# Patient Record
Sex: MALE | Race: WHITE | Employment: OTHER | URBAN - METROPOLITAN AREA
[De-identification: names, ages, dates, MRNs, and addresses within clinical notes are randomized per-mention and may not be internally consistent; named-entity substitution may affect disease eponyms.]

---

## 2019-05-09 ENCOUNTER — HOSPITAL ENCOUNTER (EMERGENCY)
Age: 59
Discharge: HOME | End: 2019-05-09
Attending: EMERGENCY MEDICINE
Payer: MEDICAID

## 2019-05-09 ENCOUNTER — APPOINTMENT (OUTPATIENT)
Dept: GENERAL RADIOLOGY | Age: 59
End: 2019-05-09
Payer: MEDICAID

## 2019-05-09 VITALS
HEIGHT: 70 IN | RESPIRATION RATE: 18 BRPM | DIASTOLIC BLOOD PRESSURE: 95 MMHG | OXYGEN SATURATION: 94 % | BODY MASS INDEX: 30.64 KG/M2 | HEART RATE: 85 BPM | SYSTOLIC BLOOD PRESSURE: 144 MMHG | WEIGHT: 214 LBS | TEMPERATURE: 98.2 F

## 2019-05-09 DIAGNOSIS — Z72.0 TOBACCO ABUSE: ICD-10-CM

## 2019-05-09 DIAGNOSIS — J40 BRONCHITIS: Primary | ICD-10-CM

## 2019-05-09 DIAGNOSIS — R07.9 CHEST PAIN, UNSPECIFIED TYPE: ICD-10-CM

## 2019-05-09 DIAGNOSIS — J44.1 COPD EXACERBATION (HCC): ICD-10-CM

## 2019-05-09 LAB
A/G RATIO: 1.8 (ref 1.1–2.2)
ALBUMIN SERPL-MCNC: 4.4 G/DL (ref 3.4–5)
ALP BLD-CCNC: 93 U/L (ref 40–129)
ALT SERPL-CCNC: 22 U/L (ref 10–40)
ANION GAP SERPL CALCULATED.3IONS-SCNC: 13 MMOL/L (ref 3–16)
AST SERPL-CCNC: 18 U/L (ref 15–37)
BASOPHILS ABSOLUTE: 0.1 K/UL (ref 0–0.2)
BASOPHILS RELATIVE PERCENT: 1.1 %
BILIRUB SERPL-MCNC: <0.2 MG/DL (ref 0–1)
BUN BLDV-MCNC: 10 MG/DL (ref 7–20)
CALCIUM SERPL-MCNC: 9.5 MG/DL (ref 8.3–10.6)
CHLORIDE BLD-SCNC: 101 MMOL/L (ref 99–110)
CO2: 27 MMOL/L (ref 21–32)
CREAT SERPL-MCNC: 0.9 MG/DL (ref 0.9–1.3)
EKG ATRIAL RATE: 82 BPM
EKG DIAGNOSIS: NORMAL
EKG P AXIS: 48 DEGREES
EKG P-R INTERVAL: 140 MS
EKG Q-T INTERVAL: 372 MS
EKG QRS DURATION: 78 MS
EKG QTC CALCULATION (BAZETT): 434 MS
EKG R AXIS: -7 DEGREES
EKG T AXIS: 17 DEGREES
EKG VENTRICULAR RATE: 82 BPM
EOSINOPHILS ABSOLUTE: 0.1 K/UL (ref 0–0.6)
EOSINOPHILS RELATIVE PERCENT: 1.1 %
GFR AFRICAN AMERICAN: >60
GFR NON-AFRICAN AMERICAN: >60
GLOBULIN: 2.5 G/DL
GLUCOSE BLD-MCNC: 118 MG/DL (ref 70–99)
HCT VFR BLD CALC: 44.9 % (ref 40.5–52.5)
HEMOGLOBIN: 15.5 G/DL (ref 13.5–17.5)
LYMPHOCYTES ABSOLUTE: 3.8 K/UL (ref 1–5.1)
LYMPHOCYTES RELATIVE PERCENT: 31.3 %
MCH RBC QN AUTO: 33.1 PG (ref 26–34)
MCHC RBC AUTO-ENTMCNC: 34.5 G/DL (ref 31–36)
MCV RBC AUTO: 95.8 FL (ref 80–100)
MONOCYTES ABSOLUTE: 0.7 K/UL (ref 0–1.3)
MONOCYTES RELATIVE PERCENT: 5.8 %
NEUTROPHILS ABSOLUTE: 7.4 K/UL (ref 1.7–7.7)
NEUTROPHILS RELATIVE PERCENT: 60.7 %
PDW BLD-RTO: 13.3 % (ref 12.4–15.4)
PLATELET # BLD: 282 K/UL (ref 135–450)
PMV BLD AUTO: 9.2 FL (ref 5–10.5)
POTASSIUM SERPL-SCNC: 3.9 MMOL/L (ref 3.5–5.1)
RBC # BLD: 4.68 M/UL (ref 4.2–5.9)
SODIUM BLD-SCNC: 141 MMOL/L (ref 136–145)
TOTAL PROTEIN: 6.9 G/DL (ref 6.4–8.2)
TROPONIN: <0.01 NG/ML
WBC # BLD: 12.2 K/UL (ref 4–11)

## 2019-05-09 PROCEDURE — 94640 AIRWAY INHALATION TREATMENT: CPT

## 2019-05-09 PROCEDURE — 96374 THER/PROPH/DIAG INJ IV PUSH: CPT

## 2019-05-09 PROCEDURE — 6370000000 HC RX 637 (ALT 250 FOR IP): Performed by: PHYSICIAN ASSISTANT

## 2019-05-09 PROCEDURE — 6360000002 HC RX W HCPCS: Performed by: PHYSICIAN ASSISTANT

## 2019-05-09 PROCEDURE — 99285 EMERGENCY DEPT VISIT HI MDM: CPT

## 2019-05-09 PROCEDURE — 93005 ELECTROCARDIOGRAM TRACING: CPT | Performed by: EMERGENCY MEDICINE

## 2019-05-09 PROCEDURE — 85025 COMPLETE CBC W/AUTO DIFF WBC: CPT

## 2019-05-09 PROCEDURE — 71046 X-RAY EXAM CHEST 2 VIEWS: CPT

## 2019-05-09 PROCEDURE — 93010 ELECTROCARDIOGRAM REPORT: CPT | Performed by: INTERNAL MEDICINE

## 2019-05-09 PROCEDURE — 84484 ASSAY OF TROPONIN QUANT: CPT

## 2019-05-09 PROCEDURE — 80053 COMPREHEN METABOLIC PANEL: CPT

## 2019-05-09 RX ORDER — KETOROLAC TROMETHAMINE 30 MG/ML
15 INJECTION, SOLUTION INTRAMUSCULAR; INTRAVENOUS ONCE
Status: COMPLETED | OUTPATIENT
Start: 2019-05-09 | End: 2019-05-09

## 2019-05-09 RX ORDER — DOXYCYCLINE HYCLATE 100 MG
100 TABLET ORAL 2 TIMES DAILY
Qty: 14 TABLET | Refills: 0 | Status: SHIPPED | OUTPATIENT
Start: 2019-05-09 | End: 2019-05-16

## 2019-05-09 RX ORDER — IPRATROPIUM BROMIDE AND ALBUTEROL SULFATE 2.5; .5 MG/3ML; MG/3ML
1 SOLUTION RESPIRATORY (INHALATION) ONCE
Status: COMPLETED | OUTPATIENT
Start: 2019-05-09 | End: 2019-05-09

## 2019-05-09 RX ORDER — PREDNISONE 50 MG/1
50 TABLET ORAL DAILY
Qty: 4 TABLET | Refills: 0 | Status: SHIPPED | OUTPATIENT
Start: 2019-05-09 | End: 2019-05-13

## 2019-05-09 RX ORDER — ALBUTEROL SULFATE 90 UG/1
2 AEROSOL, METERED RESPIRATORY (INHALATION) EVERY 6 HOURS PRN
Qty: 1 INHALER | Refills: 1 | Status: SHIPPED | OUTPATIENT
Start: 2019-05-09

## 2019-05-09 RX ORDER — PREDNISONE 20 MG/1
60 TABLET ORAL ONCE
Status: COMPLETED | OUTPATIENT
Start: 2019-05-09 | End: 2019-05-09

## 2019-05-09 RX ADMIN — PREDNISONE 60 MG: 20 TABLET ORAL at 14:37

## 2019-05-09 RX ADMIN — KETOROLAC TROMETHAMINE 15 MG: 30 INJECTION, SOLUTION INTRAMUSCULAR at 14:37

## 2019-05-09 RX ADMIN — IPRATROPIUM BROMIDE AND ALBUTEROL SULFATE 1 AMPULE: .5; 3 SOLUTION RESPIRATORY (INHALATION) at 14:18

## 2019-05-09 ASSESSMENT — PAIN SCALES - GENERAL: PAINLEVEL_OUTOF10: 2

## 2019-05-09 ASSESSMENT — PAIN DESCRIPTION - ONSET: ONSET: ON-GOING

## 2019-05-09 ASSESSMENT — ENCOUNTER SYMPTOMS
WHEEZING: 1
NAUSEA: 0
VOMITING: 0
ABDOMINAL PAIN: 0
COUGH: 1
BACK PAIN: 1
SHORTNESS OF BREATH: 0
COLOR CHANGE: 0
EYES NEGATIVE: 1

## 2019-05-09 ASSESSMENT — PAIN DESCRIPTION - FREQUENCY: FREQUENCY: CONTINUOUS

## 2019-05-09 ASSESSMENT — PAIN DESCRIPTION - DESCRIPTORS: DESCRIPTORS: ACHING

## 2019-05-09 ASSESSMENT — PAIN DESCRIPTION - PAIN TYPE: TYPE: ACUTE PAIN

## 2019-05-09 ASSESSMENT — PAIN DESCRIPTION - PROGRESSION: CLINICAL_PROGRESSION: NOT CHANGED

## 2019-05-09 ASSESSMENT — PAIN DESCRIPTION - LOCATION: LOCATION: CHEST

## 2019-05-09 NOTE — DISCHARGE INSTR - COC
Continuity of Care Form    Patient Name: Shannan Nascimento   :  1960  MRN:  [id number]    Admit date:  2019  Discharge date:  ***    Code Status Order: Prior   Advance Directives:     Admitting Physician:  No admitting provider for patient encounter. PCP: Hetal Rosenberg MD    Discharging Nurse: [facility] Unit/Room#:   Discharging Unit Phone Number: ***    Emergency Contact:   Extended Emergency Contact Information  Primary Emergency Contact: [facility] Phone: [phone]  Relation: Parent    Past Surgical History:  History reviewed. No pertinent surgical history. Immunization History: There is no immunization history on file for this patient. Active Problems: There is no problem list on file for this patient.       Isolation/Infection:   Isolation          No Isolation            Nurse Assessment:  Last Vital Signs: BP (!) 143/94   Pulse 86   Temp 98.2 °F (36.8 °C) (Oral)   Resp 24   Ht 5' 10\" (1.778 m)   Wt 214 lb (97.1 kg)   SpO2 91%   BMI 30.71 kg/m²     Last documented pain score (0-10 scale): Pain Level: 2  Last Weight:   Wt Readings from Last 1 Encounters:   19 214 lb (97.1 kg)     Mental Status:  {IP PT MENTAL STATUS:19680}    IV Access:  { MEGAN IV ACCESS:087218504}    Nursing Mobility/ADLs:  Walking   {CHP DME RQVP:347067357}  Transfer  {CHP DME RTCS:933630464}  Bathing  {CHP DME LBFI:453542938}  Dressing  {CHP DME TWTW:478781659}  Toileting  {CHP DME RLWW:983275329}  Feeding  {CHP DME RBQM:473077029}  Med Admin  {P DME LQKT:144551031}  Med Delivery   { MEGAN MED Delivery:161535021}    Wound Care Documentation and Therapy:        Elimination:  Continence:   · Bowel: {YES / MN:12281}  · Bladder: {YES / AY:30058}  Urinary Catheter: {Urinary Catheter:887690514}   Colostomy/Ileostomy/Ileal Conduit: {YES / IV:98890}       Date of Last BM: ***  No intake or output data in the 24 hours ending 19 1521  No intake/output data recorded.     Safety Concerns:     [address] [facility] Safety Concerns:162209700}    Impairments/Disabilities:      [facility] Impairments/Disabilities:826649846}    Nutrition Therapy:  Current Nutrition Therapy:   [address] [facility] Diet List:901049321}    Routes of Feeding: {CHP DME Other Feedings:267509954}  Liquids: {Slp liquid thickness:11553}  Daily Fluid Restriction: {CHP DME Yes amt example:092523141}  Last Modified Barium Swallow with Video (Video Swallowing Test): {Done Not Done MSQQ:716128781}    Treatments at the Time of Hospital Discharge:   Respiratory Treatments: ***  Oxygen Therapy:  {Therapy; copd oxygen:11181}  Ventilator:    { CC Vent VEVC:256910776}    Rehab Therapies: {THERAPEUTIC INTERVENTION:0172234287}  Weight Bearing Status/Restrictions: [address]  Weight Bearin}  Other Medical Equipment (for information only, NOT a DME order):  {EQUIPMENT:606848661}  Other Treatments: ***    Patient's personal belongings (please select all that are sent with patient):  {P DME Belongings:912160149}    RN SIGNATURE:  {Esignature:175898607}    CASE MANAGEMENT/SOCIAL WORK SECTION    Inpatient Status Date: ***    Readmission Risk Assessment Score:  Readmission Risk              Risk of Unplanned Readmission:        0           Discharging to Facility/ Agency   · Name:   · Address:  · Phone:  · Fax:    Dialysis Facility (if applicable)   · Name:  · Address:  · Dialysis Schedule:  · Phone:  · Fax:    / signature: {Esignature:185288774}    PHYSICIAN SECTION    Prognosis: {Prognosis:6875107590}    Condition at Discharge: [address] Patient Condition:947840066}    Rehab Potential (if transferring to Rehab): {Prognosis:6242828766}    Recommended Labs or Other Treatments After Discharge: ***    Physician Certification: I certify the above information and transfer of Renetta Lowery  is necessary for the continuing treatment of the diagnosis listed and that he requires {Admit to Appropriate Level of Care:55763} for {GREATER/LESS:925337011} 30 days.      Update Admission H&P: {CHP DME Changes in ZQUCS:711616323}    PHYSICIAN SIGNATURE:  {Esignature:351520856}

## 2019-05-09 NOTE — ED PROVIDER NOTES
[facility]  ED  eMERGENCY dEPARTMENT eNCOUnter        Pt Name: Orquidea Rizzo  MRN: [id number]  Armsmichaelgflinda 1960  Date of evaluation: 5/9/2019  Provider: Harlan Blanco PA-C  PCP: Juana Pettit MD  ED Attending: Franci Carnes MD    History provided by the patient    CHIEF COMPLAINT:     Chief Complaint   Patient presents with    Chest Pain     having intermittent pain over a month, today pain radiating into left arm       HISTORY OF PRESENT ILLNESS:      Orquidea Rizzo is a 63 y.o. male who arrives to the ED by private vehicle. He was reportedly sent over by urgent care. The patient states he has been sick for the last several days with congestion and a productive cough. He additionally has been experiencing some pain to his left upper back, shoulder and left chest.  Upon describing his symptoms to urgent care he was sent here secondary to concerns for possible cardiac involvement. The patient has not had a fever or chills. He is a smoker--smoking 2 packs per day. He does not have a formal diagnosis of COPD but has had to use inhalers in the past when he has been acutely ill. The patient's left upper back pain, left shoulder pain and left chest pain is exacerbated with movement. He can reproduce the pain to palpate the areas as well. Nursing Notes were reviewed     REVIEW OF SYSTEMS:     Review of Systems   Constitutional: Negative for activity change, appetite change, chills and fever. HENT: Positive for congestion. Eyes: Negative. Respiratory: Positive for cough and wheezing. Negative for shortness of breath. Cardiovascular: Positive for chest pain. Negative for palpitations. Gastrointestinal: Negative for abdominal pain, nausea and vomiting. Genitourinary: Negative. Musculoskeletal: Positive for back pain. Negative for gait problem, joint swelling and neck pain. Skin: Negative for color change.    Neurological: Negative for dizziness, weakness and headaches. All other systems reviewed and are negative. Exceptas noted above in the ROS, all other systems were reviewed and negative. PAST MEDICAL HISTORY:     Past Medical History:   Diagnosis Date    Hypertension          SURGICAL HISTORY:    History reviewed. No pertinent surgical history. CURRENT MEDICATIONS:       Previous Medications    No medications on file         ALLERGIES:    Patient has no known allergies. FAMILY HISTORY:     History reviewed. No pertinent family history. SOCIAL HISTORY:       Social History     Socioeconomic History    Marital status: Single     Spouse name: None    Number of children: None    Years of education: None    Highest education level: None   Occupational History    None   Social Needs    Financial resource strain: None    Food insecurity:     Worry: None     Inability: None    Transportation needs:     Medical: None     Non-medical: None   Tobacco Use    Smoking status: Current Every Day Smoker     Packs/day: 2.00    Smokeless tobacco: Never Used   Substance and Sexual Activity    Alcohol use:  Yes     Alcohol/week: 25.2 oz     Types: 42 Cans of beer per week     Comment: hasn't drank in 3 days    Drug use: No    Sexual activity: None   Lifestyle    Physical activity:     Days per week: None     Minutes per session: None    Stress: None   Relationships    Social connections:     Talks on phone: None     Gets together: None     Attends Religion service: None     Active member of club or organization: None     Attends meetings of clubs or organizations: None     Relationship status: None    Intimate partner violence:     Fear of current or ex partner: None     Emotionally abused: None     Physically abused: None     Forced sexual activity: None   Other Topics Concern    None   Social History Narrative    None       SCREENINGS:    [place] Coma Scale  Eye Opening: Spontaneous  Best Verbal Response: Oriented  Best Motor Response: Obeys commands  Jessica Coma Scale Score: 15        PHYSICAL EXAM:       ED Triage Vitals [05/09/19 1350]   BP Temp Temp Source Pulse Resp SpO2 Height Weight   (!) 136/99 98.2 °F (36.8 °C) Oral 82 14 94 % 5' 10\" (1.778 m) 214 lb (97.1 kg)       Physical Exam    CONSTITUTIONAL: Awake and alert. Cooperative. Well-developed. Well-nourished. Non-toxic. No acute distress. HENT: Normocephalic. Atraumatic. External ears normal, without discharge. No nasal discharge. Oropharynx clear. Mucous membranes moist.  EYES: Conjunctiva non-injected. No scleral icterus. PERRL. EOM's grossly intact. NECK: Supple. Normal ROM. CARDIOVASCULAR: RRR. No Murmer. Intact distal pulses. PULMONARY/CHEST WALL: Effort normal. No tachypnea. Lungs with scattered wheezes and rhonchi throughout to ausculation. Reproducible pain to palpate the left upper chest wall. No soft tissue swelling or crepitus  ABDOMEN: Normal BS. Soft. Nondistended. No tenderness to palpate. No guarding. /ANORECTAL: Not assessed  MUSKULOSKELETAL: Neck: Mild pain to the left upper back and over the left shoulder. Pain is reproducible with palpation and range the left shoulder. No soft tissue swelling or crepitus. Normal ROM. No acute deformities. No edema. SKIN: Warm and dry. No rash. NEUROLOGICAL: Alert and oriented x 3. GCS 15. CN II-XII grossly intact. Strength is 5/5 in all extremities and sensation is intact. Normal gait.    PSYCHIATRIC: Normal affect        DIAGNOSTICRESULTS:     LABS:    Results for orders placed or performed during the hospital encounter of 05/09/19   CBC Auto Differential   Result Value Ref Range    WBC 12.2 (H) 4.0 - 11.0 K/uL    RBC 4.68 4.20 - 5.90 M/uL    Hemoglobin 15.5 13.5 - 17.5 g/dL    Hematocrit 44.9 40.5 - 52.5 %    MCV 95.8 80.0 - 100.0 fL    MCH 33.1 26.0 - 34.0 pg    MCHC 34.5 31.0 - 36.0 g/dL    RDW 13.3 12.4 - 15.4 %    Platelets 465 584 - 853 K/uL    MPV 9.2 5.0 - 10.5 fL    Neutrophils % 60.7 %    Lymphocytes % 31.3 % Monocytes % 5.8 %    Eosinophils % 1.1 %    Basophils % 1.1 %    Neutrophils # 7.4 1.7 - 7.7 K/uL    Lymphocytes # 3.8 1.0 - 5.1 K/uL    Monocytes # 0.7 0.0 - 1.3 K/uL    Eosinophils # 0.1 0.0 - 0.6 K/uL    Basophils # 0.1 0.0 - 0.2 K/uL   Comprehensive Metabolic Panel   Result Value Ref Range    Sodium 141 136 - 145 mmol/L    Potassium 3.9 3.5 - 5.1 mmol/L    Chloride 101 99 - 110 mmol/L    CO2 27 21 - 32 mmol/L    Anion Gap 13 3 - 16    Glucose 118 (H) 70 - 99 mg/dL    BUN 10 7 - 20 mg/dL    CREATININE 0.9 0.9 - 1.3 mg/dL    GFR Non-African American >60 >60    GFR African American >60 >60    Calcium 9.5 8.3 - 10.6 mg/dL    Total Protein 6.9 6.4 - 8.2 g/dL    Alb 4.4 3.4 - 5.0 g/dL    Albumin/Globulin Ratio 1.8 1.1 - 2.2    Total Bilirubin <0.2 0.0 - 1.0 mg/dL    Alkaline Phosphatase 93 40 - 129 U/L    ALT 22 10 - 40 U/L    AST 18 15 - 37 U/L    Globulin 2.5 g/dL   Troponin   Result Value Ref Range    Troponin <0.01 <0.01 ng/mL   EKG 12 Lead   Result Value Ref Range    Ventricular Rate 82 BPM    Atrial Rate 82 BPM    P-R Interval 140 ms    QRS Duration 78 ms    Q-T Interval 372 ms    QTc Calculation (Bazett) 434 ms    P Axis 48 degrees    R Axis -7 degrees    T Axis 17 degrees    Diagnosis       Normal sinus rhythmPossible Left atrial enlargementBorderline ECGNo previous ECGs available         RADIOLOGY:  All x-ray studies areviewed/reviewed by me. Formal interpretations per the radiologist are as follows:      Xr Chest Standard (2 Vw)    Result Date: 5/9/2019  EXAMINATION: TWO XRAY VIEWS OF THE CHEST 5/9/2019 2:08 pm COMPARISON: None. HISTORY: ORDERING SYSTEM PROVIDED HISTORY: pain TECHNOLOGIST PROVIDED HISTORY: Reason for exam:->pain Ordering Physician Provided Reason for Exam: pain Acuity: Acute Type of Exam: Initial FINDINGS: The lungs are without acute focal process. There is no effusion or pneumothorax. The cardiomediastinal silhouette is without acute process.  The osseous structures are without acute process. No acute process. EKG: The Ekg interpreted by me in the absence of a cardiologist shows. normal sinus rhythm with a rate of 82  Axis is   Normal  QTc is  within an acceptable range  Intervals and Durations areunremarkable. No specific ST-T wave changes appreciated. No evidence of acute ischemia. See also interpretation by Taz López MD.      PROCEDURES:   N/A    CRITICAL CARE TIME:       None      CONSULTS:  None      EMERGENCY DEPARTMENT COURSE and DIFFERENTIAL DIAGNOSIS/MDM:   Vitals:    Vitals:    05/09/19 1419 05/09/19 1442 05/09/19 1515 05/09/19 1523   BP:  (!) 143/94 (!) 144/95    Pulse:  86 85    Resp: 26 24 18    Temp:       TempSrc:       SpO2: 92% 91% 95% 94%   Weight:       Height:           Patient was given the following medications:  Medications   ipratropium-albuterol (DUONEB) nebulizer solution 1 ampule (1 ampule Inhalation Given 5/9/19 1418)   predniSONE (DELTASONE) tablet 60 mg (60 mg Oral Given 5/9/19 1437)   ketorolac (TORADOL) injection 15 mg (15 mg Intravenous Given 5/9/19 1437)         Patient was evaluated by both myself and Taz López MD.  This patient is here reporting upper and lower respiratory symptoms for several days. He describes a productive cough. On physical exam he has some wheezing and rhonchi. The patient was sent over secondary to chest pain component. I am able to reproduce this chest pain in palpating his chest wall, upper back and shoulder. A CBC shows a white count of 12.2 but is otherwise normal.  His CMP is unremarkable. Troponin is negative. EKG shows sinus rhythm without ischemic changes. Two-view chest x-ray is normal.  The patient is given Toradol IV for the musculoskeletal pain in his chest, shoulder and back. He is given a DuoNeb treatment and prednisone orally. Overall, the patient's clinical picture is consistent with a bronchitis. The pain he describes as musculoskeletal pain.   I don't see an indication for hospitalization. He has primary care follow-up. Because he is a smoker with a productive cough will cover him with doxycycline and will additionally prescribe him a albuterol inhaler and prednisone. I estimate there is LOW risk for PNEUMONIA, PNEUMOTHORAX, PULMONARY EMBOLISM, ACUTE CORONARY SYNDROME, OR THORACIC AORTIC DISSECTION, thus I consider the discharge disposition reasonable. Maylin Contreras and I have discussed the diagnosis and risks, and we agree with discharging home to follow-up with their primary doctor. We also discussed returning to the Emergency Department immediately if new or worsening symptoms occur. We have discussed the symptoms which are most concerning (e.g., bloody sputum, fever, worsening pain or shortness of breath, vomiting) that necessitate immediate return. FINAL IMPRESSION:      1. Bronchitis    2. COPD exacerbation (HCC)    3. Chest pain, unspecified type    4.  Tobacco abuse          DISPOSITION/PLAN:   DISPOSITION Decision To Discharge      PATIENT REFERRED TO:  Savannah Rabago, [address] [facility] [address]  [address] [phone]    Schedule an appointment as soon as possible for a visit       [facility]  ED  [address]  Go to   If symptoms worsen      DISCHARGE MEDICATIONS:  New Prescriptions    ALBUTEROL SULFATE HFA (VENTOLIN HFA) 108 (90 BASE) MCG/ACT INHALER    Inhale 2 puffs into the lungs every 6 hours as needed for Wheezing    DOXYCYCLINE HYCLATE (VIBRA-TABS) 100 MG TABLET    Take 1 tablet by mouth 2 times daily for 7 days    PREDNISONE (DELTASONE) 50 MG TABLET    Take 1 tablet by mouth daily for 4 days                  (Please note thatportions of this note were completed with a voice recognition program.  Efforts were made to edit the dictations, but occasionally words are mis-transcribed.)    Ang Link PA-C (electronicallysigned)             Modesta Esqueda, [place]  05/09/19 8407

## 2019-05-09 NOTE — ED NOTES
Findings and plan of care discussed at bedside by provider. Pt verbalized understanding of plan of care, pt discharged with all instructions reviewed and any prescriptions as applicable. All belongings in hand including discharge instructions, prescriptions x3, and personal belongings. Pt in no acute distress.      Kristina Ansari RN  05/09/19 3188

## 2019-05-09 NOTE — ED PROVIDER NOTES
I independently evaluated and obtained a history and physical on Eliseo Bailey. All diagnostic, treatment, and disposition assistants were made to myself in conjunction the advanced practice provider. For further details of this patient's emergency department encounter, please see the advanced practice provider's documentation. History: The patient is a 60-year-old male who presents with 3-4 days of congestion and productive cough. He isn't every day smoker. He denies any known coronary artery disease. He denies any known history of COPD but does report that he has been prescribed inhalers when he has had breast infections in the past.  He reports his left chest pain is aching in nature, worse with movement of his left arm or pressing on his left chest.  He denies any diaphoresis or syncope. Physician Exam: Pleasant middle-aged  male in no acute distress. Mildly prolonged expiratory phase of breathing and scant wheezes. Chest pain completely reproducible to palpation. Regular rate and rhythm. Intact distal pulses. MDM:    The Ekg interpreted by me shows  normal sinus rhythm with a rate of 82  Axis is   Normal  QTc is  434ms  Intervals and Durations are unremarkable. ST Segments: normal  No previous EKG available for comparison    I have considered ACS however the patient's symptoms are very atypical, have been ongoing for multiple days with no signs of ischemia on EKG and an undetectable troponin, and a completely reproducible to palpation. I suspect likely bronchitis and chest pain from upper respiratory infection and coughing. Do not suspect PE at this time. I feel the patient is appropriate for steroids, inhalers, antibiotics, and outpatient follow-up. Strict ER return precautions given for any new or worsening symptoms. The patient expresses understanding and agreement with this plan.     I estimate there is low risk for pericardial tamponade, pneumothorax, pulmonary embolism, acute coronary syndrome or thoracic aortic dissection, thus I consider the discharge disposition reasonable. We have discussed the diagnosis and risks, and we agree with discharging home to follow-up with their primary doctor. We also discussed returning to the Emergency Department immediately if new or worsening symptoms occur. We have discussed the symptoms which are most concerning (e.g., bloody sputum, fever, worsening pain or shortenss of breath, vomiting) that necessitate immediate return. FINAL IMPRESSION      1. Bronchitis    2. COPD exacerbation (HCC)    3. Chest pain, unspecified type    4.  Tobacco abuse             Lauren Herron MD  05/10/19 2353

## 2021-03-25 ENCOUNTER — APPOINTMENT (OUTPATIENT)
Dept: GENERAL RADIOLOGY | Age: 61
DRG: 130 | End: 2021-03-25
Payer: MEDICAID

## 2021-03-25 ENCOUNTER — HOSPITAL ENCOUNTER (INPATIENT)
Age: 61
LOS: 16 days | Discharge: HOME HEALTH | DRG: 130 | End: 2021-04-10
Attending: EMERGENCY MEDICINE | Admitting: INTERNAL MEDICINE
Payer: MEDICAID

## 2021-03-25 DIAGNOSIS — J96.01 ACUTE RESPIRATORY FAILURE WITH HYPOXIA AND HYPERCAPNIA (HCC): ICD-10-CM

## 2021-03-25 DIAGNOSIS — J44.1 COPD EXACERBATION (HCC): Primary | ICD-10-CM

## 2021-03-25 DIAGNOSIS — R05.9 COUGH: ICD-10-CM

## 2021-03-25 DIAGNOSIS — J96.02 ACUTE RESPIRATORY FAILURE WITH HYPOXIA AND HYPERCAPNIA (HCC): ICD-10-CM

## 2021-03-25 PROBLEM — F10.939 ALCOHOL WITHDRAWAL (HCC): Status: ACTIVE | Noted: 2021-03-25

## 2021-03-25 LAB
A/G RATIO: 1.3 (ref 1.1–2.2)
ALBUMIN SERPL-MCNC: 4.2 G/DL (ref 3.4–5)
ALP BLD-CCNC: 108 U/L (ref 40–129)
ALT SERPL-CCNC: 20 U/L (ref 10–40)
ANION GAP SERPL CALCULATED.3IONS-SCNC: 6 MMOL/L (ref 3–16)
AST SERPL-CCNC: 17 U/L (ref 15–37)
BASE EXCESS ARTERIAL: 1.4 MMOL/L (ref -3–3)
BASE EXCESS VENOUS: 4 MMOL/L (ref -3–3)
BASOPHILS ABSOLUTE: 0.1 K/UL (ref 0–0.2)
BASOPHILS RELATIVE PERCENT: 0.9 %
BILIRUB SERPL-MCNC: 0.3 MG/DL (ref 0–1)
BUN BLDV-MCNC: 13 MG/DL (ref 7–20)
CALCIUM IONIZED: 1.12 MMOL/L (ref 1.12–1.32)
CALCIUM SERPL-MCNC: 9.4 MG/DL (ref 8.3–10.6)
CARBOXYHEMOGLOBIN ARTERIAL: 2.8 % (ref 0–1.5)
CARBOXYHEMOGLOBIN: 9.3 % (ref 0–1.5)
CHLORIDE BLD-SCNC: 98 MMOL/L (ref 99–110)
CO2: 38 MMOL/L (ref 21–32)
CREAT SERPL-MCNC: 0.7 MG/DL (ref 0.9–1.3)
EKG ATRIAL RATE: 94 BPM
EKG DIAGNOSIS: NORMAL
EKG P AXIS: 69 DEGREES
EKG P-R INTERVAL: 130 MS
EKG Q-T INTERVAL: 344 MS
EKG QRS DURATION: 74 MS
EKG QTC CALCULATION (BAZETT): 430 MS
EKG R AXIS: 19 DEGREES
EKG T AXIS: 28 DEGREES
EKG VENTRICULAR RATE: 94 BPM
EOSINOPHILS ABSOLUTE: 0.1 K/UL (ref 0–0.6)
EOSINOPHILS RELATIVE PERCENT: 0.5 %
GFR AFRICAN AMERICAN: >60
GFR NON-AFRICAN AMERICAN: >60
GLOBULIN: 3.2 G/DL
GLUCOSE BLD-MCNC: 129 MG/DL (ref 70–99)
GLUCOSE BLD-MCNC: 137 MG/DL (ref 70–99)
GLUCOSE BLD-MCNC: 138 MG/DL (ref 70–99)
HCO3 ARTERIAL: 32.9 MMOL/L (ref 21–29)
HCO3 VENOUS: 35.3 MMOL/L (ref 23–29)
HCT VFR BLD CALC: 47 % (ref 40.5–52.5)
HEMOGLOBIN, ART, EXTENDED: 16.6 G/DL (ref 13.5–17.5)
HEMOGLOBIN: 15.2 G/DL (ref 13.5–17.5)
LYMPHOCYTES ABSOLUTE: 1.5 K/UL (ref 1–5.1)
LYMPHOCYTES RELATIVE PERCENT: 14 %
MCH RBC QN AUTO: 31.6 PG (ref 26–34)
MCHC RBC AUTO-ENTMCNC: 32.4 G/DL (ref 31–36)
MCV RBC AUTO: 97.6 FL (ref 80–100)
METHEMOGLOBIN ARTERIAL: 0.5 %
METHEMOGLOBIN VENOUS: 0.4 %
MONOCYTES ABSOLUTE: 0.7 K/UL (ref 0–1.3)
MONOCYTES RELATIVE PERCENT: 6.4 %
NEUTROPHILS ABSOLUTE: 8.2 K/UL (ref 1.7–7.7)
NEUTROPHILS RELATIVE PERCENT: 78.2 %
O2 CONTENT ARTERIAL: 22 ML/DL
O2 CONTENT, VEN: 20 VOL %
O2 SAT, ARTERIAL: 98.2 %
O2 SAT, VEN: 91 %
O2 THERAPY: ABNORMAL
O2 THERAPY: ABNORMAL
PCO2 ARTERIAL: 85.8 MMHG (ref 35–45)
PCO2, VEN: 85.9 MMHG (ref 40–50)
PDW BLD-RTO: 14.5 % (ref 12.4–15.4)
PERFORMED ON: ABNORMAL
PERFORMED ON: ABNORMAL
PH ARTERIAL: 7.2 (ref 7.35–7.45)
PH VENOUS: 7.23 (ref 7.35–7.45)
PH VENOUS: 7.25 (ref 7.35–7.45)
PLATELET # BLD: 264 K/UL (ref 135–450)
PMV BLD AUTO: 8 FL (ref 5–10.5)
PO2 ARTERIAL: 139.2 MMHG (ref 75–108)
PO2, VEN: 68.1 MMHG (ref 25–40)
POTASSIUM REFLEX MAGNESIUM: 3.9 MMOL/L (ref 3.5–5.1)
PRO-BNP: 635 PG/ML (ref 0–124)
PROCALCITONIN: 0.06 NG/ML (ref 0–0.15)
RBC # BLD: 4.81 M/UL (ref 4.2–5.9)
SARS-COV-2, NAAT: NOT DETECTED
SODIUM BLD-SCNC: 142 MMOL/L (ref 136–145)
TCO2 ARTERIAL: 35.5 MMOL/L
TCO2 CALC VENOUS: 38 MMOL/L
TOTAL PROTEIN: 7.4 G/DL (ref 6.4–8.2)
TROPONIN: <0.01 NG/ML
WBC # BLD: 10.5 K/UL (ref 4–11)

## 2021-03-25 PROCEDURE — 71045 X-RAY EXAM CHEST 1 VIEW: CPT

## 2021-03-25 PROCEDURE — 2580000003 HC RX 258: Performed by: INTERNAL MEDICINE

## 2021-03-25 PROCEDURE — 84484 ASSAY OF TROPONIN QUANT: CPT

## 2021-03-25 PROCEDURE — 94644 CONT INHLJ TX 1ST HOUR: CPT

## 2021-03-25 PROCEDURE — 94640 AIRWAY INHALATION TREATMENT: CPT

## 2021-03-25 PROCEDURE — 6370000000 HC RX 637 (ALT 250 FOR IP): Performed by: INTERNAL MEDICINE

## 2021-03-25 PROCEDURE — 83880 ASSAY OF NATRIURETIC PEPTIDE: CPT

## 2021-03-25 PROCEDURE — 94761 N-INVAS EAR/PLS OXIMETRY MLT: CPT

## 2021-03-25 PROCEDURE — 87635 SARS-COV-2 COVID-19 AMP PRB: CPT

## 2021-03-25 PROCEDURE — 80053 COMPREHEN METABOLIC PANEL: CPT

## 2021-03-25 PROCEDURE — 0BH17EZ INSERTION OF ENDOTRACHEAL AIRWAY INTO TRACHEA, VIA NATURAL OR ARTIFICIAL OPENING: ICD-10-PCS | Performed by: INTERNAL MEDICINE

## 2021-03-25 PROCEDURE — 94002 VENT MGMT INPAT INIT DAY: CPT

## 2021-03-25 PROCEDURE — 5A1955Z RESPIRATORY VENTILATION, GREATER THAN 96 CONSECUTIVE HOURS: ICD-10-PCS | Performed by: INTERNAL MEDICINE

## 2021-03-25 PROCEDURE — 36556 INSERT NON-TUNNEL CV CATH: CPT

## 2021-03-25 PROCEDURE — 82330 ASSAY OF CALCIUM: CPT

## 2021-03-25 PROCEDURE — 83735 ASSAY OF MAGNESIUM: CPT

## 2021-03-25 PROCEDURE — 36600 WITHDRAWAL OF ARTERIAL BLOOD: CPT

## 2021-03-25 PROCEDURE — 6370000000 HC RX 637 (ALT 250 FOR IP)

## 2021-03-25 PROCEDURE — 2700000000 HC OXYGEN THERAPY PER DAY

## 2021-03-25 PROCEDURE — 82803 BLOOD GASES ANY COMBINATION: CPT

## 2021-03-25 PROCEDURE — 6360000002 HC RX W HCPCS: Performed by: INTERNAL MEDICINE

## 2021-03-25 PROCEDURE — 2580000003 HC RX 258: Performed by: EMERGENCY MEDICINE

## 2021-03-25 PROCEDURE — 99284 EMERGENCY DEPT VISIT MOD MDM: CPT

## 2021-03-25 PROCEDURE — 84145 PROCALCITONIN (PCT): CPT

## 2021-03-25 PROCEDURE — 74018 RADEX ABDOMEN 1 VIEW: CPT

## 2021-03-25 PROCEDURE — 93010 ELECTROCARDIOGRAM REPORT: CPT | Performed by: INTERNAL MEDICINE

## 2021-03-25 PROCEDURE — 85025 COMPLETE CBC W/AUTO DIFF WBC: CPT

## 2021-03-25 PROCEDURE — 99255 IP/OBS CONSLTJ NEW/EST HI 80: CPT | Performed by: INTERNAL MEDICINE

## 2021-03-25 PROCEDURE — 6360000002 HC RX W HCPCS: Performed by: EMERGENCY MEDICINE

## 2021-03-25 PROCEDURE — 31500 INSERT EMERGENCY AIRWAY: CPT

## 2021-03-25 PROCEDURE — 36415 COLL VENOUS BLD VENIPUNCTURE: CPT

## 2021-03-25 PROCEDURE — 2000000000 HC ICU R&B

## 2021-03-25 PROCEDURE — 94660 CPAP INITIATION&MGMT: CPT

## 2021-03-25 PROCEDURE — 83605 ASSAY OF LACTIC ACID: CPT

## 2021-03-25 PROCEDURE — 2500000003 HC RX 250 WO HCPCS: Performed by: INTERNAL MEDICINE

## 2021-03-25 PROCEDURE — 93005 ELECTROCARDIOGRAM TRACING: CPT | Performed by: EMERGENCY MEDICINE

## 2021-03-25 RX ORDER — LORAZEPAM 2 MG/ML
1 INJECTION INTRAMUSCULAR
Status: DISCONTINUED | OUTPATIENT
Start: 2021-03-25 | End: 2021-03-25

## 2021-03-25 RX ORDER — METHYLPREDNISOLONE SODIUM SUCCINATE 40 MG/ML
40 INJECTION, POWDER, LYOPHILIZED, FOR SOLUTION INTRAMUSCULAR; INTRAVENOUS EVERY 12 HOURS
Status: DISCONTINUED | OUTPATIENT
Start: 2021-03-25 | End: 2021-03-29

## 2021-03-25 RX ORDER — ONDANSETRON 2 MG/ML
4 INJECTION INTRAMUSCULAR; INTRAVENOUS EVERY 6 HOURS PRN
Status: DISCONTINUED | OUTPATIENT
Start: 2021-03-25 | End: 2021-04-08

## 2021-03-25 RX ORDER — LORAZEPAM 2 MG/ML
3 INJECTION INTRAMUSCULAR
Status: DISCONTINUED | OUTPATIENT
Start: 2021-03-25 | End: 2021-03-25

## 2021-03-25 RX ORDER — PROMETHAZINE HYDROCHLORIDE 25 MG/1
12.5 TABLET ORAL EVERY 6 HOURS PRN
Status: DISCONTINUED | OUTPATIENT
Start: 2021-03-25 | End: 2021-04-08

## 2021-03-25 RX ORDER — SODIUM CHLORIDE 0.9 % (FLUSH) 0.9 %
10 SYRINGE (ML) INJECTION PRN
Status: DISCONTINUED | OUTPATIENT
Start: 2021-03-25 | End: 2021-03-26

## 2021-03-25 RX ORDER — SUCCINYLCHOLINE CHLORIDE 20 MG/ML
100 INJECTION INTRAMUSCULAR; INTRAVENOUS ONCE
Status: COMPLETED | OUTPATIENT
Start: 2021-03-25 | End: 2021-03-25

## 2021-03-25 RX ORDER — LORAZEPAM 1 MG/1
4 TABLET ORAL
Status: DISCONTINUED | OUTPATIENT
Start: 2021-03-25 | End: 2021-03-25

## 2021-03-25 RX ORDER — ETOMIDATE 2 MG/ML
30 INJECTION INTRAVENOUS ONCE
Status: COMPLETED | OUTPATIENT
Start: 2021-03-25 | End: 2021-03-25

## 2021-03-25 RX ORDER — IPRATROPIUM BROMIDE AND ALBUTEROL SULFATE 2.5; .5 MG/3ML; MG/3ML
SOLUTION RESPIRATORY (INHALATION)
Status: COMPLETED
Start: 2021-03-25 | End: 2021-03-25

## 2021-03-25 RX ORDER — ACETAMINOPHEN 325 MG/1
650 TABLET ORAL EVERY 6 HOURS PRN
Status: DISCONTINUED | OUTPATIENT
Start: 2021-03-25 | End: 2021-04-10 | Stop reason: HOSPADM

## 2021-03-25 RX ORDER — MIDAZOLAM HYDROCHLORIDE 1 MG/ML
2 INJECTION INTRAMUSCULAR; INTRAVENOUS EVERY 30 MIN PRN
Status: DISCONTINUED | OUTPATIENT
Start: 2021-03-25 | End: 2021-04-01

## 2021-03-25 RX ORDER — ALBUTEROL SULFATE 2.5 MG/3ML
2.5 SOLUTION RESPIRATORY (INHALATION)
Status: DISCONTINUED | OUTPATIENT
Start: 2021-03-25 | End: 2021-03-26

## 2021-03-25 RX ORDER — IPRATROPIUM BROMIDE AND ALBUTEROL SULFATE 2.5; .5 MG/3ML; MG/3ML
1 SOLUTION RESPIRATORY (INHALATION)
Status: DISCONTINUED | OUTPATIENT
Start: 2021-03-25 | End: 2021-03-27

## 2021-03-25 RX ORDER — ACETAMINOPHEN 650 MG/1
650 SUPPOSITORY RECTAL EVERY 6 HOURS PRN
Status: DISCONTINUED | OUTPATIENT
Start: 2021-03-25 | End: 2021-04-10 | Stop reason: HOSPADM

## 2021-03-25 RX ORDER — LORAZEPAM 2 MG/ML
4 INJECTION INTRAMUSCULAR
Status: DISCONTINUED | OUTPATIENT
Start: 2021-03-25 | End: 2021-03-25

## 2021-03-25 RX ORDER — LORAZEPAM 0.5 MG/1
0.5 TABLET ORAL EVERY 6 HOURS PRN
Status: DISCONTINUED | OUTPATIENT
Start: 2021-03-25 | End: 2021-03-25

## 2021-03-25 RX ORDER — LORAZEPAM 2 MG/ML
2 INJECTION INTRAMUSCULAR
Status: DISCONTINUED | OUTPATIENT
Start: 2021-03-25 | End: 2021-03-25

## 2021-03-25 RX ORDER — LORAZEPAM 1 MG/1
3 TABLET ORAL
Status: DISCONTINUED | OUTPATIENT
Start: 2021-03-25 | End: 2021-03-25

## 2021-03-25 RX ORDER — THIAMINE HYDROCHLORIDE 100 MG/ML
100 INJECTION, SOLUTION INTRAMUSCULAR; INTRAVENOUS DAILY
Status: DISCONTINUED | OUTPATIENT
Start: 2021-03-25 | End: 2021-03-25 | Stop reason: CLARIF

## 2021-03-25 RX ORDER — SODIUM CHLORIDE 0.9 % (FLUSH) 0.9 %
10 SYRINGE (ML) INJECTION PRN
Status: DISCONTINUED | OUTPATIENT
Start: 2021-03-25 | End: 2021-04-10 | Stop reason: HOSPADM

## 2021-03-25 RX ORDER — PROPOFOL 10 MG/ML
INJECTION, EMULSION INTRAVENOUS
Status: DISPENSED
Start: 2021-03-25 | End: 2021-03-26

## 2021-03-25 RX ORDER — PROPOFOL 10 MG/ML
5-50 INJECTION, EMULSION INTRAVENOUS
Status: DISCONTINUED | OUTPATIENT
Start: 2021-03-25 | End: 2021-03-30

## 2021-03-25 RX ORDER — LORAZEPAM 2 MG/ML
0.5 INJECTION INTRAMUSCULAR EVERY 6 HOURS PRN
Status: DISCONTINUED | OUTPATIENT
Start: 2021-03-25 | End: 2021-03-25

## 2021-03-25 RX ORDER — MINERAL OIL AND WHITE PETROLATUM 150; 830 MG/G; MG/G
OINTMENT OPHTHALMIC EVERY 4 HOURS
Status: DISCONTINUED | OUTPATIENT
Start: 2021-03-25 | End: 2021-03-26

## 2021-03-25 RX ORDER — FENTANYL CITRATE 50 UG/ML
25 INJECTION, SOLUTION INTRAMUSCULAR; INTRAVENOUS
Status: DISCONTINUED | OUTPATIENT
Start: 2021-03-25 | End: 2021-03-31

## 2021-03-25 RX ORDER — CHLORHEXIDINE GLUCONATE 0.12 MG/ML
15 RINSE ORAL 2 TIMES DAILY
Status: DISCONTINUED | OUTPATIENT
Start: 2021-03-25 | End: 2021-03-31

## 2021-03-25 RX ORDER — SODIUM CHLORIDE 0.9 % (FLUSH) 0.9 %
10 SYRINGE (ML) INJECTION EVERY 12 HOURS SCHEDULED
Status: DISCONTINUED | OUTPATIENT
Start: 2021-03-25 | End: 2021-03-26

## 2021-03-25 RX ORDER — ALBUTEROL SULFATE 90 UG/1
4 AEROSOL, METERED RESPIRATORY (INHALATION) EVERY 4 HOURS PRN
Status: DISCONTINUED | OUTPATIENT
Start: 2021-03-25 | End: 2021-03-31

## 2021-03-25 RX ORDER — OLANZAPINE 5 MG/1
10 TABLET, ORALLY DISINTEGRATING ORAL ONCE
Status: COMPLETED | OUTPATIENT
Start: 2021-03-25 | End: 2021-03-25

## 2021-03-25 RX ORDER — POLYETHYLENE GLYCOL 3350 17 G/17G
17 POWDER, FOR SOLUTION ORAL DAILY PRN
Status: DISCONTINUED | OUTPATIENT
Start: 2021-03-25 | End: 2021-03-31

## 2021-03-25 RX ORDER — SODIUM CHLORIDE 0.9 % (FLUSH) 0.9 %
10 SYRINGE (ML) INJECTION EVERY 12 HOURS SCHEDULED
Status: DISCONTINUED | OUTPATIENT
Start: 2021-03-25 | End: 2021-04-10 | Stop reason: HOSPADM

## 2021-03-25 RX ORDER — POLYVINYL ALCOHOL 14 MG/ML
1 SOLUTION/ DROPS OPHTHALMIC EVERY 4 HOURS
Status: DISCONTINUED | OUTPATIENT
Start: 2021-03-25 | End: 2021-03-26

## 2021-03-25 RX ORDER — LORAZEPAM 1 MG/1
1 TABLET ORAL
Status: DISCONTINUED | OUTPATIENT
Start: 2021-03-25 | End: 2021-03-25

## 2021-03-25 RX ORDER — LORAZEPAM 1 MG/1
2 TABLET ORAL
Status: DISCONTINUED | OUTPATIENT
Start: 2021-03-25 | End: 2021-03-25

## 2021-03-25 RX ADMIN — METHYLPREDNISOLONE SODIUM SUCCINATE 40 MG: 40 INJECTION, POWDER, FOR SOLUTION INTRAMUSCULAR; INTRAVENOUS at 15:16

## 2021-03-25 RX ADMIN — ENOXAPARIN SODIUM 40 MG: 40 INJECTION SUBCUTANEOUS at 15:16

## 2021-03-25 RX ADMIN — IPRATROPIUM BROMIDE AND ALBUTEROL SULFATE 3 ML: .5; 3 SOLUTION RESPIRATORY (INHALATION) at 09:16

## 2021-03-25 RX ADMIN — FENTANYL CITRATE 50 MCG/HR: 50 INJECTION, SOLUTION INTRAMUSCULAR; INTRAVENOUS at 21:30

## 2021-03-25 RX ADMIN — PROPOFOL 30 MCG/KG/MIN: 10 INJECTION, EMULSION INTRAVENOUS at 22:00

## 2021-03-25 RX ADMIN — OLANZAPINE 10 MG: 5 TABLET, ORALLY DISINTEGRATING ORAL at 17:06

## 2021-03-25 RX ADMIN — Medication 10 ML: at 22:30

## 2021-03-25 RX ADMIN — SUCCINYLCHOLINE CHLORIDE 100 MG: 20 INJECTION, SOLUTION INTRAMUSCULAR; INTRAVENOUS at 21:00

## 2021-03-25 RX ADMIN — SODIUM CHLORIDE, PRESERVATIVE FREE 10 ML: 5 INJECTION INTRAVENOUS at 21:00

## 2021-03-25 RX ADMIN — CEFTRIAXONE SODIUM 1000 MG: 1 INJECTION, POWDER, FOR SOLUTION INTRAMUSCULAR; INTRAVENOUS at 10:13

## 2021-03-25 RX ADMIN — LORAZEPAM 0.5 MG: 2 INJECTION, SOLUTION INTRAMUSCULAR; INTRAVENOUS at 15:16

## 2021-03-25 RX ADMIN — MIDAZOLAM HYDROCHLORIDE 5 MG/HR: 5 INJECTION, SOLUTION INTRAMUSCULAR; INTRAVENOUS at 22:30

## 2021-03-25 RX ADMIN — ETOMIDATE 30 MG: 40 INJECTION, SOLUTION INTRAVENOUS at 21:10

## 2021-03-25 RX ADMIN — IPRATROPIUM BROMIDE AND ALBUTEROL SULFATE 1 AMPULE: .5; 3 SOLUTION RESPIRATORY (INHALATION) at 19:32

## 2021-03-25 RX ADMIN — AZITHROMYCIN MONOHYDRATE 500 MG: 500 INJECTION, POWDER, LYOPHILIZED, FOR SOLUTION INTRAVENOUS at 11:20

## 2021-03-25 ASSESSMENT — PAIN SCALES - GENERAL: PAINLEVEL_OUTOF10: 0

## 2021-03-25 ASSESSMENT — ENCOUNTER SYMPTOMS
VOMITING: 0
ABDOMINAL PAIN: 1
SORE THROAT: 0
NAUSEA: 1
BACK PAIN: 0
SHORTNESS OF BREATH: 1
COUGH: 1

## 2021-03-25 ASSESSMENT — PULMONARY FUNCTION TESTS: PIF_VALUE: 25

## 2021-03-25 NOTE — PROGRESS NOTES
03/25/21 1452   NIV Type   Equipment Type v60   Mode Bilevel   Mask Type Full face mask   Mask Size Medium   Settings/Measurements   IPAP 12 cmH20   CPAP/EPAP 6 cmH2O   Rate Ordered 10   Resp (!) 32   FiO2  60 %   Vt Exhaled 336 mL   Mask Leak (lpm) 35 lpm  (large beard)   Comfort Level Good   Using Accessory Muscles No   SpO2 98

## 2021-03-25 NOTE — PROGRESS NOTES
03/25/21 1711   NIV Type   Equipment Type v60   Mode Bilevel   Mask Type Full face mask   Mask Size Medium   Settings/Measurements   IPAP 12 cmH20   CPAP/EPAP 6 cmH2O   Rate Ordered 10   Resp 30   FiO2  60 %   Vt Exhaled 400 mL   Mask Leak (lpm) 48 lpm   Comfort Level Poor   Using Accessory Muscles No   SpO2 98   Breath Sounds   Right Upper Lobe Expiratory Wheezes   Right Middle Lobe Expiratory Wheezes   Right Lower Lobe Expiratory Wheezes   Left Upper Lobe Expiratory Wheezes   Left Lower Lobe Expiratory Wheezes   Alarm Settings   Alarms On Y   Press Low Alarm 6 cmH2O   Apnea (secs) 20 secs

## 2021-03-25 NOTE — ED NOTES
[facility] @ 9004  Re: COPD exacerbation, on Bipap  [facility]  03/25/21 1037

## 2021-03-25 NOTE — PROGRESS NOTES
Pt is a 62 yr old admitted with COPD exacerbation. Pt is now maxed out on BiPAP and sating at 88%. Pt sounds rhonchus upon entering room. Pt is currently withdrawing and NPO. Can you please come lay eyes on the pt? Thanks.  MD Ishmael Gallego has been [employer]

## 2021-03-25 NOTE — PROGRESS NOTES
03/25/21 0911   NIV Type   $NIV $Daily Charge   Skin Protection for O2 Device Yes   Location Nose   NIV Started/Stopped On   Equipment Type v60   Mode Bilevel   Mask Type Full face mask   Mask Size Large   Settings/Measurements   IPAP 12 cmH20   CPAP/EPAP 6 cmH2O   Rate Ordered 10   Resp 20   FiO2  80 %   I Time/ I Time % 1 s   Vt Exhaled 433 mL   Minute Volume 11.2 Liters   Mask Leak (lpm) 24 lpm   Comfort Level Good   Using Accessory Muscles No   SpO2 96   Breath Sounds   Right Upper Lobe Expiratory Wheezes   Right Middle Lobe Expiratory Wheezes   Right Lower Lobe Expiratory Wheezes   Left Upper Lobe Expiratory Wheezes   Left Lower Lobe Expiratory Wheezes   Alarm Settings   Alarms On Y   Press Low Alarm 6 cmH2O   High Pressure Alarm 30 cmH2O   Delay Alarm 30 sec(s)   Apnea (secs) 6 secs   High Respiratory Rate 40 br/min

## 2021-03-25 NOTE — H&P
Hospital Medicine History & Physical      PCP: Inocente Peralta MD    Date of Admission: 3/25/2021    Date of Service: Pt seen/examined on 03/25/21  and Admitted to Inpatient with expected LOS greater than two midnights due to medical therapy of acute hypoxemic respiratory failure caused by COPD exacerbation. Chief Complaint: Shortness of breath      History Of Present Illness:      62 y.o. male who presented to [facility] with shortness of breath that started a couple days ago and got worse. Today, patient was extremely short of breath. 911 was called. Patient's pulse ox was in the 40s on room air. Patient was placed on a nonrebreather, which brought him up to 76s. At that point, patient was placed on CPAP and brought to the emergency department. On CPAP, patient's pulse ox was around 90s. In the emergency department, patient was found to be tachypneic with air hunger and switched on BiPAP. Tolerated BiPAP well. Pulse ox is around low 90s at this time. Has been able to speak after started on BiPAP. Yellow sputum with productive cough past couple days was noted. Has subjective fever and chills. Complains of chest pain in the center of the chest.  Cannot characterize whether it is radiating and cannot tell exactly what kind of pain it is. Apparently, it started in past 24 hours and is connected to shortness of breath. Has some nausea without vomiting  No other accompanying symptoms  Nothing onset makes the patient feel better or worse  Patient is a smoker. No recent COVID-19 test  No recent similar episodes. Currently on BiPAP and comfortable. Past Medical History:          Diagnosis Date    COPD (chronic obstructive pulmonary disease) ([facility] [address]     Hypertension        Past Surgical History:      History reviewed. No pertinent surgical history. Medications Prior to Admission:      Prior to Admission medications    Medication Sig Start Date End Date Taking?  Authorizing Provider albuterol sulfate HFA (VENTOLIN HFA) 108 (90 Base) MCG/ACT inhaler Inhale 2 puffs into the lungs every 6 hours as needed for Wheezing 5/9/19   GABRIELA Iglesias       Allergies:  Patient has no known allergies. Social History:      The patient currently lives at home    TOBACCO:   reports that he has been smoking. He has been smoking about 2.00 packs per day. He has never used smokeless tobacco.  ETOH:   reports current alcohol use of about 42.0 standard drinks of alcohol per week. E-Cigarettes/Vaping Use     Questions Responses    E-Cigarette/Vaping Use     Start Date     Passive Exposure     Quit Date     Counseling Given     Comments             Family History:      Reviewed in detail and negative for DM, CAD, Cancer, CVA. Positive as follows:    History reviewed. No pertinent family history. REVIEW OF SYSTEMS:   Pertinent positives as noted in the HPI. Shortness of breath, subjective fever and chills, anterior chest pain. All other systems reviewed and negative. PHYSICAL EXAM PERFORMED:    BP (!) 160/121   Pulse 93   Temp 97.8 °F (36.6 °C) (Oral)   Resp 24   Ht 5' 10\" (1.778 m)   Wt 214 lb (97.1 kg)   SpO2 100%   BMI 30.71 kg/m²     General appearance:  No apparent distress, appears stated age and cooperative. HEENT:  Normal cephalic, atraumatic without obvious deformity. Pupils equal, round, and reactive to light. Extra ocular muscles intact. Conjunctivae/corneas clear. Neck: Supple, with full range of motion. No jugular venous distention. Trachea midline. Respiratory: Tachypneic, with increased respiratory effort. Bilateral expiratory wheezes and biphasic rhonchi. Cardiovascular:  Regular rate and rhythm with normal S1/S2 without murmurs, rubs or gallops. Abdomen: Soft, non-tender, non-distended with normal bowel sounds. Musculoskeletal: Bilateral upper extremity clubbing, no cyanosis or edema bilaterally. Full range of motion without deformity.   Skin: Skin color, texture, turgor normal.  No rashes or lesions. Neurologic:  Neurovascularly intact without any focal sensory/motor deficits. Cranial nerves: II-XII intact, grossly non-focal.  Psychiatric:  Alert and oriented, thought content appropriate, normal insight  Capillary Refill: Brisk,< 3 seconds   Peripheral Pulses: +2 palpable, equal bilaterally       Labs:     Recent Labs     03/25/21 0905   WBC 10.5   HGB 15.2   HCT 47.0        Recent Labs     03/25/21 0905      K 3.9   CL 98*   CO2 38*   BUN 13   CREATININE 0.7*   CALCIUM 9.4     Recent Labs     03/25/21 0905   AST 17   ALT 20   BILITOT 0.3   ALKPHOS 108     No results for input(s): INR in the last 72 hours. Recent Labs     03/25/21 0905   TROPONINI <0.01       Urinalysis:    No results found for: Verdie Dg, BACTERIA, RBCUA, BLOODU, [address], [address]    Radiology:     CXR: I have reviewed the CXR with the following interpretation: Suspicious bibasilar infiltrates. Probably due to poor expansion. EKG:  I have reviewed the EKG with the following interpretation: 94 bpm.  Normal sinus rhythm. Normal EKG. XR CHEST PORTABLE   Final Result          ASSESSMENT:    Active Hospital Problems    Diagnosis Date Noted    [facility] [J44.1] 03/25/2021         PLAN:    Acute hypoxemic respiratory failure secondary to COPD exacerbation. Required BiPAP. Continue treatment of underlying cause  Continue BiPAP  Pulmonology consult requested  I am also asking for a COVID-19 PCR test    COPD exacerbation  Started on steroids  Continue bronchodilators  Needs to quit smoking  Pulmonology consulted  COVID-19 PCR ordered    Abnormal chest x-ray  Empiric antibiotic started in the emergency department. Procalcitonin is pending  Continue same antibiotics at this time until procalcitonin is back  Given productive cough with yellow sputum, patient is likely to need some sort of antibiotics.   If procalcitonin is low or normal, I will switch to an oral antibiotic, but

## 2021-03-25 NOTE — ED PROVIDER NOTES
[facility]  EMERGENCY DEPARTMENT ENCOUNTER      Pt Name: Bob Goodwin  MRN: [id number]  [place] 1960  Date of evaluation: 3/25/2021  Provider: Blayne Zvaala MD    CHIEF COMPLAINT       Chief Complaint   Patient presents with    Shortness of Breath     Hx of COPD. Low SPO2. Pt given 125 mg solumedrol. HISTORY OF PRESENT ILLNESS   (Location/Symptom, Timing/Onset, Context/Setting, Quality, Duration, Modifying Factors, Severity)  Note limiting factors. Bob Goodwin is a 62 y.o. male who presents to the emergency department     Patient is a 64-year-old male with a past medical history of hypertension and COPD who presents with shortness of breath. Patient reports worsening shortness of breath over the last couple of days. EMS was called today and on their arrival patient was satting in the 45s. They placed him on a nonrebreather which only brought him up to the 76s. They then placed him on CPAP and patient had improvement to the 90s. Patient is currently on BiPAP and states that he does feel better. He reports cough productive of yellow sputum. Reports subjective fevers and chills. Has chest pain in the center of his chest that he has a difficult time characterizing as well as pain in the epigastrium. Reports nausea but denies any vomiting. Denies any lower extremity swelling. The history is provided by the patient. Nursing Notes were reviewed. REVIEW OF SYSTEMS    (2-9 systems for level 4, 10 or more for level 5)     Review of Systems   Constitutional: Negative for chills and fever. HENT: Negative for congestion and sore throat. Eyes: Negative for visual disturbance. Respiratory: Positive for cough and shortness of breath. Cardiovascular: Positive for chest pain. Negative for leg swelling. Gastrointestinal: Positive for abdominal pain and nausea. Negative for vomiting. Genitourinary: Negative for dysuria and hematuria.    Musculoskeletal: Negative for back pain and neck pain. Skin: Negative for pallor and rash. Neurological: Negative for light-headedness and headaches. All other systems reviewed and are negative. Except as noted above the remainder of the review of systems was reviewed and negative. PAST MEDICAL HISTORY     Past Medical History:   Diagnosis Date    COPD (chronic obstructive pulmonary disease) ([facility] [address]     Hypertension          SURGICAL HISTORY     History reviewed. No pertinent surgical history. CURRENT MEDICATIONS       Current Discharge Medication List      CONTINUE these medications which have NOT CHANGED    Details   albuterol sulfate HFA (VENTOLIN HFA) 108 (90 Base) MCG/ACT inhaler Inhale 2 puffs into the lungs every 6 hours as needed for Wheezing  Qty: 1 Inhaler, Refills: 1             ALLERGIES     Patient has no known allergies. FAMILY HISTORY     History reviewed. No pertinent family history. SOCIAL HISTORY       Social History     Socioeconomic History    Marital status: Single     Spouse name: None    Number of children: None    Years of education: None    Highest education level: None   Occupational History    None   Social Needs    Financial resource strain: None    Food insecurity     Worry: None     Inability: None    Transportation needs     Medical: None     Non-medical: None   Tobacco Use    Smoking status: Current Every Day Smoker     Packs/day: 2.00    Smokeless tobacco: Never Used   Substance and Sexual Activity    Alcohol use:  Yes     Alcohol/week: 42.0 standard drinks     Types: 42 Cans of beer per week     Comment: hasn't drank in 3 days    Drug use: No    Sexual activity: None   Lifestyle    Physical activity     Days per week: None     Minutes per session: None    Stress: None   Relationships    Social connections     Talks on phone: None     Gets together: None     Attends Buddhist service: None     Active member of club or organization: None     Attends meetings of clubs or organizations: None     Relationship status: None    Intimate partner violence     Fear of current or ex partner: None     Emotionally abused: None     Physically abused: None     Forced sexual activity: None   Other Topics Concern    None   Social History Narrative    None       SCREENINGS    Jessica Coma Scale  Eye Opening: Spontaneous  Best Verbal Response: Confused  Best Motor Response: Obeys commands  Jessica Coma Scale Score: 14          PHYSICAL EXAM    (up to 7 for level 4, 8 or more for level 5)     ED Triage Vitals [03/25/21 0903]   BP Temp Temp Source Pulse Resp SpO2 Height Weight   (!) 165/110 97.8 °F (36.6 °C) Oral 99 28 98 % 5' 10\" (1.778 m) 214 lb (97.1 kg)       Physical Exam  Vitals signs and nursing note reviewed. Constitutional:       General: He is not in acute distress. Appearance: Normal appearance. HENT:      Head: Normocephalic and atraumatic. Nose: Nose normal. No congestion. Mouth/Throat:      Mouth: Mucous membranes are moist.   Eyes:      Conjunctiva/sclera: Conjunctivae normal.   Neck:      Musculoskeletal: Normal range of motion and neck supple. Cardiovascular:      Rate and Rhythm: Regular rhythm. Tachycardia present. Pulses: Normal pulses. Heart sounds: Normal heart sounds. Pulmonary:      Effort: Respiratory distress present. Breath sounds: Wheezing present. Abdominal:      General: There is no distension. Palpations: Abdomen is soft. Tenderness: There is no abdominal tenderness. Musculoskeletal: Normal range of motion. General: No swelling or deformity. Skin:     General: Skin is warm and dry. Neurological:      General: No focal deficit present. Mental Status: He is alert and oriented to person, place, and time.          DIAGNOSTIC RESULTS     EKG: All EKG's are interpreted by the Emergency Department Physician who either signs or Co-signs this chart in the absence of a cardiologist.    Normal sinus rhythm, rate 94, normal intervals, no STEMI, similar to previous EKG dated May 9, 2019    RADIOLOGY:     Interpretation per the Radiologist below, if available at the time of this note:    XR CHEST PORTABLE   Final Result            LABS:  Labs Reviewed   CBC WITH AUTO DIFFERENTIAL - Abnormal; Notable for the following components:       Result Value    Neutrophils Absolute 8.2 (*)     All other components within normal limits    Narrative:     Performed at:  [place] [address] [employer]   Phone [phone]   COMPREHENSIVE METABOLIC PANEL W/ REFLEX TO MG FOR LOW K - Abnormal; Notable for the following components:    Chloride 98 (*)     CO2 38 (*)     Glucose 137 (*)     CREATININE 0.7 (*)     All other components within normal limits    Narrative:     Performed at:  [place] [address] [employer]   Phone [phone] - Abnormal; Notable for the following components:    Pro- (*)     All other components within normal limits    Narrative:     Performed at:  [place] [address] [employer]   Phone [phone]   BLOOD GAS, VENOUS - Abnormal; Notable for the following components:    pH, Gilbert 7.232 (*)     pCO2, Gilbert 85.9 (*)     pO2, [place] 68.1 (*)     HCO3, Venous 35.3 (*)     Base Excess, Gilbert 4.0 (*)     Carboxyhemoglobin 9.3 (*)     All other components within normal limits    Narrative:     Performed at:  [place] [address] [employer]   Phone [phone], RAPID    Narrative:     Performed at:  [place] [address] [employer]   Phone [phone]   TROPONIN    Narrative:     Performed at:  [place] [address] [employer]   Phone [phone] treating other patients and teaching time    Critical care was necessary to treat or prevent imminent or life-threatening deterioration of the following conditions:  Respiratory failure    Critical care was time spent personally by me on the following activities:  Development of treatment plan with patient or surrogate, evaluation of patient's response to treatment, examination of patient, obtaining history from patient or surrogate, ordering and performing treatments and interventions, ordering and review of laboratory studies, ordering and review of radiographic studies, pulse oximetry, re-evaluation of patient's condition and review of old charts          FINAL IMPRESSION      1. COPD exacerbation ([address]    2. Acute respiratory failure with hypoxia and hypercapnia (HCC)    3. Cough          DISPOSITION/PLAN   DISPOSITION Admitted 03/25/2021 10:23:56 AM      PATIENT REFERRED TO:  No follow-up provider specified. DISCHARGE MEDICATIONS:  Current Discharge Medication List        Controlled Substances Monitoring:     No flowsheet data found.     (Please note that portions of this note were completed with a voice recognition program.  Efforts were made to edit the dictations but occasionally words are mis-transcribed.)    Shahab Tijerina MD (electronically signed)  Attending Emergency Physician           Marysol Mendez MD  03/25/21 3746

## 2021-03-25 NOTE — ED NOTES

## 2021-03-25 NOTE — ED NOTES
Bed: 03  Expected date:   Expected time:   Means of arrival: WIN  Comments:  WIN medic [address], [facility]  03/25/21 1735

## 2021-03-25 NOTE — PROGRESS NOTES
03/25/21 1935   NIV Type   Equipment Type v60   Mode Bilevel   Mask Type Full face mask   Mask Size Large   Settings/Measurements   IPAP 16 cmH20   CPAP/EPAP 6 cmH2O   Rate Ordered 10   Resp 24   FiO2  100 %   I Time/ I Time % 1.2 s   Vt Exhaled 392 mL   Mask Leak (lpm) 22 lpm   Comfort Level Fair   Using Accessory Muscles No   Breath Sounds   Right Upper Lobe Rhonchi   Right Middle Lobe Rhonchi   Right Lower Lobe Rhonchi   Left Upper Lobe Rhonchi   Left Lower Lobe Rhonchi   Alarm Settings   Alarms On Y

## 2021-03-25 NOTE — CONSULTS
Pulmonary & Critical Care Consultation Note   Latoya Patton MD    REASONFOR CONSULTATION/CC: pneumonia, acute resp failure    Consult at request of  Emani Brock MD  PCP: Patrick Michelle MD    HISTORYOF PRESENT ILLNESS:    62y.o. year old male active heavy smoker and etoh abuse history. He presented with a one week history of progressively worsening shortness of breath, confusion, and cough with yellow sputum production. Was recommended to go to the ED when they contacted his PCP office, EMS ended up being called and took him to the ED. He was hypoxic requiring supplemental oxygen. Additional workup identified a right sided pneumonia and he was started on empiric CABP coverage, admitted. Developed progressive hypoxia and confusion this afternoon prompting our involvement. He was too confused for obtaining history when I saw him but information gathered from nursing staff and his girlfriend. He does consume regular etoh but it was unknown when his last drink was. Past Medical History:   Diagnosis Date    COPD (chronic obstructive pulmonary disease) ([facility] [address]     Hypertension        History reviewed. No pertinent surgical history. family history is not on file. Social History     Tobacco Use    Smoking status: Current Every Day Smoker     Packs/day: 2.00    Smokeless tobacco: Never Used   Substance Use Topics    Alcohol use:  Yes     Alcohol/week: 42.0 standard drinks     Types: 42 Cans of beer per week     Comment: hasn't drank in 3 days        Scheduled Meds:   sodium chloride flush  10 mL Intravenous 2 times per day    enoxaparin  40 mg Subcutaneous Daily    ipratropium-albuterol  1 ampule Inhalation Q4H WA    [START ON 3/26/2021] azithromycin  500 mg Intravenous Q24H    [START ON 3/26/2021] cefTRIAXone (ROCEPHIN) IV  1,000 mg Intravenous Q24H    methylPREDNISolone  40 mg Intravenous Q12H    OLANZapine zydis  10 mg Oral Once       Continuous Infusions:      PRN Meds:   sodium chloride flush, promethazine **OR** ondansetron, polyethylene glycol, acetaminophen **OR** acetaminophen, albuterol, LORazepam   Consult to Pulmonology  Consult performed by: Lola Jo MD  Consult ordered by: Brunilda Torres MD        ALLERGIES:  Patient has No Known Allergies. REVIEW OF SYSTEMS:  Review of Systems   Unable to perform ROS: Mental status change       Objective:   PHYSICAL EXAM:  BP (!) 163/98   Pulse 96   Temp 97.5 °F (36.4 °C) (Axillary)   Resp (!) 32   Ht 5' 10\" (1.778 m)   Wt 214 lb (97.1 kg)   SpO2 97%   BMI 30.71 kg/m²    Physical Exam  Constitutional:       General: He is not in acute distress. Appearance: He is well-developed. He is not diaphoretic. HENT:      Head: Normocephalic and atraumatic. Mouth/Throat:      Pharynx: No oropharyngeal exudate. Eyes:      Pupils: Pupils are equal, round, and reactive to light. Neck:      Musculoskeletal: Neck supple. Vascular: No JVD. Cardiovascular:      Heart sounds: Normal heart sounds. No murmur. No friction rub. No gallop. Pulmonary:      Effort: Pulmonary effort is normal.      Breath sounds: Rhonchi present. No wheezing or rales. Abdominal:      General: Bowel sounds are normal. There is no distension. Palpations: Abdomen is soft. Tenderness: There is no abdominal tenderness. Musculoskeletal: Normal range of motion. Lymphadenopathy:      Cervical: No cervical adenopathy. Skin:     General: Skin is warm and dry. Findings: No rash. Neurological:      Mental Status: He is disoriented. Cranial Nerves: No cranial nerve deficit.       Comments: CN 2-12 grossly intact            Data Reviewed:   LABS:  CBC:   Recent Labs     03/25/21  0905   WBC 10.5   HGB 15.2   HCT 47.0   MCV 97.6        BMP:   Recent Labs     03/25/21  0905      K 3.9   CL 98*   CO2 38*   BUN 13   CREATININE 0.7*     LIVER PROFILE:   Recent Labs     03/25/21  0905   AST 17   ALT 20   BILITOT 0.3   ALKPHOS 108     PT/INR: No results for input(s): PROTIME, INR in the last 72 hours. APTT:No results for input(s): APTT in the last 72 hours. UA:No results for input(s): NITRITE, COLORU, PHUR, LABCAST, WBCUA, RBCUA, MUCUS, TRICHOMONAS, YEAST, BACTERIA, CLARITYU, SPECGRAV, LEUKOCYTESUR, UROBILINOGEN, BILIRUBINUR, BLOODU, GLUCOSEU, AMORPHOUS in the last 72 hours. Invalid input(s): KETONESU  No results for input(s): PHART, [id number], PO2ART in the last 72 hours. Vent Information  FiO2 : 60 %  SpO2: 97 %  SpO2/FiO2 ratio: 125  I Time/ I Time %: 1 s  Mask Type: Full face mask  Mask Size: Medium    CXR personally reviewed, right sided infiltrate           Assessment:     1. Acute resp failure, mixed  2. Right sided CABP  3. Acute encephalopathy, hypercarbia and suspected etoh withdrawal  4.  Tobacco dependence    Plan:      -Empiric atb  -BiPAP support, trend blood gasses  -Will give zyprexa x1 dose now  -CIWA protocol initiated including ativan and thiamine  -May require transfer to ICU for precedex, I notified ICU charge in case this is needed  -COVID rapid was negative   -Eventual smoking cessation counseling   -Will follow     Jules Stacy MD

## 2021-03-26 LAB
A/G RATIO: 1.5 (ref 1.1–2.2)
ALBUMIN SERPL-MCNC: 3.5 G/DL (ref 3.4–5)
ALP BLD-CCNC: 91 U/L (ref 40–129)
ALT SERPL-CCNC: 13 U/L (ref 10–40)
AMORPHOUS: ABNORMAL /HPF
ANION GAP SERPL CALCULATED.3IONS-SCNC: 8 MMOL/L (ref 3–16)
ANION GAP SERPL CALCULATED.3IONS-SCNC: 9 MMOL/L (ref 3–16)
AST SERPL-CCNC: 14 U/L (ref 15–37)
BACTERIA: ABNORMAL /HPF
BASE EXCESS ARTERIAL: 10 (ref -3–3)
BASE EXCESS ARTERIAL: 10 MMOL/L (ref -3–3)
BASE EXCESS ARTERIAL: 5.8 MMOL/L (ref -3–3)
BASE EXCESS ARTERIAL: 9 (ref -3–3)
BASOPHILS ABSOLUTE: 0 K/UL (ref 0–0.2)
BASOPHILS RELATIVE PERCENT: 0.2 %
BILIRUB SERPL-MCNC: 0.4 MG/DL (ref 0–1)
BILIRUBIN URINE: ABNORMAL
BLOOD, URINE: NEGATIVE
BUN BLDV-MCNC: 18 MG/DL (ref 7–20)
BUN BLDV-MCNC: 26 MG/DL (ref 7–20)
C-REACTIVE PROTEIN: 22.1 MG/L (ref 0–5.1)
CALCIUM SERPL-MCNC: 8.8 MG/DL (ref 8.3–10.6)
CALCIUM SERPL-MCNC: 9 MG/DL (ref 8.3–10.6)
CARBOXYHEMOGLOBIN ARTERIAL: 0.9 % (ref 0–1.5)
CARBOXYHEMOGLOBIN ARTERIAL: 1.8 % (ref 0–1.5)
CHLORIDE BLD-SCNC: 96 MMOL/L (ref 99–110)
CHLORIDE BLD-SCNC: 99 MMOL/L (ref 99–110)
CLARITY: ABNORMAL
CO2: 35 MMOL/L (ref 21–32)
CO2: 37 MMOL/L (ref 21–32)
COLOR: YELLOW
CREAT SERPL-MCNC: 1 MG/DL (ref 0.9–1.3)
CREAT SERPL-MCNC: 1.3 MG/DL (ref 0.9–1.3)
EOSINOPHILS ABSOLUTE: 0 K/UL (ref 0–0.6)
EOSINOPHILS RELATIVE PERCENT: 0 %
EPITHELIAL CELLS, UA: ABNORMAL /HPF (ref 0–5)
GFR AFRICAN AMERICAN: >60
GFR AFRICAN AMERICAN: >60
GFR NON-AFRICAN AMERICAN: 56
GFR NON-AFRICAN AMERICAN: >60
GLOBULIN: 2.3 G/DL
GLUCOSE BLD-MCNC: 120 MG/DL (ref 70–99)
GLUCOSE BLD-MCNC: 124 MG/DL (ref 70–99)
GLUCOSE BLD-MCNC: 153 MG/DL (ref 70–99)
GLUCOSE URINE: NEGATIVE MG/DL
HCO3 ARTERIAL: 33.9 MMOL/L (ref 21–29)
HCO3 ARTERIAL: 34.4 MMOL/L (ref 21–29)
HCO3 ARTERIAL: 34.5 MMOL/L (ref 21–29)
HCO3 ARTERIAL: 35.3 MMOL/L (ref 21–29)
HCT VFR BLD CALC: 40.9 % (ref 40.5–52.5)
HEMOGLOBIN, ART, EXTENDED: 14.4 G/DL (ref 13.5–17.5)
HEMOGLOBIN, ART, EXTENDED: 15.5 G/DL (ref 13.5–17.5)
HEMOGLOBIN: 13.4 G/DL (ref 13.5–17.5)
KETONES, URINE: NEGATIVE MG/DL
LACTATE: 1.61 MMOL/L (ref 0.4–2)
LACTIC ACID: 1.3 MMOL/L (ref 0.4–2)
LEUKOCYTE ESTERASE, URINE: NEGATIVE
LYMPHOCYTES ABSOLUTE: 0.9 K/UL (ref 1–5.1)
LYMPHOCYTES RELATIVE PERCENT: 7.8 %
MAGNESIUM: 2.2 MG/DL (ref 1.8–2.4)
MCH RBC QN AUTO: 31.6 PG (ref 26–34)
MCHC RBC AUTO-ENTMCNC: 32.8 G/DL (ref 31–36)
MCV RBC AUTO: 96.4 FL (ref 80–100)
METHEMOGLOBIN ARTERIAL: 0.3 %
METHEMOGLOBIN ARTERIAL: 0.3 %
MICROSCOPIC EXAMINATION: YES
MONOCYTES ABSOLUTE: 0.8 K/UL (ref 0–1.3)
MONOCYTES RELATIVE PERCENT: 6.8 %
MUCUS: ABNORMAL /LPF
NEUTROPHILS ABSOLUTE: 9.5 K/UL (ref 1.7–7.7)
NEUTROPHILS RELATIVE PERCENT: 85.2 %
NITRITE, URINE: NEGATIVE
O2 CONTENT ARTERIAL: 16 ML/DL
O2 CONTENT ARTERIAL: 20 ML/DL
O2 SAT, ARTERIAL: 75 % (ref 93–100)
O2 SAT, ARTERIAL: 82.3 %
O2 SAT, ARTERIAL: 92.3 %
O2 SAT, ARTERIAL: 93 % (ref 93–100)
O2 THERAPY: ABNORMAL
O2 THERAPY: ABNORMAL
PCO2 ARTERIAL: 49.5 MMHG (ref 35–45)
PCO2 ARTERIAL: 52.3 MM HG (ref 35–45)
PCO2 ARTERIAL: 52.7 MM HG (ref 35–45)
PCO2 ARTERIAL: 67.2 MMHG (ref 35–45)
PDW BLD-RTO: 14.2 % (ref 12.4–15.4)
PERFORMED ON: ABNORMAL
PERFORMED ON: ABNORMAL
PH ARTERIAL: 7.33 (ref 7.35–7.45)
PH ARTERIAL: 7.42 (ref 7.35–7.45)
PH ARTERIAL: 7.43 (ref 7.35–7.45)
PH ARTERIAL: 7.47 (ref 7.35–7.45)
PH UA: 5 (ref 5–8)
PHOSPHORUS: 2.2 MG/DL (ref 2.5–4.9)
PLATELET # BLD: 230 K/UL (ref 135–450)
PMV BLD AUTO: 8.3 FL (ref 5–10.5)
PO2 ARTERIAL: 40.1 MM HG (ref 75–108)
PO2 ARTERIAL: 41.8 MMHG (ref 75–108)
PO2 ARTERIAL: 64.1 MMHG (ref 75–108)
PO2 ARTERIAL: 68.1 MM HG (ref 75–108)
POC SAMPLE TYPE: ABNORMAL
POC SAMPLE TYPE: ABNORMAL
POTASSIUM REFLEX MAGNESIUM: 4.4 MMOL/L (ref 3.5–5.1)
POTASSIUM SERPL-SCNC: 4.9 MMOL/L (ref 3.5–5.1)
PROTEIN UA: NEGATIVE MG/DL
RBC # BLD: 4.24 M/UL (ref 4.2–5.9)
RBC UA: ABNORMAL /HPF (ref 0–4)
SARS-COV-2: NOT DETECTED
SODIUM BLD-SCNC: 141 MMOL/L (ref 136–145)
SODIUM BLD-SCNC: 143 MMOL/L (ref 136–145)
SPECIFIC GRAVITY UA: >=1.03 (ref 1–1.03)
TCO2 ARTERIAL: 36 MMOL/L
TCO2 ARTERIAL: 36 MMOL/L
TCO2 ARTERIAL: 36.5 MMOL/L
TCO2 ARTERIAL: 36.8 MMOL/L
TOTAL PROTEIN: 5.8 G/DL (ref 6.4–8.2)
TRIGL SERPL-MCNC: 136 MG/DL (ref 0–150)
URINE REFLEX TO CULTURE: ABNORMAL
URINE TYPE: ABNORMAL
UROBILINOGEN, URINE: 0.2 E.U./DL
WBC # BLD: 11.2 K/UL (ref 4–11)
WBC UA: ABNORMAL /HPF (ref 0–5)

## 2021-03-26 PROCEDURE — 94640 AIRWAY INHALATION TREATMENT: CPT

## 2021-03-26 PROCEDURE — 94003 VENT MGMT INPAT SUBQ DAY: CPT

## 2021-03-26 PROCEDURE — 2580000003 HC RX 258: Performed by: INTERNAL MEDICINE

## 2021-03-26 PROCEDURE — 2000000000 HC ICU R&B

## 2021-03-26 PROCEDURE — 6370000000 HC RX 637 (ALT 250 FOR IP): Performed by: NURSE PRACTITIONER

## 2021-03-26 PROCEDURE — 99291 CRITICAL CARE FIRST HOUR: CPT | Performed by: INTERNAL MEDICINE

## 2021-03-26 PROCEDURE — 81001 URINALYSIS AUTO W/SCOPE: CPT

## 2021-03-26 PROCEDURE — 6370000000 HC RX 637 (ALT 250 FOR IP): Performed by: INTERNAL MEDICINE

## 2021-03-26 PROCEDURE — U0005 INFEC AGEN DETEC AMPLI PROBE: HCPCS

## 2021-03-26 PROCEDURE — 84478 ASSAY OF TRIGLYCERIDES: CPT

## 2021-03-26 PROCEDURE — 83605 ASSAY OF LACTIC ACID: CPT

## 2021-03-26 PROCEDURE — 82947 ASSAY GLUCOSE BLOOD QUANT: CPT

## 2021-03-26 PROCEDURE — 6360000002 HC RX W HCPCS: Performed by: INTERNAL MEDICINE

## 2021-03-26 PROCEDURE — 80053 COMPREHEN METABOLIC PANEL: CPT

## 2021-03-26 PROCEDURE — 2700000000 HC OXYGEN THERAPY PER DAY

## 2021-03-26 PROCEDURE — 84100 ASSAY OF PHOSPHORUS: CPT

## 2021-03-26 PROCEDURE — 86140 C-REACTIVE PROTEIN: CPT

## 2021-03-26 PROCEDURE — 36592 COLLECT BLOOD FROM PICC: CPT

## 2021-03-26 PROCEDURE — 94761 N-INVAS EAR/PLS OXIMETRY MLT: CPT

## 2021-03-26 PROCEDURE — 2500000003 HC RX 250 WO HCPCS: Performed by: INTERNAL MEDICINE

## 2021-03-26 PROCEDURE — U0003 INFECTIOUS AGENT DETECTION BY NUCLEIC ACID (DNA OR RNA); SEVERE ACUTE RESPIRATORY SYNDROME CORONAVIRUS 2 (SARS-COV-2) (CORONAVIRUS DISEASE [COVID-19]), AMPLIFIED PROBE TECHNIQUE, MAKING USE OF HIGH THROUGHPUT TECHNOLOGIES AS DESCRIBED BY CMS-2020-01-R: HCPCS

## 2021-03-26 PROCEDURE — 85025 COMPLETE CBC W/AUTO DIFF WBC: CPT

## 2021-03-26 PROCEDURE — 82803 BLOOD GASES ANY COMBINATION: CPT

## 2021-03-26 PROCEDURE — 36556 INSERT NON-TUNNEL CV CATH: CPT

## 2021-03-26 RX ORDER — MIDAZOLAM HYDROCHLORIDE 1 MG/ML
5 INJECTION INTRAMUSCULAR; INTRAVENOUS ONCE
Status: DISCONTINUED | OUTPATIENT
Start: 2021-03-25 | End: 2021-03-30

## 2021-03-26 RX ORDER — LANOLIN ALCOHOL/MO/W.PET/CERES
100 CREAM (GRAM) TOPICAL DAILY
Status: DISCONTINUED | OUTPATIENT
Start: 2021-03-26 | End: 2021-04-01

## 2021-03-26 RX ORDER — FAMOTIDINE 20 MG/1
20 TABLET, FILM COATED ORAL 2 TIMES DAILY
Status: DISCONTINUED | OUTPATIENT
Start: 2021-03-26 | End: 2021-04-01

## 2021-03-26 RX ORDER — CARBOXYMETHYLCELLULOSE SODIUM 10 MG/ML
1 GEL OPHTHALMIC
Status: DISCONTINUED | OUTPATIENT
Start: 2021-03-26 | End: 2021-03-31

## 2021-03-26 RX ORDER — DEXTROSE MONOHYDRATE 50 MG/ML
INJECTION, SOLUTION INTRAVENOUS
Status: DISPENSED
Start: 2021-03-26 | End: 2021-03-26

## 2021-03-26 RX ORDER — SENNA PLUS 8.6 MG/1
1 TABLET ORAL 2 TIMES DAILY
Status: DISCONTINUED | OUTPATIENT
Start: 2021-03-26 | End: 2021-04-01

## 2021-03-26 RX ADMIN — SODIUM CHLORIDE, PRESERVATIVE FREE 10 ML: 5 INJECTION INTRAVENOUS at 20:30

## 2021-03-26 RX ADMIN — IPRATROPIUM BROMIDE AND ALBUTEROL SULFATE 1 AMPULE: .5; 3 SOLUTION RESPIRATORY (INHALATION) at 07:58

## 2021-03-26 RX ADMIN — CARBOXYMETHYLCELLULOSE SODIUM 1 DROP: 10 GEL OPHTHALMIC at 15:14

## 2021-03-26 RX ADMIN — ENOXAPARIN SODIUM 40 MG: 40 INJECTION SUBCUTANEOUS at 08:15

## 2021-03-26 RX ADMIN — MINERAL OIL, PETROLATUM: 425; 568 OINTMENT OPHTHALMIC at 04:30

## 2021-03-26 RX ADMIN — AZITHROMYCIN MONOHYDRATE 500 MG: 500 INJECTION, POWDER, LYOPHILIZED, FOR SOLUTION INTRAVENOUS at 08:51

## 2021-03-26 RX ADMIN — FENTANYL CITRATE 100 MCG/HR: 50 INJECTION, SOLUTION INTRAMUSCULAR; INTRAVENOUS at 17:30

## 2021-03-26 RX ADMIN — FAMOTIDINE 20 MG: 10 INJECTION, SOLUTION INTRAVENOUS at 08:16

## 2021-03-26 RX ADMIN — CARBOXYMETHYLCELLULOSE SODIUM 1 DROP: 10 GEL OPHTHALMIC at 12:25

## 2021-03-26 RX ADMIN — Medication 15 ML: at 20:30

## 2021-03-26 RX ADMIN — METHYLPREDNISOLONE SODIUM SUCCINATE 40 MG: 40 INJECTION, POWDER, FOR SOLUTION INTRAMUSCULAR; INTRAVENOUS at 04:30

## 2021-03-26 RX ADMIN — CEFTRIAXONE SODIUM 1000 MG: 1 INJECTION, POWDER, FOR SOLUTION INTRAMUSCULAR; INTRAVENOUS at 08:12

## 2021-03-26 RX ADMIN — SENNOSIDES 8.6 MG: 8.6 TABLET, FILM COATED ORAL at 23:00

## 2021-03-26 RX ADMIN — DOCUSATE SODIUM 100 MG: 50 LIQUID ORAL at 23:00

## 2021-03-26 RX ADMIN — MIDAZOLAM HYDROCHLORIDE 6 MG/HR: 5 INJECTION, SOLUTION INTRAMUSCULAR; INTRAVENOUS at 15:00

## 2021-03-26 RX ADMIN — IPRATROPIUM BROMIDE AND ALBUTEROL SULFATE 1 AMPULE: .5; 3 SOLUTION RESPIRATORY (INHALATION) at 15:58

## 2021-03-26 RX ADMIN — Medication 15 ML: at 08:56

## 2021-03-26 RX ADMIN — MIDAZOLAM HYDROCHLORIDE 6 MG/HR: 5 INJECTION, SOLUTION INTRAMUSCULAR; INTRAVENOUS at 12:24

## 2021-03-26 RX ADMIN — IPRATROPIUM BROMIDE AND ALBUTEROL SULFATE 1 AMPULE: .5; 3 SOLUTION RESPIRATORY (INHALATION) at 20:32

## 2021-03-26 RX ADMIN — Medication 100 MG: at 12:25

## 2021-03-26 RX ADMIN — FENTANYL CITRATE 100 MCG/HR: 50 INJECTION, SOLUTION INTRAMUSCULAR; INTRAVENOUS at 06:00

## 2021-03-26 RX ADMIN — MUPIROCIN: 20 OINTMENT TOPICAL at 20:30

## 2021-03-26 RX ADMIN — PROPOFOL 25 MCG/KG/MIN: 10 INJECTION, EMULSION INTRAVENOUS at 13:07

## 2021-03-26 RX ADMIN — DOCUSATE SODIUM 100 MG: 50 LIQUID ORAL at 12:25

## 2021-03-26 RX ADMIN — METHYLPREDNISOLONE SODIUM SUCCINATE 40 MG: 40 INJECTION, POWDER, FOR SOLUTION INTRAMUSCULAR; INTRAVENOUS at 15:14

## 2021-03-26 RX ADMIN — MIDAZOLAM 2 MG: 1 INJECTION INTRAMUSCULAR; INTRAVENOUS at 13:00

## 2021-03-26 RX ADMIN — MUPIROCIN: 20 OINTMENT TOPICAL at 08:16

## 2021-03-26 RX ADMIN — SODIUM CHLORIDE, PRESERVATIVE FREE 10 ML: 5 INJECTION INTRAVENOUS at 08:16

## 2021-03-26 RX ADMIN — IPRATROPIUM BROMIDE AND ALBUTEROL SULFATE 1 AMPULE: .5; 3 SOLUTION RESPIRATORY (INHALATION) at 11:37

## 2021-03-26 RX ADMIN — PROPOFOL 25 MCG/KG/MIN: 10 INJECTION, EMULSION INTRAVENOUS at 12:23

## 2021-03-26 RX ADMIN — POLYVINYL ALCOHOL 1 DROP: 14 SOLUTION/ DROPS OPHTHALMIC at 05:30

## 2021-03-26 RX ADMIN — FAMOTIDINE 20 MG: 20 TABLET, FILM COATED ORAL at 23:00

## 2021-03-26 RX ADMIN — MINERAL OIL, PETROLATUM: 425; 568 OINTMENT OPHTHALMIC at 08:15

## 2021-03-26 RX ADMIN — SENNOSIDES 8.6 MG: 8.6 TABLET, FILM COATED ORAL at 12:24

## 2021-03-26 RX ADMIN — CARBOXYMETHYLCELLULOSE SODIUM 1 DROP: 10 GEL OPHTHALMIC at 20:30

## 2021-03-26 RX ADMIN — PROPOFOL 30 MCG/KG/MIN: 10 INJECTION, EMULSION INTRAVENOUS at 01:18

## 2021-03-26 ASSESSMENT — PULMONARY FUNCTION TESTS
PIF_VALUE: 28
PIF_VALUE: 28
PIF_VALUE: 27

## 2021-03-26 ASSESSMENT — PAIN SCALES - GENERAL
PAINLEVEL_OUTOF10: 0

## 2021-03-26 NOTE — PROGRESS NOTES
RESPIRATORY THERAPY ASSESSMENT    Name:  Erik Steven  Medical Record Number:  [id number]  Age: 62 y.o. Gender: male  : 1960  Today's Date:  3/25/2021  Room:  [facility]0229-01    Assessment     Is the patient being admitted for a COPD or Asthma exacerbation? No   (If yes the patient will be seen every 4 hours for the first 24 hours and then reassessed)    Patient Admission Diagnosis      Allergies  No Known Allergies    Minimum Predicted Vital Capacity:     N/A          Actual Vital Capacity:      Pt intubated              Pulmonary History:COPD  Home Oxygen Therapy:  unknown  Home Respiratory Therapy:Albuterol   Current Respiratory Therapy:  Duoneb HHN Q4WA, Albuterol/Atrovent mdi PRN  Treatment Type: HHN  Medications: Albuterol/Ipratropium    Respiratory Severity Index(RSI)   Patients with orders for inhalation medications, oxygen, or any therapeutic treatment modality will be placed on Respiratory Protocol. They will be assessed with the first treatment and at least every 72 hours thereafter. The following severity scale will be used to determine frequency of treatment intervention. Smoking History: Pulmonary Disease or Smoking History, Greater than 15 pack year = 2    Social History  Social History     Tobacco Use    Smoking status: Current Every Day Smoker     Packs/day: 2.00    Smokeless tobacco: Never Used   Substance Use Topics    Alcohol use: Yes     Alcohol/week: 42.0 standard drinks     Types: 42 Cans of beer per week     Comment: hasn't drank in 3 days    Drug use: No       Recent Surgical History: None = 0  Past Surgical History  History reviewed. No pertinent surgical history.     Level of Consciousness: Comatose = 4    Level of Activity: Bedridden, unresponsive or quadriplegic = 4    Respiratory Pattern: Regular Pattern; RR 8-20 = 0    Breath Sounds: Diminshed bilaterally and/or crackles = 2    Sputum   ,  , Sputum How Obtained: None  Cough: Strong, spontaneous, non-productive = 0    Vital Signs   BP (!) 163/98   Pulse 107   Temp 97.5 °F (36.4 °C) (Axillary)   Resp 25   Ht 5' 10\" (1.778 m)   Wt 214 lb (97.1 kg)   SpO2 97%   BMI 30.71 kg/m²   SPO2 (COPD values may differ): Less than 86% on room air or greater than 92% on FiO2 greater than 50% = 4    Peak Flow (asthma only): not applicable = 0    RSI: 36-43 = Q4 (every four hours)        Plan       Goals: medication delivery, mobilize retained secretions, volume expansion and improve oxygenation    Patient/caregiver was educated on the proper method of use for Respiratory Care Devices:  Yes      Level of patient/caregiver understanding able to:   ? Verbalize understanding   ? Demonstrate understanding       ? Teach back        ? Needs reinforcement       ? No available caregiver               ? Other:     Response to education:  Good     Is patient being placed on Home Treatment Regimen? No     Does the patient have everything they need prior to discharge? NA     Comments: Evaluated pt and reviewed chart. Plan of Care: [facility] [id number], Albuterol/Atrovent mdi PRN    Electronically signed by Josse Gomez RCP on 3/25/2021 at 9:49 PM    Respiratory Protocol Guidelines     1. Assessment and treatment by Respiratory Therapy will be initiated for medication and therapeutic interventions upon initiation of aerosolized medication. 2. Physician will be contacted for respiratory rate (RR) greater than 35 breaths per minute. Therapy will be held for heart rate (HR) greater than 140 beats per minute, pending direction from physician. 3. Bronchodilators will be administered via Metered Dose Inhaler (MDI) with spacer when the following criteria are met:  a. Alert and cooperative     b. HR < 140 bpm  c. RR < 30 bpm                d. Can demonstrate a 2-3 second inspiratory hold  4. Bronchodilators will be administered via Hand Held Nebulizer [facility]) to patients when ANY of the following criteria are met  a.  Incognizant or uncooperative b. Patients treated with HHN at Home        c. Unable to demonstrate proper use of MDI with spacer     d. RR > 30 bpm   5. Bronchodilators will be delivered via Metered Dose Inhaler (MDI), HHN, Aerogen to intubated patients on mechanical ventilation. 6. Inhalation medication orders will be delivered and/or substituted as outlined below. Aerosolized Medications Ordering and Administration Guidelines:    1. All Medications will be ordered by a physician, and their frequency and/or modality will be adjusted as defined by the patients Respiratory Severity Index (RSI) score. 2. If the patient does not have documented COPD, consider discontinuing anticholinergics when RSI is less than 9.  3. If the bronchospasm worsens (increased RSI), then the bronchodilator frequency can be increased to a maximum of every 4 hours. If greater than every 4 hours is required, the physician will be contacted. 4. If the bronchospasm improves, the frequency of the bronchodilator can be decreased, based on the patient's RSI, but not less than home treatment regimen frequency. 5. Bronchodilator(s) will be discontinued if patient has a RSI less than 9 and has received no scheduled or as needed treatment for 72  Hrs. Patients Ordered on a Mucolytic Agent:    1. Must always be administered with a bronchodilator. 2. Discontinue if patient experiences worsened bronchospasm, or secretions have lessened to the point that the patient is able to clear them with a cough. Anti-inflammatory and Combination Medications:    1. If the patient lacks prior history of lung disease, is not using inhaled anti-inflammatory medication at home, and lacks wheezing by examination or by history for at least 24 hours, contact physician for possible discontinuation.

## 2021-03-26 NOTE — PROGRESS NOTES
Comprehensive Nutrition Assessment    Type and Reason for Visit:  Consult(Tube feed)    Nutrition Recommendations/Plan:   1. Recommend TF initiation. Order: \"Diet: Tube feed continuous/ NPO\". Initiate Vital High Protein (Low calorie, high protein formula) at 10 mL/hr and as tolerated, increase by 10 mL/hr q 4 hours until goal of 50 mL/hr is met via O/G   2. Recommend 30 mL H20 flush q 4 hours. Monitor IVF infusion, Na labs and need for adjustments in water flush  3. Recommend 2 1 Bottle Proteinex P2Go daily via syringe. Flush with 30 mL H20 before and after  4. Monitor TF tolerance (abd distention, bowel habits, N/V, cramping)  5. Check TG while on diprivan infusion (RD ordered TG lab)  6. Monitor nutrition adequacy, pertinent labs, bowel habits, wt changes, and clinical progress    Nutrition Assessment:  Pt 62 y.o male admitted with SOB and COPD. Pt maxed out on BiPAP and was sating at 88% on 3/25 which led to intubation that night, per chart review. Currently intubated (vent settings 100% FiO2 and PEEP of 10) and sedated on fentanyl (100 mcg) and propofol (20.4 ml/hr to provide 539 kcals from lipids)  in ICU. No hx of diabetes. Phos ordered for today and TG ordered for tomorrow. Plans to initiate TF, recommendations included. Malnutrition Assessment:  Malnutrition Status:  Insufficient data(Will continue to monitor when more information is available.)    Context:  Acute Illness       Estimated Daily Nutrient Needs:  Energy (kcal):  1041-5131 kcals/day; Weight Used for Energy Requirements:  Current(112.9 kg)     Protein (g):  113-151 g/day; Weight Used for Protein Requirements:  Ideal(1.5-2 g/kg IBW)        Fluid (ml/day):  1500 ml; Method Used for Fluid Requirements:  1 ml/kcal      Nutrition Related Findings:  BG WNL, phos and TG tests ordered. No BM recorded.       Wounds:  None       Current Nutrition Therapies:    Current Tube Feeding (TF) Orders:  · Feeding Route: Orogastric  · Formula: Low Calorie, High Protein  · Schedule: Continuous  · Goal TF & Flush Orders Provides: Vital High Protein (low calorie, high protein) x 20 hours at goal rate of 50 ml/hr to provide 1000 ml total volume, 1000 kcals, 88 g protein, 836 ml free water. + 2 1 bottle Proteinex daily to provide 208 kcals and 52 g protein. Free water flush of 30 ml q 4 hours, if no IV infusing and Na WNL. Rate may be adjusted based on propofol titrations. Anthropometric Measures:  · Height: 5' 10\" (177.8 cm)  · Current Body Weight: 248 lb 14.4 oz (112.9 kg)   · Ideal Body Weight: 166 lbs; % Ideal Body Weight 149.9 %  · BMI: 35.7  · BMI Categories: Obese Class 2 (BMI 35.0 -39.9)       Nutrition Diagnosis:   · Inadequate energy intake related to impaired respiratory function as evidenced by NPO or clear liquid status due to medical condition, intubation, nutrition support - enteral nutrition    Nutrition Interventions:   Food and/or Nutrient Delivery:  Start Tube Feeding  Nutrition Education/Counseling:  No recommendation at this time   Coordination of Nutrition Care:  Continue to monitor while inpatient, Interdisciplinary Rounds    Goals:  Initiate and tolerate tube feed at goal rate without BG disturbance or GI intolerance. Nutrition Monitoring and Evaluation:   Behavioral-Environmental Outcomes:  None Identified   Food/Nutrient Intake Outcomes:  Enteral Nutrition Intake/Tolerance  Physical Signs/Symptoms Outcomes:  Biochemical Data, GI Status, Hemodynamic Status     Discharge Planning:     Too soon to determine     Electronically signed by Roberta Garcia MS, RD, LD on 3/26/21 at 10:41 AM EDT    Contact: Office: [phone]; [facility]: [phone]

## 2021-03-26 NOTE — PROCEDURES
HOSPITAL MEDICINE  Intubation Note    Procedure: Orotracheal intubation    Description: Blade:  glidescope 3    View:  grade 1    Findings:  unremarkable    Tube size:  8mm    Tube depth:  23cm    Verification: Chest rise  bilateral    Qualitative capnography  (+)        Medications:  Etomidate 30mg  Succinylcholine 100mg        Procedure: Central Vein Catheter     - 7Fr x 20cm triple lumen    Site: Left subclavian vein   Central veins were surveyed by ultrasound to assess venous anatomy and patency. Site was chosen based on findings and patient-specific factors. Description:  Patient was placed in the trendelenberg position. The site was prepped and draped in sterile fashion. The skin was infiltrated with 1% lidocaine. The vein was cannulated using real-time ultrasound guidance. A wire was threaded through the needle into the vein. Correct wire placement was verified by ultrasound. The soft tissue tract was then dilated. The catheter was then passed over the wire. The wire was withdrawn intact. All catheter ports were drawn and flushed. The catheter was secured in place with sutures at a depth of 20 cm at the skin. The site was covered with a sterile dressing. The left anterior chest was then interrogated by ultrasound. Lung sliding was still present . A portable CXR was ordered to verify appropriate catheter tip placement. There was no immediately apparent complication.   Estimated Blood Loss: < 20mL

## 2021-03-26 NOTE — PROGRESS NOTES
03/25/21 2346   Vent Information   Vent Type 840   Vent Mode AC/VC   Rate Set 20 bmp   Peak Flow 60 L/min   FiO2  100 %   SpO2 92 %   SpO2/FiO2 ratio 92   Sensitivity 3   PEEP/CPAP 10   Vent Patient Data   Peak Inspiratory Pressure 25 cmH2O   Rate Measured 22 br/min   Vt Exhaled 506 mL   I:E Ratio 1:2.5   Cough/Sputum   Sputum How Obtained None   Spontaneous Breathing Trial (SBT) RT Doc   Pulse 68   Breath Sounds   Right Upper Lobe Diminished   Right Middle Lobe Diminished   Right Lower Lobe Diminished   Left Upper Lobe Diminished   Left Lower Lobe Diminished   Additional Respiratory  Assessments   Resp 16   Position Semi-Dowd's   Cuff Pressure (cm H2O) 30 cm H2O   Alarm Settings   High Pressure Alarm 40 cmH2O   Low Minute Volume Alarm 2 L/min   High Respiratory Rate 40 br/min   Low Exhaled Vt  200 mL   ETT (adult)   Placement Date/Time: 03/25/21 2147   Preoxygenation: Yes  Type: Cuffed  Tube Size: 8 mm  Laryngoscope: GlideScope  Blade Size: 3  Location: Oral  Insertion attempts: 1  Placement Verified By[de-identified] Auscultation;Capnometry  Secured at: 23 cm  Measured From:. ..    Secured at 23 cm   Measured From Lips   ET Placement Left   Secured By Commercial tube still   Site Condition Dry

## 2021-03-26 NOTE — PROGRESS NOTES
Hospitalist Progress Note      PCP: Kyung Bran MD    Date of Admission: 3/25/2021    Chief Complaint: Shortness of breath    Hospital Course: Presented with shortness of breath. Started on BiPAP that initially tolerated well, but later got agitated. Became refractory to sedation and had to be transferred to ICU after intubation. Subjective: Review of systems is not possible due to being sedated on mechanical ventilation.       Medications:  Reviewed    Infusion Medications    dextrose      fentaNYL (SUBLIMAZE) infusion 100 mcg/hr (03/26/21 0600)    midazolam 5 mg/hr (03/25/21 2230)    propofol 35 mcg/kg/min (03/26/21 0914)     Scheduled Medications    midazolam  5 mg Intravenous Once    mupirocin   Nasal BID    carboxymethylcellulose PF  1 drop Both Eyes 6 times per day    famotidine  20 mg Per NG tube BID    thiamine  100 mg Per NG tube Daily    senna  1 tablet Per NG tube BID    docusate  100 mg Per NG tube BID    sodium chloride flush  10 mL Intravenous 2 times per day    enoxaparin  40 mg Subcutaneous Daily    ipratropium-albuterol  1 ampule Inhalation Q4H WA    azithromycin  500 mg Intravenous Q24H    cefTRIAXone (ROCEPHIN) IV  1,000 mg Intravenous Q24H    methylPREDNISolone  40 mg Intravenous Q12H    chlorhexidine  15 mL Mouth/Throat BID     PRN Meds: sodium chloride flush, promethazine **OR** ondansetron, polyethylene glycol, acetaminophen **OR** acetaminophen, fentanNYL, albuterol sulfate HFA **AND** ipratropium **AND** MDI Treatment, midazolam      Intake/Output Summary (Last 24 hours) at 3/26/2021 1222  Last data filed at 3/26/2021 1153  Gross per 24 hour   Intake --   Output 445 ml   Net -445 ml       Physical Exam Performed:    /62   Pulse 65   Temp 98.3 °F (36.8 °C) (Bladder)   Resp 20   Ht 5' 10\" (1.778 m)   Wt 248 lb 14.4 oz (112.9 kg)   SpO2 95%   BMI 35.71 kg/m²     General appearance: Mechanical ventilation, sedated  HEENT: Pupils equal, round, and reactive to light. Conjunctivae/corneas clear. No jaundice. Endotracheal tube and NG tube in place  Neck: Supple, with full range of motion. No jugular venous distention. Trachea midline. Respiratory: Mechanical ventilation with trunk  Cardiovascular: Regular rate and rhythm with normal S1/S2 without murmurs, rubs or gallops. Abdomen: Soft, non-tender, non-distended with normal bowel sounds. Musculoskeletal: No clubbing, cyanosis or edema bilaterally. Full range of motion without deformity. Skin: Skin color, texture, turgor normal.  No rashes or lesions. Neurologic: Evaluation not possible due to patient being sedated. My impression is that patient does not show any focal findings. Psychiatric: Sedated on mechanical ventilation  Capillary Refill: Brisk,< 3 seconds   Peripheral Pulses: +2 palpable, equal bilaterally       Labs:   Recent Labs     03/25/21  0905 03/26/21  0600   WBC 10.5 11.2*   HGB 15.2 13.4*   HCT 47.0 40.9    230     Recent Labs     03/25/21  0905 03/25/21  2313 03/26/21  0600    141 143   K 3.9 4.9 4.4   CL 98* 96* 99   CO2 38* 37* 35*   BUN 13 18 26*   CREATININE 0.7* 1.0 1.3   CALCIUM 9.4 9.0 8.8   PHOS  --   --  2.2*     Recent Labs     03/25/21  0905 03/26/21  0600   AST 17 14*   ALT 20 13   BILITOT 0.3 0.4   ALKPHOS 108 91     No results for input(s): INR in the last 72 hours. Recent Labs     03/25/21  0905 03/25/21  1445 03/25/21  1913   TROPONINI <0.01 <0.01 <0.01       Urinalysis:    No results found for: [employer], BACTERIA, RBCUA, BLOODU, SPECGRAV, GLUCOSEU    Radiology:  XR ABDOMEN (KUB) (SINGLE AP VIEW)   Final Result   Satisfactory enteric tube placement. XR CHEST PORTABLE   Final Result   Support lines and tubes in satisfactory position. No pneumothorax. Worsening bibasilar predominant airspace disease. Small right and trace left   pleural effusions suspected.          XR CHEST PORTABLE   Final Result      XR CHEST PORTABLE    (Results Pending) Assessment/Plan:    Active Hospital Problems    Diagnosis    [facility] ([facility] [J44.1]    Alcohol withdrawal ([facility].) [F10.239]     PLAN:    Acute hypoxemic respiratory failure secondary to COPD exacerbation. Required BiPAP on arrival.  Due to worsening agitation refractory to Ativan and Zyprexa, patient was urgently intubated and transferred to ICU. Pulmonology was consulted yesterday. Being followed by pulmonology, on mechanical ventilation.     COPD exacerbation  Started on steroids. Pulmonology consulted  Rapid COVID-19 test was negative. There is high suspicion for COVID-19 at this time. PCR test also ordered and pending.     Abnormal chest x-ray  Empiric antibiotic started in the emergency department. Procalcitonin is low  Because of patient requiring mechanical ventilation, we will continue empiric antibiotics for now.     Chest pain  Probably mechanical due to shortness of breath and cough. Nothing further needed given troponin is negative. Alcohol addiction  Alcohol withdrawal may have played a role in patient's deterioration. Continue to monitor with sedation.       Discussed with critical care      DVT Prophylaxis: Lovenox  Diet: DIET TUBE FEED CONTINUOUS/CYCLIC NPO; Low Calorie High Protein; Orogastric; Continuous; 10; 50  Diet Tube Feed Modular: Protein Modular  Code Status: Full Code    PT/OT Eval Status: Not possible    Dispo -ICU stay, uncertain duration    Jose Draper MD

## 2021-03-26 NOTE — PLAN OF CARE
Nutrition Problem #1: Inadequate energy intake  Intervention: Food and/or Nutrient Delivery: Start Tube Feeding  Nutritional Goals: Initiate and tolerate tube feed at goal rate without BG disturbance or GI intolerance.

## 2021-03-26 NOTE — PROGRESS NOTES
03/25/21 2145   Vent Information   $Ventilation $Initial Day   Skin Assessment Clean, dry, & intact   Vent Type 840   Vent Mode AC/VC   Rate Set 20 bmp   Peak Flow 60 L/min   FiO2  100 %   SpO2 97 %   SpO2/FiO2 ratio 97   Sensitivity 3   PEEP/CPAP 10   Vent Patient Data   Peak Inspiratory Pressure 27 cmH2O   Rate Measured 27 br/min   Vt Exhaled 522 mL   I:E Ratio 1:2.2   Cough/Sputum   Sputum How Obtained None   Spontaneous Breathing Trial (SBT) RT Doc   Pulse 107   Breath Sounds   Right Upper Lobe Rhonchi   Right Middle Lobe Diminished   Right Lower Lobe Diminished   Left Upper Lobe Rhonchi   Left Lower Lobe Diminished   Additional Respiratory  Assessments   Resp 25   Position Semi-Dowd's   Cuff Pressure (cm H2O) 30 cm H2O   Alarm Settings   High Pressure Alarm 40 cmH2O   Low Minute Volume Alarm 2 L/min   High Respiratory Rate 40 br/min   Low Exhaled Vt  200 mL   ETT (adult)   Placement Date/Time: 03/25/21 2147   Preoxygenation: Yes  Type: Cuffed  Tube Size: 8 mm  Laryngoscope: GlideScope  Blade Size: 3  Location: Oral  Insertion attempts: 1  Placement Verified By[de-identified] Auscultation;Capnometry  Secured at: 23 cm  Measured From:. ..    Secured at 23 cm   Measured From [address] By Commercial tube still   Site Condition Dry

## 2021-03-26 NOTE — PROGRESS NOTES
03/26/21 1137   Vent Information   Skin Assessment Clean, dry, & intact   Vent Type 840   Vent Mode AC/VC   Vt Ordered 500 mL   Rate Set 20 bmp   FiO2  100 %   SpO2 95 %   SpO2/FiO2 ratio 95   Sensitivity 3   PEEP/CPAP 10   Humidification Source HME   Vent Patient Data   Peak Inspiratory Pressure 27 cmH2O   Mean Airway Pressure 11 cmH20   Rate Measured 20 br/min   Vt Exhaled 522 mL   Minute Volume 10.4 Liters   I:E Ratio 1:2.3   Spontaneous Breathing Trial (SBT) RT Doc   Pulse 65   Breath Sounds   Right Upper Lobe Diminished   Right Middle Lobe Diminished   Right Lower Lobe Diminished   Left Upper Lobe Diminished   Left Lower Lobe Diminished   Additional Respiratory  Assessments   Resp 20   Position Semi-Dowd's   Cuff Pressure (cm H2O) 30 cm H2O   Patient Observation   Observations ambu at bedside   ETT (adult)   Placement Date/Time: 03/25/21 2147   Preoxygenation: Yes  Type: Cuffed  Tube Size: 8 mm  Laryngoscope: GlideScope  Blade Size: 3  Location: Oral  Insertion attempts: 1  Placement Verified By[de-identified] Auscultation;Capnometry  Secured at: 23 cm  Measured From:. ..    Secured at 23 cm   Measured From Lips   ET Placement Right   Secured By Commercial tube still   Site Condition Dry

## 2021-03-26 NOTE — PROGRESS NOTES
4 Eyes Skin Assessment     The patient is being assess for   Admission    I agree that 2 RN's have performed a thorough Head to Toe Skin Assessment on the patient. ALL assessment sites listed below have been assessed. Areas assessed by both nurses:   [x]   Head, Face, and Ears   [x]   Shoulders, Back, and Chest, Abdomen  [x]   Arms, Elbows, and Hands   [x]   Coccyx, Sacrum, and Ischium  [x]   Legs, Feet, and Heels        ****    **SHARE this note so that the co-signing nurse is able to place an eSignature**    Co-signer eSignature: {Esignature:[id number]}    Does the Patient have Skin Breakdown?   No          Leo Prevention initiated:  Yes   Wound Care Orders initiated:  NA      [facility] nurse consulted for Pressure Injury (Stage 3,4, Unstageable, DTI, NWPT, Complex wounds)and New or Established Ostomies:  NA      Primary Nurse eSignature: Electronically signed by Mera Payton RN on 3/26/21 at 4:02 AM EDT

## 2021-03-26 NOTE — PROGRESS NOTES
Pt transferred to  via bed. Report given to [facility], [address]. S/O being notified.

## 2021-03-26 NOTE — PROGRESS NOTES
03/26/21 0758   Vent Information   Skin Assessment Clean, dry, & intact   Vent Type 840   Vent Mode AC/VC   Vt Ordered 500 mL   Rate Set 20 bmp   Peak Flow 60 L/min   FiO2  100 %   SpO2 94 %   SpO2/FiO2 ratio 94   Sensitivity 3   PEEP/CPAP 10   Humidification Source HME   Nitric Oxide/Epoprostenol In Use? No   Vent Patient Data   Peak Inspiratory Pressure 28 cmH2O   Mean Airway Pressure 16 cmH20   Rate Measured 20 br/min   Vt Exhaled 570 mL   Minute Volume 11.4 Liters   I:E Ratio 1:2.3   Cough/Sputum   Sputum How Obtained Endotracheal   Sputum Amount Small   Sputum Color Clear   Tenacity Thin   Spontaneous Breathing Trial (SBT) RT Doc   Pulse 72   Breath Sounds   Right Upper Lobe Diminished   Right Middle Lobe Diminished   Right Lower Lobe Diminished   Left Upper Lobe Diminished   Left Lower Lobe Diminished   Additional Respiratory  Assessments   Resp 20   Position Semi-Dowd's   Cuff Pressure (cm H2O) 30 cm H2O   Patient Observation   Observations ambu @ bedside   ETT (adult)   Placement Date/Time: 03/25/21 6084   Preoxygenation: Yes  Type: Cuffed  Tube Size: 8 mm  Laryngoscope: GlideScope  Blade Size: 3  Location: Oral  Insertion attempts: 1  Placement Verified By[de-identified] Auscultation;Capnometry  Secured at: 23 cm  Measured From:. ..    Secured at 23 cm   Measured From Lips   ET Placement Left   Secured By Commercial tube still   Site Condition Dry

## 2021-03-26 NOTE — PROGRESS NOTES
[facility] [place] MEDICINE  Nocturnist / Cross-Cover Note  Dr. Robin Barajas MD    SITUATION:  Failing NIV and poor mental status    PHYSICAL EXAM:  Patient Vitals for the past 24 hrs:   BP Temp Temp src Pulse Resp SpO2 Height Weight   03/25/21 1935 -- -- -- -- 24 90 % -- --   03/25/21 1711 -- -- -- -- 30 -- -- --   03/25/21 1541 -- -- -- 95 -- -- -- --   03/25/21 1452 -- -- -- -- (!) 32 -- -- --   03/25/21 1430 (!) 163/98 97.5 °F (36.4 °C) Axillary 96 24 97 % -- --   03/25/21 1411 (!) 148/93 -- -- 94 23 98 % -- --   03/25/21 1353 -- -- -- -- -- 97 % -- --   03/25/21 1348 (!) 156/94 -- -- 93 24 92 % -- --   03/25/21 1258 139/82 -- -- 90 22 100 % -- --   03/25/21 1229 -- -- -- -- 24 -- -- --   03/25/21 1207 (!) 149/88 -- -- 98 20 100 % -- --   03/25/21 1117 (!) 147/95 -- -- 86 24 94 % -- --   03/25/21 1010 (!) 160/121 -- -- 93 24 100 % -- --   03/25/21 0911 -- -- -- -- 20 99 % -- --   03/25/21 0903 (!) 165/110 97.8 °F (36.6 °C) Oral 99 28 98 % 5' 10\" (1.778 m) 214 lb (97.1 kg)     BiPAP 18 / 8 100% at a rate of 10/min resulted in tidal volumes of ~ 330mL w/ MV ~ 4L/min. Patient is not triggering breaths much above the set rate. Adjusted to. ... BiPAP 20/10 100% w/ set rate = 15/min. Tidal volumes increased to 500-550mL and MV up to ~ 8L/min. GEN:  Somnolent. Will open eyes to voice and tactile stim, but does not follow commands or answer questions. NECK:  CV:   PULM:  Globally diminished breath sounds throughout. Crackles at the left base. Wheezes throughout. AB:  Obese abdomen. EXTR:  Cool skin. Pertinent labs, imaging, etc. reviewed. Assessment:  Acute on chronic respiratory failure  Alcohol withdrawal    Plan:  Moving to ICU. BiPAP settings adjusted to temporize. Likely to intubate. ABG and other labs pending.

## 2021-03-26 NOTE — PROGRESS NOTES
Pulmonary & Critical Care Inpatient Progress Note   Jennifer Lyn MD     REASON FOR TODAY'S VISIT:  Acute resp failure, encephalopathy    SUBJECTIVE:   Worsening agitation overnight, was transferred to the ICU and intubated after failing zyprexa and CIWA protocol ativan yesterday afternoon  Hypoxia has worsened now on 100% FIO2 and 10 of PEEP  High sedation needs, now on versed, fentanyl, and propofol infusions at high doses    Scheduled Meds:   midazolam  5 mg Intravenous Once    mupirocin   Nasal BID    carboxymethylcellulose PF  1 drop Both Eyes 6 times per day    famotidine  20 mg Per NG tube BID    thiamine  100 mg Per NG tube Daily    senna  1 tablet Per NG tube BID    docusate  100 mg Per NG tube BID    sodium chloride flush  10 mL Intravenous 2 times per day    enoxaparin  40 mg Subcutaneous Daily    ipratropium-albuterol  1 ampule Inhalation Q4H WA    azithromycin  500 mg Intravenous Q24H    cefTRIAXone (ROCEPHIN) IV  1,000 mg Intravenous Q24H    methylPREDNISolone  40 mg Intravenous Q12H    chlorhexidine  15 mL Mouth/Throat BID       Continuous Infusions:   dextrose      fentaNYL (SUBLIMAZE) infusion 100 mcg/hr (03/26/21 0600)    midazolam 5 mg/hr (03/25/21 2230)    propofol 35 mcg/kg/min (03/26/21 0914)       PRN Meds:  sodium chloride flush, promethazine **OR** ondansetron, polyethylene glycol, acetaminophen **OR** acetaminophen, fentanNYL, albuterol sulfate HFA **AND** ipratropium **AND** MDI Treatment, midazolam    ALLERGIES:  Patient has No Known Allergies. Objective:   PHYSICAL EXAM:  /62   Pulse 65   Temp 98.3 °F (36.8 °C) (Bladder)   Resp 20   Ht 5' 10\" (1.778 m)   Wt 248 lb 14.4 oz (112.9 kg)   SpO2 95%   BMI 35.71 kg/m²    Physical Exam  Constitutional:       General: He is not in acute distress. Appearance: He is well-developed. He is not diaphoretic. HENT:      Head: Normocephalic and atraumatic. Mouth/Throat:      Pharynx: No oropharyngeal exudate. Eyes:      Pupils: Pupils are equal, round, and reactive to light. Neck:      Musculoskeletal: Neck supple. Vascular: No JVD. Cardiovascular:      Heart sounds: Normal heart sounds. No murmur. No friction rub. No gallop. Pulmonary:      Breath sounds: Rhonchi and rales present. No wheezing. Abdominal:      General: Bowel sounds are normal. There is no distension. Palpations: Abdomen is soft. Tenderness: There is no abdominal tenderness. Lymphadenopathy:      Cervical: No cervical adenopathy. Skin:     General: Skin is warm and dry. Findings: No rash. Neurological:      Mental Status: He is disoriented. Cranial Nerves: Cranial nerve deficit present. Comments: intubated            Data Reviewed:   LABS:  CBC:  Recent Labs     03/25/21  0905 03/26/21  0600   WBC 10.5 11.2*   HGB 15.2 13.4*   HCT 47.0 40.9   MCV 97.6 96.4    230     BMP:  Recent Labs     03/25/21  0905 03/25/21  2313 03/26/21  0600    141 143   K 3.9 4.9 4.4   CL 98* 96* 99   CO2 38* 37* 35*   PHOS  --   --  2.2*   BUN 13 18 26*   CREATININE 0.7* 1.0 1.3     LIVER PROFILE:   Recent Labs     03/25/21  0905 03/26/21  0600   AST 17 14*   ALT 20 13   BILITOT 0.3 0.4   ALKPHOS 108 91     PT/INR:No results for input(s): PROTIME, INR in the last 72 hours. APTT: No results for input(s): APTT in the last 72 hours. UA:No results for input(s): NITRITE, COLORU, PHUR, LABCAST, WBCUA, RBCUA, MUCUS, TRICHOMONAS, YEAST, BACTERIA, CLARITYU, SPECGRAV, LEUKOCYTESUR, UROBILINOGEN, BILIRUBINUR, BLOODU, GLUCOSEU, AMORPHOUS in the last 72 hours. Invalid input(s): Opalmelissa Elin  Recent Labs     03/26/21  0830 03/26/21  0918   PHART 7.427 7. 417   RLA2CSG 52.3* 52.7*   PO2ART 40.1* 68.1*       Vent Information  $Ventilation: $Subsequent Day  Skin Assessment: Clean, dry, & intact  Vent Type: 840  Vent Mode: AC/VC  Vt Ordered: 500 mL  Rate Set: 20 bmp  Peak Flow: 60 L/min  FiO2 : 100 %  SpO2: 95 %  SpO2/FiO2 ratio: 95  Sensitivity: 3  PEEP/CPAP: 10  I Time/ I Time %: 1.2 s  Humidification Source: HME  Nitric Oxide/Epoprostenol In Use?: No  Mask Type: Full face mask  Mask Size: Large    CXR personally reviewed, worsening bilateral infiltrates and possible superimposed pleural effusions          Assessment:     1. Acute resp failure, mixed   -multifocal pneumonia, possible COVID  2. Acute encephalopathy, acute etoh withdrawal  3. KAROL  4. COPD with acute exac  5. Tobacco dependence    Plan:      -COVID testing pending, if confirmed will need therapuetics added.   -Empiric atb coverage  -Vent support, serial ABGs  -Titrate sedation as needed  -Monitor renal function with serial labs, if Cr continues to rise will ask nephro to see  -Steroids  -Start tube feeds  -CXR in AM to follow effusions and infiltrates  -Anticipate prolonged course on account of underlying etoh withdrawal. Discussed with girlfriend    Due to life threatening resp failure with vent dependence this patient is critically ill.  Total critical care time involved in his care was over 30 mins  [id number], [id number]    Jean Guerra MD

## 2021-03-27 ENCOUNTER — APPOINTMENT (OUTPATIENT)
Dept: GENERAL RADIOLOGY | Age: 61
DRG: 130 | End: 2021-03-27
Payer: MEDICAID

## 2021-03-27 LAB
ANION GAP SERPL CALCULATED.3IONS-SCNC: 9 MMOL/L (ref 3–16)
BUN BLDV-MCNC: 51 MG/DL (ref 7–20)
CALCIUM SERPL-MCNC: 9.1 MG/DL (ref 8.3–10.6)
CHLORIDE BLD-SCNC: 98 MMOL/L (ref 99–110)
CO2: 33 MMOL/L (ref 21–32)
CREAT SERPL-MCNC: 1.3 MG/DL (ref 0.9–1.3)
GFR AFRICAN AMERICAN: >60
GFR NON-AFRICAN AMERICAN: 56
GLUCOSE BLD-MCNC: 155 MG/DL (ref 70–99)
PHOSPHORUS: 4.9 MG/DL (ref 2.5–4.9)
POTASSIUM REFLEX MAGNESIUM: 4 MMOL/L (ref 3.5–5.1)
SODIUM BLD-SCNC: 140 MMOL/L (ref 136–145)

## 2021-03-27 PROCEDURE — 94640 AIRWAY INHALATION TREATMENT: CPT

## 2021-03-27 PROCEDURE — 87205 SMEAR GRAM STAIN: CPT

## 2021-03-27 PROCEDURE — 31720 CLEARANCE OF AIRWAYS: CPT

## 2021-03-27 PROCEDURE — 6360000002 HC RX W HCPCS: Performed by: INTERNAL MEDICINE

## 2021-03-27 PROCEDURE — 2500000003 HC RX 250 WO HCPCS: Performed by: INTERNAL MEDICINE

## 2021-03-27 PROCEDURE — 71045 X-RAY EXAM CHEST 1 VIEW: CPT

## 2021-03-27 PROCEDURE — 2580000003 HC RX 258: Performed by: INTERNAL MEDICINE

## 2021-03-27 PROCEDURE — 31624 DX BRONCHOSCOPE/LAVAGE: CPT | Performed by: INTERNAL MEDICINE

## 2021-03-27 PROCEDURE — 6370000000 HC RX 637 (ALT 250 FOR IP): Performed by: INTERNAL MEDICINE

## 2021-03-27 PROCEDURE — 6370000000 HC RX 637 (ALT 250 FOR IP): Performed by: NURSE PRACTITIONER

## 2021-03-27 PROCEDURE — 2700000000 HC OXYGEN THERAPY PER DAY

## 2021-03-27 PROCEDURE — 31622 DX BRONCHOSCOPE/WASH: CPT

## 2021-03-27 PROCEDURE — 2000000000 HC ICU R&B

## 2021-03-27 PROCEDURE — 99291 CRITICAL CARE FIRST HOUR: CPT | Performed by: INTERNAL MEDICINE

## 2021-03-27 PROCEDURE — 31645 BRNCHSC W/THER ASPIR 1ST: CPT | Performed by: INTERNAL MEDICINE

## 2021-03-27 PROCEDURE — 94003 VENT MGMT INPAT SUBQ DAY: CPT

## 2021-03-27 PROCEDURE — 87070 CULTURE OTHR SPECIMN AEROBIC: CPT

## 2021-03-27 PROCEDURE — 84100 ASSAY OF PHOSPHORUS: CPT

## 2021-03-27 PROCEDURE — 80048 BASIC METABOLIC PNL TOTAL CA: CPT

## 2021-03-27 PROCEDURE — 94761 N-INVAS EAR/PLS OXIMETRY MLT: CPT

## 2021-03-27 RX ORDER — IPRATROPIUM BROMIDE AND ALBUTEROL SULFATE 2.5; .5 MG/3ML; MG/3ML
1 SOLUTION RESPIRATORY (INHALATION) EVERY 4 HOURS
Status: DISCONTINUED | OUTPATIENT
Start: 2021-03-28 | End: 2021-04-01

## 2021-03-27 RX ORDER — PHENOBARBITAL SODIUM 65 MG/ML
240 INJECTION INTRAMUSCULAR EVERY 8 HOURS SCHEDULED
Status: DISCONTINUED | OUTPATIENT
Start: 2021-03-27 | End: 2021-03-29

## 2021-03-27 RX ADMIN — Medication 15 ML: at 11:18

## 2021-03-27 RX ADMIN — PROPOFOL 20 MCG/KG/MIN: 10 INJECTION, EMULSION INTRAVENOUS at 08:12

## 2021-03-27 RX ADMIN — CARBOXYMETHYLCELLULOSE SODIUM 1 DROP: 10 GEL OPHTHALMIC at 14:39

## 2021-03-27 RX ADMIN — DOCUSATE SODIUM 100 MG: 50 LIQUID ORAL at 23:00

## 2021-03-27 RX ADMIN — CARBOXYMETHYLCELLULOSE SODIUM 1 DROP: 10 GEL OPHTHALMIC at 11:18

## 2021-03-27 RX ADMIN — SENNOSIDES 8.6 MG: 8.6 TABLET, FILM COATED ORAL at 23:00

## 2021-03-27 RX ADMIN — SODIUM CHLORIDE, PRESERVATIVE FREE 10 ML: 5 INJECTION INTRAVENOUS at 23:00

## 2021-03-27 RX ADMIN — FENTANYL CITRATE 175 MCG/HR: 50 INJECTION, SOLUTION INTRAMUSCULAR; INTRAVENOUS at 07:00

## 2021-03-27 RX ADMIN — FENTANYL CITRATE 175 MCG/HR: 50 INJECTION, SOLUTION INTRAMUSCULAR; INTRAVENOUS at 12:52

## 2021-03-27 RX ADMIN — IPRATROPIUM BROMIDE AND ALBUTEROL SULFATE 1 AMPULE: .5; 3 SOLUTION RESPIRATORY (INHALATION) at 08:10

## 2021-03-27 RX ADMIN — METHYLPREDNISOLONE SODIUM SUCCINATE 40 MG: 40 INJECTION, POWDER, FOR SOLUTION INTRAMUSCULAR; INTRAVENOUS at 02:12

## 2021-03-27 RX ADMIN — MIDAZOLAM HYDROCHLORIDE 6 MG/HR: 5 INJECTION, SOLUTION INTRAMUSCULAR; INTRAVENOUS at 05:29

## 2021-03-27 RX ADMIN — AZITHROMYCIN MONOHYDRATE 500 MG: 500 INJECTION, POWDER, LYOPHILIZED, FOR SOLUTION INTRAVENOUS at 11:18

## 2021-03-27 RX ADMIN — MUPIROCIN: 20 OINTMENT TOPICAL at 11:19

## 2021-03-27 RX ADMIN — MIDAZOLAM HYDROCHLORIDE 6 MG/HR: 5 INJECTION, SOLUTION INTRAMUSCULAR; INTRAVENOUS at 21:30

## 2021-03-27 RX ADMIN — CARBOXYMETHYLCELLULOSE SODIUM 1 DROP: 10 GEL OPHTHALMIC at 23:00

## 2021-03-27 RX ADMIN — DOCUSATE SODIUM 100 MG: 50 LIQUID ORAL at 11:17

## 2021-03-27 RX ADMIN — ENOXAPARIN SODIUM 40 MG: 40 INJECTION SUBCUTANEOUS at 11:16

## 2021-03-27 RX ADMIN — FAMOTIDINE 20 MG: 20 TABLET, FILM COATED ORAL at 11:17

## 2021-03-27 RX ADMIN — Medication 100 MG: at 11:17

## 2021-03-27 RX ADMIN — IPRATROPIUM BROMIDE AND ALBUTEROL SULFATE 1 AMPULE: .5; 3 SOLUTION RESPIRATORY (INHALATION) at 20:25

## 2021-03-27 RX ADMIN — IPRATROPIUM BROMIDE AND ALBUTEROL SULFATE 1 AMPULE: .5; 3 SOLUTION RESPIRATORY (INHALATION) at 11:50

## 2021-03-27 RX ADMIN — MUPIROCIN: 20 OINTMENT TOPICAL at 23:00

## 2021-03-27 RX ADMIN — FAMOTIDINE 20 MG: 20 TABLET, FILM COATED ORAL at 23:00

## 2021-03-27 RX ADMIN — CARBOXYMETHYLCELLULOSE SODIUM 1 DROP: 10 GEL OPHTHALMIC at 04:25

## 2021-03-27 RX ADMIN — SENNOSIDES 8.6 MG: 8.6 TABLET, FILM COATED ORAL at 11:18

## 2021-03-27 RX ADMIN — SODIUM CHLORIDE, PRESERVATIVE FREE 10 ML: 5 INJECTION INTRAVENOUS at 10:40

## 2021-03-27 RX ADMIN — PROPOFOL 30 MCG/KG/MIN: 10 INJECTION, EMULSION INTRAVENOUS at 05:40

## 2021-03-27 RX ADMIN — PROPOFOL 20 MCG/KG/MIN: 10 INJECTION, EMULSION INTRAVENOUS at 19:26

## 2021-03-27 RX ADMIN — IPRATROPIUM BROMIDE AND ALBUTEROL SULFATE 1 AMPULE: .5; 3 SOLUTION RESPIRATORY (INHALATION) at 16:20

## 2021-03-27 RX ADMIN — PROPOFOL 30 MCG/KG/MIN: 10 INJECTION, EMULSION INTRAVENOUS at 23:26

## 2021-03-27 RX ADMIN — IPRATROPIUM BROMIDE AND ALBUTEROL SULFATE 1 AMPULE: .5; 3 SOLUTION RESPIRATORY (INHALATION) at 23:37

## 2021-03-27 RX ADMIN — CARBOXYMETHYLCELLULOSE SODIUM 1 DROP: 10 GEL OPHTHALMIC at 00:30

## 2021-03-27 RX ADMIN — Medication 15 ML: at 23:00

## 2021-03-27 RX ADMIN — CARBOXYMETHYLCELLULOSE SODIUM 1 DROP: 10 GEL OPHTHALMIC at 08:10

## 2021-03-27 RX ADMIN — FENTANYL CITRATE 200 MCG/HR: 50 INJECTION, SOLUTION INTRAMUSCULAR; INTRAVENOUS at 18:49

## 2021-03-27 RX ADMIN — PROPOFOL 51.49 MCG/KG/MIN: 10 INJECTION, EMULSION INTRAVENOUS at 08:09

## 2021-03-27 RX ADMIN — FENTANYL CITRATE 175 MCG/HR: 50 INJECTION, SOLUTION INTRAMUSCULAR; INTRAVENOUS at 00:30

## 2021-03-27 RX ADMIN — CEFTRIAXONE SODIUM 1000 MG: 1 INJECTION, POWDER, FOR SOLUTION INTRAMUSCULAR; INTRAVENOUS at 11:00

## 2021-03-27 RX ADMIN — METHYLPREDNISOLONE SODIUM SUCCINATE 40 MG: 40 INJECTION, POWDER, FOR SOLUTION INTRAMUSCULAR; INTRAVENOUS at 14:38

## 2021-03-27 RX ADMIN — PHENOBARBITAL SODIUM 240 MG: 65 INJECTION INTRAMUSCULAR at 22:13

## 2021-03-27 RX ADMIN — CISATRACURIUM BESYLATE 0.5 MCG/KG/MIN: 10 INJECTION, SOLUTION INTRAVENOUS at 23:34

## 2021-03-27 ASSESSMENT — PULMONARY FUNCTION TESTS
PIF_VALUE: 27
PIF_VALUE: 26
PIF_VALUE: 25
PIF_VALUE: 27
PIF_VALUE: 40
PIF_VALUE: 28
PIF_VALUE: 27
PIF_VALUE: 26
PIF_VALUE: 27
PIF_VALUE: 28
PIF_VALUE: 28
PIF_VALUE: 26
PIF_VALUE: 28

## 2021-03-27 ASSESSMENT — PAIN SCALES - GENERAL: PAINLEVEL_OUTOF10: 0

## 2021-03-27 NOTE — PROGRESS NOTES
Pupils: Pupils are equal, round, and reactive to light. Neck:      Musculoskeletal: Neck supple. Vascular: No JVD. Cardiovascular:      Heart sounds: Normal heart sounds. No murmur. No friction rub. No gallop. Pulmonary:      Breath sounds: Rhonchi and rales present. No wheezing. Abdominal:      General: Bowel sounds are normal. There is no distension. Palpations: Abdomen is soft. Tenderness: There is no abdominal tenderness. Lymphadenopathy:      Cervical: No cervical adenopathy. Skin:     General: Skin is warm and dry. Findings: No rash. Neurological:      Mental Status: He is disoriented. Cranial Nerves: Cranial nerve deficit present. Comments: intubated            Data Reviewed:   LABS:  CBC:  Recent Labs     03/25/21  0905 03/26/21  0600   WBC 10.5 11.2*   HGB 15.2 13.4*   HCT 47.0 40.9   MCV 97.6 96.4    230     BMP:  Recent Labs     03/25/21  2313 03/26/21  0600 03/27/21  1106    143 140   K 4.9 4.4 4.0   CL 96* 99 98*   CO2 37* 35* 33*   PHOS  --  2.2* 4.9   BUN 18 26* 51*   CREATININE 1.0 1.3 1.3     LIVER PROFILE:   Recent Labs     03/25/21  0905 03/26/21  0600   AST 17 14*   ALT 20 13   BILITOT 0.3 0.4   ALKPHOS 108 91     PT/INR:No results for input(s): PROTIME, INR in the last 72 hours. APTT: No results for input(s): APTT in the last 72 hours. UA:  Recent Labs     03/26/21  1310   COLORU Yellow   PHUR 5.0   WBCUA 0-2   RBCUA 0-2   MUCUS Rare*   BACTERIA 2+*   CLARITYU CLOUDY*   SPECGRAV >=1.030   LEUKOCYTESUR Negative   UROBILINOGEN 0.2   BILIRUBINUR SMALL*   BLOODU Negative   GLUCOSEU Negative   AMORPHOUS 2+     Recent Labs     03/26/21  0830 03/26/21  0918   PHART 7.427 7. 417   [id number] 52.3* 52.7*   PO2ART 40.1* 68.1*       Vent Information  $Ventilation: $Subsequent Day  Skin Assessment: Clean, dry, & intact  Equipment Changed: HME  Vent Type: 840  Vent Mode: AC/VC  Vt Ordered: 500 mL  Rate Set: 20 bmp  Peak Flow: 60 L/min  Pressure Support: 0 cmH20  FiO2 : 100 %  SpO2: (!) 88 %  SpO2/FiO2 ratio: 88  Sensitivity: 3  PEEP/CPAP: 10  I Time/ I Time %: 1.2 s  Humidification Source: HME  Nitric Oxide/Epoprostenol In Use?: No  Mask Type: Full face mask  Mask Size: Large    CXR personally reviewed, worsening bilateral infiltrates and possible superimposed pleural effusions          Assessment:     1. Acute resp failure, mixed   -multifocal pneumonia, possible COVID  2. Acute encephalopathy, acute etoh withdrawal  3. KAROL  4. COPD with acute exac  5. Tobacco dependence    Plan:      -Plan for bronch at the bedside  -Empiric atb coverage  -Vent support, serial ABGs  -Titrate sedation as needed  -Monitor renal function with serial labs, if Cr continues to rise will ask nephro to see. Will also start MIVF   -Steroids  -Continue tube feeds  -Anticipate prolonged course on account of underlying etoh withdrawal. Discussed with girlfriend    Due to life threatening resp failure with vent dependence this patient is critically ill.  Total critical care time involved in his care was over 30 mins excluding other procedures   [id number]    Garth Nagel MD

## 2021-03-27 NOTE — PLAN OF CARE
Problem: Falls - Risk of:  Goal: Will remain free from falls  Description: Will remain free from falls  Outcome: Ongoing  Goal: Absence of physical injury  Description: Absence of physical injury  Outcome: Ongoing     Problem: Skin Integrity:  Goal: Will show no infection signs and symptoms  Description: Will show no infection signs and symptoms  Outcome: Ongoing  Goal: Absence of new skin breakdown  Description: Absence of new skin breakdown  Outcome: Ongoing     Problem: Non-Violent Restraints  Goal: Removal from restraints as soon as assessed to be safe  Outcome: Ongoing  Goal: No harm/injury to patient while restraints in use  Outcome: Ongoing  Goal: Patient's dignity will be maintained  Outcome: Ongoing     Problem: Nutrition  Goal: Optimal nutrition therapy  Outcome: Ongoing

## 2021-03-27 NOTE — PROGRESS NOTES
03/27/21 1152   Vent Information   Vent Type 840   Vent Mode AC/VC   Vt Ordered 500 mL   Rate Set 20 bmp   Peak Flow 60 L/min   Pressure Support 0 cmH20   FiO2  100 %   Sensitivity 3   PEEP/CPAP 10   Humidification Source HME   Vent Patient Data   Peak Inspiratory Pressure 26 cmH2O   Mean Airway Pressure 16 cmH20   Rate Measured 20 br/min   Vt Exhaled 580 mL   Minute Volume 11.1 Liters   I:E Ratio 1:2.30   Spontaneous Breathing Trial (SBT) RT Doc   Pulse 67   Additional Respiratory  Assessments   Resp 20   Position Semi-Dowd's   Cuff Pressure (cm H2O) 30 cm H2O   Alarm Settings   High Pressure Alarm 40 cmH2O   Low Minute Volume Alarm 2 L/min   High Respiratory Rate 40 br/min   Patient Observation   Observations setting up for A scope   ETT (adult)   Placement Date/Time: 03/25/21 2147   Preoxygenation: Yes  Type: Cuffed  Tube Size: 8 mm  Laryngoscope: GlideScope  Blade Size: 3  Location: Oral  Insertion attempts: 1  Placement Verified By[de-identified] Auscultation;Capnometry  Secured at: 23 cm  Measured From:. ..    Secured at 25 cm   Measured From Lips   ET Placement Right   Secured By Commercial tube still   Site Condition Dry

## 2021-03-27 NOTE — PROGRESS NOTES
03/26/21 2038   Vent Information   Vent Type 840   Vent Mode AC/VC   Vt Ordered 500 mL   Rate Set 20 bmp   Peak Flow 60 L/min   Pressure Support 0 cmH20   FiO2  100 %   SpO2 93 %   SpO2/FiO2 ratio 93   Sensitivity 3   PEEP/CPAP 10   Humidification Source HME   Vent Patient Data   Peak Inspiratory Pressure 27 cmH2O   Mean Airway Pressure 16 cmH20   Rate Measured 20 br/min   Vt Exhaled 566 mL   Minute Volume 11.3 Liters   I:E Ratio 1:2.30   Cough/Sputum   Sputum How Obtained Endotracheal   Cough Productive   Sputum Amount Small   Sputum Color White   Tenacity Thin   Spontaneous Breathing Trial (SBT) RT Doc   Pulse 66   Breath Sounds   Right Upper Lobe Rhonchi   Right Middle Lobe Diminished   Right Lower Lobe Diminished   Left Upper Lobe Rhonchi   Left Lower Lobe Diminished   Additional Respiratory  Assessments   Resp 20   Alarm Settings   High Pressure Alarm 40 cmH2O   Low Minute Volume Alarm 2 L/min   High Respiratory Rate 40 br/min   Low Exhaled Vt  200 mL   ETT (adult)   Placement Date/Time: 03/25/21 2147   Preoxygenation: Yes  Type: Cuffed  Tube Size: 8 mm  Laryngoscope: GlideScope  Blade Size: 3  Location: Oral  Insertion attempts: 1  Placement Verified By[de-identified] Auscultation;Capnometry  Secured at: 23 cm  Measured From:. ..    Secured at 25 cm   Measured From [address],[facility] [address] By Commercial tube still   Site Condition Dry   Cuff Pressure 32 cm H2O

## 2021-03-27 NOTE — PROGRESS NOTES
Hospitalist Progress Note      PCP: Chiquita Salazar MD    Date of Admission: 3/25/2021    Chief Complaint: Shortness of breath    Hospital Course: Admitted with shortness of breath and started on BiPAP initially tolerated but later became agitated became refractory to sedation and has to be transferred to ICU after intubation. Subjective: Patient is intubated sedated appears comfortable. Medications:  Reviewed    Infusion Medications    fentaNYL (SUBLIMAZE) infusion 175 mcg/hr (03/27/21 0700)    midazolam 6 mg/hr (03/27/21 0529)    propofol 20 mcg/kg/min (03/27/21 6651)     Scheduled Medications    midazolam  5 mg Intravenous Once    mupirocin   Nasal BID    carboxymethylcellulose PF  1 drop Both Eyes 6 times per day    famotidine  20 mg Per NG tube BID    thiamine  100 mg Per NG tube Daily    senna  1 tablet Per NG tube BID    docusate  100 mg Per NG tube BID    sodium chloride flush  10 mL Intravenous 2 times per day    enoxaparin  40 mg Subcutaneous Daily    ipratropium-albuterol  1 ampule Inhalation Q4H WA    azithromycin  500 mg Intravenous Q24H    cefTRIAXone (ROCEPHIN) IV  1,000 mg Intravenous Q24H    methylPREDNISolone  40 mg Intravenous Q12H    chlorhexidine  15 mL Mouth/Throat BID     PRN Meds: sodium chloride flush, promethazine **OR** ondansetron, polyethylene glycol, acetaminophen **OR** acetaminophen, fentanNYL, albuterol sulfate HFA **AND** ipratropium **AND** MDI Treatment, midazolam      Intake/Output Summary (Last 24 hours) at 3/27/2021 0944  Last data filed at 3/27/2021 0000  Gross per 24 hour   Intake 1440 ml   Output 310 ml   Net 1130 ml       Physical Exam Performed:    /69   Pulse 64   Temp 97.3 °F (36.3 °C) (Bladder)   Resp 20   Ht 5' 10\" (1.778 m)   Wt 248 lb 14.4 oz (112.9 kg)   SpO2 (!) 88%   BMI 35.71 kg/m²     General appearance: No apparent distress, appears stated age and on ventilator  HEENT: Pupils equal, round, and reactive to light. satisfactory position. No pneumothorax. Worsening bibasilar predominant airspace disease. Small right and trace left   pleural effusions suspected. XR CHEST PORTABLE   Final Result              Assessment/Plan:    Active Hospital Problems    Diagnosis    [facility] ([facility] [J44.1]    Alcohol withdrawal ([facility] [F10.239]     1. Acute hypoxic respiratory failure secondary COPD exacerbation. Required BiPAP on arrival.  Due to worsening agitation refractory to recommended Zyprexa patient was urgently intubated and transferred to ICU. Pulmonary critical care consult appreciated vent management per pulmonary. 2.  COPD exacerbation   On IV Solu-Medrol rapid COVID-19 negative, PCR negative. Continue with inhalers plan per pulmonary. 3.  Abnormal chest x-ray on admission on antibiotic, procalcitonin level negative.   4.  History of alcohol addiction      DVT Prophylaxis: Lovenox subcu  Diet: DIET TUBE FEED CONTINUOUS/CYCLIC NPO; Low Calorie High Protein; Orogastric; Continuous; 10; 50  Diet Tube Feed Modular: Protein Modular  Code Status: Full Code    PT/OT Eval Status:     Cece Leonard MD

## 2021-03-27 NOTE — PROGRESS NOTES
03/27/21 0002   Vent Information   Vent Type 840   Vent Mode AC/VC   Vt Ordered 500 mL   Rate Set 20 bmp   Peak Flow 60 L/min   Pressure Support 0 cmH20   FiO2  100 %   Sensitivity 3   PEEP/CPAP 10   Humidification Source HME   Vent Patient Data   Peak Inspiratory Pressure 26 cmH2O   Mean Airway Pressure 16 cmH20   Rate Measured 22 br/min   Vt Exhaled 547 mL   Minute Volume 11.4 Liters   I:E Ratio 1:2.30   Cough/Sputum   Sputum How Obtained Endotracheal   Cough Non-productive   Spontaneous Breathing Trial (SBT) RT Doc   Pulse 68   Breath Sounds   Right Upper Lobe Diminished   Right Middle Lobe Diminished   Right Lower Lobe Diminished   Left Upper Lobe Diminished   Left Lower Lobe Diminished   Additional Respiratory  Assessments   Resp 19   Alarm Settings   High Pressure Alarm 40 cmH2O   Low Minute Volume Alarm 2 L/min   High Respiratory Rate 40 br/min   Low Exhaled Vt  200 mL   ETT (adult)   Placement Date/Time: 03/25/21 2147   Preoxygenation: Yes  Type: Cuffed  Tube Size: 8 mm  Laryngoscope: GlideScope  Blade Size: 3  Location: Oral  Insertion attempts: 1  Placement Verified By[de-identified] Auscultation;Capnometry  Secured at: 23 cm  Measured From:. ..    Secured at 25 cm   Measured From Lips   ET Placement Right   Secured By Commercial tube still   Site Condition Dry   Cuff Pressure 30 cm H2O

## 2021-03-27 NOTE — PROGRESS NOTES
03/27/21 0812   Vent Information   Vent Type 840   Vent Mode AC/VC   Vt Ordered 500 mL   Rate Set 20 bmp   Peak Flow 60 L/min   Pressure Support 0 cmH20   FiO2  100 %   SpO2 (!) 88 %   SpO2/FiO2 ratio 88   Sensitivity 3   PEEP/CPAP 10   Vent Patient Data   Peak Inspiratory Pressure 27 cmH2O   Mean Airway Pressure 16 cmH20   Rate Measured 20 br/min   Vt Exhaled 553 mL   Minute Volume 11 Liters   I:E Ratio 1:2.30   Cough/Sputum   Sputum How Obtained None   Spontaneous Breathing Trial (SBT) RT Doc   Pulse 64   Breath Sounds   Right Upper Lobe Clear   Right Middle Lobe Clear   Right Lower Lobe Diminished   Left Upper Lobe Clear   Left Lower Lobe Diminished   Additional Respiratory  Assessments   Resp 20   Position Semi-Dowd's   Cuff Pressure (cm H2O) 30 cm H2O   Alarm Settings   High Pressure Alarm 40 cmH2O   Low Minute Volume Alarm 2 L/min   High Respiratory Rate 40 br/min   Patient Observation   Observations [email or web address]   ETT (adult)   Placement Date/Time: 03/25/21 2147   Preoxygenation: Yes  Type: Cuffed  Tube Size: 8 mm  Laryngoscope: GlideScope  Blade Size: 3  Location: Oral  Insertion attempts: 1  Placement Verified By[de-identified] Auscultation;Capnometry  Secured at: 23 cm  Measured From:. ..    Secured at 25 cm   Measured From [address] still   Site Condition Dry   Cuff Pressure 30 cm H2O

## 2021-03-28 ENCOUNTER — APPOINTMENT (OUTPATIENT)
Dept: GENERAL RADIOLOGY | Age: 61
DRG: 130 | End: 2021-03-28
Payer: MEDICAID

## 2021-03-28 LAB
ANION GAP SERPL CALCULATED.3IONS-SCNC: 8 MMOL/L (ref 3–16)
ANION GAP SERPL CALCULATED.3IONS-SCNC: 8 MMOL/L (ref 3–16)
BACTERIA: ABNORMAL /HPF
BASE EXCESS ARTERIAL: 4.2 MMOL/L (ref -3–3)
BASE EXCESS ARTERIAL: 6.6 MMOL/L (ref -3–3)
BILIRUBIN URINE: NEGATIVE
BLOOD, URINE: NEGATIVE
BUN BLDV-MCNC: 60 MG/DL (ref 7–20)
BUN BLDV-MCNC: 61 MG/DL (ref 7–20)
CALCIUM SERPL-MCNC: 8.8 MG/DL (ref 8.3–10.6)
CALCIUM SERPL-MCNC: 8.8 MG/DL (ref 8.3–10.6)
CARBOXYHEMOGLOBIN ARTERIAL: 0.1 % (ref 0–1.5)
CARBOXYHEMOGLOBIN ARTERIAL: 0.1 % (ref 0–1.5)
CHLORIDE BLD-SCNC: 97 MMOL/L (ref 99–110)
CHLORIDE BLD-SCNC: 99 MMOL/L (ref 99–110)
CHLORIDE URINE RANDOM: <20 MMOL/L
CLARITY: CLEAR
CO2: 32 MMOL/L (ref 21–32)
CO2: 34 MMOL/L (ref 21–32)
COLOR: YELLOW
CREAT SERPL-MCNC: 1.3 MG/DL (ref 0.9–1.3)
CREAT SERPL-MCNC: 1.5 MG/DL (ref 0.9–1.3)
CREATININE URINE: 99 MG/DL (ref 39–259)
CRYSTALS, UA: ABNORMAL /HPF
GFR AFRICAN AMERICAN: 58
GFR AFRICAN AMERICAN: >60
GFR NON-AFRICAN AMERICAN: 48
GFR NON-AFRICAN AMERICAN: 56
GLUCOSE BLD-MCNC: 143 MG/DL (ref 70–99)
GLUCOSE BLD-MCNC: 179 MG/DL (ref 70–99)
GLUCOSE URINE: NEGATIVE MG/DL
HCO3 ARTERIAL: 28.4 MMOL/L (ref 21–29)
HCO3 ARTERIAL: 34 MMOL/L (ref 21–29)
HEMOGLOBIN, ART, EXTENDED: 14.3 G/DL (ref 13.5–17.5)
HEMOGLOBIN, ART, EXTENDED: 14.5 G/DL (ref 13.5–17.5)
KETONES, URINE: NEGATIVE MG/DL
LEUKOCYTE ESTERASE, URINE: NEGATIVE
METHEMOGLOBIN ARTERIAL: 0.4 %
METHEMOGLOBIN ARTERIAL: 0.4 %
MICROSCOPIC EXAMINATION: ABNORMAL
MUCUS: ABNORMAL /LPF
NITRITE, URINE: NEGATIVE
O2 CONTENT ARTERIAL: 19 ML/DL
O2 CONTENT ARTERIAL: 19 ML/DL
O2 SAT, ARTERIAL: 93.4 %
O2 SAT, ARTERIAL: 94.4 %
O2 THERAPY: ABNORMAL
O2 THERAPY: ABNORMAL
PCO2 ARTERIAL: 41 MMHG (ref 35–45)
PCO2 ARTERIAL: 60.7 MMHG (ref 35–45)
PH ARTERIAL: 7.37 (ref 7.35–7.45)
PH ARTERIAL: 7.46 (ref 7.35–7.45)
PH UA: 5 (ref 5–8)
PHOSPHORUS: 3.3 MG/DL (ref 2.5–4.9)
PHOSPHORUS: 5.3 MG/DL (ref 2.5–4.9)
PO2 ARTERIAL: 71.1 MMHG (ref 75–108)
PO2 ARTERIAL: 74.2 MMHG (ref 75–108)
POTASSIUM REFLEX MAGNESIUM: 4.1 MMOL/L (ref 3.5–5.1)
POTASSIUM REFLEX MAGNESIUM: 4.3 MMOL/L (ref 3.5–5.1)
POTASSIUM, UR: 30.8 MMOL/L
PROTEIN UA: NEGATIVE MG/DL
RBC UA: ABNORMAL /HPF (ref 0–4)
SODIUM BLD-SCNC: 139 MMOL/L (ref 136–145)
SODIUM BLD-SCNC: 139 MMOL/L (ref 136–145)
SODIUM URINE: <20 MMOL/L
SPECIFIC GRAVITY UA: 1.02 (ref 1–1.03)
TCO2 ARTERIAL: 29.7 MMOL/L
TCO2 ARTERIAL: 35.9 MMOL/L
URINE TYPE: ABNORMAL
UROBILINOGEN, URINE: 0.2 E.U./DL
WBC UA: ABNORMAL /HPF (ref 0–5)

## 2021-03-28 PROCEDURE — 94003 VENT MGMT INPAT SUBQ DAY: CPT

## 2021-03-28 PROCEDURE — 0B9D8ZX DRAINAGE OF RIGHT MIDDLE LUNG LOBE, VIA NATURAL OR ARTIFICIAL OPENING ENDOSCOPIC, DIAGNOSTIC: ICD-10-PCS | Performed by: INTERNAL MEDICINE

## 2021-03-28 PROCEDURE — 2580000003 HC RX 258: Performed by: INTERNAL MEDICINE

## 2021-03-28 PROCEDURE — 94761 N-INVAS EAR/PLS OXIMETRY MLT: CPT

## 2021-03-28 PROCEDURE — 82436 ASSAY OF URINE CHLORIDE: CPT

## 2021-03-28 PROCEDURE — 2700000000 HC OXYGEN THERAPY PER DAY

## 2021-03-28 PROCEDURE — 84100 ASSAY OF PHOSPHORUS: CPT

## 2021-03-28 PROCEDURE — 6370000000 HC RX 637 (ALT 250 FOR IP): Performed by: NURSE PRACTITIONER

## 2021-03-28 PROCEDURE — 84300 ASSAY OF URINE SODIUM: CPT

## 2021-03-28 PROCEDURE — 99233 SBSQ HOSP IP/OBS HIGH 50: CPT | Performed by: INTERNAL MEDICINE

## 2021-03-28 PROCEDURE — 84133 ASSAY OF URINE POTASSIUM: CPT

## 2021-03-28 PROCEDURE — 36592 COLLECT BLOOD FROM PICC: CPT

## 2021-03-28 PROCEDURE — 82570 ASSAY OF URINE CREATININE: CPT

## 2021-03-28 PROCEDURE — 2000000000 HC ICU R&B

## 2021-03-28 PROCEDURE — 37799 UNLISTED PX VASCULAR SURGERY: CPT

## 2021-03-28 PROCEDURE — 81001 URINALYSIS AUTO W/SCOPE: CPT

## 2021-03-28 PROCEDURE — 6360000002 HC RX W HCPCS: Performed by: INTERNAL MEDICINE

## 2021-03-28 PROCEDURE — 71045 X-RAY EXAM CHEST 1 VIEW: CPT

## 2021-03-28 PROCEDURE — 36620 INSERTION CATHETER ARTERY: CPT

## 2021-03-28 PROCEDURE — 94640 AIRWAY INHALATION TREATMENT: CPT

## 2021-03-28 PROCEDURE — 82803 BLOOD GASES ANY COMBINATION: CPT

## 2021-03-28 PROCEDURE — 6370000000 HC RX 637 (ALT 250 FOR IP): Performed by: INTERNAL MEDICINE

## 2021-03-28 PROCEDURE — 80048 BASIC METABOLIC PNL TOTAL CA: CPT

## 2021-03-28 RX ADMIN — CARBOXYMETHYLCELLULOSE SODIUM 1 DROP: 10 GEL OPHTHALMIC at 20:01

## 2021-03-28 RX ADMIN — PROPOFOL 40 MCG/KG/MIN: 10 INJECTION, EMULSION INTRAVENOUS at 15:28

## 2021-03-28 RX ADMIN — CARBOXYMETHYLCELLULOSE SODIUM 1 DROP: 10 GEL OPHTHALMIC at 03:41

## 2021-03-28 RX ADMIN — PHENOBARBITAL SODIUM 240 MG: 65 INJECTION INTRAMUSCULAR at 05:23

## 2021-03-28 RX ADMIN — PHENOBARBITAL SODIUM 240 MG: 65 INJECTION INTRAMUSCULAR at 15:46

## 2021-03-28 RX ADMIN — CARBOXYMETHYLCELLULOSE SODIUM 1 DROP: 10 GEL OPHTHALMIC at 07:52

## 2021-03-28 RX ADMIN — IPRATROPIUM BROMIDE AND ALBUTEROL SULFATE 1 AMPULE: .5; 3 SOLUTION RESPIRATORY (INHALATION) at 11:32

## 2021-03-28 RX ADMIN — SODIUM CHLORIDE, PRESERVATIVE FREE 10 ML: 5 INJECTION INTRAVENOUS at 20:01

## 2021-03-28 RX ADMIN — FENTANYL CITRATE 150 MCG/HR: 50 INJECTION, SOLUTION INTRAMUSCULAR; INTRAVENOUS at 16:36

## 2021-03-28 RX ADMIN — CARBOXYMETHYLCELLULOSE SODIUM 1 DROP: 10 GEL OPHTHALMIC at 13:37

## 2021-03-28 RX ADMIN — FAMOTIDINE 20 MG: 20 TABLET, FILM COATED ORAL at 20:01

## 2021-03-28 RX ADMIN — SENNOSIDES 8.6 MG: 8.6 TABLET, FILM COATED ORAL at 07:53

## 2021-03-28 RX ADMIN — CARBOXYMETHYLCELLULOSE SODIUM 1 DROP: 10 GEL OPHTHALMIC at 15:52

## 2021-03-28 RX ADMIN — AZITHROMYCIN MONOHYDRATE 500 MG: 500 INJECTION, POWDER, LYOPHILIZED, FOR SOLUTION INTRAVENOUS at 07:50

## 2021-03-28 RX ADMIN — METHYLPREDNISOLONE SODIUM SUCCINATE 40 MG: 40 INJECTION, POWDER, FOR SOLUTION INTRAMUSCULAR; INTRAVENOUS at 15:46

## 2021-03-28 RX ADMIN — MIDAZOLAM HYDROCHLORIDE 6 MG/HR: 5 INJECTION, SOLUTION INTRAMUSCULAR; INTRAVENOUS at 18:23

## 2021-03-28 RX ADMIN — ENOXAPARIN SODIUM 40 MG: 40 INJECTION SUBCUTANEOUS at 07:53

## 2021-03-28 RX ADMIN — PROPOFOL 40 MCG/KG/MIN: 10 INJECTION, EMULSION INTRAVENOUS at 07:26

## 2021-03-28 RX ADMIN — Medication 100 MG: at 07:53

## 2021-03-28 RX ADMIN — PROPOFOL 40 MCG/KG/MIN: 10 INJECTION, EMULSION INTRAVENOUS at 03:32

## 2021-03-28 RX ADMIN — SODIUM CHLORIDE, PRESERVATIVE FREE 10 ML: 5 INJECTION INTRAVENOUS at 08:55

## 2021-03-28 RX ADMIN — DOCUSATE SODIUM 100 MG: 50 LIQUID ORAL at 07:52

## 2021-03-28 RX ADMIN — FENTANYL CITRATE 200 MCG/HR: 50 INJECTION, SOLUTION INTRAMUSCULAR; INTRAVENOUS at 06:12

## 2021-03-28 RX ADMIN — FAMOTIDINE 20 MG: 20 TABLET, FILM COATED ORAL at 07:53

## 2021-03-28 RX ADMIN — PHENOBARBITAL SODIUM 240 MG: 65 INJECTION INTRAMUSCULAR at 22:26

## 2021-03-28 RX ADMIN — MUPIROCIN: 20 OINTMENT TOPICAL at 07:52

## 2021-03-28 RX ADMIN — FENTANYL CITRATE 200 MCG/HR: 50 INJECTION, SOLUTION INTRAMUSCULAR; INTRAVENOUS at 00:48

## 2021-03-28 RX ADMIN — SENNOSIDES 8.6 MG: 8.6 TABLET, FILM COATED ORAL at 20:01

## 2021-03-28 RX ADMIN — IPRATROPIUM BROMIDE AND ALBUTEROL SULFATE 1 AMPULE: .5; 3 SOLUTION RESPIRATORY (INHALATION) at 23:54

## 2021-03-28 RX ADMIN — METHYLPREDNISOLONE SODIUM SUCCINATE 40 MG: 40 INJECTION, POWDER, FOR SOLUTION INTRAMUSCULAR; INTRAVENOUS at 03:40

## 2021-03-28 RX ADMIN — IPRATROPIUM BROMIDE AND ALBUTEROL SULFATE 1 AMPULE: .5; 3 SOLUTION RESPIRATORY (INHALATION) at 15:13

## 2021-03-28 RX ADMIN — IPRATROPIUM BROMIDE AND ALBUTEROL SULFATE 1 AMPULE: .5; 3 SOLUTION RESPIRATORY (INHALATION) at 08:30

## 2021-03-28 RX ADMIN — IPRATROPIUM BROMIDE AND ALBUTEROL SULFATE 1 AMPULE: .5; 3 SOLUTION RESPIRATORY (INHALATION) at 03:39

## 2021-03-28 RX ADMIN — PROPOFOL 20 MCG/KG/MIN: 10 INJECTION, EMULSION INTRAVENOUS at 20:47

## 2021-03-28 RX ADMIN — Medication 15 ML: at 08:56

## 2021-03-28 RX ADMIN — MIDAZOLAM HYDROCHLORIDE 10 MG/HR: 5 INJECTION, SOLUTION INTRAMUSCULAR; INTRAVENOUS at 07:05

## 2021-03-28 RX ADMIN — IPRATROPIUM BROMIDE AND ALBUTEROL SULFATE 1 AMPULE: .5; 3 SOLUTION RESPIRATORY (INHALATION) at 20:21

## 2021-03-28 RX ADMIN — CEFTRIAXONE SODIUM 1000 MG: 1 INJECTION, POWDER, FOR SOLUTION INTRAMUSCULAR; INTRAVENOUS at 08:55

## 2021-03-28 RX ADMIN — PROPOFOL 40 MCG/KG/MIN: 10 INJECTION, EMULSION INTRAVENOUS at 11:16

## 2021-03-28 RX ADMIN — Medication 15 ML: at 20:01

## 2021-03-28 RX ADMIN — DOCUSATE SODIUM 100 MG: 50 LIQUID ORAL at 20:01

## 2021-03-28 RX ADMIN — MUPIROCIN: 20 OINTMENT TOPICAL at 20:01

## 2021-03-28 RX ADMIN — FENTANYL CITRATE 200 MCG/HR: 50 INJECTION, SOLUTION INTRAMUSCULAR; INTRAVENOUS at 11:31

## 2021-03-28 ASSESSMENT — PULMONARY FUNCTION TESTS
PIF_VALUE: 27
PIF_VALUE: 32
PIF_VALUE: 29
PIF_VALUE: 32
PIF_VALUE: 32
PIF_VALUE: 28
PIF_VALUE: 31
PIF_VALUE: 32
PIF_VALUE: 28
PIF_VALUE: 32
PIF_VALUE: 32

## 2021-03-28 ASSESSMENT — PAIN SCALES - GENERAL
PAINLEVEL_OUTOF10: 0

## 2021-03-28 NOTE — OP NOTE
Preoperative Diagnosis:  1. Acute resp failure  2. Multifocal pneumonia     Postoperative Diagnosis:  1. Acute resp failure  2. Multifocal pneumonia     Procedure Performed:  1. Flexible bronchoscopy  2. Therapeutic aspiration of endobronchial secretions  3. Bronchoalveolar lavage of right middle lobe    Findings:  1. Improved airway patency after aspiration of secretions from the tracheobronchial tree    Recommendations:  1. Await results of collected specimen    Indications: Worsening pneumonia and resp failure. Bronchoscopy indicated for further evaluation of findings and to assist with pulmonary toileting. ASA 4, Mallampati 3    Procedure Details: The patient was correctly identified as  Eliseo Brandon, a safety timeout was performed. An adult therapeutic bronchoscope was inserted through the endotracheal tube and into the airways. Tenacious white secretions were aspirated from the tracheobronchial tree with subsequent improved airway patency. An airway exam was then performed, all subsegments were well visualized and did not appear to show any acute abnormality. The bronchoscope was wedged into the right middle lobe and a bronchoalveolar lavage was performed. Bronchoscope was withdrawn and the procedure was terminated. There was no immediate complications, the patient tolerated the procedure well. Estimated blood loss was less than 1 cc.      Specimens:

## 2021-03-28 NOTE — PROGRESS NOTES
03/28/21 0343   Vent Information   $Ventilation $Subsequent Day   Skin Assessment Clean, dry, & intact   Equipment Changed HME   Vent Type 840   Vent Mode AC/VC   Vt Ordered 500 mL   Rate Set 20 bmp   Peak Flow 60 L/min   Pressure Support 0 cmH20   FiO2  100 %   Sensitivity 3   PEEP/CPAP 10   Humidification Source HME   Vent Patient Data   Peak Inspiratory Pressure 26 cmH2O   Mean Airway Pressure 15 cmH20   Rate Measured 20 br/min   Vt Exhaled 514 mL   Minute Volume 9.89 Liters   I:E Ratio 1:2.30   Spontaneous Breathing Trial (SBT) RT Doc   Pulse 74   Additional Respiratory  Assessments   Resp 20   Position Semi-Dowd's   Cuff Pressure (cm H2O) 30 cm H2O   Alarm Settings   High Pressure Alarm 40 cmH2O   Low Minute Volume Alarm 2 L/min   High Respiratory Rate 40 br/min   Low Exhaled Vt  200 mL   Patient Observation   Observations Ambu @ bedside. ETT moved to center   ETT (adult)   Placement Date/Time: 03/25/21 2147   Preoxygenation: Yes  Type: Cuffed  Tube Size: 8 mm  Laryngoscope: GlideScope  Blade Size: 3  Location: Oral  Insertion attempts: 1  Placement Verified By[de-identified] Auscultation;Capnometry  Secured at: 23 cm  Measured From:. ..    Secured at 25 cm   Measured From [address] By [address] tube still   Site Condition Dry

## 2021-03-28 NOTE — PROGRESS NOTES
03/28/21 0842   Vent Information   Vent Mode AC/PC   Rate Set 20 bmp   Pressure Support 0 cmH20   FiO2  100 %   Sensitivity 3   PEEP/CPAP 12   I Time/ I Time % 0 s   Vent Patient Data   High Peep/I Pressure 20   Peak Inspiratory Pressure 32 cmH2O   Mean Airway Pressure 17 cmH20   Rate Measured 20 br/min   Vt Exhaled 554 mL   Minute Volume 12.2 Liters   I:E Ratio 1:2.30   Spontaneous Breathing Trial (SBT) RT Doc   Pulse 73   Additional Respiratory  Assessments   Resp 20   Alarm Settings   High Pressure Alarm 40 cmH2O   Low Minute Volume Alarm 2 L/min   High Respiratory Rate 40 br/min

## 2021-03-28 NOTE — PROGRESS NOTES
Pulmonary & Critical Care Inpatient Progress Note   Marycruz Pop MD     REASON FOR TODAY'S VISIT:  Acute resp failure, encephalopathy    SUBJECTIVE:   Persistent agitation requiring versed and fentanyl infusions as well as propofol  Was paralyzed yesterday, taken off today  BIS reading below 40 despite goal listed of 40-70, discussed with nursing on weaning sedation  Tube feeds running  Low urine output     Scheduled Meds:   PHENobarbital  240 mg Intravenous 3 times per day    ipratropium-albuterol  1 ampule Inhalation Q4H    midazolam  5 mg Intravenous Once    mupirocin   Nasal BID    carboxymethylcellulose PF  1 drop Both Eyes 6 times per day    famotidine  20 mg Per NG tube BID    thiamine  100 mg Per NG tube Daily    senna  1 tablet Per NG tube BID    docusate  100 mg Per NG tube BID    sodium chloride flush  10 mL Intravenous 2 times per day    enoxaparin  40 mg Subcutaneous Daily    azithromycin  500 mg Intravenous Q24H    cefTRIAXone (ROCEPHIN) IV  1,000 mg Intravenous Q24H    methylPREDNISolone  40 mg Intravenous Q12H    chlorhexidine  15 mL Mouth/Throat BID       Continuous Infusions:   cisatracurium (NIMBEX) infusion Stopped (03/28/21 1526)    norepinephrine Stopped (03/27/21 2200)    fentaNYL (SUBLIMAZE) infusion 150 mcg/hr (03/28/21 1636)    midazolam 6 mg/hr (03/28/21 1823)    propofol 20 mcg/kg/min (03/28/21 1805)       PRN Meds:  sodium chloride flush, promethazine **OR** ondansetron, polyethylene glycol, acetaminophen **OR** acetaminophen, fentanNYL, albuterol sulfate HFA **AND** ipratropium **AND** MDI Treatment, midazolam    ALLERGIES:  Patient has No Known Allergies. Objective:   PHYSICAL EXAM:  /69   Pulse 62   Temp 97.2 °F (36.2 °C) (Bladder)   Resp 18   Ht 5' 10\" (1.778 m)   Wt 251 lb 5.2 oz (114 kg)   SpO2 94%   BMI 36.06 kg/m²    Physical Exam  Constitutional:       General: He is not in acute distress. Appearance: He is well-developed.  He is not diaphoretic. HENT:      Head: Normocephalic and atraumatic. Mouth/Throat:      Pharynx: No oropharyngeal exudate. Eyes:      Pupils: Pupils are equal, round, and reactive to light. Neck:      Musculoskeletal: Neck supple. Vascular: No JVD. Cardiovascular:      Heart sounds: Normal heart sounds. No murmur. No friction rub. No gallop. Pulmonary:      Breath sounds: Rhonchi and rales present. No wheezing. Abdominal:      General: Bowel sounds are normal. There is no distension. Palpations: Abdomen is soft. Tenderness: There is no abdominal tenderness. Lymphadenopathy:      Cervical: No cervical adenopathy. Skin:     General: Skin is warm and dry. Findings: No rash. Neurological:      Mental Status: He is disoriented. Cranial Nerves: Cranial nerve deficit present. Comments: intubated            Data Reviewed:   LABS:  CBC:  Recent Labs     03/26/21  0600   WBC 11.2*   HGB 13.4*   HCT 40.9   MCV 96.4        BMP:  Recent Labs     03/27/21  1106 03/28/21  0525 03/28/21  1340    139 139   K 4.0 4.1 4.3   CL 98* 97* 99   CO2 33* 34* 32   PHOS 4.9 5.3* 3.3   BUN 51* 60* 61*   CREATININE 1.3 1.5* 1.3     LIVER PROFILE:   Recent Labs     03/26/21  0600   AST 14*   ALT 13   BILITOT 0.4   ALKPHOS 91     PT/INR:No results for input(s): PROTIME, INR in the last 72 hours. APTT: No results for input(s): APTT in the last 72 hours.   UA:  Recent Labs     03/26/21  1310 03/28/21  1345   COLORU Yellow Yellow   PHUR 5.0 5.0   WBCUA 0-2 None seen   RBCUA 0-2 None seen   MUCUS Rare* Rare*   BACTERIA 2+* Rare*   CLARITYU CLOUDY* Clear   SPECGRAV >=1.030 1.020   LEUKOCYTESUR Negative Negative   UROBILINOGEN 0.2 0.2   BILIRUBINUR SMALL* Negative   BLOODU Negative Negative   GLUCOSEU Negative Negative   AMORPHOUS 2+  --      Recent Labs     03/28/21  0525 03/28/21  1340   PHART 7.366 7.459*   YYF8VDX 60.7* 41.0   PO2ART 74.2* 71.1*       Vent Information  $Ventilation: $Subsequent Day  Skin Assessment: Clean, dry, & intact  Equipment Changed: HME  Vent Type: 840  Vent Mode: AC/PC  Vt Ordered: 500 mL  Rate Set: 18 bmp  Peak Flow: 60 L/min  Pressure Support: 0 cmH20  FiO2 : 100 %  SpO2: 94 %  SpO2/FiO2 ratio: 94  Sensitivity: 3  PEEP/CPAP: 12  I Time/ I Time %: 0 s  Humidification Source: HME  Nitric Oxide/Epoprostenol In Use?: No  Mask Type: Full face mask  Mask Size: Large    CXR personally reviewed, worsening bilateral infiltrates and possible superimposed pleural effusions          Assessment:     1. Acute resp failure, mixed   -multifocal pneumonia, possible COVID  2. Acute encephalopathy, acute etoh withdrawal  3. KAROL  4. COPD with acute exac  5. Tobacco dependence    Plan:      -Empiric atb coverage, follow up on bronch culture   -Vent support, serial ABGs.  I changed to PC mode today.   -Titrate sedation as needed  -Monitor renal function with serial labs, nephro involved for renal failure   -Steroids  -Continue tube feeds  -Anticipate prolonged course on account of underlying etoh withdrawal. Discussed with girlfriend at the bedside     Matt Pretty MD

## 2021-03-28 NOTE — PROGRESS NOTES
This note also relates to the following rows which could not be included:  FiO2  - Cannot attach notes to unvalidated device data       03/27/21 2340   Vent Information   Skin Assessment Clean, dry, & intact   Vent Type 840   Vent Mode AC/VC   Vt Ordered 500 mL   Rate Set 20 bmp   Peak Flow 60 L/min   Pressure Support 0 cmH20   Sensitivity 3   PEEP/CPAP 10   Humidification Source HME   Vent Patient Data   Peak Inspiratory Pressure 27 cmH2O   Mean Airway Pressure 17 cmH20   Rate Measured 23 br/min   Vt Exhaled 514 mL   Minute Volume 8.09 Liters   I:E Ratio 1:2.30   Cough/Sputum   Sputum How Obtained Endotracheal   Cough Productive   Sputum Amount Small   Sputum Color Cloudy   Tenacity Thin   Spontaneous Breathing Trial (SBT) RT Doc   Pulse 86   Breath Sounds   Right Upper Lobe Diminished   Right Middle Lobe Diminished   Right Lower Lobe Diminished   Left Upper Lobe Diminished   Left Lower Lobe Diminished   Additional Respiratory  Assessments   Resp 20   Position Semi-Dowd's   Alarm Settings   High Pressure Alarm 40 cmH2O   Low Minute Volume Alarm 2 L/min   High Respiratory Rate 40 br/min   Low Exhaled Vt  200 mL   Patient Observation   Observations Ambu @ bedside. ETT moved to right side   ETT (adult)   Placement Date/Time: 03/25/21 2147   Preoxygenation: Yes  Type: Cuffed  Tube Size: 8 mm  Laryngoscope: GlideScope  Blade Size: 3  Location: Oral  Insertion attempts: 1  Placement Verified By[de-identified] Auscultation;Capnometry  Secured at: 23 cm  Measured From:. ..    Secured at 25 cm   Measured From Lips   ET Placement Right   Secured By Commercial tube still   Site Condition Dry

## 2021-03-28 NOTE — PROGRESS NOTES
This note also relates to the following rows which could not be included:  Resp - Cannot attach notes to unvalidated device data       03/27/21 2026   Vent Information   Skin Assessment Clean, dry, & intact   Vent Type 840   Vent Mode AC/VC   Vt Ordered 500 mL   Rate Set 20 bmp   Peak Flow 60 L/min   Pressure Support 0 cmH20   FiO2  100 %   Sensitivity 3   PEEP/CPAP 10   Humidification Source HME   Vent Patient Data   Peak Inspiratory Pressure 27 cmH2O   Mean Airway Pressure 16 cmH20   Rate Measured 20 br/min   Vt Exhaled 538 mL   Minute Volume 10.8 Liters   I:E Ratio 1:2.30   Spontaneous Breathing Trial (SBT) RT Doc   Pulse 70   Breath Sounds   Right Upper Lobe Diminished   Right Middle Lobe Diminished   Right Lower Lobe Diminished   Left Upper Lobe Diminished   Left Lower Lobe Diminished   Additional Respiratory  Assessments   Position Semi-Dowd's   Cuff Pressure (cm H2O) 30 cm H2O   Alarm Settings   High Pressure Alarm 40 cmH2O   Low Minute Volume Alarm 2 L/min   High Respiratory Rate 40 br/min   Low Exhaled Vt  200 mL   Patient Observation   Observations Ambu @ bedside. ETT moved to left side   ETT (adult)   Placement Date/Time: 03/25/21 2147   Preoxygenation: Yes  Type: Cuffed  Tube Size: 8 mm  Laryngoscope: GlideScope  Blade Size: 3  Location: Oral  Insertion attempts: 1  Placement Verified By[de-identified] Auscultation;Capnometry  Secured at: 23 cm  Measured From:. ..    Secured at 25 cm   Measured From Lips   ET Placement Left   Secured By Commercial tube still   Site Condition Dry   Cuff Pressure 30 cm H2O

## 2021-03-28 NOTE — PROGRESS NOTES
Updated girlfriend bedside. Updated mom via telephone. Updated sister via telephone. Girlfriend updated son. Stopped Nimbex today due to no helping respiratory functioning. Decreasing sedation currently with goal of BIS >45. Good urine output now that ott was changed this morning. There was a sediment clot in old ott. A-line placed today. Will monitor.

## 2021-03-28 NOTE — PROGRESS NOTES
03/28/21 0831   Vent Information   Vent Type 840   Vent Mode AC/VC   Vt Ordered 500 mL   Rate Set 20 bmp   Peak Flow 60 L/min   Pressure Support 0 cmH20   FiO2  100 %   Sensitivity 3   PEEP/CPAP 10   Humidification Source HME   Vent Patient Data   Peak Inspiratory Pressure 28 cmH2O   Mean Airway Pressure 16 cmH20   Rate Measured 20 br/min   Vt Exhaled 517 mL   Minute Volume 10.3 Liters   I:E Ratio 1:2.30   Spontaneous Breathing Trial (SBT) RT Doc   Pulse 74   Breath Sounds   Right Upper Lobe Diminished   Right Middle Lobe Diminished   Right Lower Lobe Diminished   Left Upper Lobe Diminished   Left Lower Lobe Diminished   Additional Respiratory  Assessments   Resp 20   Cuff Pressure (cm H2O) 30 cm H2O   Alarm Settings   High Pressure Alarm 40 cmH2O   Low Minute Volume Alarm 2 L/min   High Respiratory Rate 40 br/min   ETT (adult)   Placement Date/Time: 03/25/21 2147   Preoxygenation: Yes  Type: Cuffed  Tube Size: 8 mm  Laryngoscope: GlideScope  Blade Size: 3  Location: Oral  Insertion attempts: 1  Placement Verified By[de-identified] Auscultation;Capnometry  Secured at: 23 cm  Measured From:. ..    Secured at 25 cm   Measured From Lips   ET Placement Left   Secured By Commercial tube still   Site Condition Dry   Cuff Pressure 30 cm H2O

## 2021-03-28 NOTE — PROGRESS NOTES
Hospitalist Progress Note      PCP: Gordo Rojas MD    Date of Admission: 3/25/2021    Chief Complaint: Shortness of breath    Hospital Course: Admitted with shortness of breath and started on BiPAP initially tolerated but later became agitated became refractory to sedation and has to be transferred to ICU after intubation.     Subjective: Patient is on ventilator intubated, sedated      Medications:  Reviewed    Infusion Medications    cisatracurium (NIMBEX) infusion 1 mcg/kg/min (03/28/21 0332)    norepinephrine Stopped (03/27/21 2200)    fentaNYL (SUBLIMAZE) infusion 200 mcg/hr (03/28/21 0612)    midazolam 10 mg/hr (03/28/21 0705)    propofol 40 mcg/kg/min (03/28/21 0726)     Scheduled Medications    PHENobarbital  240 mg Intravenous 3 times per day    ipratropium-albuterol  1 ampule Inhalation Q4H    midazolam  5 mg Intravenous Once    mupirocin   Nasal BID    carboxymethylcellulose PF  1 drop Both Eyes 6 times per day    famotidine  20 mg Per NG tube BID    thiamine  100 mg Per NG tube Daily    senna  1 tablet Per NG tube BID    docusate  100 mg Per NG tube BID    sodium chloride flush  10 mL Intravenous 2 times per day    enoxaparin  40 mg Subcutaneous Daily    azithromycin  500 mg Intravenous Q24H    cefTRIAXone (ROCEPHIN) IV  1,000 mg Intravenous Q24H    methylPREDNISolone  40 mg Intravenous Q12H    chlorhexidine  15 mL Mouth/Throat BID     PRN Meds: sodium chloride flush, promethazine **OR** ondansetron, polyethylene glycol, acetaminophen **OR** acetaminophen, fentanNYL, albuterol sulfate HFA **AND** ipratropium **AND** MDI Treatment, midazolam      Intake/Output Summary (Last 24 hours) at 3/28/2021 0913  Last data filed at 3/28/2021 0855  Gross per 24 hour   Intake 2434.09 ml   Output 1845 ml   Net 589.09 ml       Physical Exam Performed:    /64   Pulse 73   Temp 96.4 °F (35.8 °C) (Bladder)   Resp 20   Ht 5' 10\" (1.778 m)   Wt 251 lb 5.2 oz (114 kg)   SpO2 92% airspace disease and possible layered effusions. Pulmonary edema or pneumonia are possible. XR ABDOMEN (KUB) (SINGLE AP VIEW)   Final Result   Satisfactory enteric tube placement. XR CHEST PORTABLE   Final Result   Support lines and tubes in satisfactory position. No pneumothorax. Worsening bibasilar predominant airspace disease. Small right and trace left   pleural effusions suspected. XR CHEST PORTABLE   Final Result              Assessment/Plan:    Active Hospital Problems    Diagnosis    COPD exacerbation ([facility] [address] [J44.1]    Alcohol withdrawal ([facility] [address] [F10.239]     1. Acute hypoxic respiratory failure secondary COPD exacerbation. Required BiPAP on arrival.  Due to worsening agitation refractory to recommended Zyprexa patient was urgently intubated and transferred to ICU. Pulmonary critical care consult appreciated vent management per pulmonary. 2.  COPD exacerbation   On IV Solu-Medrol rapid COVID-19 negative, PCR negative. Continue with inhalers plan per pulmonary. 3.  Abnormal chest x-ray on admission on antibiotic, procalcitonin level negative. 4.  History of alcohol addiction, agitated requiring Versed and fentanyl infusion as well as propofol. 5.  Acute kidney injury monitor closely,? IV fluid boluses. 6.  Nutrition on tube feeding.     DVT Prophylaxis: Lovenox subcu  Diet: DIET TUBE FEED CONTINUOUS/CYCLIC NPO; Low Calorie High Protein; Orogastric; Continuous; 10; 50  Diet Tube Feed Modular: Protein Modular  Code Status: Full Code    PT/OT Eval Status:     Dispo -ICU    Alisson Dominguez MD

## 2021-03-28 NOTE — CONSULTS
[facility]    Consult Note           Reason for Consult: KAROL  Requesting Physician:  Dr. Joés Zaragoza    Chief Complaint:    Chief Complaint   Patient presents with    Shortness of Breath     Hx of COPD. Low SPO2. Pt given 125 mg solumedrol. History of Present Illness on 3/28/21:    [address] y.o. yo male with PMH of Tobacco and etoh abuse who is admitted for resp failure  Pt presented to [facility] w 1 week h/o cough, sob n was admitted on 3/25; was intubated on 3/25 night. He has hypotensive episodes of 90s few times on 3/26 n 3/28. Overnight he didn't have any uop as the ott was obstructed with sediments. This has been remedied by RN  Cr was 1 on admission and has risen to 1.5 and nephrology has been consulted     Past Medical History:        Diagnosis Date    COPD (chronic obstructive pulmonary disease) ([facility] [address]     Hypertension        Past Surgical History:    History reviewed. No pertinent surgical history. Home Medications:    No current facility-administered medications on file prior to encounter. Current Outpatient Medications on File Prior to Encounter   Medication Sig Dispense Refill    albuterol sulfate HFA (VENTOLIN HFA) 108 (90 Base) MCG/ACT inhaler Inhale 2 puffs into the lungs every 6 hours as needed for Wheezing 1 Inhaler 1       Allergies:  Patient has no known allergies.     Social History:    Social History     Socioeconomic History    Marital status: Single     Spouse name: Not on file    Number of children: Not on file    Years of education: Not on file    Highest education level: Not on file   Occupational History    Not on file   Social Needs    Financial resource strain: Not on file    Food insecurity     Worry: Not on file     Inability: Not on file    Transportation needs     Medical: Not on file     Non-medical: Not on file   Tobacco Use    Smoking status: Current Every Day Smoker     Packs/day: 2.00    Smokeless tobacco: Never Used   Substance and Sexual Activity    Alcohol use: Yes     Alcohol/week: 42.0 standard drinks     Types: 42 Cans of beer per week     Comment: hasn't drank in 3 days    Drug use: No    Sexual activity: Not on file   Lifestyle    Physical activity     Days per week: Not on file     Minutes per session: Not on file    Stress: Not on file   Relationships    Social connections     Talks on phone: Not on file     Gets together: Not on file     Attends Hindu service: Not on file     Active member of club or organization: Not on file     Attends meetings of clubs or organizations: Not on file     Relationship status: Not on file    Intimate partner violence     Fear of current or ex partner: Not on file     Emotionally abused: Not on file     Physically abused: Not on file     Forced sexual activity: Not on file   Other Topics Concern    Not on file   Social History Narrative    Not on file       Family History:   History reviewed. No pertinent family history.     Review of Systems:   UTO    Physical exam:   Constitutional:  VITALS:  /69   Pulse 64   Temp 97.2 °F (36.2 °C) (Bladder)   Resp 20   Ht 5' 10\" (1.778 m)   Wt 251 lb 5.2 oz (114 kg)   SpO2 94%   BMI 36.06 kg/m²   Gen: intubated  Heent: ETT in situ  Cardiovascular:  S1, S2 without m/r/g; no lower extremity edema  Respiratory: rhonchi+ occasional wheezing  Abdomen:  +bs, soft, nt, nd, no hepatosplenomegaly  Neuro/Psy: sedated     Data/  Recent Labs     03/26/21  0600   WBC 11.2*   HGB 13.4*   HCT 40.9   MCV 96.4        Recent Labs     03/25/21  2313 03/25/21  2313 03/27/21  1106 03/28/21  0525 03/28/21  1340      < > 140 139 139   K 4.9   < > 4.0 4.1 4.3   CL 96*   < > 98* 97* 99   CO2 37*   < > 33* 34* 32   GLUCOSE 153*   < > 155* 143* 179*   PHOS  --    < > 4.9 5.3* 3.3   MG 2.20  --   --   --   --    BUN 18   < > 51* 60* 61*   CREATININE 1.0   < > 1.3 1.5* 1.3   LABGLOM >60   < > 56* 48* 56*   GFRAA >60   < > >60 58* >60    < > = values in this interval not displayed. UA w micro bland on 3/25    Assessment  -KAROL in the setting of obstructed ott for several hours, also hemodynamic instability (occasional SBP dropped to 90s between 3/26-28)    -Acute resp failure s/p intubation w high PEEP and FIo2    -Sepsis from pneumonia   COVID rapid test was negative     -acute COPD exacerbation    -Mixed resp acidosis and metabolic alkalosis    Plan  -Maintain hemodynamics: Keep MAP>65 CVP 8-12     -hold off IVF for now  -Urine analysis, urine na, cr  -Serial renal panel  -Daily wts and strict i/o  -Renal dose meds and avoid nephrotoxins      Thank you for the consultation. Please do not hesitate to call with questions.     Leticia Robles  [facility]  Office: [phone]  Fax:    [phone]

## 2021-03-29 ENCOUNTER — APPOINTMENT (OUTPATIENT)
Dept: GENERAL RADIOLOGY | Age: 61
DRG: 130 | End: 2021-03-29
Payer: MEDICAID

## 2021-03-29 LAB
ANION GAP SERPL CALCULATED.3IONS-SCNC: 6 MMOL/L (ref 3–16)
BASE EXCESS ARTERIAL: 7.7 MMOL/L (ref -3–3)
BUN BLDV-MCNC: 65 MG/DL (ref 7–20)
CALCIUM SERPL-MCNC: 9 MG/DL (ref 8.3–10.6)
CARBOXYHEMOGLOBIN ARTERIAL: 0.1 % (ref 0–1.5)
CHLORIDE BLD-SCNC: 99 MMOL/L (ref 99–110)
CO2: 33 MMOL/L (ref 21–32)
CREAT SERPL-MCNC: 1.1 MG/DL (ref 0.9–1.3)
CULTURE, RESPIRATORY: NORMAL
GFR AFRICAN AMERICAN: >60
GFR NON-AFRICAN AMERICAN: >60
GLUCOSE BLD-MCNC: 147 MG/DL (ref 70–99)
GRAM STAIN RESULT: NORMAL
HCO3 ARTERIAL: 33.7 MMOL/L (ref 21–29)
HEMOGLOBIN, ART, EXTENDED: 14.1 G/DL (ref 13.5–17.5)
METHEMOGLOBIN ARTERIAL: 0.5 %
O2 CONTENT ARTERIAL: 19 ML/DL
O2 SAT, ARTERIAL: 97.3 %
O2 THERAPY: ABNORMAL
PCO2 ARTERIAL: 52.4 MMHG (ref 35–45)
PH ARTERIAL: 7.43 (ref 7.35–7.45)
PO2 ARTERIAL: 101 MMHG (ref 75–108)
POTASSIUM REFLEX MAGNESIUM: 4.6 MMOL/L (ref 3.5–5.1)
SODIUM BLD-SCNC: 138 MMOL/L (ref 136–145)
TCO2 ARTERIAL: 35.3 MMOL/L

## 2021-03-29 PROCEDURE — 94003 VENT MGMT INPAT SUBQ DAY: CPT

## 2021-03-29 PROCEDURE — 6360000002 HC RX W HCPCS: Performed by: INTERNAL MEDICINE

## 2021-03-29 PROCEDURE — 80048 BASIC METABOLIC PNL TOTAL CA: CPT

## 2021-03-29 PROCEDURE — 6370000000 HC RX 637 (ALT 250 FOR IP): Performed by: INTERNAL MEDICINE

## 2021-03-29 PROCEDURE — 2580000003 HC RX 258: Performed by: INTERNAL MEDICINE

## 2021-03-29 PROCEDURE — 99291 CRITICAL CARE FIRST HOUR: CPT | Performed by: INTERNAL MEDICINE

## 2021-03-29 PROCEDURE — 94640 AIRWAY INHALATION TREATMENT: CPT

## 2021-03-29 PROCEDURE — 82803 BLOOD GASES ANY COMBINATION: CPT

## 2021-03-29 PROCEDURE — 94761 N-INVAS EAR/PLS OXIMETRY MLT: CPT

## 2021-03-29 PROCEDURE — 37799 UNLISTED PX VASCULAR SURGERY: CPT

## 2021-03-29 PROCEDURE — 2000000000 HC ICU R&B

## 2021-03-29 PROCEDURE — 2700000000 HC OXYGEN THERAPY PER DAY

## 2021-03-29 PROCEDURE — 71045 X-RAY EXAM CHEST 1 VIEW: CPT

## 2021-03-29 PROCEDURE — 6370000000 HC RX 637 (ALT 250 FOR IP): Performed by: NURSE PRACTITIONER

## 2021-03-29 RX ORDER — PREDNISONE 20 MG/1
40 TABLET ORAL DAILY
Status: DISCONTINUED | OUTPATIENT
Start: 2021-03-29 | End: 2021-04-02

## 2021-03-29 RX ORDER — FUROSEMIDE 10 MG/ML
20 INJECTION INTRAMUSCULAR; INTRAVENOUS ONCE
Status: COMPLETED | OUTPATIENT
Start: 2021-03-29 | End: 2021-03-29

## 2021-03-29 RX ORDER — PHENOBARBITAL SODIUM 65 MG/ML
130 INJECTION INTRAMUSCULAR EVERY 8 HOURS SCHEDULED
Status: DISCONTINUED | OUTPATIENT
Start: 2021-03-29 | End: 2021-03-31

## 2021-03-29 RX ADMIN — SENNOSIDES 8.6 MG: 8.6 TABLET, FILM COATED ORAL at 20:53

## 2021-03-29 RX ADMIN — PHENOBARBITAL SODIUM 130 MG: 65 INJECTION INTRAMUSCULAR at 23:00

## 2021-03-29 RX ADMIN — AZITHROMYCIN MONOHYDRATE 500 MG: 500 INJECTION, POWDER, LYOPHILIZED, FOR SOLUTION INTRAVENOUS at 09:59

## 2021-03-29 RX ADMIN — IPRATROPIUM BROMIDE AND ALBUTEROL SULFATE 1 AMPULE: .5; 3 SOLUTION RESPIRATORY (INHALATION) at 15:53

## 2021-03-29 RX ADMIN — FUROSEMIDE 20 MG: 10 INJECTION, SOLUTION INTRAMUSCULAR; INTRAVENOUS at 10:00

## 2021-03-29 RX ADMIN — CARBOXYMETHYLCELLULOSE SODIUM 1 DROP: 10 GEL OPHTHALMIC at 13:11

## 2021-03-29 RX ADMIN — FENTANYL CITRATE 150 MCG/HR: 50 INJECTION, SOLUTION INTRAMUSCULAR; INTRAVENOUS at 14:12

## 2021-03-29 RX ADMIN — PROPOFOL 20 MCG/KG/MIN: 10 INJECTION, EMULSION INTRAVENOUS at 18:48

## 2021-03-29 RX ADMIN — Medication 15 ML: at 20:54

## 2021-03-29 RX ADMIN — FENTANYL CITRATE 150 MCG/HR: 50 INJECTION, SOLUTION INTRAMUSCULAR; INTRAVENOUS at 09:59

## 2021-03-29 RX ADMIN — MUPIROCIN: 20 OINTMENT TOPICAL at 20:54

## 2021-03-29 RX ADMIN — FENTANYL CITRATE 150 MCG/HR: 50 INJECTION, SOLUTION INTRAMUSCULAR; INTRAVENOUS at 00:45

## 2021-03-29 RX ADMIN — IPRATROPIUM BROMIDE AND ALBUTEROL SULFATE 1 AMPULE: .5; 3 SOLUTION RESPIRATORY (INHALATION) at 12:04

## 2021-03-29 RX ADMIN — IPRATROPIUM BROMIDE AND ALBUTEROL SULFATE 1 AMPULE: .5; 3 SOLUTION RESPIRATORY (INHALATION) at 03:44

## 2021-03-29 RX ADMIN — PHENOBARBITAL SODIUM 130 MG: 65 INJECTION INTRAMUSCULAR at 13:10

## 2021-03-29 RX ADMIN — IPRATROPIUM BROMIDE AND ALBUTEROL SULFATE 1 AMPULE: .5; 3 SOLUTION RESPIRATORY (INHALATION) at 19:52

## 2021-03-29 RX ADMIN — CARBOXYMETHYLCELLULOSE SODIUM 1 DROP: 10 GEL OPHTHALMIC at 10:02

## 2021-03-29 RX ADMIN — CEFTRIAXONE SODIUM 1000 MG: 1 INJECTION, POWDER, FOR SOLUTION INTRAMUSCULAR; INTRAVENOUS at 10:01

## 2021-03-29 RX ADMIN — DOCUSATE SODIUM 100 MG: 50 LIQUID ORAL at 10:00

## 2021-03-29 RX ADMIN — PROPOFOL 20 MCG/KG/MIN: 10 INJECTION, EMULSION INTRAVENOUS at 05:15

## 2021-03-29 RX ADMIN — CARBOXYMETHYLCELLULOSE SODIUM 1 DROP: 10 GEL OPHTHALMIC at 04:27

## 2021-03-29 RX ADMIN — MUPIROCIN: 20 OINTMENT TOPICAL at 10:03

## 2021-03-29 RX ADMIN — Medication 100 MG: at 10:00

## 2021-03-29 RX ADMIN — SODIUM CHLORIDE, PRESERVATIVE FREE 10 ML: 5 INJECTION INTRAVENOUS at 20:54

## 2021-03-29 RX ADMIN — SODIUM CHLORIDE, PRESERVATIVE FREE 10 ML: 5 INJECTION INTRAVENOUS at 10:02

## 2021-03-29 RX ADMIN — CARBOXYMETHYLCELLULOSE SODIUM 1 DROP: 10 GEL OPHTHALMIC at 23:41

## 2021-03-29 RX ADMIN — PREDNISONE 40 MG: 20 TABLET ORAL at 13:10

## 2021-03-29 RX ADMIN — PHENOBARBITAL SODIUM 240 MG: 65 INJECTION INTRAMUSCULAR at 05:16

## 2021-03-29 RX ADMIN — METHYLPREDNISOLONE SODIUM SUCCINATE 40 MG: 40 INJECTION, POWDER, FOR SOLUTION INTRAMUSCULAR; INTRAVENOUS at 04:30

## 2021-03-29 RX ADMIN — ENOXAPARIN SODIUM 40 MG: 40 INJECTION SUBCUTANEOUS at 10:01

## 2021-03-29 RX ADMIN — IPRATROPIUM BROMIDE AND ALBUTEROL SULFATE 1 AMPULE: .5; 3 SOLUTION RESPIRATORY (INHALATION) at 08:37

## 2021-03-29 RX ADMIN — Medication 15 ML: at 10:03

## 2021-03-29 RX ADMIN — FENTANYL CITRATE 150 MCG/HR: 50 INJECTION, SOLUTION INTRAMUSCULAR; INTRAVENOUS at 22:36

## 2021-03-29 RX ADMIN — FAMOTIDINE 20 MG: 20 TABLET, FILM COATED ORAL at 10:00

## 2021-03-29 RX ADMIN — CARBOXYMETHYLCELLULOSE SODIUM 1 DROP: 10 GEL OPHTHALMIC at 16:48

## 2021-03-29 RX ADMIN — DOCUSATE SODIUM 100 MG: 50 LIQUID ORAL at 20:53

## 2021-03-29 RX ADMIN — SENNOSIDES 8.6 MG: 8.6 TABLET, FILM COATED ORAL at 10:02

## 2021-03-29 RX ADMIN — MIDAZOLAM HYDROCHLORIDE 6 MG/HR: 5 INJECTION, SOLUTION INTRAMUSCULAR; INTRAVENOUS at 14:12

## 2021-03-29 RX ADMIN — CARBOXYMETHYLCELLULOSE SODIUM 1 DROP: 10 GEL OPHTHALMIC at 00:10

## 2021-03-29 RX ADMIN — FAMOTIDINE 20 MG: 20 TABLET, FILM COATED ORAL at 20:54

## 2021-03-29 ASSESSMENT — PAIN SCALES - GENERAL
PAINLEVEL_OUTOF10: 0

## 2021-03-29 ASSESSMENT — PULMONARY FUNCTION TESTS
PIF_VALUE: 28
PIF_VALUE: 32
PIF_VALUE: 28
PIF_VALUE: 25
PIF_VALUE: 28
PIF_VALUE: 32
PIF_VALUE: 28

## 2021-03-29 NOTE — PROGRESS NOTES
Hospitalist Progress Note      PCP: Stuart Reilly MD    Date of Admission: 3/25/2021    Chief Complaint: SOB    Hospital Course:   62 y.o. male who presented to [facility] with shortness of breath that started a couple days ago and got worse. Today, patient was extremely short of breath. 911 was called. Patient's pulse ox was in the 40s on room air. Patient was placed on a nonrebreather, which brought him up to 76s. At that point, patient was placed on CPAP and brought to the emergency department. On CPAP, patient's pulse ox was around 90s. In the emergency department, patient was found to be tachypneic with air hunger and switched on BiPAP. Tolerated BiPAP well. Pulse ox is around low 90s at this time. Has been able to speak after started on BiPAP. Yellow sputum with productive cough past couple days was noted. Has subjective fever and chills. Complains of chest pain in the center of the chest.  Cannot characterize whether it is radiating and cannot tell exactly what kind of pain it is. Apparently, it started in past 24 hours and is connected to shortness of breath. Has some nausea without vomiting  No other accompanying symptoms  Nothing onset makes the patient feel better or worse  Patient is a smoker. No recent COVID-19 test  No recent similar episodes. Subjective: remains intubated and heavily sedated.        Medications:  Reviewed    Infusion Medications    cisatracurium (NIMBEX) infusion Stopped (03/28/21 1526)    norepinephrine Stopped (03/27/21 2200)    fentaNYL (SUBLIMAZE) infusion 150 mcg/hr (03/29/21 0045)    midazolam 6 mg/hr (03/28/21 6833)    propofol 20 mcg/kg/min (03/29/21 2115)     Scheduled Medications    PHENobarbital  240 mg Intravenous 3 times per day    ipratropium-albuterol  1 ampule Inhalation Q4H    midazolam  5 mg Intravenous Once    mupirocin   Nasal BID    carboxymethylcellulose PF  1 drop Both Eyes 6 times per day    famotidine  20 mg Per NG tube BID    thiamine  100 mg Per NG tube Daily    senna  1 tablet Per NG tube BID    docusate  100 mg Per NG tube BID    sodium chloride flush  10 mL Intravenous 2 times per day    enoxaparin  40 mg Subcutaneous Daily    azithromycin  500 mg Intravenous Q24H    cefTRIAXone (ROCEPHIN) IV  1,000 mg Intravenous Q24H    methylPREDNISolone  40 mg Intravenous Q12H    chlorhexidine  15 mL Mouth/Throat BID     PRN Meds: sodium chloride flush, promethazine **OR** ondansetron, polyethylene glycol, acetaminophen **OR** acetaminophen, fentanNYL, albuterol sulfate HFA **AND** ipratropium **AND** MDI Treatment, midazolam      Intake/Output Summary (Last 24 hours) at 3/29/2021 0829  Last data filed at 3/29/2021 0600  Gross per 24 hour   Intake 3067 ml   Output 1855 ml   Net 1212 ml       Physical Exam Performed:    /69   Pulse 69   Temp 97.8 °F (36.6 °C) (Bladder)   Resp 18   Ht 5' 10\" (1.778 m)   Wt 249 lb 1.9 oz (113 kg)   SpO2 98%   BMI 35.74 kg/m²     General appearance: intubated, sedated  HEENT: Pupils equal, round, and reactive to light. Conjunctivae/corneas clear. Neck: Supple, with full range of motion. No jugular venous distention. Trachea midline. Respiratory:  Normal respiratory effort. Clear to auscultation, bilaterally without Rales/Wheezes/Rhonchi. Cardiovascular: Regular rate and rhythm with normal S1/S2 without murmurs, rubs or gallops. Abdomen: Soft, non-tender, non-distended with normal bowel sounds. Musculoskeletal: No clubbing, cyanosis or edema bilaterally. Full range of motion without deformity. Skin: Skin color, texture, turgor normal.  No rashes or lesions. Neurologic: On ventilator  Psychiatric: On ventilator sedated  Capillary Refill: Brisk,< 3 seconds   Peripheral Pulses: +2 palpable, equal bilaterally       Labs:   No results for input(s): WBC, HGB, HCT, PLT in the last 72 hours.   Recent Labs     03/27/21  1106 03/28/21  0525 03/28/21  1340 03/29/21  0410    139 139 138   K 4.0 4.1 4.3 4.6   CL 98* 97* 99 99   CO2 33* 34* 32 33*   BUN 51* 60* 61* 65*   CREATININE 1.3 1.5* 1.3 1.1   CALCIUM 9.1 8.8 8.8 9.0   PHOS 4.9 5.3* 3.3  --      No results for input(s): AST, ALT, BILIDIR, BILITOT, ALKPHOS in the last 72 hours. No results for input(s): INR in the last 72 hours. No results for input(s): [place] in the last 72 hours. Urinalysis:      Lab Results   Component Value Date    NITRU Negative 03/28/2021    WBCUA None seen 03/28/2021    BACTERIA Rare 03/28/2021    RBCUA None seen 03/28/2021    BLOODU Negative 03/28/2021    SPECGRAV 1.020 03/28/2021    GLUCOSEU Negative 03/28/2021       Radiology:  XR CHEST PORTABLE   Preliminary Result   Persistent bibasilar airspace disease and pleural effusions. Mild cardiac   silhouette enlargement. No change in life support. XR CHEST PORTABLE   Final Result   Supportive tubing projects in stable positions. Continued bibasilar airspace disease, with possible layered pleural effusions. XR CHEST PORTABLE   Final Result   Supportive tubing is in stable position. Stable pattern of bilateral airspace disease and possible layered effusions. Pulmonary edema or pneumonia are possible. XR ABDOMEN (KUB) (SINGLE AP VIEW)   Final Result   Satisfactory enteric tube placement. XR CHEST PORTABLE   Final Result   Support lines and tubes in satisfactory position. No pneumothorax. Worsening bibasilar predominant airspace disease. Small right and trace left   pleural effusions suspected. XR CHEST PORTABLE   Final Result              Assessment/Plan:    Active Hospital Problems    Diagnosis    COPD exacerbation ([facility] [address] [J44.1]    Alcohol withdrawal ([facility] [F10.239]     Acute hypoxic and hypercarbic respiratory failure 2/2 COPD exacerbation  - remains intubated  - vent management per pulm  - continue nebs, steroids  - COVID PCR negative  - s/p bronch.  BAL NGTD  - remains on very high settings. S/p paralytics  - continue ceftriaxone, azithromycin for now. If final BAL results negative, will likely stop    Alcohol dependence with withdrawal  - continue versed gtt  - will start CIWA if applicable.  Will likely need prolonged sedatives  - counseling once able    KAROL  - 2/2 ott obstruction  - nephrology consulted  - improved following replacement    DVT Prophylaxis: lovenox  Diet: DIET TUBE FEED CONTINUOUS/CYCLIC NPO; Low Calorie High Protein; Orogastric; Continuous; 10; 50  Diet Tube Feed Modular: Protein Modular  Code Status: Full Code    PT/OT Eval Status: pending improvement    Dispo - ICU    Yany Galan MD

## 2021-03-29 NOTE — PROGRESS NOTES
03/29/21 1557   Vent Information   Vent Type 840   Vent Mode AC/PC   Pressure Ordered 18   Rate Set 18 bmp   Pressure Support 0 cmH20   FiO2  70 %   SpO2 95 %   SpO2/FiO2 ratio 135.71   Sensitivity 3   PEEP/CPAP 10   I Time/ I Time % 0 s   Vent Patient Data   High Peep/I Pressure 18   Peak Inspiratory Pressure 28 cmH2O   Mean Airway Pressure 15 cmH20   Rate Measured 18 br/min   Vt Exhaled 537 mL   Minute Volume 9.72 Liters   I:E Ratio 1:2.70   Cough/Sputum   Sputum How Obtained Endotracheal;Suctioned   Cough None   Sputum Amount Small   Sputum Color Clear   Tenacity Thin   Spontaneous Breathing Trial (SBT) RT Doc   Pulse 81   Breath Sounds   Right Upper Lobe Diminished   Right Middle Lobe Diminished   Right Lower Lobe Diminished   Left Upper Lobe Diminished   Left Lower Lobe Diminished   Additional Respiratory  Assessments   Resp 18   Alarm Settings   High Pressure Alarm 40 cmH2O   Low Minute Volume Alarm 2 L/min   High Respiratory Rate 40 br/min   Patient Observation   Observations 8 ETT, ambu bag @ bedside   ETT (adult)   Placement Date/Time: 03/25/21 2144   Preoxygenation: Yes  Type: Cuffed  Tube Size: 8 mm  Laryngoscope: GlideScope  Blade Size: 3  Location: Oral  Insertion attempts: 1  Placement Verified By[de-identified] Auscultation;Capnometry  Secured at: 23 cm  Measured From:. ..    Secured at 25 cm   Measured From [address] By [employer] tube still   Site Condition Dry   Cuff Pressure 30 cm H2O

## 2021-03-29 NOTE — PROGRESS NOTES
Received bedside report from off going RN. Pt currently sedated and on vent. Pts skin was assessed. Turned and repositioned. Laws in place. Orders verified. Pt able to follow commands. OG in place and clamped, placement verified. Call light in reach, bed in lowest position, will continue to monitor.

## 2021-03-29 NOTE — PROGRESS NOTES
03/29/21 1206   Vent Information   Vent Type 840   Vent Mode AC/PC   Pressure Ordered 18   Rate Set 18 bmp   Pressure Support 0 cmH20   FiO2  80 %   Sensitivity 3   PEEP/CPAP 10   I Time/ I Time % 0.9 s   Humidification Source HME   Vent Patient Data   High Peep/I Pressure 18   Peak Inspiratory Pressure 28 cmH2O   Mean Airway Pressure 15 cmH20   Rate Measured 18 br/min   Vt Exhaled 574 mL   Minute Volume 10.3 Liters   I:E Ratio 1:2.70   Cough/Sputum   Sputum How Obtained Endotracheal;Suctioned   Cough Non-productive   Sputum Amount None   Spontaneous Breathing Trial (SBT) RT Doc   Pulse 79   Breath Sounds   Right Upper Lobe Diminished   Right Middle Lobe Diminished   Right Lower Lobe Diminished   Left Upper Lobe Diminished   Left Lower Lobe Diminished   Additional Respiratory  Assessments   Resp 18   Position Semi-Dowd's   Alarm Settings   High Pressure Alarm 40 cmH2O   Low Minute Volume Alarm 2 L/min   High Respiratory Rate 40 br/min   Low Exhaled Vt  200 mL   Patient Observation   Observations 8 ETT, ambu bag @ bedside   ETT (adult)   Placement Date/Time: 03/25/21 2147   Preoxygenation: Yes  Type: Cuffed  Tube Size: 8 mm  Laryngoscope: GlideScope  Blade Size: 3  Location: Oral  Insertion attempts: 1  Placement Verified By[de-identified] Auscultation;Capnometry  Secured at: 23 cm  Measured From:. ..    Secured at 25 cm   Measured From Lips   ET Placement Right   Secured By Commercial tube still

## 2021-03-29 NOTE — PROGRESS NOTES
03/29/21 0345   Vent Information   $Ventilation $Subsequent Day   Skin Assessment Clean, dry, & intact   Equipment Changed HME   Vent Type 840   Vent Mode AC/PC   Rate Set 18 bmp   Pressure Support 0 cmH20   FiO2  100 %   Sensitivity 3   PEEP/CPAP 12   I Time/ I Time % 0.9 s   Humidification Source HME   Vent Patient Data   High Peep/I Pressure 20   Peak Inspiratory Pressure 25 cmH2O   Mean Airway Pressure 17 cmH20   Rate Measured 25 br/min   Vt Exhaled 693 mL   Minute Volume 10.7 Liters   I:E Ratio 4.40:1   Cough/Sputum   Sputum How Obtained Endotracheal   Cough Productive   Sputum Amount Small   Sputum Color Clear   Tenacity Thin   Spontaneous Breathing Trial (SBT) RT Doc   Pulse 71   Breath Sounds   Right Upper Lobe Diminished   Right Middle Lobe Diminished   Right Lower Lobe Diminished   Left Upper Lobe Diminished   Left Lower Lobe Diminished   Additional Respiratory  Assessments   Resp 18   Position Semi-Dowd's   Cuff Pressure (cm H2O) 30 cm H2O   Alarm Settings   High Pressure Alarm 40 cmH2O   Low Minute Volume Alarm 2 L/min   High Respiratory Rate 40 br/min   Low Exhaled Vt  200 mL   Patient Observation   Observations Ambu @ bedside. ETT moved to left side   ETT (adult)   Placement Date/Time: 03/25/21 2147   Preoxygenation: Yes  Type: Cuffed  Tube Size: 8 mm  Laryngoscope: GlideScope  Blade Size: 3  Location: Oral  Insertion attempts: 1  Placement Verified By[de-identified] Auscultation;Capnometry  Secured at: 23 cm  Measured From:. ..    Secured at 25 cm   Measured From Lips   ET Placement Left   Secured By Commercial tube still   Site Condition Dry   Cuff Pressure 30 cm H2O stated

## 2021-03-29 NOTE — PROGRESS NOTES
Nephrology Progress Note   [email or web address]      This patient is a 62year old male whom we are following for KAROL. Subjective: The patient was seen and examined. Good amount of urine, received IV Lasix this morning. BP stable. Family History: Family at bedside and updated  ROS: Unable to obtain      Vitals:  /69   Pulse 83   Temp 97.1 °F (36.2 °C) (Bladder)   Resp 18   Ht 5' 10\" (1.778 m)   Wt 249 lb 1.9 oz (113 kg)   SpO2 95%   BMI 35.74 kg/m²   I/O last 3 completed shifts: In: 3067 [I.V.:1496; NG/GT:1571]  Out: 1577 [Urine:2805]  I/O this shift:  In: -   Out: 840 [Urine:840]    Physical Exam:  Physical Exam  Vitals signs reviewed. Constitutional:       Interventions: He is sedated and intubated. HENT:      Head: Normocephalic and atraumatic. Eyes:      General: No scleral icterus. Conjunctiva/sclera: Conjunctivae normal.   Cardiovascular:      Rate and Rhythm: Normal rate and regular rhythm. Heart sounds: No friction rub. Pulmonary:      Effort: He is intubated. Comments: Equal chest expansion, decreased breath sounds bibasal  Abdominal:      General: Bowel sounds are normal. There is no distension. Tenderness: There is no abdominal tenderness. Musculoskeletal:      Right lower leg: Edema present. Left lower leg: Edema present.            Medications:   predniSONE  40 mg Oral Daily    PHENobarbital  130 mg Intravenous 3 times per day    ipratropium-albuterol  1 ampule Inhalation Q4H    midazolam  5 mg Intravenous Once    mupirocin   Nasal BID    carboxymethylcellulose PF  1 drop Both Eyes 6 times per day    famotidine  20 mg Per NG tube BID    thiamine  100 mg Per NG tube Daily    senna  1 tablet Per NG tube BID    docusate  100 mg Per NG tube BID    sodium chloride flush  10 mL Intravenous 2 times per day    enoxaparin  40 mg Subcutaneous Daily    chlorhexidine  15 mL Mouth/Throat BID         Labs:  No results for input(s): WBC, HGB, HCT, MCV, PLT in the last 72 hours. Recent Labs     03/27/21  1106 03/28/21  0525 03/28/21  1340 03/29/21  0410    139 139 138   K 4.0 4.1 4.3 4.6   CL 98* 97* 99 99   CO2 33* 34* 32 33*   GLUCOSE 155* 143* 179* 147*   PHOS 4.9 5.3* 3.3  --    BUN 51* 60* 61* 65*   CREATININE 1.3 1.5* 1.3 1.1   LABGLOM 56* 48* 56* >60   GFRAA >60 58* >60 >60           Assessment/Plan:    Acute Kidney Injury. - In the setting of obstructed ott for several hours, also hemodynamic instability (occasional SBP dropped to 90s between 3/26-28). - Urinalysis with no hematuria or proteinuria. Urine studies consistent with pre-renal cause of KAROL.  - Kidney function improving.  - Ok to give PRN Lasix for volume control.  - Avoid hypotension.     Acute Respiratory Failure. - From multifocal pneumonia and COPD. - Per pulmonary.     Sepsis from pneumonia.  - COVID rapid test was negative      Acid-Base Disorder.  - Mixed resp acidosis and metabolic alkalosis. Discussed with Sabrina JUARES. Please do not hesitate to contact me at [phone] if with questions. Thank you!     Jordan Yousif MD  3/29/2021  [facility]

## 2021-03-29 NOTE — PROGRESS NOTES
pain.    Medications:  Scheduled Meds:   predniSONE  40 mg Oral Daily    PHENobarbital  130 mg Intravenous 3 times per day    ipratropium-albuterol  1 ampule Inhalation Q4H    midazolam  5 mg Intravenous Once    mupirocin   Nasal BID    carboxymethylcellulose PF  1 drop Both Eyes 6 times per day    famotidine  20 mg Per NG tube BID    thiamine  100 mg Per NG tube Daily    senna  1 tablet Per NG tube BID    docusate  100 mg Per NG tube BID    sodium chloride flush  10 mL Intravenous 2 times per day    enoxaparin  40 mg Subcutaneous Daily    azithromycin  500 mg Intravenous Q24H    chlorhexidine  15 mL Mouth/Throat BID       PRN Meds:  sodium chloride flush, promethazine **OR** ondansetron, polyethylene glycol, acetaminophen **OR** acetaminophen, fentanNYL, albuterol sulfate HFA **AND** ipratropium **AND** MDI Treatment, midazolam    Results:  CBC: No results for input(s): WBC, HGB, HCT, MCV, PLT in the last 72 hours. BMP:   Recent Labs     03/27/21  1106 03/28/21  0525 03/28/21  1340 03/29/21  0410    139 139 138   K 4.0 4.1 4.3 4.6   CL 98* 97* 99 99   CO2 33* 34* 32 33*   PHOS 4.9 5.3* 3.3  --    BUN 51* 60* 61* 65*   CREATININE 1.3 1.5* 1.3 1.1     LIVER PROFILE: No results for input(s): AST, ALT, LIPASE, BILIDIR, BILITOT, ALKPHOS in the last 72 hours. Invalid input(s): AMYLASE,  ALB  PT/INR: No results for input(s): PROTIME, INR in the last 72 hours. APTT: No results for input(s): APTT in the last 72 hours.   UA:  Recent Labs     03/26/21  1310 03/28/21  1345   COLORU Yellow Yellow   PHUR 5.0 5.0   WBCUA 0-2 None seen   RBCUA 0-2 None seen   MUCUS Rare* Rare*   BACTERIA 2+* Rare*   CLARITYU CLOUDY* Clear   SPECGRAV >=1.030 1.020   LEUKOCYTESUR Negative Negative   UROBILINOGEN 0.2 0.2   BILIRUBINUR SMALL* Negative   BLOODU Negative Negative   GLUCOSEU Negative Negative   AMORPHOUS 2+  --        Cultures:  Respiratory culture has no growth to date    Films:  CXR from 03/29 reviewed by me and it showed persistent bibasilar airspace disease and pleural effusions. Assessment and Plan:    Acute hypoxic respiratory failure secondary to COPD exacerbation  -Pt remains intubated  -Continue nebs and steroids  -COVID PCR neg  -Serial ABGs  -s/p bronch. Cultures no growth to date  -Continue ceftriaxone, azithromycin pending final BAL result. -Titrate sedation as needed    Alcohol Dependence with withdrawal  -Continue versed, fentanyl, propofol, and phenobarbital.  -[facility] protocol  -Counseling    KAROL  -Secondary to ott obstruction  -Nephro consulted  -Improved following replacement  -Per Nephro, holding IVF    Critical care time spent reviewing labs/films, examining patient, collaborating with other physicians but excluding procedures for life threatening organ failure is 30 minutes.     Erica Patel DO, PGY-1    Electronically signed by:  Erica Patel DO    3/29/2021    10:46 AM.

## 2021-03-29 NOTE — PROGRESS NOTES
03/29/21 0838   Vent Information   Vent Type 840   Vent Mode AC/PC   Pressure Ordered 18   Rate Set 18 bmp   Pressure Support 0 cmH20   FiO2  90 %   SpO2 96 %   SpO2/FiO2 ratio 106.67   Sensitivity 3   PEEP/CPAP 10   I Time/ I Time % 0.9 s   Humidification Source HME  (chamged)   Vent Patient Data   High Peep/I Pressure 18   Peak Inspiratory Pressure 28 cmH2O   Mean Airway Pressure 15 cmH20   Rate Measured 18 br/min   Vt Exhaled 601 mL   Minute Volume 10.8 Liters   I:E Ratio 1:2.70   Cough/Sputum   Sputum How Obtained Endotracheal;Suctioned   Cough Non-productive   Sputum Amount None   Spontaneous Breathing Trial (SBT) RT Doc   Pulse 71   Breath Sounds   Right Upper Lobe Diminished   Right Middle Lobe Diminished   Right Lower Lobe Diminished   Left Upper Lobe Diminished   Left Lower Lobe Diminished   Additional Respiratory  Assessments   Resp 18   Position Semi-Dowd's   Cuff Pressure (cm H2O) 30 cm H2O   Alarm Settings   High Pressure Alarm 40 cmH2O   Low Minute Volume Alarm 2 L/min   High Respiratory Rate 40 br/min   Low Exhaled Vt  200 mL   Patient Observation   Observations 8 ETT, ambu bag @ bedside   ETT (adult)   Placement Date/Time: 03/25/21 2147   Preoxygenation: Yes  Type: Cuffed  Tube Size: 8 mm  Laryngoscope: GlideScope  Blade Size: 3  Location: Oral  Insertion attempts: 1  Placement Verified By[de-identified] Auscultation;Capnometry  Secured at: 23 cm  Measured From:. ..    Secured at 25 cm   Measured From [address] By [employer] tube still

## 2021-03-29 NOTE — PROGRESS NOTES
03/29/21 1943   Vent Information   Vent Type 840   Vent Mode AC/PC   Pressure Ordered 18   Rate Set 18 bmp   Pressure Support 0 cmH20   FiO2  70 %   Sensitivity 3   PEEP/CPAP 10   I Time/ I Time % 0 s   Vent Patient Data   High Peep/I Pressure 18   Peak Inspiratory Pressure 28 cmH2O   Mean Airway Pressure 15 cmH20   Rate Measured 18 br/min   Vt Exhaled 545 mL   Minute Volume 9.85 Liters   I:E Ratio 1:2.70   Cough/Sputum   Sputum How Obtained Endotracheal;Suctioned   Spontaneous Breathing Trial (SBT) RT Doc   Pulse 78   Breath Sounds   Right Upper Lobe Diminished   Right Middle Lobe Diminished   Right Lower Lobe Diminished   Left Upper Lobe Diminished   Left Lower Lobe Diminished   Additional Respiratory  Assessments   Resp 18   Alarm Settings   High Pressure Alarm 40 cmH2O   Low Minute Volume Alarm 2 L/min   High Respiratory Rate 40 br/min   Patient Observation   Observations 8 ETT, ambu bag @ bedside   ETT (adult)   Placement Date/Time: 03/25/21 2144   Preoxygenation: Yes  Type: Cuffed  Tube Size: 8 mm  Laryngoscope: GlideScope  Blade Size: 3  Location: Oral  Insertion attempts: 1  Placement Verified By[de-identified] Auscultation;Capnometry  Secured at: 23 cm  Measured From:. ..    Secured at 25 cm   Measured From Lips   ET Placement Right   Secured By Commercial tube still   Site Condition Dry   Cuff Pressure 30 cm H2O

## 2021-03-29 NOTE — CARE COORDINATION
CM update PD#4. COPD and ETOH withdrawal- remains sedated on Vent. Will follow for needs.     Jhon Lefort, RN

## 2021-03-29 NOTE — PROGRESS NOTES
Pulmonary & Critical Care Medicine ICU Progress Note      Events of Last 24 hours: The patient remains intubated. He is heavily sedated due to alcohol withdrawal. He was initially intubated for COPD exacerbation, was negative for Covid. Related to left alcohol withdrawal. He remains on high FiO2 and PEEP, sedated with Versed 6, fentanyl 150, propofol 20 and also received phenobarbital. He is afebrile. He was initially paralyzed, no longer on cisatracurium. He is on PCV 18, FiO2 reduced from 100% to 80%, PEEP 10. He underwent fiberoptic bronchoscopy on 3/27/2021. Invasive Lines:      CVC left subclavian 3/25/2021    Art Line left radial        MV: 3/25/2021    Recent Labs     03/28/21  1340 03/29/21  0410   PHART 7.459* 7.426   GBQ4KBT 41.0 52.4*   PO2ART 71.1* 101.0       MV Settings:  Vent Mode: AC/PC Rate Set: 18 bmp/Vt Ordered: 500 mL/ [email or web address])    IV:   norepinephrine Stopped (03/27/21 2200)    fentaNYL (SUBLIMAZE) infusion 150 mcg/hr (03/29/21 0045)    midazolam 6 mg/hr (03/28/21 1823)    propofol 20 mcg/kg/min (03/29/21 0515)       Vitals:  /69   Pulse 71   Temp 97.8 °F (36.6 °C) (Bladder)   Resp 18   Ht 5' 10\" (1.778 m)   Wt 249 lb 1.9 oz (113 kg)   SpO2 96%   BMI 35.74 kg/m²         Intake/Output Summary (Last 24 hours) at 3/29/2021 0912  Last data filed at 3/29/2021 0600  Gross per 24 hour   Intake 3047 ml   Output 1780 ml   Net 1267 ml       EXAM:  General: No distress. Overweight, deeply sedated and intubated  Eyes: PERRL. No sclera icterus. No conjunctival injection. ENT: #8 endotracheal tube at 23 cm, OG. Koza dry. Neck: Large neck, no JVD trachea midline. Normal thyroid. Resp: No accessory muscle use. No crackles. No wheezing. No rhonchi. CV: Regular rate. Regular rhythm. No mumur or rub. No edema. GI: Non-tender, protuberant and fatty abdomen. Non-distended. No masses. No organmegaly. Normal bowel sounds. No hernia. Skin: Warm and dry.  No nodule on exposed extremities. No rash on exposed extremities. Lymph: No cervical LAD. No supraclavicular LAD. M/S: No cyanosis. No joint deformity. No clubbing. Neuro: Sedated and intubated, not much spontaneous movement. Not breathing over the ventilator. Psych: Could not be assessed. Medications:  Scheduled Meds:   predniSONE  40 mg Oral Daily    furosemide  20 mg Intravenous Once    PHENobarbital  130 mg Intravenous 3 times per day    ipratropium-albuterol  1 ampule Inhalation Q4H    midazolam  5 mg Intravenous Once    mupirocin   Nasal BID    carboxymethylcellulose PF  1 drop Both Eyes 6 times per day    famotidine  20 mg Per NG tube BID    thiamine  100 mg Per NG tube Daily    senna  1 tablet Per NG tube BID    docusate  100 mg Per NG tube BID    sodium chloride flush  10 mL Intravenous 2 times per day    enoxaparin  40 mg Subcutaneous Daily    azithromycin  500 mg Intravenous Q24H    cefTRIAXone (ROCEPHIN) IV  1,000 mg Intravenous Q24H    chlorhexidine  15 mL Mouth/Throat BID       PRN Meds:  sodium chloride flush, promethazine **OR** ondansetron, polyethylene glycol, acetaminophen **OR** acetaminophen, fentanNYL, albuterol sulfate HFA **AND** ipratropium **AND** MDI Treatment, midazolam    Results:  CBC:   No results for input(s): WBC, HGB, HCT, MCV, PLT in the last 72 hours.   BMP:   Recent Labs     03/27/21  1106 03/28/21  0525 03/28/21  1340 03/29/21  0410    139 139 138   K 4.0 4.1 4.3 4.6   CL 98* 97* 99 99   CO2 33* 34* 32 33*   PHOS 4.9 5.3* 3.3  --    BUN 51* 60* 61* 65*   CREATININE 1.3 1.5* 1.3 1.1     UA:  Recent Labs     03/26/21  1310 03/28/21  1345   COLORU Yellow Yellow   PHUR 5.0 5.0   WBCUA 0-2 None seen   RBCUA 0-2 None seen   MUCUS Rare* Rare*   BACTERIA 2+* Rare*   CLARITYU CLOUDY* Clear   SPECGRAV >=1.030 1.020   LEUKOCYTESUR Negative Negative   UROBILINOGEN 0.2 0.2   BILIRUBINUR SMALL* Negative   BLOODU Negative Negative   GLUCOSEU Negative Negative   AMORPHOUS 2+  --

## 2021-03-30 ENCOUNTER — APPOINTMENT (OUTPATIENT)
Dept: GENERAL RADIOLOGY | Age: 61
DRG: 130 | End: 2021-03-30
Payer: MEDICAID

## 2021-03-30 LAB
ANION GAP SERPL CALCULATED.3IONS-SCNC: 5 MMOL/L (ref 3–16)
BASE EXCESS ARTERIAL: 15 (ref -3–3)
BASE EXCESS ARTERIAL: 7.8 MMOL/L (ref -3–3)
BASE EXCESS ARTERIAL: 9.5 MMOL/L (ref -3–3)
BUN BLDV-MCNC: 61 MG/DL (ref 7–20)
CALCIUM SERPL-MCNC: 9.4 MG/DL (ref 8.3–10.6)
CARBOXYHEMOGLOBIN ARTERIAL: 0.3 % (ref 0–1.5)
CARBOXYHEMOGLOBIN ARTERIAL: 0.3 % (ref 0–1.5)
CHLORIDE BLD-SCNC: 101 MMOL/L (ref 99–110)
CO2: 38 MMOL/L (ref 21–32)
CREAT SERPL-MCNC: 1 MG/DL (ref 0.9–1.3)
GFR AFRICAN AMERICAN: >60
GFR NON-AFRICAN AMERICAN: >60
GLUCOSE BLD-MCNC: 115 MG/DL (ref 70–99)
GLUCOSE BLD-MCNC: 155 MG/DL (ref 70–99)
HCO3 ARTERIAL: 39.4 MMOL/L (ref 21–29)
HCO3 ARTERIAL: 39.8 MMOL/L (ref 21–29)
HCO3 ARTERIAL: 40.2 MMOL/L (ref 21–29)
HEMOGLOBIN, ART, EXTENDED: 14.6 G/DL (ref 13.5–17.5)
HEMOGLOBIN, ART, EXTENDED: 14.6 G/DL (ref 13.5–17.5)
METHEMOGLOBIN ARTERIAL: 0.5 %
METHEMOGLOBIN ARTERIAL: 0.6 %
O2 CONTENT ARTERIAL: 19 ML/DL
O2 CONTENT ARTERIAL: 19 ML/DL
O2 SAT, ARTERIAL: 92 % (ref 93–100)
O2 SAT, ARTERIAL: 93.6 %
O2 SAT, ARTERIAL: 93.7 %
O2 THERAPY: ABNORMAL
O2 THERAPY: ABNORMAL
PCO2 ARTERIAL: 59.2 MM HG (ref 35–45)
PCO2 ARTERIAL: 88 MMHG (ref 35–45)
PCO2 ARTERIAL: 99.8 MMHG (ref 35–45)
PERFORMED ON: ABNORMAL
PH ARTERIAL: 7.22 (ref 7.35–7.45)
PH ARTERIAL: 7.28 (ref 7.35–7.45)
PH ARTERIAL: 7.43 (ref 7.35–7.45)
PO2 ARTERIAL: 63.1 MM HG (ref 75–108)
PO2 ARTERIAL: 77.1 MMHG (ref 75–108)
PO2 ARTERIAL: 80.3 MMHG (ref 75–108)
POC SAMPLE TYPE: ABNORMAL
POTASSIUM REFLEX MAGNESIUM: 4.8 MMOL/L (ref 3.5–5.1)
SODIUM BLD-SCNC: 144 MMOL/L (ref 136–145)
TCO2 ARTERIAL: 41 MMOL/L
TCO2 ARTERIAL: 42.9 MMOL/L
TCO2 ARTERIAL: 42.9 MMOL/L

## 2021-03-30 PROCEDURE — 71045 X-RAY EXAM CHEST 1 VIEW: CPT

## 2021-03-30 PROCEDURE — 2700000000 HC OXYGEN THERAPY PER DAY

## 2021-03-30 PROCEDURE — 80048 BASIC METABOLIC PNL TOTAL CA: CPT

## 2021-03-30 PROCEDURE — 94761 N-INVAS EAR/PLS OXIMETRY MLT: CPT

## 2021-03-30 PROCEDURE — 2000000000 HC ICU R&B

## 2021-03-30 PROCEDURE — 6360000002 HC RX W HCPCS: Performed by: INTERNAL MEDICINE

## 2021-03-30 PROCEDURE — 6370000000 HC RX 637 (ALT 250 FOR IP): Performed by: INTERNAL MEDICINE

## 2021-03-30 PROCEDURE — 82803 BLOOD GASES ANY COMBINATION: CPT

## 2021-03-30 PROCEDURE — 6370000000 HC RX 637 (ALT 250 FOR IP): Performed by: NURSE PRACTITIONER

## 2021-03-30 PROCEDURE — 99291 CRITICAL CARE FIRST HOUR: CPT | Performed by: INTERNAL MEDICINE

## 2021-03-30 PROCEDURE — 94640 AIRWAY INHALATION TREATMENT: CPT

## 2021-03-30 PROCEDURE — 2500000003 HC RX 250 WO HCPCS: Performed by: INTERNAL MEDICINE

## 2021-03-30 PROCEDURE — 2580000003 HC RX 258: Performed by: INTERNAL MEDICINE

## 2021-03-30 PROCEDURE — 82947 ASSAY GLUCOSE BLOOD QUANT: CPT

## 2021-03-30 PROCEDURE — 94003 VENT MGMT INPAT SUBQ DAY: CPT

## 2021-03-30 PROCEDURE — 37799 UNLISTED PX VASCULAR SURGERY: CPT

## 2021-03-30 RX ORDER — FUROSEMIDE 10 MG/ML
40 INJECTION INTRAMUSCULAR; INTRAVENOUS 2 TIMES DAILY
Status: COMPLETED | OUTPATIENT
Start: 2021-03-30 | End: 2021-03-30

## 2021-03-30 RX ORDER — DEXMEDETOMIDINE HYDROCHLORIDE 4 UG/ML
.2-1.4 INJECTION, SOLUTION INTRAVENOUS
Status: DISCONTINUED | OUTPATIENT
Start: 2021-03-30 | End: 2021-04-08

## 2021-03-30 RX ORDER — FUROSEMIDE 10 MG/ML
20 INJECTION INTRAMUSCULAR; INTRAVENOUS ONCE
Status: DISCONTINUED | OUTPATIENT
Start: 2021-03-30 | End: 2021-03-30

## 2021-03-30 RX ORDER — BISACODYL 10 MG
10 SUPPOSITORY, RECTAL RECTAL DAILY PRN
Status: DISCONTINUED | OUTPATIENT
Start: 2021-03-30 | End: 2021-03-31

## 2021-03-30 RX ADMIN — PHENOBARBITAL SODIUM 130 MG: 65 INJECTION INTRAMUSCULAR at 21:47

## 2021-03-30 RX ADMIN — Medication 15 ML: at 20:06

## 2021-03-30 RX ADMIN — IPRATROPIUM BROMIDE AND ALBUTEROL SULFATE 1 AMPULE: .5; 3 SOLUTION RESPIRATORY (INHALATION) at 19:31

## 2021-03-30 RX ADMIN — SODIUM CHLORIDE, PRESERVATIVE FREE 10 ML: 5 INJECTION INTRAVENOUS at 20:13

## 2021-03-30 RX ADMIN — DOCUSATE SODIUM 100 MG: 50 LIQUID ORAL at 20:12

## 2021-03-30 RX ADMIN — FENTANYL CITRATE 150 MCG/HR: 50 INJECTION, SOLUTION INTRAMUSCULAR; INTRAVENOUS at 13:56

## 2021-03-30 RX ADMIN — FENTANYL CITRATE 150 MCG/HR: 50 INJECTION, SOLUTION INTRAMUSCULAR; INTRAVENOUS at 06:30

## 2021-03-30 RX ADMIN — IPRATROPIUM BROMIDE AND ALBUTEROL SULFATE 1 AMPULE: .5; 3 SOLUTION RESPIRATORY (INHALATION) at 15:33

## 2021-03-30 RX ADMIN — CARBOXYMETHYLCELLULOSE SODIUM 1 DROP: 10 GEL OPHTHALMIC at 15:55

## 2021-03-30 RX ADMIN — MIDAZOLAM HYDROCHLORIDE 6 MG/HR: 5 INJECTION, SOLUTION INTRAMUSCULAR; INTRAVENOUS at 07:50

## 2021-03-30 RX ADMIN — SENNOSIDES 8.6 MG: 8.6 TABLET, FILM COATED ORAL at 20:12

## 2021-03-30 RX ADMIN — MUPIROCIN: 20 OINTMENT TOPICAL at 08:03

## 2021-03-30 RX ADMIN — ENOXAPARIN SODIUM 40 MG: 40 INJECTION SUBCUTANEOUS at 08:04

## 2021-03-30 RX ADMIN — FENTANYL CITRATE 150 MCG/HR: 50 INJECTION, SOLUTION INTRAMUSCULAR; INTRAVENOUS at 19:19

## 2021-03-30 RX ADMIN — PREDNISONE 40 MG: 20 TABLET ORAL at 08:07

## 2021-03-30 RX ADMIN — MIDAZOLAM HYDROCHLORIDE 5 MG/HR: 5 INJECTION, SOLUTION INTRAMUSCULAR; INTRAVENOUS at 09:38

## 2021-03-30 RX ADMIN — Medication 0.8 MCG/KG/HR: at 21:46

## 2021-03-30 RX ADMIN — Medication 0.7 MCG/KG/HR: at 16:11

## 2021-03-30 RX ADMIN — FUROSEMIDE 40 MG: 10 INJECTION, SOLUTION INTRAMUSCULAR; INTRAVENOUS at 17:43

## 2021-03-30 RX ADMIN — SENNOSIDES 8.6 MG: 8.6 TABLET, FILM COATED ORAL at 08:05

## 2021-03-30 RX ADMIN — MUPIROCIN: 20 OINTMENT TOPICAL at 20:19

## 2021-03-30 RX ADMIN — DOCUSATE SODIUM 100 MG: 50 LIQUID ORAL at 08:04

## 2021-03-30 RX ADMIN — Medication 15 ML: at 08:03

## 2021-03-30 RX ADMIN — Medication 100 MG: at 08:07

## 2021-03-30 RX ADMIN — IPRATROPIUM BROMIDE AND ALBUTEROL SULFATE 1 AMPULE: .5; 3 SOLUTION RESPIRATORY (INHALATION) at 04:13

## 2021-03-30 RX ADMIN — CARBOXYMETHYLCELLULOSE SODIUM 1 DROP: 10 GEL OPHTHALMIC at 12:21

## 2021-03-30 RX ADMIN — FAMOTIDINE 20 MG: 20 TABLET, FILM COATED ORAL at 20:12

## 2021-03-30 RX ADMIN — BISACODYL 10 MG: 10 SUPPOSITORY RECTAL at 13:03

## 2021-03-30 RX ADMIN — FUROSEMIDE 40 MG: 10 INJECTION, SOLUTION INTRAMUSCULAR; INTRAVENOUS at 09:38

## 2021-03-30 RX ADMIN — FAMOTIDINE 20 MG: 20 TABLET, FILM COATED ORAL at 08:05

## 2021-03-30 RX ADMIN — Medication 0.6 MCG/KG/HR: at 13:13

## 2021-03-30 RX ADMIN — IPRATROPIUM BROMIDE AND ALBUTEROL SULFATE 1 AMPULE: .5; 3 SOLUTION RESPIRATORY (INHALATION) at 11:57

## 2021-03-30 RX ADMIN — CARBOXYMETHYLCELLULOSE SODIUM 1 DROP: 10 GEL OPHTHALMIC at 08:03

## 2021-03-30 RX ADMIN — IPRATROPIUM BROMIDE AND ALBUTEROL SULFATE 1 AMPULE: .5; 3 SOLUTION RESPIRATORY (INHALATION) at 00:10

## 2021-03-30 RX ADMIN — CARBOXYMETHYLCELLULOSE SODIUM 1 DROP: 10 GEL OPHTHALMIC at 04:00

## 2021-03-30 RX ADMIN — Medication 0.7 MCG/KG/HR: at 16:43

## 2021-03-30 RX ADMIN — IPRATROPIUM BROMIDE AND ALBUTEROL SULFATE 1 AMPULE: .5; 3 SOLUTION RESPIRATORY (INHALATION) at 07:52

## 2021-03-30 RX ADMIN — CARBOXYMETHYLCELLULOSE SODIUM 1 DROP: 10 GEL OPHTHALMIC at 20:07

## 2021-03-30 RX ADMIN — PROPOFOL 20 MCG/KG/MIN: 10 INJECTION, EMULSION INTRAVENOUS at 00:30

## 2021-03-30 RX ADMIN — PHENOBARBITAL SODIUM 130 MG: 65 INJECTION INTRAMUSCULAR at 14:49

## 2021-03-30 RX ADMIN — Medication 0.4 MCG/KG/HR: at 11:08

## 2021-03-30 RX ADMIN — SODIUM CHLORIDE, PRESERVATIVE FREE 10 ML: 5 INJECTION INTRAVENOUS at 08:03

## 2021-03-30 RX ADMIN — PHENOBARBITAL SODIUM 130 MG: 65 INJECTION INTRAMUSCULAR at 04:35

## 2021-03-30 ASSESSMENT — PULMONARY FUNCTION TESTS
PIF_VALUE: 28
PIF_VALUE: 29
PIF_VALUE: 28
PIF_VALUE: 26
PIF_VALUE: 29
PIF_VALUE: 24
PIF_VALUE: 28
PIF_VALUE: 28
PIF_VALUE: 30
PIF_VALUE: 32
PIF_VALUE: 28
PIF_VALUE: 28
PIF_VALUE: 29
PIF_VALUE: 28
PIF_VALUE: 26

## 2021-03-30 ASSESSMENT — PAIN SCALES - GENERAL: PAINLEVEL_OUTOF10: 0

## 2021-03-30 NOTE — PROGRESS NOTES
4 Eyes Skin Assessment     The patient is being assess for   Shift Handoff    I agree that 2 RN's have performed a thorough Head to Toe Skin Assessment on the patient. ALL assessment sites listed below have been assessed. Areas assessed by both nurses:   [x]   Head, Face, and Ears   [x]   Shoulders, Back, and Chest, Abdomen  [x]   Arms, Elbows, and Hands   [x]   Coccyx, Sacrum, and Ischium  [x]   Legs, Feet, and Heels            **SHARE this note so that the co-signing nurse is able to place an eSignature**    Co-signer eSignature: Electronically signed by Brown Edmond RN on 3/30/21 at 5:02 PM EDT    Does the Patient have Skin Breakdown?   No          Leo Prevention initiated:  No   Wound Care Orders initiated:  No      [facility] nurse consulted for Pressure Injury (Stage 3,4, Unstageable, DTI, NWPT, Complex wounds)and New or Established Ostomies:  No      Primary Nurse eSignature: Electronically signed by Elke Warren RN on 3/30/21 at 4:51 PM EDT

## 2021-03-30 NOTE — PROGRESS NOTES
Comprehensive Nutrition Assessment    Type and Reason for Visit:  Reassess    Nutrition Recommendations/Plan:   1. Vital high protein continue at 50 ml/hr  2.  Water flush 150 ml every 4 hours  3. Proteinex Protein supplement twice per day, flush 30 ml before and after administration  4. Will monitor TF intake and tolerance, blood sugar trends, and fluid and electrolyte balances    Nutrition Assessment:  Follow up:  Currently on Vital high protein at 50 ml/hr with water flush 30 ml every 4 hours + 2 Proteinex Protein supplements per day. No BM in several days, suppository to be provided today. Will increase water flush due to rising Na levels, constipation and elevated BUN. Malnutrition Assessment:  Malnutrition Status:  Insufficient data(Will continue to monitor when more information is available.)    Context:  Acute Illness       Estimated Daily Nutrient Needs:  Energy (kcal):  7273-6392 kcals/day; Weight Used for Energy Requirements:  Current(112.9 kg)     Protein (g):  113-151 g/day; Weight Used for Protein Requirements:  Ideal(1.5-2 g/kg IBW)        Fluid (ml/day):  1500 ml; Method Used for Fluid Requirements:  1 ml/kcal      Nutrition Related Findings:  NO BM in several days-getting suppository today per rounds      Wounds:  None       Current Nutrition Therapies:    DIET TUBE FEED CONTINUOUS/CYCLIC NPO; Low Calorie High Protein; Orogastric; Continuous; 10; 50  Current Tube Feeding (TF) Orders:  · Feeding Route: Orogastric  · Formula: Low Calorie, High Protein  · Schedule: Continuous  · Goal TF & Flush Orders Provides: Vital High Protein (low calorie, high protein) x 20 hours at goal rate of 50 ml/hr to provide 1000 ml total volume, 1000 kcals, 88 g protein, 836 ml free water. + 2 1 bottle Proteinex daily to provide 208 kcals and 52 g protein. Free water flush of 30 ml q 4 hours, if no IV infusing and Na WNL. Rate may be adjusted based on propofol titrations.     Anthropometric Measures:  · Height:

## 2021-03-30 NOTE — PROGRESS NOTES
Hospitalist Progress Note      PCP: Adrien Brown MD    Date of Admission: 3/25/2021    Chief Complaint: SOB    Hospital Course:   62 y.o. male who presented to [facility] with shortness of breath that started a couple days ago and got worse. Today, patient was extremely short of breath. 911 was called. Patient's pulse ox was in the 40s on room air. Patient was placed on a nonrebreather, which brought him up to 76s. At that point, patient was placed on CPAP and brought to the emergency department. On CPAP, patient's pulse ox was around 90s. In the emergency department, patient was found to be tachypneic with air hunger and switched on BiPAP. Tolerated BiPAP well. Pulse ox is around low 90s at this time. Has been able to speak after started on BiPAP. Yellow sputum with productive cough past couple days was noted. Has subjective fever and chills. Complains of chest pain in the center of the chest.  Cannot characterize whether it is radiating and cannot tell exactly what kind of pain it is. Apparently, it started in past 24 hours and is connected to shortness of breath. Has some nausea without vomiting  No other accompanying symptoms  Nothing onset makes the patient feel better or worse  Patient is a smoker. No recent COVID-19 test  No recent similar episodes. Subjective: remains intubated and heavily sedated.        Medications:  Reviewed    Infusion Medications    fentaNYL (SUBLIMAZE) infusion 150 mcg/hr (03/30/21 0630)    midazolam 6 mg/hr (03/30/21 0750)     Scheduled Medications    furosemide  20 mg Intravenous Once    predniSONE  40 mg Oral Daily    PHENobarbital  130 mg Intravenous 3 times per day    ipratropium-albuterol  1 ampule Inhalation Q4H    midazolam  5 mg Intravenous Once    mupirocin   Nasal BID    carboxymethylcellulose PF  1 drop Both Eyes 6 times per day    famotidine  20 mg Per NG tube BID    thiamine  100 mg Per NG tube Daily    senna  1 tablet Per NG tube BID    docusate  100 mg Per NG tube BID    sodium chloride flush  10 mL Intravenous 2 times per day    enoxaparin  40 mg Subcutaneous Daily    chlorhexidine  15 mL Mouth/Throat BID     PRN Meds: bisacodyl, sodium chloride flush, promethazine **OR** ondansetron, polyethylene glycol, acetaminophen **OR** acetaminophen, fentanNYL, albuterol sulfate HFA **AND** ipratropium **AND** MDI Treatment, midazolam      Intake/Output Summary (Last 24 hours) at 3/30/2021 0829  Last data filed at 3/30/2021 0600  Gross per 24 hour   Intake 3074 ml   Output 5120 ml   Net -2046 ml       Physical Exam Performed:    /69   Pulse 88   Temp 97.4 °F (36.3 °C) (Bladder)   Resp 21   Ht 5' 10\" (1.778 m)   Wt 249 lb 1.9 oz (113 kg)   SpO2 93%   BMI 35.74 kg/m²     General appearance: intubated, sedated  HEENT: Pupils equal, round, and reactive to light. Conjunctivae/corneas clear. Neck: Supple, with full range of motion. No jugular venous distention. Trachea midline. Respiratory:  Normal respiratory effort. Clear to auscultation, bilaterally without Rales/Wheezes/Rhonchi. Cardiovascular: Regular rate and rhythm with normal S1/S2 without murmurs, rubs or gallops. Abdomen: Soft, non-tender, non-distended with normal bowel sounds. Musculoskeletal: No clubbing, cyanosis or edema bilaterally. Full range of motion without deformity. Skin: Skin color, texture, turgor normal.  No rashes or lesions. Neurologic: On ventilator  Psychiatric: On ventilator sedated  Capillary Refill: Brisk,< 3 seconds   Peripheral Pulses: +2 palpable, equal bilaterally       Labs:   No results for input(s): WBC, HGB, HCT, PLT in the last 72 hours.   Recent Labs     03/27/21  1106 03/28/21  0525 03/28/21  1340 03/29/21  0410 03/30/21  0400    139 139 138 144   K 4.0 4.1 4.3 4.6 4.8   CL 98* 97* 99 99 101   CO2 33* 34* 32 33* 38*   BUN 51* 60* 61* 65* 61*   CREATININE 1.3 1.5* 1.3 1.1 1.0   CALCIUM 9.1 8.8 8.8 9.0 9.4   PHOS 4.9 5.3* 3.3  -- --      No results for input(s): AST, ALT, BILIDIR, BILITOT, ALKPHOS in the last 72 hours. No results for input(s): INR in the last 72 hours. No results for input(s): Cori Lemme in the last 72 hours. Urinalysis:      Lab Results   Component Value Date    NITRU Negative 03/28/2021    WBCUA None seen 03/28/2021    BACTERIA Rare 03/28/2021    RBCUA None seen 03/28/2021    BLOODU Negative 03/28/2021    SPECGRAV 1.020 03/28/2021    GLUCOSEU Negative 03/28/2021       Radiology:  XR CHEST PORTABLE   Final Result   Persistent bibasilar airspace disease and pleural effusions. Mild cardiac   silhouette enlargement. No change in life support. XR CHEST PORTABLE   Final Result   Supportive tubing projects in stable positions. Continued bibasilar airspace disease, with possible layered pleural effusions. XR CHEST PORTABLE   Final Result   Supportive tubing is in stable position. Stable pattern of bilateral airspace disease and possible layered effusions. Pulmonary edema or pneumonia are possible. XR ABDOMEN (KUB) (SINGLE AP VIEW)   Final Result   Satisfactory enteric tube placement. XR CHEST PORTABLE   Final Result   Support lines and tubes in satisfactory position. No pneumothorax. Worsening bibasilar predominant airspace disease. Small right and trace left   pleural effusions suspected. XR CHEST PORTABLE   Final Result      XR CHEST PORTABLE    (Results Pending)           Assessment/Plan:    Active Hospital Problems    Diagnosis    COPD exacerbation ([address] [J44.1]    [facility] [F10.239]     Acute hypoxic and hypercarbic respiratory failure 2/2 COPD exacerbation  - remains intubated  - vent management per pulm  - continue nebs, steroids  - COVID PCR negative  - s/p bronch.  BAL NGTD  - s/p paralytics  - completed course of abx without clear evidence of pneumonia  - hypercarbia worse today but hypoxia slowly improving    Alcohol dependence with withdrawal  - continue versed gtt  - will start CIWA if applicable.  Will likely need prolonged sedatives  - counseling once able    KAROL  - 2/2 ott obstruction  - nephrology consulted  - improved following replacement    DVT Prophylaxis: lovenox  Diet: DIET TUBE FEED CONTINUOUS/CYCLIC NPO; Low Calorie High Protein; Orogastric; Continuous; 10; 50  Diet Tube Feed Modular: Protein Modular  Code Status: Full Code    PT/OT Eval Status: pending improvement    Dispo - ICU    Keren Guaman MD

## 2021-03-30 NOTE — PROGRESS NOTES
This note also relates to the following rows which could not be included:  Vt Ordered - Cannot attach notes to unvalidated device data  Rate Set - Cannot attach notes to unvalidated device data  Peak Flow - Cannot attach notes to unvalidated device data  Pressure Support - Cannot attach notes to unvalidated device data  FiO2  - Cannot attach notes to unvalidated device data  Sensitivity - Cannot attach notes to unvalidated device data  PEEP/CPAP - Cannot attach notes to unvalidated device data  Peak Inspiratory Pressure - Cannot attach notes to unvalidated device data  Mean Airway Pressure - Cannot attach notes to unvalidated device data  Rate Measured - Cannot attach notes to unvalidated device data  Vt Exhaled - Cannot attach notes to unvalidated device data  Minute Volume - Cannot attach notes to unvalidated device data  I:E Ratio - Cannot attach notes to unvalidated device data  Pulse - Cannot attach notes to unvalidated device data  Resp - Cannot attach notes to unvalidated device data  High Pressure Alarm - Cannot attach notes to unvalidated device data  Low Minute Volume Alarm - Cannot attach notes to unvalidated device data  High Respiratory Rate - Cannot attach notes to unvalidated device data       03/30/21 1938   Vent Information   Vent Mode AC/VC

## 2021-03-30 NOTE — PROGRESS NOTES
Pulmonary & Critical Care Medicine ICU Progress Note      Events of Last 24 hours: The patient remains intubated. Propofol turned off. He remains on high FiO2 and PEEP, FiO2 decreased but elevated PCO2. He is afebrile. He is on PCV 18, FiO2 reduced from 100% to 60 %, PEEP 10. He underwent fiberoptic bronchoscopy on 3/27/2021. Tolerating tube feeds, completed 5 days of antibiotics      Invasive Lines:      CVC left subclavian 3/25/2021    Art Line left radial        MV: 3/25/2021    Recent Labs     03/30/21  0540 03/30/21  1736   PHART 7.278* 7.431   HMR2NFG 88.0* 59.2*   PO2ART 77.1 63.1*       MV Settings:  Vent Mode: AC/PC Rate Set: 20 bmp/Vt Ordered: 550 mL/ Jodie@[employer])    IV:   dexmedetomidine 0.8 mcg/kg/hr (03/30/21 1829)    fentaNYL (SUBLIMAZE) infusion 150 mcg/hr (03/30/21 1356)    midazolam 3 mg/hr (03/30/21 1553)       Vitals:  /69   Pulse 68   Temp 98.5 °F (36.9 °C) (Bladder)   Resp 20   Ht 5' 10\" (1.778 m)   Wt 249 lb 1.9 oz (113 kg)   SpO2 91%   BMI 35.74 kg/m²         Intake/Output Summary (Last 24 hours) at 3/30/2021 1855  Last data filed at 3/30/2021 1833  Gross per 24 hour   Intake 1904 ml   Output 5400 ml   Net -3496 ml       EXAM:  General: No distress. Overweight, deeply sedated and intubated  Eyes: PERRL. No sclera icterus. No conjunctival injection. ENT: #8 endotracheal tube at 23 cm, OG. Mucosa dry. Neck: Large neck, no JVD   Resp: No accessory muscle use. No crackles. No wheezing. No rhonchi. CV: Regular rate. Regular rhythm. No mumur or rub. No edema. GI: Non-tender, protuberant and fatty abdomen. Non-distended. No masses. No organmegaly. Skin: Warm and dry. No nodule on exposed extremities. No rash on exposed extremities. Lymph: Deferred. M/S: No cyanosis. No joint deformity. No clubbing. Neuro: Sedated and intubated, not much spontaneous movement. Now breathing over the ventilator. Psych: Could not be assessed.      Medications:  Scheduled Meds:   predniSONE  40 mg Oral Daily    PHENobarbital  130 mg Intravenous 3 times per day    ipratropium-albuterol  1 ampule Inhalation Q4H    mupirocin   Nasal BID    carboxymethylcellulose PF  1 drop Both Eyes 6 times per day    famotidine  20 mg Per NG tube BID    thiamine  100 mg Per NG tube Daily    senna  1 tablet Per NG tube BID    docusate  100 mg Per NG tube BID    sodium chloride flush  10 mL Intravenous 2 times per day    enoxaparin  40 mg Subcutaneous Daily    chlorhexidine  15 mL Mouth/Throat BID       PRN Meds:  bisacodyl, sodium chloride flush, promethazine **OR** ondansetron, polyethylene glycol, acetaminophen **OR** acetaminophen, fentanNYL, albuterol sulfate HFA **AND** ipratropium **AND** MDI Treatment, midazolam    Results:  CBC:   No results for input(s): WBC, HGB, HCT, MCV, PLT in the last 72 hours. BMP:   Recent Labs     03/28/21  0525 03/28/21  1340 03/29/21  0410 03/30/21  0400    139 138 144   K 4.1 4.3 4.6 4.8   CL 97* 99 99 101   CO2 34* 32 33* 38*   PHOS 5.3* 3.3  --   --    BUN 60* 61* 65* 61*   CREATININE 1.5* 1.3 1.1 1.0     UA:  Recent Labs     03/28/21  1345   COLORU Yellow   PHUR 5.0   WBCUA None seen   RBCUA None seen   MUCUS Rare*   BACTERIA Rare*   CLARITYU Clear   SPECGRAV 1.020   LEUKOCYTESUR Negative   UROBILINOGEN 0.2   BILIRUBINUR Negative   BLOODU Negative   GLUCOSEU Negative       Cultures:  Bronchoscopy cultures negative    Films:  CXR reviewed by me    Assessment and plan:  Acute respiratory failure, hypoxemic and hypercarbic. Attempt to wean FiO2, will attempt change to volume control ventilation  COPD exacerbation. On bronchodilator and steroid. Completed 5 days of antibiotics  Bilateral large pleural effusions. Diuresis, hopefully extubate by tomorrow  Alcohol withdrawal, encephalopathy. Attempt to wean decrease sedation further, possible SBT. KAROL. Renal function improving, nephrology following. Diuretics held. Alcoholism.  Address once extubated, receiving thiamine  DVT prophylaxis. Lovenox. PUD prophylaxis. Pepcid. Critical care time spent reviewing labs/films, examining patient, collaborating with other physicians but excluding procedures for life threatening organ failure is 33 minutes.         Electronically signed by:  Julian Edmonds MD    3/30/2021    6:55 PM.

## 2021-03-30 NOTE — PROGRESS NOTES
03/30/21 0755   Vent Information   Vent Type 840   Vent Mode AC/PC   Pressure Ordered 18   Rate Set 18 bmp   Pressure Support 0 cmH20   FiO2  65 %   Sensitivity 3   PEEP/CPAP 10   I Time/ I Time % 0 s   Humidification Source HME   Vent Patient Data   High Peep/I Pressure 18   Peak Inspiratory Pressure 29 cmH2O   Mean Airway Pressure 15 cmH20   Rate Measured 18 br/min   Vt Exhaled 514 mL   Minute Volume 9.23 Liters   I:E Ratio 1:2.70   Cough/Sputum   Sputum How Obtained Suctioned;Endotracheal   Cough Productive   Sputum Amount Moderate   Sputum Color Red;Yellow   Spontaneous Breathing Trial (SBT) RT Doc   Pulse 88   Additional Respiratory  Assessments   Resp 21   Alarm Settings   High Pressure Alarm 40 cmH2O   Low Minute Volume Alarm 2 L/min   High Respiratory Rate 40 br/min   Low Exhaled Vt  200 mL   ETT (adult)   Placement Date/Time: 03/25/21 2147   Preoxygenation: Yes  Type: Cuffed  Tube Size: 8 mm  Laryngoscope: GlideScope  Blade Size: 3  Location: Oral  Insertion attempts: 1  Placement Verified By[de-identified] Auscultation;Capnometry  Secured at: 23 cm  Measured From:. ..    Secured at 25 cm   Measured From [address],[facility] [address] tube still   Site Condition Dry   Cuff Pressure 30 cm H2O

## 2021-03-30 NOTE — PROGRESS NOTES
Pulmonary & Critical Care Medicine ICU Progress Note      Events of Last 24 hours:  -Pt was weaned off propofol last night  -Pt given 20mg lasix which yield 3L fluid  -Phenobarbital was decreased from 240mg to 130mg  -CO2 increased to 88 this morning  -Pt has still not had bowel movement in 5 days    Invasive Lines:   PICC: none   CVC ( L subclavian)D#6   Art Line (L radial) D#3        MV:  D#6    Recent Labs     03/30/21  0400 03/30/21  0540   PHART 7.219* 7.278*   [id number] 99.8* 88.0*   PO2ART 80.3 77.1       MV Settings:  Vent Mode: AC/PC Rate Set: 18 bmp/Vt Ordered: 500 mL/ Aedan@[employer])    IV:   dexmedetomidine      fentaNYL (SUBLIMAZE) infusion 150 mcg/hr (03/30/21 0630)    midazolam 5 mg/hr (03/30/21 0938)     ROS  Unable to be obtained due to patients sedation    Vitals:  /69   Pulse 88   Temp 97.7 °F (36.5 °C) (Bladder)   Resp 21   Ht 5' 10\" (1.778 m)   Wt 249 lb 1.9 oz (113 kg)   SpO2 93%   BMI 35.74 kg/m²      Intake/Output Summary (Last 24 hours) at 3/30/2021 0947  Last data filed at 3/30/2021 0900  Gross per 24 hour   Intake 3074 ml   Output 5545 ml   Net -2471 ml       EXAM:  General: No distress. ENT: No discharge. Pharynx clear. Neck: Trachea midline. Normal thyroid. Resp: No accessory muscle use. Rhonchi and Rales present. No dullness on percussion. Pt intubated. CV: Regular rate. Regular rhythm. No mumur or rub. No edema. GI: Non-tender. Non-distended. No masses. No organmegaly. Normal bowel sounds. No hernia. Skin: Warm and dry. No nodule on exposed extremities. No rash on exposed extremities. Bilateral lower extremity edema  Lymph: No cervical LAD. No supraclavicular LAD. M/S: No cyanosis. No joint deformity. No clubbing. Neuro: Awake. Follows commands. Positive pupils/gag/corneals. Normal pain response. Psych: Pt is sedated and only responsive to pain.     Medications:  Scheduled Meds:   furosemide  40 mg Intravenous BID    predniSONE  40 mg Oral Daily    PHENobarbital  130 mg Intravenous 3 times per day    ipratropium-albuterol  1 ampule Inhalation Q4H    mupirocin   Nasal BID    carboxymethylcellulose PF  1 drop Both Eyes 6 times per day    famotidine  20 mg Per NG tube BID    thiamine  100 mg Per NG tube Daily    senna  1 tablet Per NG tube BID    docusate  100 mg Per NG tube BID    sodium chloride flush  10 mL Intravenous 2 times per day    enoxaparin  40 mg Subcutaneous Daily    chlorhexidine  15 mL Mouth/Throat BID       PRN Meds:  bisacodyl, sodium chloride flush, promethazine **OR** ondansetron, polyethylene glycol, acetaminophen **OR** acetaminophen, fentanNYL, albuterol sulfate HFA **AND** ipratropium **AND** MDI Treatment, midazolam    Results:  CBC: No results for input(s): WBC, HGB, HCT, MCV, PLT in the last 72 hours. BMP:   Recent Labs     03/27/21  1106 03/28/21  0525 03/28/21  1340 03/29/21  0410 03/30/21  0400    139 139 138 144   K 4.0 4.1 4.3 4.6 4.8   CL 98* 97* 99 99 101   CO2 33* 34* 32 33* 38*   PHOS 4.9 5.3* 3.3  --   --    BUN 51* 60* 61* 65* 61*   CREATININE 1.3 1.5* 1.3 1.1 1.0     LIVER PROFILE: No results for input(s): AST, ALT, LIPASE, BILIDIR, BILITOT, ALKPHOS in the last 72 hours. Invalid input(s): AMYLASE,  ALB  PT/INR: No results for input(s): PROTIME, INR in the last 72 hours. APTT: No results for input(s): APTT in the last 72 hours. UA:  Recent Labs     03/28/21  1345   COLORU Yellow   PHUR 5.0   WBCUA None seen   RBCUA None seen   MUCUS Rare*   BACTERIA Rare*   CLARITYU Clear   SPECGRAV 1.020   LEUKOCYTESUR Negative   UROBILINOGEN 0.2   BILIRUBINUR Negative   BLOODU Negative   GLUCOSEU Negative       Cultures:  Respiratory culture has no growth to date    Films:  CXR from 03/30 reviewed by me and it showed mildly increased bilateral pulmonary disease over the past 24 hours. Bilateral pleural effusions without significant change.       Assessment and Plan:    Acute hypoxic respiratory failure secondary to COPD

## 2021-03-30 NOTE — PROGRESS NOTES
Nephrology Progress Note   [email or web address]      This patient is a 62year old male whom we are following for KAROL. Subjective: The patient was seen and examined. Net negative 2.4L the past 24H with Lasix 20mg IV x 1. FiO2 down to 60%. Family History: No family at bedside  ROS: Unable to obtain      Vitals:  /69   Pulse 77   Temp 97.7 °F (36.5 °C) (Bladder)   Resp 20   Ht 5' 10\" (1.778 m)   Wt 249 lb 1.9 oz (113 kg)   SpO2 90%   BMI 35.74 kg/m²   I/O last 3 completed shifts: In: 3074 [I.V.:1750; NG/GT:1324]  Out: Krysta Pat [YAYRT:6730]  I/O this shift:  In: -   Out: 1300 [Urine:1300]    Physical Exam:  Physical Exam  Vitals signs reviewed. Constitutional:       Interventions: He is sedated and intubated. HENT:      Head: Normocephalic and atraumatic. Eyes:      General: No scleral icterus. Conjunctiva/sclera: Conjunctivae normal.   Cardiovascular:      Rate and Rhythm: Normal rate and regular rhythm. Heart sounds: No friction rub. Pulmonary:      Effort: He is intubated. Comments: Equal chest expansion, decreased breath sounds bibasal  Abdominal:      General: Bowel sounds are normal. There is no distension. Tenderness: There is no abdominal tenderness. Musculoskeletal:      Right lower leg: Edema present. Left lower leg: Edema present.            Medications:   furosemide  40 mg Intravenous BID    predniSONE  40 mg Oral Daily    PHENobarbital  130 mg Intravenous 3 times per day    ipratropium-albuterol  1 ampule Inhalation Q4H    mupirocin   Nasal BID    carboxymethylcellulose PF  1 drop Both Eyes 6 times per day    famotidine  20 mg Per NG tube BID    thiamine  100 mg Per NG tube Daily    senna  1 tablet Per NG tube BID    docusate  100 mg Per NG tube BID    sodium chloride flush  10 mL Intravenous 2 times per day    enoxaparin  40 mg Subcutaneous Daily    chlorhexidine  15 mL Mouth/Throat BID         Labs:  No results for input(s): WBC, HGB, HCT, MCV, PLT in the last 72 hours. Recent Labs     03/27/21  1106 03/28/21  0525 03/28/21  1340 03/29/21  0410 03/30/21  0400    139 139 138 144   K 4.0 4.1 4.3 4.6 4.8   CL 98* 97* 99 99 101   CO2 33* 34* 32 33* 38*   GLUCOSE 155* 143* 179* 147* 115*   PHOS 4.9 5.3* 3.3  --   --    BUN 51* 60* 61* 65* 61*   CREATININE 1.3 1.5* 1.3 1.1 1.0   LABGLOM 56* 48* 56* >60 >60   GFRAA >60 58* >60 >60 >60           Assessment/Plan:    Acute Kidney Injury. - In the setting of obstructed ott for several hours, also hemodynamic instability (occasional SBP dropped to 90s between 3/26-28). - Urinalysis with no hematuria or proteinuria. Urine studies consistent with pre-renal cause of KAROL.  - Kidney function improving/stable. - Noted Lasix 40mg IV BID started today. - Avoid hypotension.     Acute Respiratory Failure. - From multifocal pneumonia and COPD. - Per pulmonary.     Sepsis from pneumonia.  - COVID rapid test was negative      Acid-Base Disorder.  - Mixed resp acidosis and metabolic alkalosis. Discussed with Sabrina JURAES. Please do not hesitate to contact me at [phone] if with questions. Thank you!     Mira Ferrell MD  3/30/2021  [facility]

## 2021-03-30 NOTE — PROGRESS NOTES
03/30/21 0413   Vent Information   Equipment Changed HME   Vent Type 840   Vent Mode AC/PC   Pressure Ordered 18   Rate Set 18 bmp   Pressure Support 0 cmH20   FiO2  65 %   Sensitivity 3   PEEP/CPAP 10   I Time/ I Time % 0.9 s   Humidification Source HME   Vent Patient Data   High Peep/I Pressure 18   Peak Inspiratory Pressure 28 cmH2O   Mean Airway Pressure 15 cmH20   Rate Measured 18 br/min   Vt Exhaled 451 mL   Minute Volume 8.05 Liters   I:E Ratio 1:2.70   Cough/Sputum   Sputum How Obtained Endotracheal   Cough Productive   Sputum Amount Moderate   Sputum Color Red;Yellow   Tenacity Thick   Spontaneous Breathing Trial (SBT) RT Doc   Pulse 77   Breath Sounds   Right Upper Lobe Expiratory Wheezes; Rhonchi   Right Middle Lobe Expiratory Wheezes; Rhonchi   Right Lower Lobe Expiratory Wheezes; Rhonchi   Left Upper Lobe Expiratory Wheezes; Rhonchi   Left Lower Lobe Expiratory Wheezes; Rhonchi   Additional Respiratory  Assessments   Resp 18   Alarm Settings   High Pressure Alarm 40 cmH2O   Low Minute Volume Alarm 2 L/min   High Respiratory Rate 40 br/min   Low Exhaled Vt  200 mL   ETT (adult)   Placement Date/Time: 03/25/21 2147   Preoxygenation: Yes  Type: Cuffed  Tube Size: 8 mm  Laryngoscope: GlideScope  Blade Size: 3  Location: Oral  Insertion attempts: 1  Placement Verified By[de-identified] Auscultation;Capnometry  Secured at: 23 cm  Measured From:. ..    Secured at 25 cm   Measured From Lips   ET Placement Right   Secured By Commercial tube still   Site Condition Dry   Cuff Pressure 32 cm H2O

## 2021-03-30 NOTE — PLAN OF CARE
Nutrition Problem #1: Inadequate energy intake  Intervention: Food and/or Nutrient Delivery: Continue Current Tube Feeding  Nutritional Goals: Initiate and tolerate tube feed at goal rate without BG disturbance or GI intolerance.

## 2021-03-30 NOTE — PROGRESS NOTES
This note also relates to the following rows which could not be included:  Vt Ordered - Cannot attach notes to unvalidated device data  Rate Set - Cannot attach notes to unvalidated device data  Peak Flow - Cannot attach notes to unvalidated device data  Pressure Support - Cannot attach notes to unvalidated device data  FiO2  - Cannot attach notes to unvalidated device data  Sensitivity - Cannot attach notes to unvalidated device data  PEEP/CPAP - Cannot attach notes to unvalidated device data  Peak Inspiratory Pressure - Cannot attach notes to unvalidated device data  Mean Airway Pressure - Cannot attach notes to unvalidated device data  Rate Measured - Cannot attach notes to unvalidated device data  Vt Exhaled - Cannot attach notes to unvalidated device data  Minute Volume - Cannot attach notes to unvalidated device data  I:E Ratio - Cannot attach notes to unvalidated device data  Pulse - Cannot attach notes to unvalidated device data  Resp - Cannot attach notes to unvalidated device data  High Pressure Alarm - Cannot attach notes to unvalidated device data  Low Minute Volume Alarm - Cannot attach notes to unvalidated device data  High Respiratory Rate - Cannot attach notes to unvalidated device data

## 2021-03-30 NOTE — PROGRESS NOTES
This note also relates to the following rows which could not be included:  Vt Ordered - Cannot attach notes to unvalidated device data  Rate Set - Cannot attach notes to unvalidated device data  Peak Flow - Cannot attach notes to unvalidated device data  Pressure Support - Cannot attach notes to unvalidated device data  FiO2  - Cannot attach notes to unvalidated device data  Sensitivity - Cannot attach notes to unvalidated device data  PEEP/CPAP - Cannot attach notes to unvalidated device data  Peak Inspiratory Pressure - Cannot attach notes to unvalidated device data  Mean Airway Pressure - Cannot attach notes to unvalidated device data  Rate Measured - Cannot attach notes to unvalidated device data  Vt Exhaled - Cannot attach notes to unvalidated device data  Minute Volume - Cannot attach notes to unvalidated device data  I:E Ratio - Cannot attach notes to unvalidated device data  Pulse - Cannot attach notes to unvalidated device data  High Pressure Alarm - Cannot attach notes to unvalidated device data  Low Minute Volume Alarm - Cannot attach notes to unvalidated device data  High Respiratory Rate - Cannot attach notes to unvalidated device data       03/30/21 1536   Vent Information   Equipment Changed HME   Vent Type 840   Vent Mode AC/PC   Humidification Source HME   Cough/Sputum   Sputum How Obtained Suctioned;Endotracheal   Sputum Amount Small   Sputum Color Tan;Yellow   Breath Sounds   Right Upper Lobe Diminished   Right Middle Lobe Diminished   Right Lower Lobe Diminished   Left Upper Lobe Diminished   Left Lower Lobe Diminished   Additional Respiratory  Assessments   Resp 20

## 2021-03-30 NOTE — PROGRESS NOTES
03/30/21 0014   Vent Information   Vent Type 840   Vent Mode AC/PC   Pressure Ordered 18   Rate Set 18 bmp   Pressure Support 0 cmH20   FiO2  65 %   SpO2 95 %   SpO2/FiO2 ratio 146.15   Sensitivity 3   PEEP/CPAP 10   I Time/ I Time % 0.9 s   Humidification Source HME   Vent Patient Data   High Peep/I Pressure 18   Peak Inspiratory Pressure 28 cmH2O   Mean Airway Pressure 15 cmH20   Rate Measured 18 br/min   Vt Exhaled 389 mL   Minute Volume 6.99 Liters   I:E Ratio 1:2.70   Cough/Sputum   Sputum How Obtained Endotracheal   Cough None   Spontaneous Breathing Trial (SBT) RT Doc   Pulse 86   Breath Sounds   Right Upper Lobe Rhonchi   Right Middle Lobe Rhonchi   Right Lower Lobe Diminished   Left Upper Lobe Rhonchi   Left Lower Lobe Diminished   Additional Respiratory  Assessments   Resp 18   Alarm Settings   High Pressure Alarm 40 cmH2O   Low Minute Volume Alarm 2 L/min   High Respiratory Rate 40 br/min   Low Exhaled Vt  200 mL   ETT (adult)   Placement Date/Time: 03/25/21 2147   Preoxygenation: Yes  Type: Cuffed  Tube Size: 8 mm  Laryngoscope: GlideScope  Blade Size: 3  Location: Oral  Insertion attempts: 1  Placement Verified By[de-identified] Auscultation;Capnometry  Secured at: 23 cm  Measured From:. ..    Secured at 25 cm   Measured From [address] By Commercial tube still   Site Condition Dry   Cuff Pressure 34 cm H2O

## 2021-03-30 NOTE — PROGRESS NOTES
4 Eyes Skin Assessment     The patient is being assess for   Shift Handoff    I agree that 2 RN's have performed a thorough Head to Toe Skin Assessment on the patient. ALL assessment sites listed below have been assessed. Areas assessed by both nurses:   [x]   Head, Face, and Ears   [x]   Shoulders, Back, and Chest, Abdomen  [x]   Arms, Elbows, and Hands   [x]   Coccyx, Sacrum, and Ischium  [x]   Legs, Feet, and Heels        **SHARE this note so that the co-signing nurse is able to place an eSignature**    Co-signer eSignature: Electronically signed by Nemiah Sicard, RN on 3/30/21 at 10:18 PM EDT    Does the Patient have Skin Breakdown?   No          Leo Prevention initiated:  No   Wound Care Orders initiated:  No      WOC nurse consulted for Pressure Injury (Stage 3,4, Unstageable, DTI, NWPT, Complex wounds)and New or Established Ostomies:  No      Primary Nurse eSignature: Electronically signed by Kiki Avelar RN on 3/30/21 at 7:23 PM EDT

## 2021-03-31 ENCOUNTER — APPOINTMENT (OUTPATIENT)
Dept: GENERAL RADIOLOGY | Age: 61
DRG: 130 | End: 2021-03-31
Payer: MEDICAID

## 2021-03-31 LAB
ANION GAP SERPL CALCULATED.3IONS-SCNC: 7 MMOL/L (ref 3–16)
BASE EXCESS ARTERIAL: 13.8 MMOL/L (ref -3–3)
BASE EXCESS ARTERIAL: 17 (ref -3–3)
BASE EXCESS ARTERIAL: 19 (ref -3–3)
BUN BLDV-MCNC: 53 MG/DL (ref 7–20)
CALCIUM SERPL-MCNC: 9.8 MG/DL (ref 8.3–10.6)
CARBOXYHEMOGLOBIN ARTERIAL: 0.5 % (ref 0–1.5)
CHLORIDE BLD-SCNC: 102 MMOL/L (ref 99–110)
CO2: 39 MMOL/L (ref 21–32)
CREAT SERPL-MCNC: 0.8 MG/DL (ref 0.9–1.3)
GFR AFRICAN AMERICAN: >60
GFR NON-AFRICAN AMERICAN: >60
GLUCOSE BLD-MCNC: 128 MG/DL (ref 70–99)
GLUCOSE BLD-MCNC: 148 MG/DL (ref 70–99)
GLUCOSE BLD-MCNC: 153 MG/DL (ref 70–99)
HCO3 ARTERIAL: 41 MMOL/L (ref 21–29)
HCO3 ARTERIAL: 41 MMOL/L (ref 21–29)
HCO3 ARTERIAL: 43.9 MMOL/L (ref 21–29)
HEMOGLOBIN, ART, EXTENDED: 14.8 G/DL (ref 13.5–17.5)
METHEMOGLOBIN ARTERIAL: 0.5 %
O2 CONTENT ARTERIAL: 19 ML/DL
O2 SAT, ARTERIAL: 88 % (ref 93–100)
O2 SAT, ARTERIAL: 88 % (ref 93–100)
O2 SAT, ARTERIAL: 91.5 %
O2 THERAPY: ABNORMAL
PCO2 ARTERIAL: 61.4 MMHG (ref 35–45)
PCO2 ARTERIAL: 62.9 MM HG (ref 35–45)
PCO2 ARTERIAL: 69.3 MM HG (ref 35–45)
PERFORMED ON: ABNORMAL
PERFORMED ON: ABNORMAL
PH ARTERIAL: 7.41 (ref 7.35–7.45)
PH ARTERIAL: 7.42 (ref 7.35–7.45)
PH ARTERIAL: 7.44 (ref 7.35–7.45)
PO2 ARTERIAL: 56.6 MM HG (ref 75–108)
PO2 ARTERIAL: 57.5 MM HG (ref 75–108)
PO2 ARTERIAL: 62.8 MMHG (ref 75–108)
POC SAMPLE TYPE: ABNORMAL
POC SAMPLE TYPE: ABNORMAL
POTASSIUM REFLEX MAGNESIUM: 4.1 MMOL/L (ref 3.5–5.1)
SODIUM BLD-SCNC: 148 MMOL/L (ref 136–145)
TCO2 ARTERIAL: 42.8 MMOL/L
TCO2 ARTERIAL: 43 MMOL/L
TCO2 ARTERIAL: 46 MMOL/L

## 2021-03-31 PROCEDURE — 2500000003 HC RX 250 WO HCPCS: Performed by: INTERNAL MEDICINE

## 2021-03-31 PROCEDURE — 94003 VENT MGMT INPAT SUBQ DAY: CPT

## 2021-03-31 PROCEDURE — 6360000002 HC RX W HCPCS: Performed by: INTERNAL MEDICINE

## 2021-03-31 PROCEDURE — 6360000002 HC RX W HCPCS: Performed by: NURSE PRACTITIONER

## 2021-03-31 PROCEDURE — 94761 N-INVAS EAR/PLS OXIMETRY MLT: CPT

## 2021-03-31 PROCEDURE — 6370000000 HC RX 637 (ALT 250 FOR IP): Performed by: NURSE PRACTITIONER

## 2021-03-31 PROCEDURE — 74018 RADEX ABDOMEN 1 VIEW: CPT

## 2021-03-31 PROCEDURE — 94640 AIRWAY INHALATION TREATMENT: CPT

## 2021-03-31 PROCEDURE — 2580000003 HC RX 258: Performed by: INTERNAL MEDICINE

## 2021-03-31 PROCEDURE — 71045 X-RAY EXAM CHEST 1 VIEW: CPT

## 2021-03-31 PROCEDURE — 82947 ASSAY GLUCOSE BLOOD QUANT: CPT

## 2021-03-31 PROCEDURE — 2700000000 HC OXYGEN THERAPY PER DAY

## 2021-03-31 PROCEDURE — 6370000000 HC RX 637 (ALT 250 FOR IP)

## 2021-03-31 PROCEDURE — 82803 BLOOD GASES ANY COMBINATION: CPT

## 2021-03-31 PROCEDURE — 6370000000 HC RX 637 (ALT 250 FOR IP): Performed by: INTERNAL MEDICINE

## 2021-03-31 PROCEDURE — 80048 BASIC METABOLIC PNL TOTAL CA: CPT

## 2021-03-31 PROCEDURE — 99233 SBSQ HOSP IP/OBS HIGH 50: CPT | Performed by: INTERNAL MEDICINE

## 2021-03-31 PROCEDURE — 2000000000 HC ICU R&B

## 2021-03-31 RX ORDER — LABETALOL HYDROCHLORIDE 5 MG/ML
10 INJECTION, SOLUTION INTRAVENOUS EVERY 6 HOURS PRN
Status: DISCONTINUED | OUTPATIENT
Start: 2021-03-31 | End: 2021-04-01

## 2021-03-31 RX ORDER — BISACODYL 10 MG
20 SUPPOSITORY, RECTAL RECTAL ONCE
Status: COMPLETED | OUTPATIENT
Start: 2021-03-31 | End: 2021-03-31

## 2021-03-31 RX ORDER — HYDRALAZINE HYDROCHLORIDE 20 MG/ML
10 INJECTION INTRAMUSCULAR; INTRAVENOUS EVERY 6 HOURS PRN
Status: DISCONTINUED | OUTPATIENT
Start: 2021-03-31 | End: 2021-04-01

## 2021-03-31 RX ORDER — FUROSEMIDE 10 MG/ML
40 INJECTION INTRAMUSCULAR; INTRAVENOUS 2 TIMES DAILY
Status: COMPLETED | OUTPATIENT
Start: 2021-03-31 | End: 2021-03-31

## 2021-03-31 RX ORDER — POLYETHYLENE GLYCOL 3350 17 G/17G
17 POWDER, FOR SOLUTION ORAL 2 TIMES DAILY
Status: DISCONTINUED | OUTPATIENT
Start: 2021-03-31 | End: 2021-04-01

## 2021-03-31 RX ADMIN — LABETALOL HYDROCHLORIDE 10 MG: 5 INJECTION INTRAVENOUS at 18:33

## 2021-03-31 RX ADMIN — IPRATROPIUM BROMIDE AND ALBUTEROL SULFATE 1 AMPULE: .5; 3 SOLUTION RESPIRATORY (INHALATION) at 00:27

## 2021-03-31 RX ADMIN — Medication 0.8 MCG/KG/HR: at 03:23

## 2021-03-31 RX ADMIN — Medication 15 ML: at 09:44

## 2021-03-31 RX ADMIN — FAMOTIDINE 20 MG: 20 TABLET, FILM COATED ORAL at 09:43

## 2021-03-31 RX ADMIN — HYDRALAZINE HYDROCHLORIDE 10 MG: 20 INJECTION INTRAMUSCULAR; INTRAVENOUS at 17:12

## 2021-03-31 RX ADMIN — MIDAZOLAM 2 MG: 1 INJECTION INTRAMUSCULAR; INTRAVENOUS at 08:06

## 2021-03-31 RX ADMIN — Medication 1 MCG/KG/HR: at 08:08

## 2021-03-31 RX ADMIN — FENTANYL CITRATE 150 MCG/HR: 50 INJECTION, SOLUTION INTRAMUSCULAR; INTRAVENOUS at 09:36

## 2021-03-31 RX ADMIN — IPRATROPIUM BROMIDE AND ALBUTEROL SULFATE 1 AMPULE: .5; 3 SOLUTION RESPIRATORY (INHALATION) at 12:46

## 2021-03-31 RX ADMIN — CARBOXYMETHYLCELLULOSE SODIUM 1 DROP: 10 GEL OPHTHALMIC at 10:00

## 2021-03-31 RX ADMIN — Medication 100 MG: at 09:43

## 2021-03-31 RX ADMIN — Medication: at 16:36

## 2021-03-31 RX ADMIN — FUROSEMIDE 40 MG: 10 INJECTION, SOLUTION INTRAMUSCULAR; INTRAVENOUS at 18:05

## 2021-03-31 RX ADMIN — PHENOBARBITAL SODIUM 130 MG: 65 INJECTION INTRAMUSCULAR at 06:26

## 2021-03-31 RX ADMIN — FUROSEMIDE 40 MG: 10 INJECTION, SOLUTION INTRAMUSCULAR; INTRAVENOUS at 09:43

## 2021-03-31 RX ADMIN — PREDNISONE 40 MG: 20 TABLET ORAL at 09:43

## 2021-03-31 RX ADMIN — SENNOSIDES 8.6 MG: 8.6 TABLET, FILM COATED ORAL at 09:43

## 2021-03-31 RX ADMIN — SODIUM CHLORIDE, PRESERVATIVE FREE 10 ML: 5 INJECTION INTRAVENOUS at 09:45

## 2021-03-31 RX ADMIN — FENTANYL CITRATE 150 MCG/HR: 50 INJECTION, SOLUTION INTRAMUSCULAR; INTRAVENOUS at 03:23

## 2021-03-31 RX ADMIN — Medication 1.2 MCG/KG/HR: at 10:17

## 2021-03-31 RX ADMIN — ENOXAPARIN SODIUM 40 MG: 40 INJECTION SUBCUTANEOUS at 09:43

## 2021-03-31 RX ADMIN — IPRATROPIUM BROMIDE AND ALBUTEROL SULFATE 1 AMPULE: .5; 3 SOLUTION RESPIRATORY (INHALATION) at 15:52

## 2021-03-31 RX ADMIN — DOCUSATE SODIUM 100 MG: 50 LIQUID ORAL at 09:43

## 2021-03-31 RX ADMIN — BISACODYL 20 MG: 10 SUPPOSITORY RECTAL at 09:43

## 2021-03-31 RX ADMIN — SODIUM CHLORIDE, PRESERVATIVE FREE 10 ML: 5 INJECTION INTRAVENOUS at 21:27

## 2021-03-31 RX ADMIN — POLYETHYLENE GLYCOL 3350 17 G: 17 POWDER, FOR SOLUTION ORAL at 16:57

## 2021-03-31 RX ADMIN — CARBOXYMETHYLCELLULOSE SODIUM 1 DROP: 10 GEL OPHTHALMIC at 03:34

## 2021-03-31 RX ADMIN — FAMOTIDINE 20 MG: 20 TABLET, FILM COATED ORAL at 21:27

## 2021-03-31 RX ADMIN — MIDAZOLAM HYDROCHLORIDE 5 MG/HR: 5 INJECTION, SOLUTION INTRAMUSCULAR; INTRAVENOUS at 07:24

## 2021-03-31 RX ADMIN — MIDAZOLAM HYDROCHLORIDE 3 MG/HR: 5 INJECTION, SOLUTION INTRAMUSCULAR; INTRAVENOUS at 10:17

## 2021-03-31 RX ADMIN — IPRATROPIUM BROMIDE AND ALBUTEROL SULFATE 1 AMPULE: .5; 3 SOLUTION RESPIRATORY (INHALATION) at 07:57

## 2021-03-31 ASSESSMENT — PULMONARY FUNCTION TESTS
PIF_VALUE: 37
PIF_VALUE: 31
PIF_VALUE: 31
PIF_VALUE: 29
PIF_VALUE: 35
PIF_VALUE: 28
PIF_VALUE: 34
PIF_VALUE: 31
PIF_VALUE: 19

## 2021-03-31 NOTE — CARE COORDINATION
SHELDON reviewed chart on this day and notes that pt remains vented at the moment. SHELDON has spoken with pt's mother to complete initial assessment. He lives at home alone, works, and may benefit from ETOH treatment resources if open to this. Sheldon will follow along to ensure pt's discharge needs are met.

## 2021-03-31 NOTE — PROGRESS NOTES
4 Eyes Skin Assessment     The patient is being assess for   Shift Handoff    I agree that 2 RN's have performed a thorough Head to Toe Skin Assessment on the patient. ALL assessment sites listed below have been assessed. Areas assessed by both nurses:   [x]   Head, Face, and Ears   [x]   Shoulders, Back, and Chest, Abdomen  [x]   Arms, Elbows, and Hands   [x]   Coccyx, Sacrum, and Ischium  [x]   Legs, Feet, and Heels        *    **SHARE this note so that the co-signing nurse is able to place an eSignature**    Co-signer eSignature: {Esignature:[id number]}    Does the Patient have Skin Breakdown?   No          Leo Prevention initiated:  No   Wound Care Orders initiated:  No      WOC nurse consulted for Pressure Injury (Stage 3,4, Unstageable, DTI, NWPT, Complex wounds)and New or Established Ostomies:  No      Primary Nurse eSignature: Electronically signed by Ugo Trinh RN on 3/31/21 at 6:48 PM EDT

## 2021-03-31 NOTE — PROGRESS NOTES
20mL of fentanyl and 72mL of versed wasted with Tila Pyle RN and ISAAC Alexis RN.     Electronically signed by Che Morelos RN on 3/31/2021 at 6:31 PM   Electronically signed by Leslie Valle RN on 3/31/2021 at 6:33 PM

## 2021-03-31 NOTE — PROGRESS NOTES
Patient awake and following commands. Patient suctioned and extubated to 15 liters high flow nasal cannula. OG tube discontinued. Patient tolerated well. Oxygen saturation 90 on 15 liters nasal cannula. Patient alert and oriented X 3.  Respiratory notified pt extubated

## 2021-03-31 NOTE — PROGRESS NOTES
03/31/21 0029   Vent Information   $Ventilation $Subsequent Day   Skin Assessment Clean, dry, & intact   Vent Type 840   Vent Mode AC/VC   Vt Ordered 550 mL   Rate Set 20 bmp   Peak Flow 60 L/min   Pressure Support 0 cmH20   FiO2  70 %   Sensitivity 3   PEEP/CPAP 10   Vent Patient Data   Peak Inspiratory Pressure 31 cmH2O   Mean Airway Pressure 17 cmH20   Rate Measured 20 br/min   Vt Exhaled 582 mL   Minute Volume 11.6 Liters   I:E Ratio 1:2.00   Spontaneous Breathing Trial (SBT) RT Doc   Pulse 67   Breath Sounds   Right Upper Lobe Diminished   Right Middle Lobe Diminished   Right Lower Lobe Diminished   Left Upper Lobe Diminished   Left Lower Lobe Diminished   Additional Respiratory  Assessments   Resp 20   Position Semi-Dowd's   Alarm Settings   High Pressure Alarm 40 cmH2O   Low Minute Volume Alarm 2 L/min   High Respiratory Rate 40 br/min   Low Exhaled Vt  200 mL   Patient Observation   Observations Ambu @ bedside. ETT moved to left side   ETT (adult)   Placement Date/Time: 03/25/21 2146   Preoxygenation: Yes  Type: Cuffed  Tube Size: 8 mm  Laryngoscope: GlideScope  Blade Size: 3  Location: Oral  Insertion attempts: 1  Placement Verified By[de-identified] Auscultation;Capnometry  Secured at: 23 cm  Measured From:. ..    Secured at 25 cm   Measured From Lips   ET Placement Left   Secured By Commercial tube still   Site Condition Dry

## 2021-03-31 NOTE — PROGRESS NOTES
03/31/21 0351   Vent Information   Vent Type 840   Vent Mode AC/VC   Vt Ordered 550 mL   Rate Set 20 bmp   Peak Flow 60 L/min   Pressure Support 0 cmH20   FiO2  70 %   Sensitivity 3   PEEP/CPAP 10   Humidification Source HME   Vent Patient Data   Peak Inspiratory Pressure 31 cmH2O   Mean Airway Pressure 17 cmH20   Rate Measured 20 br/min   Vt Exhaled 581 mL   Minute Volume 11.6 Liters   I:E Ratio 1:2.00   Cough/Sputum   Sputum How Obtained None   Cough None   Sputum Amount None   Sputum Color None   Tenacity None   Spontaneous Breathing Trial (SBT) RT Doc   Pulse 67   Additional Respiratory  Assessments   Resp 19   Position Semi-Dowd's   Cuff Pressure (cm H2O) 30 cm H2O   Alarm Settings   High Pressure Alarm 40 cmH2O   Low Minute Volume Alarm 2 L/min   High Respiratory Rate 40 br/min   Low Exhaled Vt  200 mL   ETT (adult)   Placement Date/Time: 03/25/21 2147   Preoxygenation: Yes  Type: Cuffed  Tube Size: 8 mm  Laryngoscope: GlideScope  Blade Size: 3  Location: Oral  Insertion attempts: 1  Placement Verified By[de-identified] Auscultation;Capnometry  Secured at: 23 cm  Measured From:. ..    Secured at 25 cm   Measured From [address] By Commercial tube still   Site Condition Dry

## 2021-03-31 NOTE — PLAN OF CARE
Problem: Falls - Risk of:  Goal: Will remain free from falls  Description: Will remain free from falls  3/31/2021 1131 by Tone Esquivel RN  Outcome: Ongoing     Problem: Falls - Risk of:  Goal: Absence of physical injury  Description: Absence of physical injury  3/31/2021 1131 by Tone Esquivel RN  Outcome: Ongoing     Problem: Skin Integrity:  Goal: Will show no infection signs and symptoms  Description: Will show no infection signs and symptoms  3/31/2021 1131 by Tone Esquivel RN  Outcome: Ongoing     Problem: Skin Integrity:  Goal: Absence of new skin breakdown  Description: Absence of new skin breakdown  3/31/2021 1131 by Tone Esquivel RN  Outcome: Ongoing     Problem: Non-Violent Restraints  Goal: Removal from restraints as soon as assessed to be safe  3/31/2021 1131 by Tone Esquivel RN  Outcome: Ongoing     Problem: Non-Violent Restraints  Goal: No harm/injury to patient while restraints in use  3/31/2021 1131 by Tone Esquivel RN  Outcome: Ongoing     Problem: Non-Violent Restraints  Goal: Patient's dignity will be maintained  3/31/2021 1131 by Tone Esquivel RN  Outcome: Ongoing     Problem: Nutrition  Goal: Optimal nutrition therapy  3/31/2021 1131 by Tone Esquivel RN  Outcome: Ongoing

## 2021-03-31 NOTE — PLAN OF CARE
Problem: Falls - Risk of:  Goal: Will remain free from falls  Description: Will remain free from falls  3/30/2021 2136 by Luis Alberto Page RN  Outcome: Ongoing  3/30/2021 1047 by Mehdi Ac RN  Outcome: Ongoing  Goal: Absence of physical injury  Description: Absence of physical injury  3/30/2021 2136 by Luis Alberto Page RN  Outcome: Ongoing  3/30/2021 1047 by Mehdi Ac RN  Outcome: Ongoing     Problem: Skin Integrity:  Goal: Will show no infection signs and symptoms  Description: Will show no infection signs and symptoms  3/30/2021 2136 by Luis Alberto Page RN  Outcome: Ongoing  3/30/2021 1047 by Mehdi Ac RN  Outcome: Ongoing  Goal: Absence of new skin breakdown  Description: Absence of new skin breakdown  3/30/2021 2136 by Luis Alberto Page RN  Outcome: Ongoing  3/30/2021 1047 by Mehdi Ac RN  Outcome: Ongoing     Problem: Non-Violent Restraints  Goal: Removal from restraints as soon as assessed to be safe  3/30/2021 2136 by Luis Alberto Page RN  Outcome: Ongoing  3/30/2021 1047 by Mehdi Ac RN  Outcome: Ongoing  Goal: No harm/injury to patient while restraints in use  3/30/2021 2136 by Luis Alberto Page RN  Outcome: Ongoing  3/30/2021 1047 by Mehdi Ac RN  Outcome: Ongoing  Goal: Patient's dignity will be maintained  3/30/2021 2136 by Luis Alberto Page RN  Outcome: Ongoing  3/30/2021 1047 by Mehdi Ac RN  Outcome: Ongoing     Problem: Nutrition  Description: Nutrition Problem #1: Inadequate energy intake  Intervention: Food and/or Nutrient Delivery: Start Tube Feeding  Nutritional Goals: Initiate and tolerate tube feed at goal rate without BG disturbance or GI intolerance.       Goal: Optimal nutrition therapy  3/30/2021 2136 by Luis Alberto Page RN  Outcome: Ongoing  3/30/2021 1047 by Mehdi Ac RN  Outcome: Ongoing     Problem: Discharge Planning:  Goal: Participates in care planning  Description: Participates in care planning  Outcome: Ongoing  Goal: Discharged to chronic pain  Description: Control of chronic pain  Outcome: Ongoing     Problem: Serum Glucose Level - Abnormal:  Goal: Ability to maintain appropriate glucose levels will improve to within specified parameters  Description: Ability to maintain appropriate glucose levels will improve to within specified parameters  Outcome: Ongoing     Problem: Skin Integrity - Impaired:  Goal: Will show no infection signs and symptoms  Description: Will show no infection signs and symptoms  Outcome: Ongoing  Goal: Absence of new skin breakdown  Description: Absence of new skin breakdown  Outcome: Ongoing     Problem: Sleep Pattern Disturbance:  Goal: Appears well-rested  Description: Appears well-rested  Outcome: Ongoing     Problem: Tissue Perfusion, Altered:  Goal: Circulatory function within specified parameters  Description: Circulatory function within specified parameters  Outcome: Ongoing     Problem: Tissue Perfusion - Cardiopulmonary, Altered:  Goal: Absence of angina  Description: Absence of angina  Outcome: Ongoing  Goal: Hemodynamic stability will improve  Description: Hemodynamic stability will improve  Outcome: Ongoing

## 2021-03-31 NOTE — PROGRESS NOTES
4 Eyes Skin Assessment     The patient is being assess for   Shift Handoff    I agree that 2 RN's have performed a thorough Head to Toe Skin Assessment on the patient. ALL assessment sites listed below have been assessed. Areas assessed by both nurses:   [x]   Head, Face, and Ears   [x]   Shoulders, Back, and Chest, Abdomen  [x]   Arms, Elbows, and Hands   [x]   Coccyx, Sacrum, and Ischium  [x]   Legs, Feet, and Heels          **SHARE this note so that the co-signing nurse is able to place an eSignature**    Co-signer eSignature: Electronically signed by Noam Robison RN on 3/31/21 at 7:41 AM EDT    Does the Patient have Skin Breakdown?   No          Leo Prevention initiated:  NA   Wound Care Orders initiated:  NA      WOC nurse consulted for Pressure Injury (Stage 3,4, Unstageable, DTI, NWPT, Complex wounds)and New or Established Ostomies:  NA      Primary Nurse eSignature: Electronically signed by Ruthie Fitch RN on 3/31/21 at 6:43 AM EDT

## 2021-03-31 NOTE — PROGRESS NOTES
03/31/21 1310   Vent Information   Vent Type 840   Vent Mode PS   Vt Ordered 550 mL   Rate Set 20 bmp   Peak Flow 60 L/min   Pressure Support 10 cmH20   FiO2  65 %   Sensitivity 3   PEEP/CPAP 8   Vent Patient Data   Peak Inspiratory Pressure 19 cmH2O   Mean Airway Pressure 11 cmH20   Rate Measured 14 br/min   Vt Exhaled 596 mL   Minute Volume 8.09 Liters   I:E Ratio 1:4.10   Spontaneous Breathing Trial (SBT) RT Doc   Pulse 77   Additional Respiratory  Assessments   Resp 14   Alarm Settings   High Pressure Alarm 40 cmH2O   Low Minute Volume Alarm 2 L/min   High Respiratory Rate 40 br/min

## 2021-03-31 NOTE — PROGRESS NOTES
03/31/21 0803   Vent Information   Vent Type 840   Vent Mode AC/VC   Vt Ordered 550 mL   Rate Set 20 bmp   Peak Flow 60 L/min   Pressure Support 0 cmH20   FiO2  60 %   Sensitivity 3   PEEP/CPAP 10   Vent Patient Data   Peak Inspiratory Pressure 37 cmH2O   Mean Airway Pressure 17 cmH20   Rate Measured 20 br/min   Vt Exhaled 539 mL   Minute Volume 11.8 Liters   I:E Ratio 1:2.00   Cough/Sputum   Sputum How Obtained Oral;Suctioned   Sputum Amount Large   Sputum Color Cloudy   Tenacity Thin;Thick   Spontaneous Breathing Trial (SBT) RT Doc   Pulse 87   Additional Respiratory  Assessments   Resp 20   Alarm Settings   High Pressure Alarm 40 cmH2O   Low Minute Volume Alarm 2 L/min   High Respiratory Rate 40 br/min   Resp Rate Low Alarm 200   Patient Observation   Observations Ambu @  bedside, pt very awake, reaching for tube and shaking head. Rn informed, RN niw at bedside. ETT (adult)   Placement Date/Time: 03/25/21 2144   Preoxygenation: Yes  Type: Cuffed  Tube Size: 8 mm  Laryngoscope: GlideScope  Blade Size: 3  Location: Oral  Insertion attempts: 1  Placement Verified By[de-identified] Auscultation;Capnometry  Secured at: 23 cm  Measured From:. ..    Secured at 25 cm   Measured From Lips   ET Placement Left   Secured By Commercial tube still   Site Condition Dry   Cuff Pressure 30 cm H2O

## 2021-03-31 NOTE — PROGRESS NOTES
input(s): WBC, HGB, HCT, MCV, PLT in the last 72 hours. Recent Labs     03/28/21  1340 03/29/21  0410 03/30/21  0400 03/31/21  0615    138 144 148*   K 4.3 4.6 4.8 4.1   CL 99 99 101 102   CO2 32 33* 38* 39*   GLUCOSE 179* 147* 115* 128*   PHOS 3.3  --   --   --    BUN 61* 65* 61* 53*   CREATININE 1.3 1.1 1.0 0.8*   LABGLOM 56* >60 >60 >60   GFRAA >60 >60 >60 >60           Assessment/Plan:    Acute Kidney Injury. - In the setting of obstructed ott for several hours, also hemodynamic instability (occasional SBP dropped to 90s between 3/26-28). - Urinalysis with no hematuria or proteinuria. Urine studies consistent with pre-renal cause of KAROL.  - Kidney function continues to improve. - Tolerating diuresis, continue Lasix 40mg IV BID x 2 more doses then reevaluate in am.    Hypernatremia.  - Increase FWF to 300mL every 4H. - BMP in am.     Acute Respiratory Failure. - From multifocal pneumonia and COPD. - Per pulmonary.     Sepsis from pneumonia.  - COVID rapid test was negative      Acid-Base Disorder.  - Mixed resp acidosis and metabolic alkalosis. Please do not hesitate to contact me at [phone] if with questions. Thank you!     Andres Hyde MD  3/31/2021  [facility]

## 2021-04-01 ENCOUNTER — APPOINTMENT (OUTPATIENT)
Dept: GENERAL RADIOLOGY | Age: 61
DRG: 130 | End: 2021-04-01
Payer: MEDICAID

## 2021-04-01 LAB
ANION GAP SERPL CALCULATED.3IONS-SCNC: 11 MMOL/L (ref 3–16)
ANION GAP SERPL CALCULATED.3IONS-SCNC: 11 MMOL/L (ref 3–16)
BASE EXCESS ARTERIAL: 11 (ref -3–3)
BASE EXCESS ARTERIAL: 15 (ref -3–3)
BUN BLDV-MCNC: 37 MG/DL (ref 7–20)
BUN BLDV-MCNC: 38 MG/DL (ref 7–20)
CALCIUM SERPL-MCNC: 10.1 MG/DL (ref 8.3–10.6)
CALCIUM SERPL-MCNC: 10.5 MG/DL (ref 8.3–10.6)
CHLORIDE BLD-SCNC: 101 MMOL/L (ref 99–110)
CHLORIDE BLD-SCNC: 95 MMOL/L (ref 99–110)
CO2: 36 MMOL/L (ref 21–32)
CO2: 38 MMOL/L (ref 21–32)
CREAT SERPL-MCNC: 0.6 MG/DL (ref 0.9–1.3)
CREAT SERPL-MCNC: 0.8 MG/DL (ref 0.9–1.3)
GFR AFRICAN AMERICAN: >60
GFR AFRICAN AMERICAN: >60
GFR NON-AFRICAN AMERICAN: >60
GFR NON-AFRICAN AMERICAN: >60
GLUCOSE BLD-MCNC: 105 MG/DL (ref 70–99)
GLUCOSE BLD-MCNC: 123 MG/DL (ref 70–99)
HCO3 ARTERIAL: 35.2 MMOL/L (ref 21–29)
HCO3 ARTERIAL: 38.9 MMOL/L (ref 21–29)
MAGNESIUM: 2.3 MG/DL (ref 1.8–2.4)
O2 SAT, ARTERIAL: 88 % (ref 93–100)
O2 SAT, ARTERIAL: 93 % (ref 93–100)
PCO2 ARTERIAL: 49.8 MM HG (ref 35–45)
PCO2 ARTERIAL: 57.2 MM HG (ref 35–45)
PERFORMED ON: ABNORMAL
PERFORMED ON: ABNORMAL
PH ARTERIAL: 7.44 (ref 7.35–7.45)
PH ARTERIAL: 7.46 (ref 7.35–7.45)
PO2 ARTERIAL: 53.4 MM HG (ref 75–108)
PO2 ARTERIAL: 65.4 MM HG (ref 75–108)
POC SAMPLE TYPE: ABNORMAL
POC SAMPLE TYPE: ABNORMAL
POTASSIUM REFLEX MAGNESIUM: 3.3 MMOL/L (ref 3.5–5.1)
POTASSIUM REFLEX MAGNESIUM: 3.7 MMOL/L (ref 3.5–5.1)
SODIUM BLD-SCNC: 144 MMOL/L (ref 136–145)
SODIUM BLD-SCNC: 148 MMOL/L (ref 136–145)
TCO2 ARTERIAL: 37 MMOL/L
TCO2 ARTERIAL: 41 MMOL/L

## 2021-04-01 PROCEDURE — 2580000003 HC RX 258: Performed by: INTERNAL MEDICINE

## 2021-04-01 PROCEDURE — 6360000002 HC RX W HCPCS: Performed by: INTERNAL MEDICINE

## 2021-04-01 PROCEDURE — 99233 SBSQ HOSP IP/OBS HIGH 50: CPT | Performed by: INTERNAL MEDICINE

## 2021-04-01 PROCEDURE — 82803 BLOOD GASES ANY COMBINATION: CPT

## 2021-04-01 PROCEDURE — 6370000000 HC RX 637 (ALT 250 FOR IP): Performed by: INTERNAL MEDICINE

## 2021-04-01 PROCEDURE — 2700000000 HC OXYGEN THERAPY PER DAY

## 2021-04-01 PROCEDURE — 71045 X-RAY EXAM CHEST 1 VIEW: CPT

## 2021-04-01 PROCEDURE — 2000000000 HC ICU R&B

## 2021-04-01 PROCEDURE — 2500000003 HC RX 250 WO HCPCS: Performed by: INTERNAL MEDICINE

## 2021-04-01 PROCEDURE — 6360000002 HC RX W HCPCS

## 2021-04-01 PROCEDURE — 83735 ASSAY OF MAGNESIUM: CPT

## 2021-04-01 PROCEDURE — 94640 AIRWAY INHALATION TREATMENT: CPT

## 2021-04-01 PROCEDURE — 6360000002 HC RX W HCPCS: Performed by: NURSE PRACTITIONER

## 2021-04-01 PROCEDURE — 80048 BASIC METABOLIC PNL TOTAL CA: CPT

## 2021-04-01 PROCEDURE — 94761 N-INVAS EAR/PLS OXIMETRY MLT: CPT

## 2021-04-01 RX ORDER — LORAZEPAM 2 MG/ML
1 INJECTION INTRAMUSCULAR
Status: DISCONTINUED | OUTPATIENT
Start: 2021-04-01 | End: 2021-04-08

## 2021-04-01 RX ORDER — IPRATROPIUM BROMIDE AND ALBUTEROL SULFATE 2.5; .5 MG/3ML; MG/3ML
1 SOLUTION RESPIRATORY (INHALATION)
Status: DISCONTINUED | OUTPATIENT
Start: 2021-04-01 | End: 2021-04-02

## 2021-04-01 RX ORDER — LORAZEPAM 1 MG/1
2 TABLET ORAL
Status: DISCONTINUED | OUTPATIENT
Start: 2021-04-01 | End: 2021-04-08

## 2021-04-01 RX ORDER — FAMOTIDINE 20 MG/1
20 TABLET, FILM COATED ORAL 2 TIMES DAILY
Status: DISCONTINUED | OUTPATIENT
Start: 2021-04-01 | End: 2021-04-10 | Stop reason: HOSPADM

## 2021-04-01 RX ORDER — NITROGLYCERIN 20 MG/100ML
5-200 INJECTION INTRAVENOUS CONTINUOUS
Status: DISCONTINUED | OUTPATIENT
Start: 2021-04-01 | End: 2021-04-05

## 2021-04-01 RX ORDER — AMLODIPINE BESYLATE 5 MG/1
5 TABLET ORAL DAILY
Status: DISCONTINUED | OUTPATIENT
Start: 2021-04-01 | End: 2021-04-01

## 2021-04-01 RX ORDER — LORAZEPAM 2 MG/ML
3 INJECTION INTRAMUSCULAR
Status: DISCONTINUED | OUTPATIENT
Start: 2021-04-01 | End: 2021-04-08

## 2021-04-01 RX ORDER — CHLORDIAZEPOXIDE HYDROCHLORIDE 25 MG/1
25 CAPSULE, GELATIN COATED ORAL 3 TIMES DAILY
Status: DISCONTINUED | OUTPATIENT
Start: 2021-04-01 | End: 2021-04-02

## 2021-04-01 RX ORDER — CARVEDILOL 6.25 MG/1
6.25 TABLET ORAL 2 TIMES DAILY WITH MEALS
Status: DISCONTINUED | OUTPATIENT
Start: 2021-04-01 | End: 2021-04-08

## 2021-04-01 RX ORDER — LABETALOL HYDROCHLORIDE 5 MG/ML
20 INJECTION, SOLUTION INTRAVENOUS EVERY 4 HOURS PRN
Status: DISCONTINUED | OUTPATIENT
Start: 2021-04-01 | End: 2021-04-10 | Stop reason: HOSPADM

## 2021-04-01 RX ORDER — LORAZEPAM 2 MG/ML
4 INJECTION INTRAMUSCULAR ONCE
Status: COMPLETED | OUTPATIENT
Start: 2021-04-01 | End: 2021-04-01

## 2021-04-01 RX ORDER — LORAZEPAM 1 MG/1
1 TABLET ORAL
Status: DISCONTINUED | OUTPATIENT
Start: 2021-04-01 | End: 2021-04-08

## 2021-04-01 RX ORDER — LORAZEPAM 2 MG/ML
4 INJECTION INTRAMUSCULAR
Status: DISCONTINUED | OUTPATIENT
Start: 2021-04-01 | End: 2021-04-08

## 2021-04-01 RX ORDER — FUROSEMIDE 10 MG/ML
40 INJECTION INTRAMUSCULAR; INTRAVENOUS ONCE
Status: COMPLETED | OUTPATIENT
Start: 2021-04-01 | End: 2021-04-01

## 2021-04-01 RX ORDER — LORAZEPAM 1 MG/1
3 TABLET ORAL
Status: DISCONTINUED | OUTPATIENT
Start: 2021-04-01 | End: 2021-04-08

## 2021-04-01 RX ORDER — LORAZEPAM 1 MG/1
4 TABLET ORAL
Status: DISCONTINUED | OUTPATIENT
Start: 2021-04-01 | End: 2021-04-08

## 2021-04-01 RX ORDER — HYDRALAZINE HYDROCHLORIDE 20 MG/ML
10 INJECTION INTRAMUSCULAR; INTRAVENOUS EVERY 4 HOURS PRN
Status: DISCONTINUED | OUTPATIENT
Start: 2021-04-01 | End: 2021-04-10 | Stop reason: HOSPADM

## 2021-04-01 RX ORDER — LORAZEPAM 2 MG/ML
2 INJECTION INTRAMUSCULAR
Status: DISCONTINUED | OUTPATIENT
Start: 2021-04-01 | End: 2021-04-08

## 2021-04-01 RX ORDER — AMLODIPINE BESYLATE 5 MG/1
5 TABLET ORAL 2 TIMES DAILY
Status: DISCONTINUED | OUTPATIENT
Start: 2021-04-01 | End: 2021-04-08

## 2021-04-01 RX ORDER — FUROSEMIDE 10 MG/ML
INJECTION INTRAMUSCULAR; INTRAVENOUS
Status: COMPLETED
Start: 2021-04-01 | End: 2021-04-01

## 2021-04-01 RX ORDER — POTASSIUM CHLORIDE 29.8 MG/ML
20 INJECTION INTRAVENOUS
Status: COMPLETED | OUTPATIENT
Start: 2021-04-01 | End: 2021-04-01

## 2021-04-01 RX ORDER — POTASSIUM CHLORIDE 29.8 MG/ML
20 INJECTION INTRAVENOUS PRN
Status: DISCONTINUED | OUTPATIENT
Start: 2021-04-01 | End: 2021-04-10 | Stop reason: HOSPADM

## 2021-04-01 RX ORDER — LANOLIN ALCOHOL/MO/W.PET/CERES
100 CREAM (GRAM) TOPICAL DAILY
Status: DISCONTINUED | OUTPATIENT
Start: 2021-04-01 | End: 2021-04-10 | Stop reason: HOSPADM

## 2021-04-01 RX ADMIN — SODIUM CHLORIDE, PRESERVATIVE FREE 10 ML: 5 INJECTION INTRAVENOUS at 20:28

## 2021-04-01 RX ADMIN — ENOXAPARIN SODIUM 40 MG: 40 INJECTION SUBCUTANEOUS at 09:37

## 2021-04-01 RX ADMIN — IPRATROPIUM BROMIDE AND ALBUTEROL SULFATE 1 AMPULE: .5; 3 SOLUTION RESPIRATORY (INHALATION) at 07:52

## 2021-04-01 RX ADMIN — PREDNISONE 40 MG: 20 TABLET ORAL at 09:37

## 2021-04-01 RX ADMIN — Medication 1.4 MCG/KG/HR: at 13:01

## 2021-04-01 RX ADMIN — LORAZEPAM 2 MG: 2 INJECTION, SOLUTION INTRAMUSCULAR; INTRAVENOUS at 17:27

## 2021-04-01 RX ADMIN — FUROSEMIDE 40 MG: 10 INJECTION, SOLUTION INTRAMUSCULAR; INTRAVENOUS at 09:36

## 2021-04-01 RX ADMIN — SODIUM CHLORIDE, PRESERVATIVE FREE 10 ML: 5 INJECTION INTRAVENOUS at 09:17

## 2021-04-01 RX ADMIN — AMLODIPINE BESYLATE 5 MG: 5 TABLET ORAL at 21:05

## 2021-04-01 RX ADMIN — POTASSIUM CHLORIDE 20 MEQ: 29.8 INJECTION, SOLUTION INTRAVENOUS at 08:22

## 2021-04-01 RX ADMIN — IPRATROPIUM BROMIDE AND ALBUTEROL SULFATE 1 AMPULE: .5; 3 SOLUTION RESPIRATORY (INHALATION) at 19:38

## 2021-04-01 RX ADMIN — Medication 100 MG: at 09:17

## 2021-04-01 RX ADMIN — FAMOTIDINE 20 MG: 20 TABLET ORAL at 09:37

## 2021-04-01 RX ADMIN — LORAZEPAM 2 MG: 2 INJECTION, SOLUTION INTRAMUSCULAR; INTRAVENOUS at 21:57

## 2021-04-01 RX ADMIN — NITROGLYCERIN 40 MCG/MIN: 20 INJECTION INTRAVENOUS at 08:43

## 2021-04-01 RX ADMIN — IPRATROPIUM BROMIDE AND ALBUTEROL SULFATE 1 AMPULE: .5; 3 SOLUTION RESPIRATORY (INHALATION) at 16:07

## 2021-04-01 RX ADMIN — CHLORDIAZEPOXIDE HYDROCHLORIDE 25 MG: 25 CAPSULE ORAL at 09:37

## 2021-04-01 RX ADMIN — CHLORDIAZEPOXIDE HYDROCHLORIDE 25 MG: 25 CAPSULE ORAL at 21:05

## 2021-04-01 RX ADMIN — LABETALOL HYDROCHLORIDE 10 MG: 5 INJECTION INTRAVENOUS at 02:17

## 2021-04-01 RX ADMIN — Medication 1.4 MCG/KG/HR: at 18:57

## 2021-04-01 RX ADMIN — CARVEDILOL 6.25 MG: 6.25 TABLET, FILM COATED ORAL at 09:37

## 2021-04-01 RX ADMIN — LORAZEPAM 4 MG: 2 INJECTION, SOLUTION INTRAMUSCULAR; INTRAVENOUS at 16:03

## 2021-04-01 RX ADMIN — POTASSIUM CHLORIDE 20 MEQ: 29.8 INJECTION, SOLUTION INTRAVENOUS at 09:46

## 2021-04-01 RX ADMIN — IPRATROPIUM BROMIDE AND ALBUTEROL SULFATE 1 AMPULE: .5; 3 SOLUTION RESPIRATORY (INHALATION) at 11:36

## 2021-04-01 RX ADMIN — CARVEDILOL 6.25 MG: 6.25 TABLET, FILM COATED ORAL at 17:19

## 2021-04-01 RX ADMIN — HYDRALAZINE HYDROCHLORIDE 10 MG: 20 INJECTION INTRAMUSCULAR; INTRAVENOUS at 07:04

## 2021-04-01 RX ADMIN — LABETALOL HYDROCHLORIDE 20 MG: 5 INJECTION, SOLUTION INTRAVENOUS at 06:25

## 2021-04-01 RX ADMIN — FUROSEMIDE 40 MG: 10 INJECTION, SOLUTION INTRAMUSCULAR; INTRAVENOUS at 13:43

## 2021-04-01 RX ADMIN — LORAZEPAM 4 MG: 2 INJECTION, SOLUTION INTRAMUSCULAR; INTRAVENOUS at 02:57

## 2021-04-01 RX ADMIN — Medication 0.6 MCG/KG/HR: at 04:35

## 2021-04-01 RX ADMIN — Medication 1.4 MCG/KG/HR: at 10:18

## 2021-04-01 RX ADMIN — FAMOTIDINE 20 MG: 20 TABLET ORAL at 21:05

## 2021-04-01 RX ADMIN — Medication 1.4 MCG/KG/HR: at 07:03

## 2021-04-01 RX ADMIN — Medication 1.4 MCG/KG/HR: at 16:09

## 2021-04-01 RX ADMIN — Medication 1.4 MCG/KG/HR: at 21:35

## 2021-04-01 RX ADMIN — FUROSEMIDE 40 MG: 10 INJECTION INTRAMUSCULAR; INTRAVENOUS at 13:43

## 2021-04-01 RX ADMIN — HYDRALAZINE HYDROCHLORIDE 10 MG: 20 INJECTION INTRAMUSCULAR; INTRAVENOUS at 02:49

## 2021-04-01 RX ADMIN — AMLODIPINE BESYLATE 5 MG: 5 TABLET ORAL at 09:37

## 2021-04-01 RX ADMIN — CHLORDIAZEPOXIDE HYDROCHLORIDE 25 MG: 25 CAPSULE ORAL at 13:51

## 2021-04-01 NOTE — PROGRESS NOTES
Hospitalist Progress Note      PCP: Blayne Friedman MD    Date of Admission: 3/25/2021    Chief Complaint: SOB    Hospital Course:   62 y.o. male who presented to [facility] with shortness of breath that started a couple days ago and got worse. Today, patient was extremely short of breath. 911 was called. Patient's pulse ox was in the 40s on room air. Patient was placed on a nonrebreather, which brought him up to 76s. At that point, patient was placed on CPAP and brought to the emergency department. On CPAP, patient's pulse ox was around 90s. In the emergency department, patient was found to be tachypneic with air hunger and switched on BiPAP. Tolerated BiPAP well. Pulse ox is around low 90s at this time. Has been able to speak after started on BiPAP. Yellow sputum with productive cough past couple days was noted. Has subjective fever and chills. Complains of chest pain in the center of the chest.  Cannot characterize whether it is radiating and cannot tell exactly what kind of pain it is. Apparently, it started in past 24 hours and is connected to shortness of breath. Has some nausea without vomiting  No other accompanying symptoms  Nothing onset makes the patient feel better or worse  Patient is a smoker. No recent COVID-19 test  No recent similar episodes. Subjective: remains intubated and heavily sedated.        Medications:  Reviewed    Infusion Medications    nitroGLYCERIN 70 mcg/min (04/01/21 0884)    dexmedetomidine 1.4 mcg/kg/hr (04/01/21 0850)     Scheduled Medications    ipratropium-albuterol  1 ampule Inhalation Q4H WA    potassium chloride  20 mEq Intravenous Q1H    polyethylene glycol  17 g Oral BID    predniSONE  40 mg Oral Daily    famotidine  20 mg Per NG tube BID    thiamine  100 mg Per NG tube Daily    senna  1 tablet Per NG tube BID    docusate  100 mg Per NG tube BID    sodium chloride flush  10 mL Intravenous 2 times per day    enoxaparin  40 mg Subcutaneous Daily     PRN Meds: LORazepam **OR** LORazepam **OR** LORazepam **OR** LORazepam **OR** LORazepam **OR** LORazepam **OR** LORazepam **OR** LORazepam, hydrALAZINE, labetalol, perflutren lipid microspheres, sodium chloride flush, promethazine **OR** ondansetron, acetaminophen **OR** acetaminophen, midazolam      Intake/Output Summary (Last 24 hours) at 4/1/2021 0902  Last data filed at 4/1/2021 0600  Gross per 24 hour   Intake 1332.17 ml   Output 7110 ml   Net -5777.83 ml       Physical Exam Performed:    BP (!) 204/90   Pulse 84   Temp 99.4 °F (37.4 °C) (Bladder)   Resp 19   Ht 5' 10\" (1.778 m)   Wt 237 lb 7 oz (107.7 kg)   SpO2 91%   BMI 34.07 kg/m²     General appearance: intubated, sedated  HEENT: Pupils equal, round, and reactive to light. Conjunctivae/corneas clear. Neck: Supple, with full range of motion. No jugular venous distention. Trachea midline. Respiratory:  Normal respiratory effort. Clear to auscultation, bilaterally without Rales/Wheezes/Rhonchi. Cardiovascular: Regular rate and rhythm with normal S1/S2 without murmurs, rubs or gallops. Abdomen: Soft, non-tender, non-distended with normal bowel sounds. Musculoskeletal: No clubbing, cyanosis or edema bilaterally. Full range of motion without deformity. Skin: Skin color, texture, turgor normal.  No rashes or lesions. Neurologic: On ventilator  Psychiatric: On ventilator sedated  Capillary Refill: Brisk,< 3 seconds   Peripheral Pulses: +2 palpable, equal bilaterally       Labs:   No results for input(s): WBC, HGB, HCT, PLT in the last 72 hours. Recent Labs     03/30/21  0400 03/31/21  0615 04/01/21  0620    148* 148*   K 4.8 4.1 3.3*    102 101   CO2 38* 39* 36*   BUN 61* 53* 38*   CREATININE 1.0 0.8* 0.6*   CALCIUM 9.4 9.8 10.1     No results for input(s): AST, ALT, BILIDIR, BILITOT, ALKPHOS in the last 72 hours. No results for input(s): INR in the last 72 hours.   No results for input(s): [place] in the last 72 hours. Urinalysis:      Lab Results   Component Value Date    NITRU Negative 03/28/2021    WBCUA None seen 03/28/2021    BACTERIA Rare 03/28/2021    RBCUA None seen 03/28/2021    BLOODU Negative 03/28/2021    SPECGRAV 1.020 03/28/2021    GLUCOSEU Negative 03/28/2021       Radiology:  XR CHEST PORTABLE   Final Result   Increased severity of pulmonary edema over the past 24 hours         XR CHEST PORTABLE   Final Result   Unchanged appearance of the chest over the past 24 hours with bilateral   pleural effusions, bibasilar atelectasis and pulmonary edema         XR ABDOMEN (KUB) (SINGLE AP VIEW)   Final Result   Negative supine of abdominal radiographs with the reservation that there is   minimal bowel gas. If there is increasing distention this could be due to   fluid-filled small bowel without bowel air, ascites, or even free air as   these images were obtained supine. XR CHEST PORTABLE   Final Result   Mildly increased bilateral pulmonary disease over the past 24 hours, likely   due to increasing edema. Bilateral pleural effusions without significant change remain present         XR CHEST PORTABLE   Final Result   Persistent bibasilar airspace disease and pleural effusions. Mild cardiac   silhouette enlargement. No change in life support. XR CHEST PORTABLE   Final Result   Supportive tubing projects in stable positions. Continued bibasilar airspace disease, with possible layered pleural effusions. XR CHEST PORTABLE   Final Result   Supportive tubing is in stable position. Stable pattern of bilateral airspace disease and possible layered effusions. Pulmonary edema or pneumonia are possible. XR ABDOMEN (KUB) (SINGLE AP VIEW)   Final Result   Satisfactory enteric tube placement. XR CHEST PORTABLE   Final Result   Support lines and tubes in satisfactory position. No pneumothorax. Worsening bibasilar predominant airspace disease.   Small

## 2021-04-01 NOTE — PROGRESS NOTES
Nephrology Progress Note   [email or web address]      This patient is a 62year old male whom we are following for KAROL. Subjective: The patient was seen and examined. Extubated yesterday, very confused. Started on Librium. BP is high, on nitro drip. Good diuresis. Serum sodium 148mmol/L this morning but NGT out and not able to get FW. Family History: Family at bedside  ROS: Unable to obtain, confused      Vitals:  BP (!) 194/71   Pulse 87   Temp 99.3 °F (37.4 °C) (Bladder)   Resp 25   Ht 5' 10\" (1.778 m)   Wt 237 lb 7 oz (107.7 kg)   SpO2 (!) 88%   BMI 34.07 kg/m²   I/O last 3 completed shifts: In: 1332.2 [P.O.:160; I.V.:553.2; NG/GT:619]  Out: 7544 [Urine:7110]  I/O this shift:  In: -   Out: 295 [Urine:295]    Physical Exam:  Physical Exam  Vitals signs reviewed. Constitutional:       Comments: confused   HENT:      Head: Normocephalic and atraumatic. Eyes:      General: No scleral icterus. Conjunctiva/sclera: Conjunctivae normal.   Cardiovascular:      Rate and Rhythm: Normal rate and regular rhythm. Heart sounds: No friction rub. Pulmonary:      Comments: Equal chest expansion, coarse breath sounds bilateral  Abdominal:      General: Bowel sounds are normal. There is no distension. Tenderness: There is no abdominal tenderness. Musculoskeletal:      Right lower leg: No edema. Left lower leg: No edema. Medications:   ipratropium-albuterol  1 ampule Inhalation Q4H WA    famotidine  20 mg Oral BID    thiamine  100 mg Oral Daily    chlordiazePOXIDE  25 mg Oral TID    carvedilol  6.25 mg Oral BID     amLODIPine  5 mg Oral Daily    predniSONE  40 mg Oral Daily    sodium chloride flush  10 mL Intravenous 2 times per day    enoxaparin  40 mg Subcutaneous Daily         Labs:  No results for input(s): WBC, HGB, HCT, MCV, PLT in the last 72 hours.   Recent Labs     03/30/21  0400 03/31/21  0615 04/01/21  0620    148* 148*   K 4.8 4.1 3.3*    102 101

## 2021-04-01 NOTE — CARE COORDINATION
Patient was extubated 3/31. In alcohol withdrawal at present and with altered mental status. Per MD patient thought he was in [place] today. Received consult for consideration of rehab. DCP will address this when patient more alert and able to participate in conversation. IPTA. Continuing to follow and assist with needs pertaining to discharge.

## 2021-04-01 NOTE — PROGRESS NOTES
Pulmonary & Critical Care Medicine ICU Progress Note      Events of Last 24 hours: The patient was extubated, but has developed confusion and mild agitation. He was also aggressively diuresed due to large pleural effusions. His phenobarbital was discontinued yesterday. He has had increased oxygen requirement, desaturated down to 79%. He has had low-grade fever. He has had excellent diuresis in the last 24 hours. Invasive Lines:      CVC left subclavian 3/25/2021    Art Line left radial        MV: 3/25/2021, extubated 3/31/2021    Recent Labs     03/31/21  1452 04/01/21  0246   PHART 7.410 7.440   HJO2EBZ 69.3* 57.2*   PO2ART 57.5* 65.4*       MV Settings:  Vent Mode: (S) PS Rate Set: 20 bmp/Vt Ordered: 550 mL/ [email or web address])    IV:   dexmedetomidine 1.4 mcg/kg/hr (04/01/21 0703)       Vitals:  BP (!) 204/90   Pulse 84   Temp 99.4 °F (37.4 °C) (Bladder)   Resp 21   Ht 5' 10\" (1.778 m)   Wt 237 lb 7 oz (107.7 kg)   SpO2 92%   BMI 34.07 kg/m²         Intake/Output Summary (Last 24 hours) at 4/1/2021 0742  Last data filed at 4/1/2021 0600  Gross per 24 hour   Intake 1332.17 ml   Output 7110 ml   Net -5777.83 ml       EXAM:  General: No distress. Overweight, awake, communicative but disoriented   Eyes: PERRL. No sclera icterus. No conjunctival injection. ENT: Class III airway, NG. Mucosa dry. Neck: Large and short neck, no JVD   Resp: No accessory muscle use. No crackles. No wheezing. No rhonchi. CV: Regular rate. Regular rhythm. No mumur or rub. No edema. GI: Non-tender, protuberant and fatty abdomen. Non-distended. No masses. No organmegaly. Skin: Warm and dry. No nodule on exposed extremities. No rash on exposed extremities. Lymph: Deferred. M/S: No cyanosis. No joint deformity. No clubbing. Neuro: Awake, disoriented. Moving all extremities. No obvious focal deficit  Psych: Disoriented and mildly agitated.      Medications:  Scheduled Meds:   ipratropium-albuterol  1 ampule Inhalation Q4H WA    polyethylene glycol  17 g Oral BID    predniSONE  40 mg Oral Daily    famotidine  20 mg Per NG tube BID    thiamine  100 mg Per NG tube Daily    senna  1 tablet Per NG tube BID    docusate  100 mg Per NG tube BID    sodium chloride flush  10 mL Intravenous 2 times per day    enoxaparin  40 mg Subcutaneous Daily       PRN Meds:  LORazepam **OR** LORazepam **OR** LORazepam **OR** LORazepam **OR** LORazepam **OR** LORazepam **OR** LORazepam **OR** LORazepam, hydrALAZINE, labetalol, sodium chloride flush, promethazine **OR** ondansetron, acetaminophen **OR** acetaminophen, midazolam    Results:  CBC:   No results for input(s): WBC, HGB, HCT, MCV, PLT in the last 72 hours. BMP:   Recent Labs     03/30/21  0400 03/31/21  0615 04/01/21  0620    148* 148*   K 4.8 4.1 3.3*    102 101   CO2 38* 39* 36*   BUN 61* 53* 38*   CREATININE 1.0 0.8* 0.6*       Cultures:  Bronchoscopy cultures negative    Films:  CXR reviewed by me    Assessment and plan:  Acute respiratory failure, hypoxemic and hypercarbic. Weaned and extubated 3/31/2021. Increased oxygen requirement overnight and through the day today. Will attempt further diuresis. He could have mucous plugging. May need therapeutic bronchoscopy in a.m. COPD exacerbation. On bronchodilator and steroid. Completed 5 days of antibiotics  Bilateral large pleural effusions, pulmonary edema. He diuresed, but chest x-ray today with small effusions but possible worsened edema due to extubation and increased preload. Continue diuresis today  Alcohol withdrawal, encephalopathy. Possibility of psychosis or ongoing withdrawal.  Will add low-dose Librium  KAROL. Renal function improved. Diuretics continued to mobilize fluid from large effusions and assist with extubation  Alcoholism. Address once more alert  DVT prophylaxis. Lovenox. PUD prophylaxis. Pepcid.       Multidisciplinary rounds were completed at the bedside with participation from the

## 2021-04-01 NOTE — PROGRESS NOTES
Received report from night shift RN. Pt became agitated and aggressive overnight. BPs in 200's consistently despite Labetalol and Hydralazine. O2 requirements increased, currently on NRB mask.

## 2021-04-01 NOTE — PROGRESS NOTES
Rounded with Dr. Bharathi Cisneros. Plan for Lasix today due to worsening chest xray. Will try Librium for ETOH withdraw. Monitor urine output.

## 2021-04-01 NOTE — PROGRESS NOTES
Spoke with Dr. Rj Lovell regarding pts status. Concerned pt may need bronch or possibly reintubation. Currently on 15L high flow and O2 SAT 90%.  Order for 40mg Lasix and will reevaluate after lasix is given

## 2021-04-02 ENCOUNTER — APPOINTMENT (OUTPATIENT)
Dept: GENERAL RADIOLOGY | Age: 61
DRG: 130 | End: 2021-04-02
Payer: MEDICAID

## 2021-04-02 LAB
ANION GAP SERPL CALCULATED.3IONS-SCNC: 7 MMOL/L (ref 3–16)
BUN BLDV-MCNC: 42 MG/DL (ref 7–20)
CALCIUM SERPL-MCNC: 9.6 MG/DL (ref 8.3–10.6)
CHLORIDE BLD-SCNC: 96 MMOL/L (ref 99–110)
CO2: 38 MMOL/L (ref 21–32)
CREAT SERPL-MCNC: 0.7 MG/DL (ref 0.9–1.3)
GFR AFRICAN AMERICAN: >60
GFR NON-AFRICAN AMERICAN: >60
GLUCOSE BLD-MCNC: 123 MG/DL (ref 70–99)
LV EF: 55 %
LVEF MODALITY: NORMAL
MAGNESIUM: 2.4 MG/DL (ref 1.8–2.4)
POTASSIUM REFLEX MAGNESIUM: 3.5 MMOL/L (ref 3.5–5.1)
SODIUM BLD-SCNC: 141 MMOL/L (ref 136–145)

## 2021-04-02 PROCEDURE — 6360000002 HC RX W HCPCS: Performed by: INTERNAL MEDICINE

## 2021-04-02 PROCEDURE — 6370000000 HC RX 637 (ALT 250 FOR IP): Performed by: INTERNAL MEDICINE

## 2021-04-02 PROCEDURE — 71045 X-RAY EXAM CHEST 1 VIEW: CPT

## 2021-04-02 PROCEDURE — 80048 BASIC METABOLIC PNL TOTAL CA: CPT

## 2021-04-02 PROCEDURE — 93306 TTE W/DOPPLER COMPLETE: CPT

## 2021-04-02 PROCEDURE — 2700000000 HC OXYGEN THERAPY PER DAY

## 2021-04-02 PROCEDURE — 2500000003 HC RX 250 WO HCPCS: Performed by: INTERNAL MEDICINE

## 2021-04-02 PROCEDURE — 99233 SBSQ HOSP IP/OBS HIGH 50: CPT | Performed by: INTERNAL MEDICINE

## 2021-04-02 PROCEDURE — 2000000000 HC ICU R&B

## 2021-04-02 PROCEDURE — 94640 AIRWAY INHALATION TREATMENT: CPT

## 2021-04-02 PROCEDURE — 83735 ASSAY OF MAGNESIUM: CPT

## 2021-04-02 PROCEDURE — 2580000003 HC RX 258: Performed by: INTERNAL MEDICINE

## 2021-04-02 PROCEDURE — 94761 N-INVAS EAR/PLS OXIMETRY MLT: CPT

## 2021-04-02 RX ORDER — BUDESONIDE 0.5 MG/2ML
0.5 INHALANT ORAL 2 TIMES DAILY
Status: DISCONTINUED | OUTPATIENT
Start: 2021-04-02 | End: 2021-04-10 | Stop reason: HOSPADM

## 2021-04-02 RX ORDER — CLONIDINE 0.2 MG/24H
1 PATCH, EXTENDED RELEASE TRANSDERMAL WEEKLY
Status: DISCONTINUED | OUTPATIENT
Start: 2021-04-02 | End: 2021-04-08

## 2021-04-02 RX ORDER — CHLORDIAZEPOXIDE HYDROCHLORIDE 25 MG/1
50 CAPSULE, GELATIN COATED ORAL 3 TIMES DAILY
Status: DISCONTINUED | OUTPATIENT
Start: 2021-04-02 | End: 2021-04-08

## 2021-04-02 RX ORDER — FUROSEMIDE 10 MG/ML
40 INJECTION INTRAMUSCULAR; INTRAVENOUS 2 TIMES DAILY
Status: DISCONTINUED | OUTPATIENT
Start: 2021-04-02 | End: 2021-04-03

## 2021-04-02 RX ORDER — IPRATROPIUM BROMIDE AND ALBUTEROL SULFATE 2.5; .5 MG/3ML; MG/3ML
1 SOLUTION RESPIRATORY (INHALATION) 4 TIMES DAILY
Status: DISCONTINUED | OUTPATIENT
Start: 2021-04-02 | End: 2021-04-09

## 2021-04-02 RX ADMIN — Medication 1.4 MCG/KG/HR: at 23:32

## 2021-04-02 RX ADMIN — Medication 1.4 MCG/KG/HR: at 11:35

## 2021-04-02 RX ADMIN — IPRATROPIUM BROMIDE AND ALBUTEROL SULFATE 1 AMPULE: .5; 2.5 SOLUTION RESPIRATORY (INHALATION) at 11:52

## 2021-04-02 RX ADMIN — NITROGLYCERIN 60 MCG/MIN: 20 INJECTION INTRAVENOUS at 23:13

## 2021-04-02 RX ADMIN — Medication 1.4 MCG/KG/HR: at 20:43

## 2021-04-02 RX ADMIN — BUDESONIDE 500 MCG: 0.5 SUSPENSION RESPIRATORY (INHALATION) at 19:37

## 2021-04-02 RX ADMIN — Medication 1.4 MCG/KG/HR: at 14:27

## 2021-04-02 RX ADMIN — Medication 1.4 MCG/KG/HR: at 02:52

## 2021-04-02 RX ADMIN — IPRATROPIUM BROMIDE AND ALBUTEROL SULFATE 1 AMPULE: .5; 2.5 SOLUTION RESPIRATORY (INHALATION) at 15:41

## 2021-04-02 RX ADMIN — Medication 1.4 MCG/KG/HR: at 17:40

## 2021-04-02 RX ADMIN — LORAZEPAM 2 MG: 2 INJECTION, SOLUTION INTRAMUSCULAR; INTRAVENOUS at 17:36

## 2021-04-02 RX ADMIN — AMLODIPINE BESYLATE 5 MG: 5 TABLET ORAL at 22:27

## 2021-04-02 RX ADMIN — SODIUM CHLORIDE, PRESERVATIVE FREE 10 ML: 5 INJECTION INTRAVENOUS at 09:33

## 2021-04-02 RX ADMIN — CARVEDILOL 6.25 MG: 6.25 TABLET, FILM COATED ORAL at 09:32

## 2021-04-02 RX ADMIN — Medication 1.4 MCG/KG/HR: at 05:25

## 2021-04-02 RX ADMIN — LORAZEPAM 2 MG: 2 INJECTION, SOLUTION INTRAMUSCULAR; INTRAVENOUS at 21:27

## 2021-04-02 RX ADMIN — Medication 1.4 MCG/KG/HR: at 08:39

## 2021-04-02 RX ADMIN — FUROSEMIDE 40 MG: 10 INJECTION, SOLUTION INTRAMUSCULAR; INTRAVENOUS at 22:51

## 2021-04-02 RX ADMIN — CHLORDIAZEPOXIDE HYDROCHLORIDE 50 MG: 25 CAPSULE ORAL at 14:04

## 2021-04-02 RX ADMIN — NITROGLYCERIN 90 MCG/MIN: 20 INJECTION INTRAVENOUS at 05:57

## 2021-04-02 RX ADMIN — IPRATROPIUM BROMIDE AND ALBUTEROL SULFATE 1 AMPULE: .5; 2.5 SOLUTION RESPIRATORY (INHALATION) at 19:37

## 2021-04-02 RX ADMIN — FAMOTIDINE 20 MG: 20 TABLET ORAL at 22:26

## 2021-04-02 RX ADMIN — CARVEDILOL 6.25 MG: 6.25 TABLET, FILM COATED ORAL at 17:36

## 2021-04-02 RX ADMIN — LORAZEPAM 2 MG: 2 INJECTION, SOLUTION INTRAMUSCULAR; INTRAVENOUS at 01:26

## 2021-04-02 RX ADMIN — IPRATROPIUM BROMIDE AND ALBUTEROL SULFATE 1 AMPULE: .5; 3 SOLUTION RESPIRATORY (INHALATION) at 08:04

## 2021-04-02 RX ADMIN — Medication 100 MG: at 09:32

## 2021-04-02 RX ADMIN — Medication 1.4 MCG/KG/HR: at 00:16

## 2021-04-02 RX ADMIN — LORAZEPAM 2 MG: 2 INJECTION, SOLUTION INTRAMUSCULAR; INTRAVENOUS at 23:37

## 2021-04-02 RX ADMIN — FAMOTIDINE 20 MG: 20 TABLET ORAL at 09:32

## 2021-04-02 RX ADMIN — AMLODIPINE BESYLATE 5 MG: 5 TABLET ORAL at 09:32

## 2021-04-02 RX ADMIN — CHLORDIAZEPOXIDE HYDROCHLORIDE 50 MG: 25 CAPSULE ORAL at 22:27

## 2021-04-02 RX ADMIN — HYDRALAZINE HYDROCHLORIDE 10 MG: 20 INJECTION INTRAMUSCULAR; INTRAVENOUS at 22:55

## 2021-04-02 RX ADMIN — CHLORDIAZEPOXIDE HYDROCHLORIDE 50 MG: 25 CAPSULE ORAL at 09:32

## 2021-04-02 RX ADMIN — ENOXAPARIN SODIUM 40 MG: 40 INJECTION SUBCUTANEOUS at 09:32

## 2021-04-02 RX ADMIN — LORAZEPAM 2 MG: 2 INJECTION, SOLUTION INTRAMUSCULAR; INTRAVENOUS at 03:30

## 2021-04-02 NOTE — PROGRESS NOTES
Nephrology Progress Note   [email or web address]      This patient is a 62year old male whom we are following for KAROL. Subjective: The patient was seen and examined. Still on nitro drip. On HFNC. Net negative 2.8L the past 24H. Family History: Family at bedside  ROS: Unable to obtain, confused      Vitals:  BP (!) 188/77   Pulse 72   Temp 99.2 °F (37.3 °C) (Bladder)   Resp 23   Ht 5' 10\" (1.778 m)   Wt 237 lb 7 oz (107.7 kg)   SpO2 94%   BMI 34.07 kg/m²   I/O last 3 completed shifts: In: 599 [I.V.:943]  Out: 2058 [Urine:2058]  No intake/output data recorded. Physical Exam:  Physical Exam  Vitals signs reviewed. Constitutional:       General: He is not in acute distress. Comments: Somnolent   HENT:      Head: Normocephalic and atraumatic. Cardiovascular:      Rate and Rhythm: Normal rate and regular rhythm. Heart sounds: No friction rub. Pulmonary:      Comments: Equal chest expansion, coarse breath sounds bilateral  Abdominal:      General: Bowel sounds are normal. There is no distension. Tenderness: There is no abdominal tenderness. Musculoskeletal:      Right lower leg: No edema. Left lower leg: No edema. Skin:     General: Skin is dry. Medications:   chlordiazePOXIDE  50 mg Oral TID    budesonide  0.5 mg Nebulization BID    ipratropium-albuterol  1 ampule Inhalation 4x daily    cloNIDine  1 patch Transdermal Weekly    famotidine  20 mg Oral BID    thiamine  100 mg Oral Daily    carvedilol  6.25 mg Oral BID WC    amLODIPine  5 mg Oral BID    sodium chloride flush  10 mL Intravenous 2 times per day    enoxaparin  40 mg Subcutaneous Daily         Labs:  No results for input(s): WBC, HGB, HCT, MCV, PLT in the last 72 hours.   Recent Labs     04/01/21  0620 04/01/21  1502 04/02/21  0434   * 144 141   K 3.3* 3.7 3.5    95* 96*   CO2 36* 38* 38*   GLUCOSE 105* 123* 123*   MG 2.30  --  2.40   BUN 38* 37* 42*   CREATININE 0.6* 0.8* 0.7*   LABGLOM

## 2021-04-02 NOTE — PROGRESS NOTES
Pulmonary & Critical Care Medicine ICU Progress Note      Events of Last 24 hours: The patient was extubated, but has developed confusion and mild agitation. He was also aggressively diuresed due to large pleural effusions. His phenobarbital was discontinued 3/31. Effusions were decreased, but he had pulmonary edema, received more Lasix. The patient had extreme agitation last night, received Ativan 2 mg. He had been up all night. The patient has remained disoriented, but is able to drink liquids and swallow pills. He is on maximum Precedex at 1.4. Oxygen was turned down from 15 L to 13 L/min. His I/O so far has been -11 L. Afebrile, hemodynamically stable. SaO2 91% on 13 LPM O2. Invasive Lines:      CVC left subclavian 3/25/2021    Art Line left radial        MV: 3/25/2021, extubated 3/31/2021    Recent Labs     04/01/21  0246 04/01/21  0818   PHART 7.440 7.457*   JPF2LDX 57.2* 49.8*   PO2ART 65.4* 53.4*       MV Settings:  Vent Mode: (S) PS Rate Set: 14 bmp/Vt Ordered: 450 mL/ [email or web address])    IV:   nitroGLYCERIN 50 mcg/min (04/02/21 0647)    dexmedetomidine 1.4 mcg/kg/hr (04/02/21 1135)       Vitals:  BP (!) 188/77   Pulse 78   Temp 99.2 °F (37.3 °C) (Bladder)   Resp 22   Ht 5' 10\" (1.778 m)   Wt 237 lb 7 oz (107.7 kg)   SpO2 94%   BMI 34.07 kg/m²         Intake/Output Summary (Last 24 hours) at 4/2/2021 1226  Last data filed at 4/2/2021 0630  Gross per 24 hour   Intake 1959 ml   Output 2883 ml   Net -924 ml       EXAM:  General: No distress. Overweight, awake, communicative but disoriented, pleasantly not agitated  Eyes: PERRL. No sclera icterus. No conjunctival injection. ENT: Class III airway, NG. Mucosa dry. Neck: Large and short neck, no JVD   Resp: No accessory muscle use. No crackles. No wheezing. No rhonchi. CV: Regular rate. Regular rhythm. No mumur or rub. No edema. GI: Deferred. Skin: Warm and dry. No nodule on exposed extremities.  No rash on exposed extremities. Lymph: Deferred. M/S: No cyanosis. No joint deformity. No clubbing. Neuro: Awake, disoriented. Moving all extremities. No obvious focal deficit  Psych: Disoriented and mildly agitated. Medications:  Scheduled Meds:   chlordiazePOXIDE  50 mg Oral TID    budesonide  0.5 mg Nebulization BID    ipratropium-albuterol  1 ampule Inhalation 4x daily    famotidine  20 mg Oral BID    thiamine  100 mg Oral Daily    carvedilol  6.25 mg Oral BID WC    amLODIPine  5 mg Oral BID    sodium chloride flush  10 mL Intravenous 2 times per day    enoxaparin  40 mg Subcutaneous Daily       PRN Meds:  LORazepam **OR** LORazepam **OR** LORazepam **OR** LORazepam **OR** LORazepam **OR** LORazepam **OR** LORazepam **OR** LORazepam, hydrALAZINE, labetalol, perflutren lipid microspheres, potassium chloride, sodium chloride flush, promethazine **OR** ondansetron, acetaminophen **OR** acetaminophen    Results:  CBC:   No results for input(s): WBC, HGB, HCT, MCV, PLT in the last 72 hours. BMP:   Recent Labs     04/01/21  0620 04/01/21  1502 04/02/21  0434   * 144 141   K 3.3* 3.7 3.5    95* 96*   CO2 36* 38* 38*   BUN 38* 37* 42*   CREATININE 0.6* 0.8* 0.7*       Cultures:  Bronchoscopy cultures negative    Films:  CXR reviewed by me    Assessment and plan:  Acute respiratory failure, hypoxemic and hypercarbic. Weaned and extubated 3/31/2021. Slightly decreased oxygen requirement, possibly due to diuresis. He does not appear to have mucous plugging at present. COPD exacerbation. On bronchodilator and steroid. Completed 5 days of antibiotics, will change prednisone to budesonide nebulizer  Bilateral large pleural effusions, pulmonary edema. CXR with decreased effusions, possibly decreased edema, continue diuresis for another 2 days or so, although BUN is rising  Alcohol withdrawal, encephalopathy. Possibility of psychosis or ongoing withdrawal.  Increased dose of Librium  KAROL.  Renal function

## 2021-04-02 NOTE — PROGRESS NOTES
Supplement  Nutrition Education/Counseling:  No recommendation at this time   Coordination of Nutrition Care:  Continue to monitor while inpatient, Interdisciplinary Rounds    Goals:  Patient will eat 50% or greater of meals and supplements. Nutrition Monitoring and Evaluation:   Behavioral-Environmental Outcomes:  None Identified   Food/Nutrient Intake Outcomes:  Supplement Intake, Food and Nutrient Intake  Physical Signs/Symptoms Outcomes:  Biochemical Data, Nutrition Focused Physical Findings     Discharge Planning:     Too soon to determine     Electronically signed by Alexandra Bee, [address] [place] [address],  on 4/2/21 at 11:02 AM EDT    Contact: Office: [phone]; [address]: [phone]

## 2021-04-02 NOTE — PROGRESS NOTES
Hospitalist Progress Note      PCP: Chiquita Salazar MD    Date of Admission: 3/25/2021    Chief Complaint: SOB    Hospital Course:   62 y.o. male who presented to [facility] with shortness of breath that started a couple days ago and got worse. Today, patient was extremely short of breath. 911 was called. Patient's pulse ox was in the 40s on room air. Patient was placed on a nonrebreather, which brought him up to 76s. At that point, patient was placed on CPAP and brought to the emergency department. On CPAP, patient's pulse ox was around 90s. In the emergency department, patient was found to be tachypneic with air hunger and switched on BiPAP. Tolerated BiPAP well. Pulse ox is around low 90s at this time. Has been able to speak after started on BiPAP. Yellow sputum with productive cough past couple days was noted. Has subjective fever and chills. Complains of chest pain in the center of the chest.  Cannot characterize whether it is radiating and cannot tell exactly what kind of pain it is. Apparently, it started in past 24 hours and is connected to shortness of breath. Has some nausea without vomiting  No other accompanying symptoms  Nothing onset makes the patient feel better or worse  Patient is a smoker. No recent COVID-19 test  No recent similar episodes. Subjective: remains intubated and heavily sedated.        Medications:  Reviewed    Infusion Medications    nitroGLYCERIN 50 mcg/min (04/02/21 0698)    dexmedetomidine 1.4 mcg/kg/hr (04/02/21 3834)     Scheduled Medications    chlordiazePOXIDE  50 mg Oral TID    budesonide  0.5 mg Nebulization BID    ipratropium-albuterol  1 ampule Inhalation 4x daily    cloNIDine  1 patch Transdermal Weekly    famotidine  20 mg Oral BID    thiamine  100 mg Oral Daily    carvedilol  6.25 mg Oral BID WC    amLODIPine  5 mg Oral BID    sodium chloride flush  10 mL Intravenous 2 times per day    enoxaparin  40 mg Subcutaneous Daily PRN Meds: LORazepam **OR** LORazepam **OR** LORazepam **OR** LORazepam **OR** LORazepam **OR** LORazepam **OR** LORazepam **OR** LORazepam, hydrALAZINE, labetalol, perflutren lipid microspheres, potassium chloride, sodium chloride flush, promethazine **OR** ondansetron, acetaminophen **OR** acetaminophen      Intake/Output Summary (Last 24 hours) at 4/2/2021 1332  Last data filed at 4/2/2021 1135  Gross per 24 hour   Intake 1959 ml   Output 3108 ml   Net -1149 ml       Physical Exam Performed:    BP (!) 188/77   Pulse 70   Temp 99.2 °F (37.3 °C) (Bladder)   Resp 27   Ht 5' 10\" (1.778 m)   Wt 237 lb 7 oz (107.7 kg)   SpO2 94%   BMI 34.07 kg/m²     General appearance: intubated, sedated  HEENT: Pupils equal, round, and reactive to light. Conjunctivae/corneas clear. Neck: Supple, with full range of motion. No jugular venous distention. Trachea midline. Respiratory:  Normal respiratory effort. Clear to auscultation, bilaterally without Rales/Wheezes/Rhonchi. Cardiovascular: Regular rate and rhythm with normal S1/S2 without murmurs, rubs or gallops. Abdomen: Soft, non-tender, non-distended with normal bowel sounds. Musculoskeletal: No clubbing, cyanosis or edema bilaterally. Full range of motion without deformity. Skin: Skin color, texture, turgor normal.  No rashes or lesions. Neurologic: On ventilator  Psychiatric: On ventilator sedated  Capillary Refill: Brisk,< 3 seconds   Peripheral Pulses: +2 palpable, equal bilaterally       Labs:   No results for input(s): WBC, HGB, HCT, PLT in the last 72 hours. Recent Labs     04/01/21  0620 04/01/21  1502 04/02/21  0434   * 144 141   K 3.3* 3.7 3.5    95* 96*   CO2 36* 38* 38*   BUN 38* 37* 42*   CREATININE 0.6* 0.8* 0.7*   CALCIUM 10.1 10.5 9.6     No results for input(s): AST, ALT, BILIDIR, BILITOT, ALKPHOS in the last 72 hours. No results for input(s): INR in the last 72 hours.   No results for input(s): [employer] in the last 72 (SINGLE AP VIEW)   Final Result   Satisfactory enteric tube placement. XR CHEST PORTABLE   Final Result   Support lines and tubes in satisfactory position. No pneumothorax. Worsening bibasilar predominant airspace disease. Small right and trace left   pleural effusions suspected. XR CHEST PORTABLE   Final Result              Assessment/Plan:    Active Hospital Problems    Diagnosis    Acute respiratory failure with hypoxia and hypercapnia ([facility]) [J96.01, J96.02]    COPD exacerbation ([facility]) [J44.1]    Alcohol withdrawal ([facility]) [F10.239]     Acute hypoxic and hypercarbic respiratory failure 2/2 COPD exacerbation  - extubated 3/31  - continue nebs, steroids  - COVID PCR negative  - s/p bronch. BAL NGTD  - s/p paralytics  - completed course of abx without clear evidence of pneumonia  - wean O2 as able    Alcohol dependence with withdrawal  - continue precedex.  Continue librium, ativan PRN  - counseling once able    Pulmonary edema  - improved with IV lasix  - no history of heart failure  - echo pending    KAROL  - 2/2 ott obstruction  - nephrology consulted  - improved following replacement    Hypertensive urgency  - poorly controlled  - wean off nitro gtt as able  - started on coreg, norvasc, clonidine    Hypernatremia  - 2/2 free water deficit  - improved with improved PO intake  - continue to monitor closely    Constipation  - increasing bowel regimen    DVT Prophylaxis: lovenox  Diet: DIET GENERAL;  Dietary Nutrition Supplements: Standard High Calorie Oral Supplement  Code Status: Full Code    PT/OT Eval Status: pending improvement    Dispo - ICU    Hanna Garcia MD

## 2021-04-03 ENCOUNTER — APPOINTMENT (OUTPATIENT)
Dept: GENERAL RADIOLOGY | Age: 61
DRG: 130 | End: 2021-04-03
Payer: MEDICAID

## 2021-04-03 LAB
ANION GAP SERPL CALCULATED.3IONS-SCNC: 10 MMOL/L (ref 3–16)
BUN BLDV-MCNC: 35 MG/DL (ref 7–20)
CALCIUM SERPL-MCNC: 9.5 MG/DL (ref 8.3–10.6)
CHLORIDE BLD-SCNC: 105 MMOL/L (ref 99–110)
CO2: 36 MMOL/L (ref 21–32)
CREAT SERPL-MCNC: 0.7 MG/DL (ref 0.9–1.3)
GFR AFRICAN AMERICAN: >60
GFR NON-AFRICAN AMERICAN: >60
GLUCOSE BLD-MCNC: 121 MG/DL (ref 70–99)
MAGNESIUM: 2.3 MG/DL (ref 1.8–2.4)
POTASSIUM REFLEX MAGNESIUM: 3.3 MMOL/L (ref 3.5–5.1)
SODIUM BLD-SCNC: 151 MMOL/L (ref 136–145)

## 2021-04-03 PROCEDURE — 83735 ASSAY OF MAGNESIUM: CPT

## 2021-04-03 PROCEDURE — 6370000000 HC RX 637 (ALT 250 FOR IP): Performed by: INTERNAL MEDICINE

## 2021-04-03 PROCEDURE — 6360000002 HC RX W HCPCS: Performed by: INTERNAL MEDICINE

## 2021-04-03 PROCEDURE — 2000000000 HC ICU R&B

## 2021-04-03 PROCEDURE — 94640 AIRWAY INHALATION TREATMENT: CPT

## 2021-04-03 PROCEDURE — 2500000003 HC RX 250 WO HCPCS: Performed by: INTERNAL MEDICINE

## 2021-04-03 PROCEDURE — 80048 BASIC METABOLIC PNL TOTAL CA: CPT

## 2021-04-03 PROCEDURE — 99233 SBSQ HOSP IP/OBS HIGH 50: CPT | Performed by: INTERNAL MEDICINE

## 2021-04-03 PROCEDURE — 94761 N-INVAS EAR/PLS OXIMETRY MLT: CPT

## 2021-04-03 PROCEDURE — 71045 X-RAY EXAM CHEST 1 VIEW: CPT

## 2021-04-03 PROCEDURE — 2580000003 HC RX 258: Performed by: INTERNAL MEDICINE

## 2021-04-03 PROCEDURE — 2700000000 HC OXYGEN THERAPY PER DAY

## 2021-04-03 RX ADMIN — CARVEDILOL 6.25 MG: 6.25 TABLET, FILM COATED ORAL at 09:21

## 2021-04-03 RX ADMIN — ENOXAPARIN SODIUM 40 MG: 40 INJECTION SUBCUTANEOUS at 09:19

## 2021-04-03 RX ADMIN — BUDESONIDE 500 MCG: 0.5 SUSPENSION RESPIRATORY (INHALATION) at 08:21

## 2021-04-03 RX ADMIN — LORAZEPAM 2 MG: 2 INJECTION, SOLUTION INTRAMUSCULAR; INTRAVENOUS at 03:57

## 2021-04-03 RX ADMIN — Medication 1.4 MCG/KG/HR: at 19:44

## 2021-04-03 RX ADMIN — Medication 1.4 MCG/KG/HR: at 16:53

## 2021-04-03 RX ADMIN — CARVEDILOL 6.25 MG: 6.25 TABLET, FILM COATED ORAL at 18:27

## 2021-04-03 RX ADMIN — FAMOTIDINE 20 MG: 20 TABLET ORAL at 09:19

## 2021-04-03 RX ADMIN — Medication 1.4 MCG/KG/HR: at 10:57

## 2021-04-03 RX ADMIN — LORAZEPAM 2 MG: 2 INJECTION, SOLUTION INTRAMUSCULAR; INTRAVENOUS at 15:47

## 2021-04-03 RX ADMIN — CHLORDIAZEPOXIDE HYDROCHLORIDE 50 MG: 25 CAPSULE ORAL at 09:19

## 2021-04-03 RX ADMIN — FAMOTIDINE 20 MG: 20 TABLET ORAL at 19:33

## 2021-04-03 RX ADMIN — POTASSIUM CHLORIDE 20 MEQ: 29.8 INJECTION, SOLUTION INTRAVENOUS at 08:00

## 2021-04-03 RX ADMIN — SODIUM CHLORIDE, PRESERVATIVE FREE 10 ML: 5 INJECTION INTRAVENOUS at 09:20

## 2021-04-03 RX ADMIN — POTASSIUM CHLORIDE 20 MEQ: 29.8 INJECTION, SOLUTION INTRAVENOUS at 09:19

## 2021-04-03 RX ADMIN — CHLORDIAZEPOXIDE HYDROCHLORIDE 50 MG: 25 CAPSULE ORAL at 14:19

## 2021-04-03 RX ADMIN — Medication 1.4 MCG/KG/HR: at 13:56

## 2021-04-03 RX ADMIN — FUROSEMIDE 40 MG: 10 INJECTION, SOLUTION INTRAMUSCULAR; INTRAVENOUS at 09:19

## 2021-04-03 RX ADMIN — AMLODIPINE BESYLATE 5 MG: 5 TABLET ORAL at 19:33

## 2021-04-03 RX ADMIN — Medication 1.4 MCG/KG/HR: at 01:51

## 2021-04-03 RX ADMIN — AMLODIPINE BESYLATE 5 MG: 5 TABLET ORAL at 09:19

## 2021-04-03 RX ADMIN — IPRATROPIUM BROMIDE AND ALBUTEROL SULFATE 1 AMPULE: .5; 2.5 SOLUTION RESPIRATORY (INHALATION) at 16:03

## 2021-04-03 RX ADMIN — Medication 1.4 MCG/KG/HR: at 07:55

## 2021-04-03 RX ADMIN — BUDESONIDE 500 MCG: 0.5 SUSPENSION RESPIRATORY (INHALATION) at 20:47

## 2021-04-03 RX ADMIN — Medication 1.4 MCG/KG/HR: at 22:11

## 2021-04-03 RX ADMIN — LORAZEPAM 1 MG: 2 INJECTION, SOLUTION INTRAMUSCULAR; INTRAVENOUS at 20:38

## 2021-04-03 RX ADMIN — LORAZEPAM 1 MG: 2 INJECTION, SOLUTION INTRAMUSCULAR; INTRAVENOUS at 22:35

## 2021-04-03 RX ADMIN — IPRATROPIUM BROMIDE AND ALBUTEROL SULFATE 1 AMPULE: .5; 2.5 SOLUTION RESPIRATORY (INHALATION) at 20:47

## 2021-04-03 RX ADMIN — IPRATROPIUM BROMIDE AND ALBUTEROL SULFATE 1 AMPULE: .5; 2.5 SOLUTION RESPIRATORY (INHALATION) at 12:48

## 2021-04-03 RX ADMIN — IPRATROPIUM BROMIDE AND ALBUTEROL SULFATE 1 AMPULE: .5; 2.5 SOLUTION RESPIRATORY (INHALATION) at 08:20

## 2021-04-03 RX ADMIN — Medication 100 MG: at 09:19

## 2021-04-03 RX ADMIN — CHLORDIAZEPOXIDE HYDROCHLORIDE 50 MG: 25 CAPSULE ORAL at 19:33

## 2021-04-03 NOTE — PROGRESS NOTES
Hospitalist Progress Note      PCP: Lopez Javed MD    Date of Admission: 3/25/2021    Chief Complaint: SOB    Hospital Course:   62 y.o. male who presented to [facility] with shortness of breath that started a couple days ago and got worse. Today, patient was extremely short of breath. 911 was called. Patient's pulse ox was in the 40s on room air. Patient was placed on a nonrebreather, which brought him up to 76s. At that point, patient was placed on CPAP and brought to the emergency department. On CPAP, patient's pulse ox was around 90s. In the emergency department, patient was found to be tachypneic with air hunger and switched on BiPAP. Tolerated BiPAP well. Pulse ox is around low 90s at this time. Has been able to speak after started on BiPAP. Yellow sputum with productive cough past couple days was noted. Has subjective fever and chills. Complains of chest pain in the center of the chest.  Cannot characterize whether it is radiating and cannot tell exactly what kind of pain it is. Apparently, it started in past 24 hours and is connected to shortness of breath. Has some nausea without vomiting  No other accompanying symptoms  Nothing onset makes the patient feel better or worse  Patient is a smoker. No recent COVID-19 test  No recent similar episodes. Subjective: remains intubated and heavily sedated.        Medications:  Reviewed    Infusion Medications    nitroGLYCERIN 70 mcg/min (04/03/21 0105)    dexmedetomidine 1.4 mcg/kg/hr (04/03/21 1185)     Scheduled Medications    chlordiazePOXIDE  50 mg Oral TID    budesonide  0.5 mg Nebulization BID    ipratropium-albuterol  1 ampule Inhalation 4x daily    cloNIDine  1 patch Transdermal Weekly    furosemide  40 mg Intravenous BID    famotidine  20 mg Oral BID    thiamine  100 mg Oral Daily    carvedilol  6.25 mg Oral BID WC    amLODIPine  5 mg Oral BID    sodium chloride flush  10 mL Intravenous 2 times per day    enoxaparin  40 mg Subcutaneous Daily     PRN Meds: LORazepam **OR** LORazepam **OR** LORazepam **OR** LORazepam **OR** LORazepam **OR** LORazepam **OR** LORazepam **OR** LORazepam, hydrALAZINE, labetalol, perflutren lipid microspheres, potassium chloride, sodium chloride flush, promethazine **OR** ondansetron, acetaminophen **OR** acetaminophen      Intake/Output Summary (Last 24 hours) at 4/3/2021 0837  Last data filed at 4/3/2021 0500  Gross per 24 hour   Intake 1437.09 ml   Output 3125 ml   Net -1687.91 ml       Physical Exam Performed:    BP (!) 175/80   Pulse 79   Temp 97.6 °F (36.4 °C) (Bladder)   Resp 22   Ht 5' 10\" (1.778 m)   Wt 237 lb 7 oz (107.7 kg)   SpO2 92%   BMI 34.07 kg/m²     General appearance: intubated, sedated  HEENT: Pupils equal, round, and reactive to light. Conjunctivae/corneas clear. Neck: Supple, with full range of motion. No jugular venous distention. Trachea midline. Respiratory:  Normal respiratory effort. Clear to auscultation, bilaterally without Rales/Wheezes/Rhonchi. Cardiovascular: Regular rate and rhythm with normal S1/S2 without murmurs, rubs or gallops. Abdomen: Soft, non-tender, non-distended with normal bowel sounds. Musculoskeletal: No clubbing, cyanosis or edema bilaterally. Full range of motion without deformity. Skin: Skin color, texture, turgor normal.  No rashes or lesions. Neurologic: On ventilator  Psychiatric: On ventilator sedated  Capillary Refill: Brisk,< 3 seconds   Peripheral Pulses: +2 palpable, equal bilaterally       Labs:   No results for input(s): WBC, HGB, HCT, PLT in the last 72 hours. Recent Labs     04/01/21  1502 04/02/21  0434 04/03/21  0610    141 151*   K 3.7 3.5 3.3*   CL 95* 96* 105   CO2 38* 38* 36*   BUN 37* 42* 35*   CREATININE 0.8* 0.7* 0.7*   CALCIUM 10.5 9.6 9.5     No results for input(s): AST, ALT, BILIDIR, BILITOT, ALKPHOS in the last 72 hours. No results for input(s): INR in the last 72 hours.   No results for are possible. XR ABDOMEN (KUB) (SINGLE AP VIEW)   Final Result   Satisfactory enteric tube placement. XR CHEST PORTABLE   Final Result   Support lines and tubes in satisfactory position. No pneumothorax. Worsening bibasilar predominant airspace disease. Small right and trace left   pleural effusions suspected. XR CHEST PORTABLE   Final Result              Assessment/Plan:    Active Hospital Problems    Diagnosis    Acute respiratory failure with hypoxia and hypercapnia ([facility]) [J96.01, J96.02]    COPD exacerbation ([facility]) [J44.1]    Alcohol withdrawal ([facility]) [F10.239]     Acute hypoxic and hypercarbic respiratory failure 2/2 COPD exacerbation  - extubated 3/31  - continue nebs, steroids  - COVID PCR negative  - s/p bronch. BAL NGTD  - s/p paralytics  - completed course of abx without clear evidence of pneumonia  - wean O2 as able. Currently unclear why hypoxia remains so severe    Alcohol dependence with withdrawal  - continue precedex. Continue librium, ativan PRN  - counseling once able    Pulmonary edema  - improved with IV lasix.  Continue intermittent dosing.  - no history of heart failure  - echo with EF 55%    KAROL  - 2/2 ott obstruction  - nephrology consulted  - improved following replacement    Hypertensive urgency  - poorly controlled  - wean off nitro gtt as able  - started on coreg, norvasc, clonidine    Hypernatremia  - 2/2 free water deficit  - improved with improved PO intake but now worsening again  - may need hypotonic IVF  - continue to monitor closely    Constipation  - increasing bowel regimen    DVT Prophylaxis: lovenox  Diet: DIET GENERAL;  Dietary Nutrition Supplements: Standard High Calorie Oral Supplement  Code Status: Full Code    PT/OT Eval Status: pending improvement    Dispo - ICU    Carlos Frost MD

## 2021-04-03 NOTE — PROGRESS NOTES
Pulmonary & Critical Care Medicine ICU Progress Note      Events of Last 24 hours: The patient was extubated, has had agitation and has been on Precedex, chlordiazepoxide. His phenobarbital was discontinued 3/31. He was diuresed effusions were decreased, but he had pulmonary edema, received more Lasix. During the day he was calmer, did eat and drink liquids. The patient has remained disoriented at times, but is able to drink liquids and swallow pills. He is on maximum Precedex at 1.4. Oxygen remains at around 15 L/min. His I/O so far has been - 08635 mL. Afebrile, hemodynamically stable. SaO2 92% on 15 LPM O2. Invasive Lines:      CVC left subclavian 3/25/2021    Art Line left radial        MV: 3/25/2021, extubated 3/31/2021    Recent Labs     04/01/21  0246 04/01/21  0818   PHART 7.440 7.457*   RSI4ONE 57.2* 49.8*   PO2ART 65.4* 53.4*       MV Settings:  Vent Mode: (S) PS Rate Set: 14 bmp/Vt Ordered: 450 mL/ Clementina@[employer])    IV:   nitroGLYCERIN 70 mcg/min (04/03/21 0105)    dexmedetomidine 1.4 mcg/kg/hr (04/03/21 0755)       Vitals:  BP (!) 175/80   Pulse 79   Temp 97.7 °F (36.5 °C) (Bladder)   Resp 21   Ht 5' 10\" (1.778 m)   Wt 237 lb 7 oz (107.7 kg)   SpO2 92%   BMI 34.07 kg/m²         Intake/Output Summary (Last 24 hours) at 4/3/2021 1046  Last data filed at 4/3/2021 0500  Gross per 24 hour   Intake 1437.09 ml   Output 3125 ml   Net -1687.91 ml       EXAM:  General: No distress. Overweight, awake, communicative but disoriented, pleasantly not agitated  Eyes: PERRL. No sclera icterus. No conjunctival injection. ENT: Class III airway, NG. Mucosa dry. Neck: Large and short neck, no JVD   Resp: No accessory muscle use. No crackles. No wheezing. No rhonchi. CV: Regular rate. Regular rhythm. No mumur or rub. No edema. GI: Deferred. Skin: Warm and dry. No nodule on exposed extremities. No rash on exposed extremities. Lymph: Deferred. M/S: No cyanosis. No joint deformity. No clubbing. Address once more alert  DVT prophylaxis. Lovenox. PUD prophylaxis. Pepcid. Discussed with nursing, patient's girlfriend at bedside.       Electronically signed by:  Eyad Ruvalcaba MD    4/3/2021    10:46 AM.

## 2021-04-04 LAB
ANION GAP SERPL CALCULATED.3IONS-SCNC: 6 MMOL/L (ref 3–16)
BUN BLDV-MCNC: 33 MG/DL (ref 7–20)
CALCIUM SERPL-MCNC: 9.5 MG/DL (ref 8.3–10.6)
CHLORIDE BLD-SCNC: 99 MMOL/L (ref 99–110)
CO2: 35 MMOL/L (ref 21–32)
CREAT SERPL-MCNC: 0.7 MG/DL (ref 0.9–1.3)
GFR AFRICAN AMERICAN: >60
GFR NON-AFRICAN AMERICAN: >60
GLUCOSE BLD-MCNC: 109 MG/DL (ref 70–99)
POTASSIUM REFLEX MAGNESIUM: 3.7 MMOL/L (ref 3.5–5.1)
SODIUM BLD-SCNC: 140 MMOL/L (ref 136–145)

## 2021-04-04 PROCEDURE — 94761 N-INVAS EAR/PLS OXIMETRY MLT: CPT

## 2021-04-04 PROCEDURE — 6370000000 HC RX 637 (ALT 250 FOR IP): Performed by: INTERNAL MEDICINE

## 2021-04-04 PROCEDURE — 2000000000 HC ICU R&B

## 2021-04-04 PROCEDURE — 6360000002 HC RX W HCPCS: Performed by: INTERNAL MEDICINE

## 2021-04-04 PROCEDURE — 2580000003 HC RX 258: Performed by: INTERNAL MEDICINE

## 2021-04-04 PROCEDURE — 80048 BASIC METABOLIC PNL TOTAL CA: CPT

## 2021-04-04 PROCEDURE — 2500000003 HC RX 250 WO HCPCS: Performed by: INTERNAL MEDICINE

## 2021-04-04 PROCEDURE — 99233 SBSQ HOSP IP/OBS HIGH 50: CPT | Performed by: INTERNAL MEDICINE

## 2021-04-04 PROCEDURE — 94640 AIRWAY INHALATION TREATMENT: CPT

## 2021-04-04 PROCEDURE — 2700000000 HC OXYGEN THERAPY PER DAY

## 2021-04-04 RX ORDER — NICOTINE 21 MG/24HR
1 PATCH, TRANSDERMAL 24 HOURS TRANSDERMAL DAILY
Status: DISCONTINUED | OUTPATIENT
Start: 2021-04-04 | End: 2021-04-10 | Stop reason: HOSPADM

## 2021-04-04 RX ADMIN — CHLORDIAZEPOXIDE HYDROCHLORIDE 50 MG: 25 CAPSULE ORAL at 09:10

## 2021-04-04 RX ADMIN — CARVEDILOL 6.25 MG: 6.25 TABLET, FILM COATED ORAL at 09:11

## 2021-04-04 RX ADMIN — FAMOTIDINE 20 MG: 20 TABLET ORAL at 20:47

## 2021-04-04 RX ADMIN — Medication 1.4 MCG/KG/HR: at 12:06

## 2021-04-04 RX ADMIN — Medication 1.4 MCG/KG/HR: at 02:41

## 2021-04-04 RX ADMIN — Medication 1.4 MCG/KG/HR: at 23:46

## 2021-04-04 RX ADMIN — LORAZEPAM 2 MG: 2 INJECTION, SOLUTION INTRAMUSCULAR; INTRAVENOUS at 15:29

## 2021-04-04 RX ADMIN — Medication 1.4 MCG/KG/HR: at 15:27

## 2021-04-04 RX ADMIN — CHLORDIAZEPOXIDE HYDROCHLORIDE 50 MG: 25 CAPSULE ORAL at 15:28

## 2021-04-04 RX ADMIN — AMLODIPINE BESYLATE 5 MG: 5 TABLET ORAL at 20:47

## 2021-04-04 RX ADMIN — Medication 1.4 MCG/KG/HR: at 09:30

## 2021-04-04 RX ADMIN — IPRATROPIUM BROMIDE AND ALBUTEROL SULFATE 1 AMPULE: .5; 2.5 SOLUTION RESPIRATORY (INHALATION) at 15:56

## 2021-04-04 RX ADMIN — ENOXAPARIN SODIUM 40 MG: 40 INJECTION SUBCUTANEOUS at 09:09

## 2021-04-04 RX ADMIN — LORAZEPAM 2 MG: 2 INJECTION, SOLUTION INTRAMUSCULAR; INTRAVENOUS at 02:37

## 2021-04-04 RX ADMIN — LORAZEPAM 2 MG: 2 INJECTION, SOLUTION INTRAMUSCULAR; INTRAVENOUS at 22:33

## 2021-04-04 RX ADMIN — Medication 100 MG: at 09:09

## 2021-04-04 RX ADMIN — Medication 1.4 MCG/KG/HR: at 00:46

## 2021-04-04 RX ADMIN — LORAZEPAM 2 MG: 2 INJECTION, SOLUTION INTRAMUSCULAR; INTRAVENOUS at 01:32

## 2021-04-04 RX ADMIN — LORAZEPAM 2 MG: 2 INJECTION, SOLUTION INTRAMUSCULAR; INTRAVENOUS at 17:04

## 2021-04-04 RX ADMIN — SODIUM CHLORIDE, PRESERVATIVE FREE 10 ML: 5 INJECTION INTRAVENOUS at 20:48

## 2021-04-04 RX ADMIN — CARVEDILOL 6.25 MG: 6.25 TABLET, FILM COATED ORAL at 17:14

## 2021-04-04 RX ADMIN — IPRATROPIUM BROMIDE AND ALBUTEROL SULFATE 1 AMPULE: .5; 2.5 SOLUTION RESPIRATORY (INHALATION) at 08:02

## 2021-04-04 RX ADMIN — BUDESONIDE 500 MCG: 0.5 SUSPENSION RESPIRATORY (INHALATION) at 20:08

## 2021-04-04 RX ADMIN — AMLODIPINE BESYLATE 5 MG: 5 TABLET ORAL at 09:10

## 2021-04-04 RX ADMIN — Medication 1.4 MCG/KG/HR: at 18:00

## 2021-04-04 RX ADMIN — LORAZEPAM 2 MG: 2 INJECTION, SOLUTION INTRAMUSCULAR; INTRAVENOUS at 19:00

## 2021-04-04 RX ADMIN — IPRATROPIUM BROMIDE AND ALBUTEROL SULFATE 1 AMPULE: .5; 2.5 SOLUTION RESPIRATORY (INHALATION) at 11:52

## 2021-04-04 RX ADMIN — Medication 1.4 MCG/KG/HR: at 20:48

## 2021-04-04 RX ADMIN — IPRATROPIUM BROMIDE AND ALBUTEROL SULFATE 1 AMPULE: .5; 2.5 SOLUTION RESPIRATORY (INHALATION) at 20:08

## 2021-04-04 RX ADMIN — Medication 1.4 MCG/KG/HR: at 06:22

## 2021-04-04 RX ADMIN — CHLORDIAZEPOXIDE HYDROCHLORIDE 50 MG: 25 CAPSULE ORAL at 20:47

## 2021-04-04 RX ADMIN — LORAZEPAM 2 MG: 2 INJECTION, SOLUTION INTRAMUSCULAR; INTRAVENOUS at 20:47

## 2021-04-04 RX ADMIN — FAMOTIDINE 20 MG: 20 TABLET ORAL at 09:21

## 2021-04-04 RX ADMIN — LORAZEPAM 2 MG: 2 INJECTION, SOLUTION INTRAMUSCULAR; INTRAVENOUS at 10:28

## 2021-04-04 RX ADMIN — SODIUM CHLORIDE, PRESERVATIVE FREE 10 ML: 5 INJECTION INTRAVENOUS at 09:21

## 2021-04-04 RX ADMIN — BUDESONIDE 500 MCG: 0.5 SUSPENSION RESPIRATORY (INHALATION) at 08:02

## 2021-04-04 NOTE — PROGRESS NOTES
Pt exhibiting signs of agitation thus far in shift, stating that he is \"going to be dead in the morning\" and expressing concern about falsehoods in reference to his care. Emotional support provided to patient alongside scheduled librium however agitation persisted and resulted in patient purposely removing his arterial line. While addressing this issue patient continued to make statements towards impending behavior such as throwing objects and attempting to leave the facility. Nonpharmacologic and passive interventions were failing to address problem at hand and therefore patient was placed in two point restraints given concern for both purposeful and non-purposeful movements that may cause harm to patient or impede care by way of removing other medical devices such as his central access or indwelling catheter. Patient educated on need to implement restraints at this time to progress his care and prevent additional infractions to his health. While less than enthusiastic, patient appears to be in agreement at this time. Pressure held to arterial line site following removal and, once hemostasis achieved, was covered with a sterile gauze and transparent dressing. No further complications at this time. Covering MD notified of restraint order. Continuing to monitor.

## 2021-04-04 NOTE — PROGRESS NOTES
04/03/21 2327   Oxygen Therapy/Pulse Ox   O2 Therapy Oxygen   O2 Device High flow nasal cannula   O2 Flow Rate (L/min) 15 L/min   Resp 22   SpO2 92 %

## 2021-04-04 NOTE — PROGRESS NOTES
Hospitalist Progress Note      PCP: Pablo Muñoz MD    Date of Admission: 3/25/2021    Chief Complaint: SOB    Hospital Course:   62 y.o. male who presented to [facility] with shortness of breath that started a couple days ago and got worse. Today, patient was extremely short of breath. 911 was called. Patient's pulse ox was in the 40s on room air. Patient was placed on a nonrebreather, which brought him up to 76s. At that point, patient was placed on CPAP and brought to the emergency department. On CPAP, patient's pulse ox was around 90s. In the emergency department, patient was found to be tachypneic with air hunger and switched on BiPAP. Tolerated BiPAP well. Pulse ox is around low 90s at this time. Has been able to speak after started on BiPAP. Yellow sputum with productive cough past couple days was noted. Has subjective fever and chills. Complains of chest pain in the center of the chest.  Cannot characterize whether it is radiating and cannot tell exactly what kind of pain it is. Apparently, it started in past 24 hours and is connected to shortness of breath. Has some nausea without vomiting  No other accompanying symptoms  Nothing onset makes the patient feel better or worse  Patient is a smoker. No recent COVID-19 test  No recent similar episodes. Subjective: continues with significant withdrawal. Significant hypoxia still. Able to take PO when mentation is better though continues to fluctuate.       Medications:  Reviewed    Infusion Medications    nitroGLYCERIN Stopped (04/03/21 1100)    dexmedetomidine 1.4 mcg/kg/hr (04/04/21 0622)     Scheduled Medications    chlordiazePOXIDE  50 mg Oral TID    budesonide  0.5 mg Nebulization BID    ipratropium-albuterol  1 ampule Inhalation 4x daily    cloNIDine  1 patch Transdermal Weekly    famotidine  20 mg Oral BID    thiamine  100 mg Oral Daily    carvedilol  6.25 mg Oral BID WC    amLODIPine  5 mg Oral BID    sodium chloride flush  10 mL Intravenous 2 times per day    enoxaparin  40 mg Subcutaneous Daily     PRN Meds: LORazepam **OR** LORazepam **OR** LORazepam **OR** LORazepam **OR** LORazepam **OR** LORazepam **OR** LORazepam **OR** LORazepam, hydrALAZINE, labetalol, perflutren lipid microspheres, potassium chloride, sodium chloride flush, promethazine **OR** ondansetron, acetaminophen **OR** acetaminophen      Intake/Output Summary (Last 24 hours) at 4/4/2021 0810  Last data filed at 4/4/2021 0520  Gross per 24 hour   Intake 2054 ml   Output 1950 ml   Net 104 ml       Physical Exam Performed:    /74   Pulse 73   Temp 98.8 °F (37.1 °C) (Bladder)   Resp 23   Ht 5' 10\" (1.778 m)   Wt 237 lb 7 oz (107.7 kg)   SpO2 96%   BMI 34.07 kg/m²     General appearance: intubated, sedated  HEENT: Pupils equal, round, and reactive to light. Conjunctivae/corneas clear. Neck: Supple, with full range of motion. No jugular venous distention. Trachea midline. Respiratory:  Normal respiratory effort. Clear to auscultation, bilaterally without Rales/Wheezes/Rhonchi. Cardiovascular: Regular rate and rhythm with normal S1/S2 without murmurs, rubs or gallops. Abdomen: Soft, non-tender, non-distended with normal bowel sounds. Musculoskeletal: No clubbing, cyanosis or edema bilaterally. Full range of motion without deformity. Skin: Skin color, texture, turgor normal.  No rashes or lesions. Neurologic: On ventilator  Psychiatric: On ventilator sedated  Capillary Refill: Brisk,< 3 seconds   Peripheral Pulses: +2 palpable, equal bilaterally       Labs:   No results for input(s): WBC, HGB, HCT, PLT in the last 72 hours. Recent Labs     04/02/21  0434 04/03/21  0610 04/04/21  0345    151* 140   K 3.5 3.3* 3.7   CL 96* 105 99   CO2 38* 36* 35*   BUN 42* 35* 33*   CREATININE 0.7* 0.7* 0.7*   CALCIUM 9.6 9.5 9.5     No results for input(s): AST, ALT, BILIDIR, BILITOT, ALKPHOS in the last 72 hours.   No results for input(s): INR in stable position. Stable pattern of bilateral airspace disease and possible layered effusions. Pulmonary edema or pneumonia are possible. XR ABDOMEN (KUB) (SINGLE AP VIEW)   Final Result   Satisfactory enteric tube placement. XR CHEST PORTABLE   Final Result   Support lines and tubes in satisfactory position. No pneumothorax. Worsening bibasilar predominant airspace disease. Small right and trace left   pleural effusions suspected. XR CHEST PORTABLE   Final Result              Assessment/Plan:    Active Hospital Problems    Diagnosis    Acute respiratory failure with hypoxia and hypercapnia ([facility]) [J96.01, J96.02]    COPD exacerbation ([facility]) [J44.1]    Alcohol withdrawal ([facility]) [F10.239]     Acute hypoxic and hypercarbic respiratory failure 2/2 COPD exacerbation  - extubated 3/31  - continue nebs, steroids  - COVID PCR negative  - s/p bronch. BAL NGTD  - s/p paralytics  - completed course of abx without clear evidence of pneumonia  - wean O2 as able. Currently unclear why hypoxia remains so severe    Alcohol dependence with withdrawal  - continue precedex. Continue librium, ativan PRN  - counseling once able    Pulmonary edema  - improved with IV lasix.  Continue intermittent dosing.  - no history of heart failure  - echo with EF 55%    KAROL  - 2/2 ott obstruction  - nephrology consulted  - improved following replacement    Hypertensive urgency  - control improved  - weaned off nitro gtt  - started on coreg, norvasc, clonidine    Hypernatremia  - 2/2 free water deficit  - improved with improved PO intake  - continue to monitor closely    Constipation  - improved with bowel regimen    DVT Prophylaxis: lovenox  Diet: DIET GENERAL;  Dietary Nutrition Supplements: Standard High Calorie Oral Supplement  Code Status: Full Code    PT/OT Eval Status: pending improvement    Dispo - ICU    Minesh Kline MD

## 2021-04-04 NOTE — PROGRESS NOTES
agitated. Medications:  Scheduled Meds:   chlordiazePOXIDE  50 mg Oral TID    budesonide  0.5 mg Nebulization BID    ipratropium-albuterol  1 ampule Inhalation 4x daily    cloNIDine  1 patch Transdermal Weekly    famotidine  20 mg Oral BID    thiamine  100 mg Oral Daily    carvedilol  6.25 mg Oral BID WC    amLODIPine  5 mg Oral BID    sodium chloride flush  10 mL Intravenous 2 times per day    enoxaparin  40 mg Subcutaneous Daily       PRN Meds:  LORazepam **OR** LORazepam **OR** LORazepam **OR** LORazepam **OR** LORazepam **OR** LORazepam **OR** LORazepam **OR** LORazepam, hydrALAZINE, labetalol, perflutren lipid microspheres, potassium chloride, sodium chloride flush, promethazine **OR** ondansetron, acetaminophen **OR** acetaminophen    Results:  CBC:   No results for input(s): WBC, HGB, HCT, MCV, PLT in the last 72 hours. BMP:   Recent Labs     04/02/21  0434 04/03/21  0610 04/04/21  0345    151* 140   K 3.5 3.3* 3.7   CL 96* 105 99   CO2 38* 36* 35*   BUN 42* 35* 33*   CREATININE 0.7* 0.7* 0.7*       Cultures:  Bronchoscopy cultures negative    Films:  CXR reviewed by me    Assessment and plan:  Acute respiratory failure, hypoxemic and hypercarbic. Weaned and extubated 3/31/2021. Slightly decreased oxygen requirement, possibly due to diuresis. He does not appear to have mucous plugging at present. COPD exacerbation. On bronchodilator and steroid by nebulized route. Completed 5 days of antibiotics  Bilateral large pleural effusions, pulmonary edema. CXR improved, BUN has improved Alcohol withdrawal, encephalopathy. Possibility of psychosis or ongoing withdrawal.  Increased dose of Librium, on maximum dose of Precedex, receiving as needed pushes of Ativan as well. Should de-escalate over the next few days  KAROL. Renal function improved, though BUN was rising, improved today  Alcoholism. Address once more alert  Smoker. Asking for cigarettes, will start NRT  DVT prophylaxis. Lovenox. PUD prophylaxis. Pepcid. Discussed with nursing, patient.       Electronically signed by:  Rachel Barnett MD    4/4/2021    10:18 AM.

## 2021-04-05 ENCOUNTER — APPOINTMENT (OUTPATIENT)
Dept: GENERAL RADIOLOGY | Age: 61
DRG: 130 | End: 2021-04-05
Payer: MEDICAID

## 2021-04-05 PROBLEM — F17.200 CURRENT SMOKER: Status: ACTIVE | Noted: 2021-04-05

## 2021-04-05 PROBLEM — R45.1 AGITATION REQUIRING SEDATION PROTOCOL: Status: ACTIVE | Noted: 2021-04-05

## 2021-04-05 PROBLEM — F10.90 ALCOHOL USE: Status: ACTIVE | Noted: 2021-04-05

## 2021-04-05 PROBLEM — Z78.9 ALCOHOL USE: Status: ACTIVE | Noted: 2021-04-05

## 2021-04-05 LAB
ANION GAP SERPL CALCULATED.3IONS-SCNC: 7 MMOL/L (ref 3–16)
BASE EXCESS ARTERIAL: 10 (ref -3–3)
BASE EXCESS ARTERIAL: 4.6 MMOL/L (ref -3–3)
BASE EXCESS ARTERIAL: 6.2 MMOL/L (ref -3–3)
BUN BLDV-MCNC: 21 MG/DL (ref 7–20)
CALCIUM IONIZED: 1.28 MMOL/L (ref 1.12–1.32)
CALCIUM SERPL-MCNC: 9.4 MG/DL (ref 8.3–10.6)
CARBOXYHEMOGLOBIN ARTERIAL: 0.6 % (ref 0–1.5)
CARBOXYHEMOGLOBIN ARTERIAL: 1 % (ref 0–1.5)
CHLORIDE BLD-SCNC: 99 MMOL/L (ref 99–110)
CO2: 35 MMOL/L (ref 21–32)
CREAT SERPL-MCNC: 0.6 MG/DL (ref 0.9–1.3)
GFR AFRICAN AMERICAN: >60
GFR NON-AFRICAN AMERICAN: >60
GLUCOSE BLD-MCNC: 102 MG/DL (ref 70–99)
GLUCOSE BLD-MCNC: 119 MG/DL (ref 70–99)
HCO3 ARTERIAL: 33.6 MMOL/L (ref 21–29)
HCO3 ARTERIAL: 33.9 MMOL/L (ref 21–29)
HCO3 ARTERIAL: 38 MMOL/L (ref 21–29)
HEMOGLOBIN, ART, EXTENDED: 14.6 G/DL (ref 13.5–17.5)
HEMOGLOBIN, ART, EXTENDED: 15 G/DL (ref 13.5–17.5)
HEMOGLOBIN: 16.2 GM/DL (ref 13.5–17.5)
LACTATE: 0.41 MMOL/L (ref 0.4–2)
METHEMOGLOBIN ARTERIAL: 0.5 %
METHEMOGLOBIN ARTERIAL: 0.5 %
O2 CONTENT ARTERIAL: 18 ML/DL
O2 CONTENT ARTERIAL: 20 ML/DL
O2 SAT, ARTERIAL: 90.6 %
O2 SAT, ARTERIAL: 93 % (ref 93–100)
O2 SAT, ARTERIAL: 97.6 %
O2 THERAPY: ABNORMAL
O2 THERAPY: ABNORMAL
PCO2 ARTERIAL: 60 MMHG (ref 35–45)
PCO2 ARTERIAL: 72.8 MMHG (ref 35–45)
PCO2 ARTERIAL: 93.4 MM HG (ref 35–45)
PERFORMED ON: ABNORMAL
PH ARTERIAL: 7.22 (ref 7.35–7.45)
PH ARTERIAL: 7.29 (ref 7.35–7.45)
PH ARTERIAL: 7.37 (ref 7.35–7.45)
PO2 ARTERIAL: 115.2 MMHG (ref 75–108)
PO2 ARTERIAL: 57.9 MMHG (ref 75–108)
PO2 ARTERIAL: 87.2 MM HG (ref 75–108)
POC HEMATOCRIT: 48 % (ref 40.5–52.5)
POC POTASSIUM: 3.8 MMOL/L (ref 3.5–5.1)
POC SAMPLE TYPE: ABNORMAL
POC SODIUM: 143 MMOL/L (ref 136–145)
POTASSIUM REFLEX MAGNESIUM: 3.8 MMOL/L (ref 3.5–5.1)
PROCALCITONIN: 0.05 NG/ML (ref 0–0.15)
SODIUM BLD-SCNC: 141 MMOL/L (ref 136–145)
TCO2 ARTERIAL: 35.4 MMOL/L
TCO2 ARTERIAL: 36.1 MMOL/L
TCO2 ARTERIAL: 41 MMOL/L

## 2021-04-05 PROCEDURE — 6360000002 HC RX W HCPCS: Performed by: INTERNAL MEDICINE

## 2021-04-05 PROCEDURE — 84132 ASSAY OF SERUM POTASSIUM: CPT

## 2021-04-05 PROCEDURE — 82947 ASSAY GLUCOSE BLOOD QUANT: CPT

## 2021-04-05 PROCEDURE — 6370000000 HC RX 637 (ALT 250 FOR IP): Performed by: INTERNAL MEDICINE

## 2021-04-05 PROCEDURE — 2580000003 HC RX 258: Performed by: INTERNAL MEDICINE

## 2021-04-05 PROCEDURE — 85014 HEMATOCRIT: CPT

## 2021-04-05 PROCEDURE — 80048 BASIC METABOLIC PNL TOTAL CA: CPT

## 2021-04-05 PROCEDURE — 71045 X-RAY EXAM CHEST 1 VIEW: CPT

## 2021-04-05 PROCEDURE — 82803 BLOOD GASES ANY COMBINATION: CPT

## 2021-04-05 PROCEDURE — 94640 AIRWAY INHALATION TREATMENT: CPT

## 2021-04-05 PROCEDURE — 2500000003 HC RX 250 WO HCPCS: Performed by: INTERNAL MEDICINE

## 2021-04-05 PROCEDURE — 84295 ASSAY OF SERUM SODIUM: CPT

## 2021-04-05 PROCEDURE — 82330 ASSAY OF CALCIUM: CPT

## 2021-04-05 PROCEDURE — 36592 COLLECT BLOOD FROM PICC: CPT

## 2021-04-05 PROCEDURE — 84145 PROCALCITONIN (PCT): CPT

## 2021-04-05 PROCEDURE — 2000000000 HC ICU R&B

## 2021-04-05 PROCEDURE — 83605 ASSAY OF LACTIC ACID: CPT

## 2021-04-05 PROCEDURE — 36600 WITHDRAWAL OF ARTERIAL BLOOD: CPT

## 2021-04-05 PROCEDURE — 2700000000 HC OXYGEN THERAPY PER DAY

## 2021-04-05 PROCEDURE — 94660 CPAP INITIATION&MGMT: CPT

## 2021-04-05 PROCEDURE — 94761 N-INVAS EAR/PLS OXIMETRY MLT: CPT

## 2021-04-05 PROCEDURE — 99233 SBSQ HOSP IP/OBS HIGH 50: CPT | Performed by: INTERNAL MEDICINE

## 2021-04-05 RX ADMIN — BUDESONIDE 500 MCG: 0.5 SUSPENSION RESPIRATORY (INHALATION) at 19:22

## 2021-04-05 RX ADMIN — Medication 1.4 MCG/KG/HR: at 23:40

## 2021-04-05 RX ADMIN — Medication 1.4 MCG/KG/HR: at 14:13

## 2021-04-05 RX ADMIN — Medication 1.4 MCG/KG/HR: at 17:09

## 2021-04-05 RX ADMIN — LORAZEPAM 2 MG: 2 INJECTION, SOLUTION INTRAMUSCULAR; INTRAVENOUS at 07:37

## 2021-04-05 RX ADMIN — IPRATROPIUM BROMIDE AND ALBUTEROL SULFATE 1 AMPULE: .5; 2.5 SOLUTION RESPIRATORY (INHALATION) at 19:21

## 2021-04-05 RX ADMIN — LORAZEPAM 2 MG: 2 INJECTION, SOLUTION INTRAMUSCULAR; INTRAVENOUS at 16:18

## 2021-04-05 RX ADMIN — IPRATROPIUM BROMIDE AND ALBUTEROL SULFATE 1 AMPULE: .5; 2.5 SOLUTION RESPIRATORY (INHALATION) at 08:15

## 2021-04-05 RX ADMIN — FAMOTIDINE 20 MG: 20 TABLET ORAL at 20:10

## 2021-04-05 RX ADMIN — IPRATROPIUM BROMIDE AND ALBUTEROL SULFATE 1 AMPULE: .5; 2.5 SOLUTION RESPIRATORY (INHALATION) at 15:53

## 2021-04-05 RX ADMIN — Medication 1.4 MCG/KG/HR: at 20:40

## 2021-04-05 RX ADMIN — Medication 0.9 MCG/KG/HR: at 04:12

## 2021-04-05 RX ADMIN — LORAZEPAM 4 MG: 2 INJECTION, SOLUTION INTRAMUSCULAR; INTRAVENOUS at 08:42

## 2021-04-05 RX ADMIN — IPRATROPIUM BROMIDE AND ALBUTEROL SULFATE 1 AMPULE: .5; 2.5 SOLUTION RESPIRATORY (INHALATION) at 11:46

## 2021-04-05 RX ADMIN — CHLORDIAZEPOXIDE HYDROCHLORIDE 50 MG: 25 CAPSULE ORAL at 20:10

## 2021-04-05 RX ADMIN — LORAZEPAM 3 MG: 2 INJECTION, SOLUTION INTRAMUSCULAR; INTRAVENOUS at 18:38

## 2021-04-05 RX ADMIN — BUDESONIDE 500 MCG: 0.5 SUSPENSION RESPIRATORY (INHALATION) at 08:25

## 2021-04-05 RX ADMIN — Medication 1.4 MCG/KG/HR: at 11:04

## 2021-04-05 RX ADMIN — AMLODIPINE BESYLATE 5 MG: 5 TABLET ORAL at 20:10

## 2021-04-05 RX ADMIN — ENOXAPARIN SODIUM 40 MG: 40 INJECTION SUBCUTANEOUS at 09:45

## 2021-04-05 RX ADMIN — CHLORDIAZEPOXIDE HYDROCHLORIDE 50 MG: 25 CAPSULE ORAL at 15:48

## 2021-04-05 RX ADMIN — Medication 100 MG: at 15:48

## 2021-04-05 RX ADMIN — SODIUM CHLORIDE, PRESERVATIVE FREE 10 ML: 5 INJECTION INTRAVENOUS at 08:30

## 2021-04-05 RX ADMIN — SODIUM CHLORIDE, PRESERVATIVE FREE 10 ML: 5 INJECTION INTRAVENOUS at 20:10

## 2021-04-05 ASSESSMENT — PAIN SCALES - GENERAL
PAINLEVEL_OUTOF10: 0
PAINLEVEL_OUTOF10: 0

## 2021-04-05 NOTE — PROGRESS NOTES
04/05/21 1148   NIV Type   Skin Protection for O2 Device Yes   Equipment Type v60   Mode Bilevel   Mask Type Full face mask   Mask Size Medium   Settings/Measurements   IPAP 16 cmH20   CPAP/EPAP 8 cmH2O   Rate Ordered 22   Resp 22   FiO2  80 %   Vt Exhaled 576 mL   Mask Leak (lpm) 48 lpm  (large beard)   Comfort Level Good   Using Accessory Muscles No   SpO2 94   Breath Sounds   Right Upper Lobe Diminished   Right Middle Lobe Diminished   Right Lower Lobe Diminished   Left Upper Lobe Diminished   Left Lower Lobe Diminished   Alarm Settings   Alarms On Y

## 2021-04-05 NOTE — CARE COORDINATION
.Case Management/Follow up:    Chart reviewed for length of stay. Hospital day #  11     Unit:  C-2    Diagnosis and current status as per MD progress: acute hypoxic resp. Failure with hypoxia, hypercarbic, alcohol dependence- withdrawal. High dose precedex, librium, and ativan. Pulmonary edema. Anticipated d/c date: pending improvement. ICU LOC at this time    Expected plan for discharge: pending progress and needs. Pt currently in ICU, extubated, on Bipap, high dose precedex, confused. Alcohol abuse- withdrawal.     Potential barriers: none identified    Precert required/date initiated: n/a- will be needed for SNF placement. Confirmed plan with patient and/or family: no. Home vs. SNF vs. [facility] rehab. .. pending progress. Comments: will follow. Will need SA resource packet when able to participate in discussion.

## 2021-04-05 NOTE — PROGRESS NOTES
Pt de sating to 82% and sustaining, pt switched to non- re breather mask, 02 saturation came up to 95%. ABG's done. Informed Heri Connelly and MD ordered BiPAP and repeat ABG's after one hour.

## 2021-04-05 NOTE — PROGRESS NOTES
04/05/21 1924   NIV Type   Skin Assessment Clean, dry, & intact   NIV Started/Stopped On   Equipment Type v60   Mode Bilevel   Mask Type Full face mask   Mask Size Medium   Bonnet size Medium   Settings/Measurements   IPAP 16 cmH20   CPAP/EPAP 8 cmH2O   Rate Ordered 22   Resp 22   FiO2  60 %   I Time/ I Time % 1 s   Vt Exhaled 492 mL   Minute Volume 10.8 Liters   Mask Leak (lpm) 22 lpm   Comfort Level Good   Using Accessory Muscles No   SpO2 95   Breath Sounds   Right Upper Lobe Diminished   Right Middle Lobe Diminished   Right Lower Lobe Diminished   Left Upper Lobe Diminished   Left Lower Lobe Diminished   Alarm Settings   Alarms On Y   Press Low Alarm 6 cmH2O   High Pressure Alarm 30 cmH2O   Apnea (secs) 20 secs   Resp Rate Low Alarm 6   High Respiratory Rate 40 br/min

## 2021-04-05 NOTE — PROGRESS NOTES
Pt lying on the bed with bilateral soft limb restraints, confused and agitated. Sleeping on and off. Pt verbalizing to leave the hospital, explained him about the need to stay for medical care. Pt hallucinating as well. Updated pt's son about his care over the phone. Using PRN ativan and scheduled librium. Shavonne sys in use. Will monitor.

## 2021-04-05 NOTE — PROGRESS NOTES
supportive care and monitoring,    thank you,  Chrissy Escoto RN CDS  [phone]  Options provided:  -- Sepsis ruled out, pneumonia ruled out  -- Sepsis ruled out, pneumonia confirmed POA  -- Sepsis and pneumonia confirmed present on admission  -- Sepsis and pneumonia confirmed, not present on admission  -- Other - I will add my own diagnosis  -- Disagree - Not applicable / Not valid  -- Disagree - Clinically unable to determine / Unknown  -- Refer to Clinical Documentation Reviewer    PROVIDER RESPONSE TEXT:    The diagnosis of sepsis was ruled out, pneumonia ruled out. Query created by:  Santhosh Mancuso on 4/5/2021 2:46 PM      Electronically signed by:  Katerine North MD 4/5/2021 6:35 PM

## 2021-04-05 NOTE — PROGRESS NOTES
Hospitalist Progress Note      PCP: Tonya Martinez MD    Date of Admission: 3/25/2021    Chief Complaint: SOB      Subjective:  He remains dependent on BiPAP. Nursing says that when they tried to wean down the precedex he got very agitated. Medications:  Reviewed    Infusion Medications    dexmedetomidine 1.4 mcg/kg/hr (04/05/21 1413)     Scheduled Medications    nicotine  1 patch Transdermal Daily    chlordiazePOXIDE  50 mg Oral TID    budesonide  0.5 mg Nebulization BID    ipratropium-albuterol  1 ampule Inhalation 4x daily    cloNIDine  1 patch Transdermal Weekly    famotidine  20 mg Oral BID    thiamine  100 mg Oral Daily    carvedilol  6.25 mg Oral BID WC    amLODIPine  5 mg Oral BID    sodium chloride flush  10 mL Intravenous 2 times per day    enoxaparin  40 mg Subcutaneous Daily     PRN Meds: LORazepam **OR** LORazepam **OR** LORazepam **OR** LORazepam **OR** LORazepam **OR** LORazepam **OR** LORazepam **OR** LORazepam, hydrALAZINE, labetalol, perflutren lipid microspheres, potassium chloride, sodium chloride flush, promethazine **OR** ondansetron, acetaminophen **OR** acetaminophen      Intake/Output Summary (Last 24 hours) at 4/5/2021 1624  Last data filed at 4/5/2021 0500  Gross per 24 hour   Intake 1030 ml   Output 1675 ml   Net -645 ml       Physical Exam Performed:    /68   Pulse 62   Temp 99.5 °F (37.5 °C) (Bladder)   Resp 22   Ht 5' 10\" (1.778 m)   Wt 237 lb 7 oz (107.7 kg)   SpO2 94%   BMI 34.07 kg/m²       General appearance:  NAD, appears stated age  HEENT: Pupils equal, round, and reactive to light. Conjunctivae/corneas clear. Neck: Supple, with full range of motion. No jugular venous distention. Trachea midline. Respiratory:  Normal respiratory effort. Clear to auscultation, bilaterally without Rales/Wheezes/Rhonchi. Diminished bilaterally. Cardiovascular: Regular rate and rhythm with normal S1/S2 without murmurs, rubs or gallops.   Abdomen: Soft, non-tender, non-distended with normal bowel sounds. Musculoskeletal: No clubbing, cyanosis or edema bilaterally. Full range of motion without deformity. Skin: Skin color, texture, turgor normal.  No rashes or lesions. Neurologic: no focal deficits but difficult to assess  Psychiatric: sedated  Capillary Refill: Brisk,< 3 seconds   Peripheral Pulses: +2 palpable, equal bilaterally       Labs:   Recent Labs     04/05/21  0255   HGB 16.2     Recent Labs     04/03/21  0610 04/04/21  0345 04/05/21  0425   * 140 141   K 3.3* 3.7 3.8    99 99   CO2 36* 35* 35*   BUN 35* 33* 21*   CREATININE 0.7* 0.7* 0.6*   CALCIUM 9.5 9.5 9.4     No results for input(s): AST, ALT, BILIDIR, BILITOT, ALKPHOS in the last 72 hours. No results for input(s): INR in the last 72 hours. No results for input(s): Betty Castor in the last 72 hours. Urinalysis:      Lab Results   Component Value Date    NITRU Negative 03/28/2021    WBCUA None seen 03/28/2021    BACTERIA Rare 03/28/2021    RBCUA None seen 03/28/2021    BLOODU Negative 03/28/2021    SPECGRAV 1.020 03/28/2021    GLUCOSEU Negative 03/28/2021       Radiology:  XR CHEST PORTABLE   Final Result   New small bilateral pleural effusions         XR CHEST PORTABLE   Final Result   Improving airspace opacities and pleural effusion. XR CHEST PORTABLE   Final Result   Interval decreased but persistent right greater than left pulmonary opacities. Increased small bibasilar opacities, left greater than right, which likely   represent atelectasis and small pleural fluid.          XR CHEST PORTABLE   Final Result   Increased severity of pulmonary edema over the past 24 hours         XR CHEST PORTABLE   Final Result   Unchanged appearance of the chest over the past 24 hours with bilateral   pleural effusions, bibasilar atelectasis and pulmonary edema         XR ABDOMEN (KUB) (SINGLE AP VIEW)   Final Result   Negative supine of abdominal radiographs with the to the emergency department. Yellow sputum with productive cough past couple days was noted. Has subjective fever and chills. Complains of chest pain in the center of the chest.  Patient is a smoker. \"      Acute hypoxic and hypercarbic respiratory failure 2/2 COPD exacerbation  - extubated 3/31. Wean O2 as able. It looks like chest pain was an initial complaint of his, f/u ddimer.   - continue nebs, steroids.  - COVID PCR negative. S/p bronch. BAL NGTD. Completed course of abx without clear evidence of pneumonia.  procalcitonin was negative on admission, f/u repeat. Alcohol dependence with withdrawal  - continue precedex. Continue librium, ativan PRN. Encourage cessation.  - his mother and other family members apparently are surprised about his alcohol withdrawal.  They didn't get the impression that he drank heavily. Reportedly the patient told nursing early on during this admission that he drank \"three fifths\" of liquor per day. The reliability of this statement is unknown. Pulmonary edema  - improved with intermittent IV furosemide. No history of heart failure, echo with EF 55%. KAROL. 2/2 ott obstruction, improved following replacement. Hypertensive urgency  - started on coreg, norvasc, clonidine    Hypernatremia  - 2/2 free water deficit, improved with hydration    Acute metabolic encephalopathy  - due to all of the above issues, treated accordingly. Will get head CT when he is more stable. Constipation  - improved with bowel regimen      DVT Prophylaxis: enoxaparin  Diet: DIET GENERAL;  Dietary Nutrition Supplements: Standard High Calorie Oral Supplement  Code Status: Full Code    PT/OT Eval Status: order when he can participate    Dispo - when respiratory status is stable. Timing unclear at this point. He came from home.       Bennie Bauer MD

## 2021-04-05 NOTE — PROGRESS NOTES
04/05/21 0307   NIV Type   $NIV $Daily Charge   Skin Assessment Clean, dry, & intact   NIV Started/Stopped On   Equipment Type v60   Mode Bilevel   Mask Type Full face mask   Mask Size Medium   Bonnet size Medium   Settings/Measurements   IPAP 16 cmH20   CPAP/EPAP 8 cmH2O   Rate Ordered 22   Resp (!) 34   FiO2  100 %   Vt Exhaled 303 mL   Minute Volume 9.8 Liters   Mask Leak (lpm) 46 lpm   Comfort Level Good   Using Accessory Muscles No   SpO2 94

## 2021-04-05 NOTE — PROGRESS NOTES
Pulmonary & Critical Care Medicine ICU Progress Note      Events of Last 24 hours: Patient continues to be on home BiPAP overnight, patient when seen this morning was continued to be on BiPAP and patient was on 100% oxygen on the BiPAP to maintain a saturation of 97%, patient continues to have increased agitation intermittently and requires Precedex infusion, patient has had T-max of 99.9 °F, patient had normal sinus rhythm on the monitor, patient was hemodynamically maintained, patient had glycemic control which was acceptable, patient has had adequate urine output with cumulative fluid balance of -13.5 L,  No other pertinent review of system could be obtained objectively    Invasive Lines:      CVC left subclavian 3/25/2021    Art Line left radial            Vitals:  /67   Pulse 62   Temp 99.5 °F (37.5 °C) (Bladder)   Resp 22   Ht 5' 10\" (1.778 m)   Wt 237 lb 7 oz (107.7 kg)   SpO2 94%   BMI 34.07 kg/m²         EXAM:  General: In mild respiratory distress on BiPAP when seen  Eyes: PERRL. No sclera icterus. No conjunctival injection. ENT: BiPAP mask intact NG. Mucosa dry. Neck: Large and short neck, no JVD   Resp: No accessory muscle use. Scattered crackles. No wheezing. No rhonchi. CV: Regular rate. Regular rhythm. No mumur or rub. No edema. GI: No hepatosplenomegaly, obese  Skin: Warm and dry. No nodule on exposed extremities. No rash on exposed extremities. Lymph: No cervical lymphadenopathy  M/S: No cyanosis. No joint deformity. No clubbing. Neuro: Having increased agitation without any focal motor deficits  Psych: Disoriented and mildly agitated.      Medications:  Scheduled Meds:   nicotine  1 patch Transdermal Daily    chlordiazePOXIDE  50 mg Oral TID    budesonide  0.5 mg Nebulization BID    ipratropium-albuterol  1 ampule Inhalation 4x daily    cloNIDine  1 patch Transdermal Weekly    famotidine  20 mg Oral BID    thiamine  100 mg Oral Daily    carvedilol  6.25 mg Oral BID WC    amLODIPine  5 mg Oral BID    sodium chloride flush  10 mL Intravenous 2 times per day    enoxaparin  40 mg Subcutaneous Daily       PRN Meds:  LORazepam **OR** LORazepam **OR** LORazepam **OR** LORazepam **OR** LORazepam **OR** LORazepam **OR** LORazepam **OR** LORazepam, hydrALAZINE, labetalol, perflutren lipid microspheres, potassium chloride, sodium chloride flush, promethazine **OR** ondansetron, acetaminophen **OR** acetaminophen    Results:  CBC:   Recent Labs     04/05/21  0255   HGB 16.2     BMP:   Recent Labs     04/03/21  0610 04/04/21  0345 04/05/21  0425   * 140 141   K 3.3* 3.7 3.8    99 99   CO2 36* 35* 35*   BUN 35* 33* 21*   CREATININE 0.7* 0.7* 0.6*       Results for Anna Marie Gordon (MRN [id number]) as of 4/5/2021 21:10   Ref.  Range 4/4/2021 03:45 4/5/2021 02:55 4/5/2021 04:25 4/5/2021 08:35   Sodium Latest Ref Range: 136 - 145 mmol/L 140  141    Potassium Latest Ref Range: 3.5 - 5.1 mmol/L 3.7  3.8    Chloride Latest Ref Range: 99 - 110 mmol/L 99  99    CO2 Latest Ref Range: 21 - 32 mmol/L 35 (H)  35 (H)    BUN Latest Ref Range: 7 - 20 mg/dL 33 (H)  21 (H)    Creatinine Latest Ref Range: 0.9 - 1.3 mg/dL 0.7 (L)  0.6 (L)    Anion Gap Latest Ref Range: 3 - 16  6  7    Calcium, Ion Latest Ref Range: 1.12 - 1.32 mmol/L  1.28     GFR Non- Latest Ref Range: >60  >60  >60    GFR  Latest Ref Range: >60  >60  >60    Lactate, ser/plas Latest Ref Range: 0.40 - 2.00 mmol/L  0.41     Glucose Latest Ref Range: 70 - 99 mg/dL 109 (H)  102 (H)    POC Glucose Latest Ref Range: 70 - 99 mg/dl  119 (H)     Calcium Latest Ref Range: 8.3 - 10.6 mg/dL 9.5  9.4    Procalcitonin Latest Ref Range: 0.00 - 0.15 ng/mL   0.05    POC Sodium Latest Ref Range: 136 - 145 mmol/L  143     POC Potassium Latest Ref Range: 3.5 - 5.1 mmol/L  3.8     POC Hematocrit Latest Ref Range: 40.5 - 52.5 %  48.0     Hemoglobin Quant Latest Ref Range: 13.5 - 17.5 gm/dL  16.2     O2 Therapy Unknown   Unknown Unknown   Hemoglobin, Art, Extended Latest Ref Range: 13.5 - 17.5 g/dL   15.0 14.6   pH, Arterial Latest Ref Range: 7.350 - 7.450   7.218 (L) 7.286 (L) 7.366   pCO2, Arterial Latest Ref Range: 35.0 - 45.0 mmHg  93.4 (HH) 72.8 (HH) 60.0 (H)   pO2, Arterial Latest Ref Range: 75.0 - 108.0 mmHg  87.2 115.2 (H) 57.9 (L)   HCO3, Arterial Latest Ref Range: 21.0 - 29.0 mmol/L  38.0 (H) 33.9 (H) 33.6 (H)   TCO2 (calc), Art Latest Ref Range: Not Established mmol/L  41 36.1 35.4   Base Excess, Arterial Latest Ref Range: -3.0 - 3.0 mmol/L  10 (H) 4.6 (H) 6.2 (H)   O2 Sat, Arterial Latest Ref Range: >92 %  93 97.6 90.6 (L)   O2 Content, Arterial Latest Ref Range: Not Established mL/dL   20 18   Methemoglobin, Arterial Latest Ref Range: <1.5 %   0.5 0.5   Carboxyhgb, Arterial Latest Ref Range: 0.0 - 1.5 %   1.0 0.6     Results for Shannon Fairchild (MRN [id number]) as of 4/5/2021 21:10   Ref. Range 3/27/2021 12:45   CULTURE, RESPIRATORY Unknown Rpt   Gram Stain Result Unknown No WBCs or organisms seen   CULTURE, RESPIRATORY Unknown Normal respiratory neema     ONE XRAY VIEW OF THE CHEST       4/5/2021 12:45 pm       COMPARISON:   04/03/2021       HISTORY:   ORDERING SYSTEM PROVIDED HISTORY: hypoxia   TECHNOLOGIST PROVIDED HISTORY:   Reason for exam:->hypoxia   Reason for Exam: hypoxia   Acuity: Acute   Type of Exam: Initial       FINDINGS:   Left subclavian central venous line remains in place.  Heart size stable. Increased opacity in the lower lung zones with blunting of the costophrenic   angles.  No definite infiltrate or edema           Impression   New small bilateral pleural effusions       ECHO-    Summary   Technically difficult examination. Normal left ventricle systolic function with an estimated ejection fraction   of 55%. No regional wall motion abnormalities are seen. Normal left   ventricular diastolic filling pressure. The right atrium is mildly dilated.    Trace mitral and tricuspid regurgitation. Systolic pulmonary artery pressure (SPAP) is normal and estimated at 34 mmHg   (right atrial pressure 3 mmHg). Assessment and plan:  Acute respiratory failure, hypoxemic and hypercarbic. Weaned and extubated 3/31/2021. BiPAP with oxygen supplementation to keep saturation with 90-94% only  Please titrate down the oxygen as per the above parameters  Pulmonary toilet  Monitor for any worsening hypoventilation and hypercarbia requiring patient to be reintubated  COPD exacerbation. On bronchodilator and steroid by nebulized route. Completed 5 days of antibiotics  Bilateralpleural effusions, pulmonary edema. CXR improved, BUN has improved Alcohol withdrawal, encephalopathy. Possibility of psychosis or ongoing withdrawal.  Increased dose of Librium, on maximum dose of Precedex, receiving as needed pushes of Ativan as well. Should de-escalate over the next few days  Patient may require reintroduction of barbital  KAROL. Renal function improved, though BUN was rising, improved today  Alcoholism. Address once more alert  Smoker. Asking for cigarettes, will start NRT  DVT prophylaxis. Lovenox. PUD prophylaxis. Pepcid.       Case discussed with ICU team      Electronically signed by:  Mirlande Isidro MD    4/5/2021    12:22 PM.

## 2021-04-05 NOTE — PROGRESS NOTES
04/05/21 1553   NIV Type   Skin Assessment Clean, dry, & intact   Skin Protection for O2 Device Yes   Location Nose   Intervention(s) Skin Barrier   NIV Started/Stopped On   Equipment Type V60   Mode Bilevel   Mask Type Full face mask   Mask Size Medium   Settings/Measurements   IPAP 16 cmH20   CPAP/EPAP 8 cmH2O   Rate Ordered 22   Resp 22   FiO2  80 %  (decreased to 70% at this time.)   Vt Exhaled 403 mL   Minute Volume 12.4 Liters   Mask Leak (lpm) 20 lpm   Comfort Level Good   Using Accessory Muscles No   SpO2 94   Alarm Settings   Alarms On Y

## 2021-04-05 NOTE — PROGRESS NOTES
04/05/21 0822   NIV Type   Skin Protection for O2 Device Yes   Equipment Type v60   Mode Bilevel   Mask Type Full face mask   Mask Size Medium   Settings/Measurements   IPAP 16 cmH20   CPAP/EPAP 8 cmH2O   Rate Ordered 22   Resp (!) 31   FiO2  80 %   Vt Exhaled 641 mL   Mask Leak (lpm) 67 lpm  (large beard)   Comfort Level Good   Using Accessory Muscles No   SpO2 93   Breath Sounds   Right Upper Lobe Diminished   Right Middle Lobe Diminished   Right Lower Lobe Diminished   Left Upper Lobe Diminished   Left Lower Lobe Diminished

## 2021-04-06 LAB
ANION GAP SERPL CALCULATED.3IONS-SCNC: 6 MMOL/L (ref 3–16)
BASE EXCESS ARTERIAL: 5.6 MMOL/L (ref -3–3)
BUN BLDV-MCNC: 24 MG/DL (ref 7–20)
CALCIUM SERPL-MCNC: 9.1 MG/DL (ref 8.3–10.6)
CARBOXYHEMOGLOBIN ARTERIAL: 0.3 % (ref 0–1.5)
CHLORIDE BLD-SCNC: 99 MMOL/L (ref 99–110)
CO2: 34 MMOL/L (ref 21–32)
CREAT SERPL-MCNC: 0.6 MG/DL (ref 0.9–1.3)
GFR AFRICAN AMERICAN: >60
GFR NON-AFRICAN AMERICAN: >60
GLUCOSE BLD-MCNC: 108 MG/DL (ref 70–99)
HCO3 ARTERIAL: 32.8 MMOL/L (ref 21–29)
HCT VFR BLD CALC: 39.8 % (ref 40.5–52.5)
HEMOGLOBIN, ART, EXTENDED: 14.1 G/DL (ref 13.5–17.5)
HEMOGLOBIN: 13 G/DL (ref 13.5–17.5)
MCH RBC QN AUTO: 31 PG (ref 26–34)
MCHC RBC AUTO-ENTMCNC: 32.5 G/DL (ref 31–36)
MCV RBC AUTO: 95.3 FL (ref 80–100)
METHEMOGLOBIN ARTERIAL: 0.4 %
O2 CONTENT ARTERIAL: 19 ML/DL
O2 SAT, ARTERIAL: 97.3 %
O2 THERAPY: ABNORMAL
PCO2 ARTERIAL: 58.5 MMHG (ref 35–45)
PDW BLD-RTO: 13.6 % (ref 12.4–15.4)
PH ARTERIAL: 7.37 (ref 7.35–7.45)
PLATELET # BLD: 193 K/UL (ref 135–450)
PMV BLD AUTO: 9.9 FL (ref 5–10.5)
PO2 ARTERIAL: 103.9 MMHG (ref 75–108)
POTASSIUM REFLEX MAGNESIUM: 3.6 MMOL/L (ref 3.5–5.1)
RBC # BLD: 4.18 M/UL (ref 4.2–5.9)
SODIUM BLD-SCNC: 139 MMOL/L (ref 136–145)
TCO2 ARTERIAL: 34.6 MMOL/L
WBC # BLD: 10.2 K/UL (ref 4–11)

## 2021-04-06 PROCEDURE — 97530 THERAPEUTIC ACTIVITIES: CPT

## 2021-04-06 PROCEDURE — 94660 CPAP INITIATION&MGMT: CPT

## 2021-04-06 PROCEDURE — 6370000000 HC RX 637 (ALT 250 FOR IP): Performed by: INTERNAL MEDICINE

## 2021-04-06 PROCEDURE — 97166 OT EVAL MOD COMPLEX 45 MIN: CPT

## 2021-04-06 PROCEDURE — 2500000003 HC RX 250 WO HCPCS: Performed by: INTERNAL MEDICINE

## 2021-04-06 PROCEDURE — 6360000002 HC RX W HCPCS: Performed by: INTERNAL MEDICINE

## 2021-04-06 PROCEDURE — 97163 PT EVAL HIGH COMPLEX 45 MIN: CPT

## 2021-04-06 PROCEDURE — 94761 N-INVAS EAR/PLS OXIMETRY MLT: CPT

## 2021-04-06 PROCEDURE — 97116 GAIT TRAINING THERAPY: CPT

## 2021-04-06 PROCEDURE — 82803 BLOOD GASES ANY COMBINATION: CPT

## 2021-04-06 PROCEDURE — 6370000000 HC RX 637 (ALT 250 FOR IP)

## 2021-04-06 PROCEDURE — 80048 BASIC METABOLIC PNL TOTAL CA: CPT

## 2021-04-06 PROCEDURE — 97535 SELF CARE MNGMENT TRAINING: CPT

## 2021-04-06 PROCEDURE — 94640 AIRWAY INHALATION TREATMENT: CPT

## 2021-04-06 PROCEDURE — 99233 SBSQ HOSP IP/OBS HIGH 50: CPT | Performed by: INTERNAL MEDICINE

## 2021-04-06 PROCEDURE — 85027 COMPLETE CBC AUTOMATED: CPT

## 2021-04-06 PROCEDURE — 2700000000 HC OXYGEN THERAPY PER DAY

## 2021-04-06 PROCEDURE — 2000000000 HC ICU R&B

## 2021-04-06 PROCEDURE — 36592 COLLECT BLOOD FROM PICC: CPT

## 2021-04-06 PROCEDURE — 2580000003 HC RX 258: Performed by: INTERNAL MEDICINE

## 2021-04-06 RX ADMIN — FAMOTIDINE 20 MG: 20 TABLET ORAL at 09:50

## 2021-04-06 RX ADMIN — CARVEDILOL 6.25 MG: 6.25 TABLET, FILM COATED ORAL at 09:50

## 2021-04-06 RX ADMIN — LORAZEPAM 2 MG: 2 INJECTION, SOLUTION INTRAMUSCULAR; INTRAVENOUS at 03:28

## 2021-04-06 RX ADMIN — SODIUM CHLORIDE, PRESERVATIVE FREE 10 ML: 5 INJECTION INTRAVENOUS at 09:51

## 2021-04-06 RX ADMIN — CHLORDIAZEPOXIDE HYDROCHLORIDE 50 MG: 25 CAPSULE ORAL at 09:50

## 2021-04-06 RX ADMIN — Medication: at 11:28

## 2021-04-06 RX ADMIN — AMLODIPINE BESYLATE 5 MG: 5 TABLET ORAL at 09:50

## 2021-04-06 RX ADMIN — IPRATROPIUM BROMIDE AND ALBUTEROL SULFATE 1 AMPULE: .5; 2.5 SOLUTION RESPIRATORY (INHALATION) at 08:23

## 2021-04-06 RX ADMIN — Medication 1.4 MCG/KG/HR: at 15:16

## 2021-04-06 RX ADMIN — Medication 100 MG: at 09:50

## 2021-04-06 RX ADMIN — LORAZEPAM 4 MG: 2 INJECTION, SOLUTION INTRAMUSCULAR; INTRAVENOUS at 06:44

## 2021-04-06 RX ADMIN — LORAZEPAM 2 MG: 2 INJECTION, SOLUTION INTRAMUSCULAR; INTRAVENOUS at 12:13

## 2021-04-06 RX ADMIN — BUDESONIDE 500 MCG: 0.5 SUSPENSION RESPIRATORY (INHALATION) at 08:23

## 2021-04-06 RX ADMIN — BUDESONIDE 500 MCG: 0.5 SUSPENSION RESPIRATORY (INHALATION) at 19:37

## 2021-04-06 RX ADMIN — IPRATROPIUM BROMIDE AND ALBUTEROL SULFATE 1 AMPULE: .5; 2.5 SOLUTION RESPIRATORY (INHALATION) at 15:55

## 2021-04-06 RX ADMIN — ENOXAPARIN SODIUM 40 MG: 40 INJECTION SUBCUTANEOUS at 09:49

## 2021-04-06 RX ADMIN — Medication 1.4 MCG/KG/HR: at 05:20

## 2021-04-06 RX ADMIN — SODIUM CHLORIDE, PRESERVATIVE FREE 10 ML: 5 INJECTION INTRAVENOUS at 20:00

## 2021-04-06 RX ADMIN — LORAZEPAM 4 MG: 2 INJECTION, SOLUTION INTRAMUSCULAR; INTRAVENOUS at 20:00

## 2021-04-06 RX ADMIN — Medication 1.4 MCG/KG/HR: at 02:20

## 2021-04-06 RX ADMIN — CARVEDILOL 6.25 MG: 6.25 TABLET, FILM COATED ORAL at 17:50

## 2021-04-06 RX ADMIN — Medication 1.4 MCG/KG/HR: at 08:27

## 2021-04-06 RX ADMIN — IPRATROPIUM BROMIDE AND ALBUTEROL SULFATE 1 AMPULE: .5; 2.5 SOLUTION RESPIRATORY (INHALATION) at 11:57

## 2021-04-06 RX ADMIN — LORAZEPAM 2 MG: 2 INJECTION, SOLUTION INTRAMUSCULAR; INTRAVENOUS at 01:55

## 2021-04-06 RX ADMIN — AMLODIPINE BESYLATE 5 MG: 5 TABLET ORAL at 19:59

## 2021-04-06 RX ADMIN — IPRATROPIUM BROMIDE AND ALBUTEROL SULFATE 1 AMPULE: .5; 2.5 SOLUTION RESPIRATORY (INHALATION) at 19:37

## 2021-04-06 RX ADMIN — LORAZEPAM 3 MG: 2 INJECTION, SOLUTION INTRAMUSCULAR; INTRAVENOUS at 05:34

## 2021-04-06 RX ADMIN — CHLORDIAZEPOXIDE HYDROCHLORIDE 50 MG: 25 CAPSULE ORAL at 15:17

## 2021-04-06 RX ADMIN — FAMOTIDINE 20 MG: 20 TABLET ORAL at 19:59

## 2021-04-06 RX ADMIN — LORAZEPAM 3 MG: 2 INJECTION, SOLUTION INTRAMUSCULAR; INTRAVENOUS at 13:10

## 2021-04-06 RX ADMIN — CHLORDIAZEPOXIDE HYDROCHLORIDE 50 MG: 25 CAPSULE ORAL at 19:59

## 2021-04-06 ASSESSMENT — PAIN SCALES - GENERAL: PAINLEVEL_OUTOF10: 0

## 2021-04-06 NOTE — PLAN OF CARE
Nutrition Problem #1: Increased nutrient needs  Intervention: Food and/or Nutrient Delivery: Continue Current Diet, Continue Oral Nutrition Supplement  Nutritional Goals: Patient will eat 50% or greater of meals and supplements.

## 2021-04-06 NOTE — PROGRESS NOTES
assistance  Sit to Supine: Unable to assess(up in chair)  Transfers  Stand Step Transfers: 2 Person assistance;Maximum assistance(posterior lean)  Sit to stand: 2 Person assistance;Maximum assistance  Stand to sit: Maximum assistance;2 Person assistance     Cognition  Overall Cognitive Status: Exceptions  Arousal/Alertness: Delayed responses to stimuli  Following Commands: Follows one step commands with repetition  Attention Span: Attends with cues to redirect  Memory: Decreased long term memory;Decreased short term memory  Safety Judgement: Decreased awareness of need for assistance  Problem Solving: Assistance required to generate solutions;Assistance required to identify errors made  Insights: Not aware of deficits  Initiation: Requires cues for all  Sequencing: Requires cues for all                 LUE AROM (degrees)  LUE AROM : WFL  RUE AROM (degrees)  RUE AROM : WFL                      Plan   Plan  Times per week: 4-5x/wk  Current Treatment Recommendations: Strengthening, Endurance Training, Balance Training, Functional Mobility Training, Safety Education & Training      AM-PAC Score        AM-[facility] Inpatient Daily Activity Raw Score: 9 (04/06/21 1539)  AM-PAC Inpatient ADL T-Scale Score : 25.33 (04/06/21 1539)  ADL Inpatient CMS 0-100% Score: 79.59 (04/06/21 1539)  ADL Inpatient CMS G-Code Modifier : CL (04/06/21 1539)    Goals  Short term goals  Time Frame for Short term goals: 1 week (4/13/21)  Short term goal 1: PT will complete LB dressing with min A. Short term goal 2: Pt will complete toileting with CGA.,  Short term goal 3: Pt will complete 15 reps of BUE exercises for increased endurance.  (4/10/21)  Patient Goals   Patient goals : \"to get up to the chair\"       Therapy Time   Individual Concurrent Group Co-treatment   Time In 1308         Time Out 1342         Minutes 34         Timed Code Treatment Minutes: 24 Minutes(10 minutes for evaluation)       Scott Lugo, OTR/L    If pt is unable to be seen after this session, please let this note serve as discharge summary. Please see case management note for discharge disposition. Thank you.

## 2021-04-06 NOTE — PROGRESS NOTES
Physical Therapy    Facility/Department: [facility] C2 CARD TELEMETRY  Initial Assessment    NAME: Tena Hazel  : 1960  MRN: [id number]    Date of Service: 2021    Discharge Recommendations:  Subacute/Skilled Nursing Facility   PT Equipment Recommendations  Equipment Needed: No    Assessment   Body structures, Functions, Activity limitations: Decreased functional mobility ; Decreased balance;Decreased safe awareness;Decreased cognition;Decreased endurance  Assessment: Pt is 61 yo male who presents with diagnosis of COPD Exacerbation. Intubated 3/25 and extubated 3/31. Indep with mobility at baseline. Grossly mod-max A x 2 for standing and taking steps to chair d/t poor balance and posture. Pt limited d/t impaired balance, activity tolerance, posture, and cognition. Pt would benefit from continued skilled therapy to address deficits. Recommend SNF at d/c due to current deficits and impaired safety. Treatment Diagnosis: impaired functional mobility  Specific instructions for Next Treatment: progress mobility as tolerated  Prognosis: Good  Decision Making: High Complexity  PT Education: Goals; General Safety;Gait Training;PT Role;Disease Specific Education;Plan of Care; Functional Mobility Training;Transfer Training  Patient Education: Pt educated on importance of OOB mobility and safe mobility -- pt verbalized understanding but will require reinforcement  Barriers to Learning: cognition  REQUIRES PT FOLLOW UP: Yes  Activity Tolerance  Activity Tolerance: Patient Tolerated treatment well;Patient limited by fatigue;Patient limited by endurance; Patient limited by cognitive status  Activity Tolerance: Seated in chair /84, HR 99; SpO2 02% on 7L high flow       Patient Diagnosis(es): The primary encounter diagnosis was COPD exacerbation ([facility] [address] Diagnoses of Acute respiratory failure with hypoxia and hypercapnia (HCC) and Cough were also pertinent to this visit.      has a past medical history of COPD (chronic obstructive pulmonary disease) (HCC) and Hypertension. has no past surgical history on file. Restrictions  Restrictions/Precautions  Restrictions/Precautions: General Precautions, Fall Risk  Position Activity Restriction  Other position/activity restrictions: up with assist  Vision/Hearing  Vision: Impaired  Vision Exceptions: Wears glasses for reading     Subjective  General  Chart Reviewed: Yes  Patient assessed for rehabilitation services?: Yes  Response To Previous Treatment: Not applicable  Family / Caregiver Present: Yes(mom)  Referring Practitioner: CODI Mireles  Referral Date : 04/06/21  Diagnosis: COPD Exacerbation  Follows Commands: Within Functional Limits  General Comment  Comments: Pt resting in bed on approach; RN cleared pt for therapy  Subjective  Subjective: pt agreeable to therapy  Pain Screening  Patient Currently in Pain: Denies    Orientation  Orientation  Overall Orientation Status: Impaired  Orientation Level: Oriented to place;Oriented to person;Disoriented to time;Disoriented to situation  Social/Functional History  Social/Functional History  Lives With: Alone(girlfriend sometimes there)  Type of Home: Apartment  Home Layout: One level  Home Access: Stairs to enter with rails  Entrance Stairs - Number of Steps: 12  Entrance Stairs - Rails: Right  Bathroom Shower/Tub: Tub/Shower unit  Bathroom Toilet: Standard  ADL Assistance: Independent  Homemaking Assistance: Independent  Homemaking Responsibilities: Yes  Ambulation Assistance: Independent  Transfer Assistance: Independent  Active : Yes  Occupation: Full time employment  Type of occupation: upolstery    Objective     RLE AROM: WFL  LLE AROM : WFL  Strength RLE: WFL  Strength LLE: WFL        Bed mobility  Supine to Sit: Moderate assistance;2 Person assistance(HOB elevated, cues for technique)  Sit to Supine: Unable to assess(pt up in chair at end of session)     Transfers  Sit to Stand:  Moderate Assistance;2 Person Assistance;Maximum Assistance(mod A x 2 for 2 reps from EOB with HHA. Max A x 2 to stand from chair with bilateral knees blocked. No buckling noted.)  Stand to sit: Moderate Assistance;2 Person Assistance     Ambulation  Ambulation?: Yes  Ambulation 1  Surface: level tile  Device: Hand-Held Assist  Assistance: Moderate assistance;2 Person assistance;Maximum assistance  Quality of Gait: Poor posture with significant hip flexion. Max cues for increased hip extension with some improvement noted. Shuffling gait with poor balance  Gait Deviations: Shuffles; Slow Gabrielle;Decreased step length;Decreased step height  Distance: 3 ft to chair     Balance  Sitting - Static: Good  Sitting - Dynamic: Fair;+  Standing - Static: Poor  Standing - Dynamic: Poor  Comments: Mod-max A x 2 for standing balance d/t poor posture and decreased hip extension        Plan   Plan  Times per week: 3-5x/wk  Times per day: Daily  Specific instructions for Next Treatment: progress mobility as tolerated  Current Treatment Recommendations: Strengthening, Neuromuscular Re-education, Safety Education & Training, Balance Training, Endurance Training, Functional Mobility Training, Transfer Training, Gait Training, Equipment Evaluation, Education, & procurement, Patient/Caregiver Education & Training  Safety Devices  Type of devices: All fall risk precautions in place, Call light within reach, Gait belt, Patient at risk for falls, Nurse notified, Chair alarm in place, Left in chair                        AM-PAC Score  AM-PAC Inpatient Mobility Raw Score : 12 (04/06/21 1520)  AM-PAC Inpatient T-Scale Score : 35.33 (04/06/21 1520)  Mobility Inpatient CMS 0-100% Score: 68.66 (04/06/21 1520)  Mobility Inpatient CMS G-Code Modifier : CL (04/06/21 1520)          Goals  Short term goals  Time Frame for Short term goals: 1 week (4/13) unless otherwise specified  Short term goal 1: Pt will be CGA for bed mobility.   Short term goal 2: Pt will be min A x 2 for sit<>stand and bed<>chair transfers with LRAD. Short term goal 3: Pt will ambulate 25 ft with mod A x 2 and LRAD. Short term goal 4: 4/10: Pt will participate in 12-15 reps of BLE exercises to promote strength and activity tolerance. Patient Goals   Patient goals : \"to get better\"       Therapy Time   Individual Concurrent Group Co-treatment   Time In 1308         Time Out 1342         Minutes 34         Timed Code Treatment Minutes: Patrice Rod [id number], PT, DPT  If pt is unable to be seen after this session, please let this note serve as discharge summary. Please see case management note for discharge disposition. Thank you.

## 2021-04-06 NOTE — PROGRESS NOTES
Hospitalist Progress Note      PCP: Radha Lowry MD    Date of Admission: 3/25/2021    Chief Complaint: SOB      Subjective:  He remains on continuous BiPAP. More alert this AM.  He is able to follow simple commands. He seems impulsive and restless but not necessarily agitated. He is still on precedex. Medications:  Reviewed    Infusion Medications    dexmedetomidine 1.4 mcg/kg/hr (04/06/21 0520)     Scheduled Medications    nicotine  1 patch Transdermal Daily    chlordiazePOXIDE  50 mg Oral TID    budesonide  0.5 mg Nebulization BID    ipratropium-albuterol  1 ampule Inhalation 4x daily    cloNIDine  1 patch Transdermal Weekly    famotidine  20 mg Oral BID    thiamine  100 mg Oral Daily    carvedilol  6.25 mg Oral BID WC    amLODIPine  5 mg Oral BID    sodium chloride flush  10 mL Intravenous 2 times per day    enoxaparin  40 mg Subcutaneous Daily     PRN Meds: LORazepam **OR** LORazepam **OR** LORazepam **OR** LORazepam **OR** LORazepam **OR** LORazepam **OR** LORazepam **OR** LORazepam, hydrALAZINE, labetalol, perflutren lipid microspheres, potassium chloride, sodium chloride flush, promethazine **OR** ondansetron, acetaminophen **OR** acetaminophen      Intake/Output Summary (Last 24 hours) at 4/6/2021 0601  Last data filed at 4/6/2021 0400  Gross per 24 hour   Intake 1972.5 ml   Output 985 ml   Net 987.5 ml       Physical Exam Performed:    /81   Pulse 69   Temp 99.5 °F (37.5 °C) (Bladder)   Resp 22   Ht 5' 10\" (1.778 m)   Wt 237 lb 7 oz (107.7 kg)   SpO2 94%   BMI 34.07 kg/m²       General appearance:  NAD, appears stated age  HEENT: Pupils equal, round, and reactive to light. Conjunctivae/corneas clear. Neck: Supple, with full range of motion. No jugular venous distention. Trachea midline. Respiratory:  Normal respiratory effort. Clear to auscultation, bilaterally without Rales/Wheezes/Rhonchi. Diminished bilaterally.   Cardiovascular: Regular rate and rhythm with normal S1/S2 without murmurs, rubs or gallops. Abdomen: Soft, non-tender, non-distended with normal bowel sounds. Musculoskeletal: No clubbing, cyanosis or edema bilaterally. Full range of motion without deformity. Skin: Skin color, texture, turgor normal.  No rashes or lesions. Neurologic: no focal deficits but difficult to assess  Psychiatric: sedated  Capillary Refill: Brisk,< 3 seconds   Peripheral Pulses: +2 palpable, equal bilaterally       Labs:   Recent Labs     04/05/21  0255 04/06/21 0420   WBC  --  10.2   HGB 16.2 13.0*   HCT  --  39.8*   PLT  --  193     Recent Labs     04/04/21  0345 04/05/21  0425 04/06/21  0420    141 139   K 3.7 3.8 3.6   CL 99 99 99   CO2 35* 35* 34*   BUN 33* 21* 24*   CREATININE 0.7* 0.6* 0.6*   CALCIUM 9.5 9.4 9.1     No results for input(s): AST, ALT, BILIDIR, BILITOT, ALKPHOS in the last 72 hours. No results for input(s): INR in the last 72 hours. No results for input(s): Ivett Elder in the last 72 hours. Urinalysis:      Lab Results   Component Value Date    NITRU Negative 03/28/2021    WBCUA None seen 03/28/2021    BACTERIA Rare 03/28/2021    RBCUA None seen 03/28/2021    BLOODU Negative 03/28/2021    SPECGRAV 1.020 03/28/2021    GLUCOSEU Negative 03/28/2021       Radiology:  XR CHEST PORTABLE   Final Result   New small bilateral pleural effusions         XR CHEST PORTABLE   Final Result   Improving airspace opacities and pleural effusion. XR CHEST PORTABLE   Final Result   Interval decreased but persistent right greater than left pulmonary opacities. Increased small bibasilar opacities, left greater than right, which likely   represent atelectasis and small pleural fluid.          XR CHEST PORTABLE   Final Result   Increased severity of pulmonary edema over the past 24 hours         XR CHEST PORTABLE   Final Result   Unchanged appearance of the chest over the past 24 hours with bilateral   pleural effusions, bibasilar atelectasis and pulmonary edema         XR ABDOMEN (KUB) (SINGLE AP VIEW)   Final Result   Negative supine of abdominal radiographs with the reservation that there is   minimal bowel gas. If there is increasing distention this could be due to   fluid-filled small bowel without bowel air, ascites, or even free air as   these images were obtained supine. XR CHEST PORTABLE   Final Result   Mildly increased bilateral pulmonary disease over the past 24 hours, likely   due to increasing edema. Bilateral pleural effusions without significant change remain present         XR CHEST PORTABLE   Final Result   Persistent bibasilar airspace disease and pleural effusions. Mild cardiac   silhouette enlargement. No change in life support. XR CHEST PORTABLE   Final Result   Supportive tubing projects in stable positions. Continued bibasilar airspace disease, with possible layered pleural effusions. XR CHEST PORTABLE   Final Result   Supportive tubing is in stable position. Stable pattern of bilateral airspace disease and possible layered effusions. Pulmonary edema or pneumonia are possible. XR ABDOMEN (KUB) (SINGLE AP VIEW)   Final Result   Satisfactory enteric tube placement. XR CHEST PORTABLE   Final Result   Support lines and tubes in satisfactory position. No pneumothorax. Worsening bibasilar predominant airspace disease. Small right and trace left   pleural effusions suspected. XR CHEST PORTABLE   Final Result              Assessment/Plan:    Active Hospital Problems    Diagnosis    Alcohol use [Z72.89]    Agitation requiring sedation protocol [R45.1]    Current smoker [F17.200]    Acute respiratory failure with hypoxia and hypercapnia (HCC) [J96.01, J96.02]    COPD exacerbation (HCC) [J44.1]    Alcohol withdrawal [facility]) [F10.239]        \"62 y.o. male [facility] that started a couple days ago and got worse.   Today, patient was extremely short of breath. 911 was called. Patient's pulse ox was in the 40s on room air. Patient was placed on a nonrebreather, which brought him up to 76s. At that point, patient was placed on CPAP and brought to the emergency department. Yellow sputum with productive cough past couple days was noted. Has subjective fever and chills. Complains of chest pain in the center of the chest.  Patient is a smoker. \"      Acute hypoxic and hypercarbic respiratory failure 2/2 COPD exacerbation  - extubated 3/31. Wean O2 as able. It looks like chest pain was an initial complaint of his, f/u ddimer (reordered). - continue nebs, steroids.  - COVID PCR negative. S/p bronch. BAL NGTD. Completed course of abx without clear evidence of pneumonia.  procalcitonin was negative on admission, negative again on 4/5 repeat. At this point a rapid flu swab wouldn't affect management. Alcohol dependence with withdrawal  - continue precedex. Continue librium, ativan PRN. Encourage cessation.  - his mother and other family members apparently are surprised about his alcohol withdrawal.  They didn't get the impression that he drank heavily. Reportedly the patient told nursing early on during this admission that he drank \"three fifths\" of liquor per day. The reliability of this statement is unknown. Pulmonary edema  - improved with intermittent IV furosemide. No history of heart failure, echo with EF 55%. KAROL. 2/2 ott obstruction, improved following replacement. Hypertensive urgency  - started on coreg, norvasc, clonidine    Hypernatremia  - 2/2 free water deficit, improved with hydration    Acute metabolic encephalopathy  - due to all of the above issues, treated accordingly. Will get head CT when he is more stable.     Constipation  - improved with bowel regimen      DVT Prophylaxis: enoxaparin  Diet: DIET GENERAL;  Dietary Nutrition Supplements: Standard High Calorie Oral Supplement  Code Status: Full Code    PT/OT Eval Status: order when he can participate    Dispo - when respiratory status is stable. Timing unclear at this point. He came from home.       Sandra Madrid MD

## 2021-04-06 NOTE — PROGRESS NOTES
Rounds    Goals:  Patient will eat 50% or greater of meals and supplements.        Nutrition Monitoring and Evaluation:   Behavioral-Environmental Outcomes:  None Identified   Food/Nutrient Intake Outcomes:  Supplement Intake, Food and Nutrient Intake  Physical Signs/Symptoms Outcomes:  Biochemical Data, Nutrition Focused Physical Findings     Discharge Planning:    Continue current diet     Electronically signed by Mia Noonan MS, RD, LD on 4/6/21 at 1:39 PM EDT    Contact: Office: [phone]; [address]: [address]

## 2021-04-06 NOTE — PROGRESS NOTES
04/06/21 0405   NIV Type   $NIV $Daily Charge   Skin Assessment Clean, dry, & intact   Skin Protection for O2 Device Yes   NIV Started/Stopped On   Equipment Type V60   Mode Bilevel   Mask Type Full face mask   Mask Size Medium   Settings/Measurements   IPAP 16 cmH20   CPAP/EPAP 8 cmH2O   Rate Ordered 22   Resp 30   FiO2  60 %   Vt Exhaled 421 mL   Minute Volume 112.2 Liters   Mask Leak (lpm) 20 lpm   Comfort Level Good   Using Accessory Muscles No   SpO2 96   Breath Sounds   Right Upper Lobe Diminished   Right Middle Lobe Diminished   Right Lower Lobe Diminished   Left Upper Lobe Diminished   Left Lower Lobe Diminished   Alarm Settings   Alarms On Y

## 2021-04-06 NOTE — CARE COORDINATION
Writer met with Lupis/Pat ELLIS, she will follow pt for length of stay needs. Pt remains on Precedex drip, down to 6L/NC.   ROYA Villalta-ALEN

## 2021-04-06 NOTE — PROGRESS NOTES
04/05/21 8321   NIV Type   NIV Started/Stopped On   Equipment Type V60   Mode Bilevel   Mask Type Full face mask   Mask Size Medium   Settings/Measurements   IPAP 16 cmH20   CPAP/EPAP 8 cmH2O   Rate Ordered 22   Resp 25   FiO2  60 %   Vt Exhaled 583 mL   Minute Volume 11.7 Liters   Mask Leak (lpm) 6 lpm   Comfort Level Good   Using Accessory Muscles No   SpO2 95   Breath Sounds   Right Upper Lobe Diminished   Right Middle Lobe Diminished   Right Lower Lobe Diminished   Left Upper Lobe Diminished   Left Lower Lobe Diminished   Alarm Settings   Alarms On Y   Press Low Alarm 6 cmH2O   High Pressure Alarm 30 cmH2O   Delay Alarm 20 sec(s)   Resp Rate Low Alarm 6   High Respiratory Rate 40 br/min

## 2021-04-07 ENCOUNTER — APPOINTMENT (OUTPATIENT)
Dept: CT IMAGING | Age: 61
DRG: 130 | End: 2021-04-07
Payer: MEDICAID

## 2021-04-07 PROBLEM — R79.89 ELEVATED D-DIMER: Status: ACTIVE | Noted: 2021-04-07

## 2021-04-07 LAB
ANION GAP SERPL CALCULATED.3IONS-SCNC: 8 MMOL/L (ref 3–16)
BUN BLDV-MCNC: 16 MG/DL (ref 7–20)
CALCIUM SERPL-MCNC: 9.1 MG/DL (ref 8.3–10.6)
CHLORIDE BLD-SCNC: 98 MMOL/L (ref 99–110)
CO2: 33 MMOL/L (ref 21–32)
CREAT SERPL-MCNC: 0.7 MG/DL (ref 0.9–1.3)
D DIMER: 1740 NG/ML DDU (ref 0–229)
GFR AFRICAN AMERICAN: >60
GFR NON-AFRICAN AMERICAN: >60
GLUCOSE BLD-MCNC: 100 MG/DL (ref 70–99)
HCT VFR BLD CALC: 39.1 % (ref 40.5–52.5)
HEMOGLOBIN: 12.7 G/DL (ref 13.5–17.5)
MAGNESIUM: 1.9 MG/DL (ref 1.8–2.4)
MCH RBC QN AUTO: 30.9 PG (ref 26–34)
MCHC RBC AUTO-ENTMCNC: 32.5 G/DL (ref 31–36)
MCV RBC AUTO: 95.2 FL (ref 80–100)
PDW BLD-RTO: 13.5 % (ref 12.4–15.4)
PLATELET # BLD: 176 K/UL (ref 135–450)
PMV BLD AUTO: 9.6 FL (ref 5–10.5)
POTASSIUM REFLEX MAGNESIUM: 3.4 MMOL/L (ref 3.5–5.1)
RBC # BLD: 4.11 M/UL (ref 4.2–5.9)
SODIUM BLD-SCNC: 139 MMOL/L (ref 136–145)
WBC # BLD: 11.4 K/UL (ref 4–11)

## 2021-04-07 PROCEDURE — 85379 FIBRIN DEGRADATION QUANT: CPT

## 2021-04-07 PROCEDURE — 2500000003 HC RX 250 WO HCPCS: Performed by: INTERNAL MEDICINE

## 2021-04-07 PROCEDURE — 6360000004 HC RX CONTRAST MEDICATION: Performed by: INTERNAL MEDICINE

## 2021-04-07 PROCEDURE — 6370000000 HC RX 637 (ALT 250 FOR IP): Performed by: INTERNAL MEDICINE

## 2021-04-07 PROCEDURE — 83735 ASSAY OF MAGNESIUM: CPT

## 2021-04-07 PROCEDURE — 99233 SBSQ HOSP IP/OBS HIGH 50: CPT | Performed by: INTERNAL MEDICINE

## 2021-04-07 PROCEDURE — 94640 AIRWAY INHALATION TREATMENT: CPT

## 2021-04-07 PROCEDURE — 2000000000 HC ICU R&B

## 2021-04-07 PROCEDURE — 85027 COMPLETE CBC AUTOMATED: CPT

## 2021-04-07 PROCEDURE — 94660 CPAP INITIATION&MGMT: CPT

## 2021-04-07 PROCEDURE — 94761 N-INVAS EAR/PLS OXIMETRY MLT: CPT

## 2021-04-07 PROCEDURE — 6360000002 HC RX W HCPCS: Performed by: INTERNAL MEDICINE

## 2021-04-07 PROCEDURE — 80048 BASIC METABOLIC PNL TOTAL CA: CPT

## 2021-04-07 PROCEDURE — 70450 CT HEAD/BRAIN W/O DYE: CPT

## 2021-04-07 PROCEDURE — 2700000000 HC OXYGEN THERAPY PER DAY

## 2021-04-07 PROCEDURE — 2580000003 HC RX 258: Performed by: INTERNAL MEDICINE

## 2021-04-07 PROCEDURE — 71260 CT THORAX DX C+: CPT

## 2021-04-07 RX ADMIN — BUDESONIDE 500 MCG: 0.5 SUSPENSION RESPIRATORY (INHALATION) at 08:20

## 2021-04-07 RX ADMIN — FAMOTIDINE 20 MG: 20 TABLET ORAL at 09:26

## 2021-04-07 RX ADMIN — BUDESONIDE 500 MCG: 0.5 SUSPENSION RESPIRATORY (INHALATION) at 21:10

## 2021-04-07 RX ADMIN — IOPAMIDOL 75 ML: 755 INJECTION, SOLUTION INTRAVENOUS at 10:47

## 2021-04-07 RX ADMIN — FAMOTIDINE 20 MG: 20 TABLET ORAL at 23:13

## 2021-04-07 RX ADMIN — CHLORDIAZEPOXIDE HYDROCHLORIDE 50 MG: 25 CAPSULE ORAL at 09:25

## 2021-04-07 RX ADMIN — Medication 0.6 MCG/KG/HR: at 10:22

## 2021-04-07 RX ADMIN — AMLODIPINE BESYLATE 5 MG: 5 TABLET ORAL at 09:26

## 2021-04-07 RX ADMIN — Medication 100 MG: at 09:26

## 2021-04-07 RX ADMIN — LORAZEPAM 4 MG: 2 INJECTION, SOLUTION INTRAMUSCULAR; INTRAVENOUS at 00:13

## 2021-04-07 RX ADMIN — IPRATROPIUM BROMIDE AND ALBUTEROL SULFATE 1 AMPULE: .5; 2.5 SOLUTION RESPIRATORY (INHALATION) at 21:10

## 2021-04-07 RX ADMIN — SODIUM CHLORIDE, PRESERVATIVE FREE 10 ML: 5 INJECTION INTRAVENOUS at 09:31

## 2021-04-07 RX ADMIN — CHLORDIAZEPOXIDE HYDROCHLORIDE 50 MG: 25 CAPSULE ORAL at 14:22

## 2021-04-07 RX ADMIN — ENOXAPARIN SODIUM 40 MG: 40 INJECTION SUBCUTANEOUS at 09:28

## 2021-04-07 RX ADMIN — CHLORDIAZEPOXIDE HYDROCHLORIDE 50 MG: 25 CAPSULE ORAL at 23:12

## 2021-04-07 RX ADMIN — AMLODIPINE BESYLATE 5 MG: 5 TABLET ORAL at 23:12

## 2021-04-07 RX ADMIN — CARVEDILOL 6.25 MG: 6.25 TABLET, FILM COATED ORAL at 17:11

## 2021-04-07 RX ADMIN — IPRATROPIUM BROMIDE AND ALBUTEROL SULFATE 1 AMPULE: .5; 2.5 SOLUTION RESPIRATORY (INHALATION) at 08:20

## 2021-04-07 RX ADMIN — CARVEDILOL 6.25 MG: 6.25 TABLET, FILM COATED ORAL at 07:47

## 2021-04-07 RX ADMIN — LORAZEPAM 4 MG: 2 INJECTION, SOLUTION INTRAMUSCULAR; INTRAVENOUS at 01:58

## 2021-04-07 RX ADMIN — IPRATROPIUM BROMIDE AND ALBUTEROL SULFATE 1 AMPULE: .5; 2.5 SOLUTION RESPIRATORY (INHALATION) at 16:27

## 2021-04-07 RX ADMIN — SODIUM CHLORIDE, PRESERVATIVE FREE 10 ML: 5 INJECTION INTRAVENOUS at 23:13

## 2021-04-07 RX ADMIN — IPRATROPIUM BROMIDE AND ALBUTEROL SULFATE 1 AMPULE: .5; 2.5 SOLUTION RESPIRATORY (INHALATION) at 12:23

## 2021-04-07 ASSESSMENT — PAIN SCALES - PAIN ASSESSMENT IN ADVANCED DEMENTIA (PAINAD)
CONSOLABILITY: 0
CONSOLABILITY: 0
BODYLANGUAGE: 1
TOTALSCORE: 2
TOTALSCORE: 1
CONSOLABILITY: 0
FACIALEXPRESSION: 0
TOTALSCORE: 2
BREATHING: 1
CONSOLABILITY: 0
BODYLANGUAGE: 0
NEGVOCALIZATION: 0
CONSOLABILITY: 0
TOTALSCORE: 2
FACIALEXPRESSION: 0
TOTALSCORE: 1
FACIALEXPRESSION: 0
NEGVOCALIZATION: 0
FACIALEXPRESSION: 0
NEGVOCALIZATION: 0
BODYLANGUAGE: 0
BREATHING: 1

## 2021-04-07 NOTE — PROGRESS NOTES
Pulmonary & Critical Care Medicine ICU Progress Note      Events of Last 24 hours: Patient continues to be on BiPAP overnight and patient's oxygen requirements on the BiPAP had come down to 60%, patient was taken off the BiPAP this morning and was placed on 6 L of high flow oxygen with saturation of 94% when seen, patient was still somewhat confused and agitated, patient continues to have increased agitation intermittently and requires Precedex infusion which is being tapered, patient has had T-max of 99.5 °F, patient did not have any autonomic instability when seen this morning and did not have any tremors patient had normal sinus rhythm on the monitor, patient was hemodynamically maintained, patient had glycemic control which was acceptable, patient has had adequate urine output with cumulative fluid balance of -13.5 L,  No other pertinent review of system could be obtained objectively    Invasive Lines:      CVC left subclavian 3/25/2021        Vitals:  BP (!) 147/79   Pulse 90   Temp 99.2 °F (37.3 °C)   Resp 19   Ht 5' 10\" (1.778 m)   Wt 237 lb 7 oz (107.7 kg)   SpO2 98%   BMI 34.07 kg/m²         EXAM:  General: In mild respiratory distress off BiPAP when seen  Eyes: PERRL. No sclera icterus. No conjunctival injection. ENT: BiPAP mask intact NG. Mucosa dry. Neck: Large and short neck, no JVD   Resp: No accessory muscle use. Scattered crackles. No wheezing. No rhonchi. CV: Regular rate. Regular rhythm. No mumur or rub. No edema. GI: No hepatosplenomegaly, obese  Skin: Warm and dry. No nodule on exposed extremities. No rash on exposed extremities. Lymph: No cervical lymphadenopathy  M/S: No cyanosis. No joint deformity. No clubbing. Neuro: Having increased agitation without any focal motor deficits  Psych: Disoriented and mildly agitated.   Intermittently    Medications:  Scheduled Meds:   nicotine  1 patch Transdermal Daily    chlordiazePOXIDE  50 mg Oral TID    budesonide  0.5 mg Nebulization BID    ipratropium-albuterol  1 ampule Inhalation 4x daily    cloNIDine  1 patch Transdermal Weekly    famotidine  20 mg Oral BID    thiamine  100 mg Oral Daily    carvedilol  6.25 mg Oral BID WC    amLODIPine  5 mg Oral BID    sodium chloride flush  10 mL Intravenous 2 times per day    enoxaparin  40 mg Subcutaneous Daily       PRN Meds:  LORazepam **OR** LORazepam **OR** LORazepam **OR** LORazepam **OR** LORazepam **OR** LORazepam **OR** LORazepam **OR** LORazepam, hydrALAZINE, labetalol, perflutren lipid microspheres, potassium chloride, sodium chloride flush, promethazine **OR** ondansetron, acetaminophen **OR** acetaminophen    Results:  CBC:   Recent Labs     04/05/21  0255 04/06/21 0420   WBC  --  10.2   HGB 16.2 13.0*   HCT  --  39.8*   MCV  --  95.3   PLT  --  193     BMP:   Recent Labs     04/04/21  0345 04/05/21  0425 04/06/21  0420    141 139   K 3.7 3.8 3.6   CL 99 99 99   CO2 35* 35* 34*   BUN 33* 21* 24*   CREATININE 0.7* 0.6* 0.6*     Results for Ino Meng (MRN [id number]) as of 4/6/2021 20:23   Ref. Range 3/27/2021 12:45   Gram Stain Result Unknown No WBCs or organisms seen   CULTURE, RESPIRATORY Unknown Normal respiratory neema     Results for Ino Meng (MRN [id number]) as of 4/6/2021 20:23   Ref.  Range 4/1/2021 08:18 4/5/2021 02:55 4/5/2021 04:25 4/5/2021 08:35 4/6/2021 08:31   O2 Therapy Unknown   Unknown Unknown Unknown   Hemoglobin, Art, Extended Latest Ref Range: 13.5 - 17.5 g/dL   15.0 14.6 14.1   pH, Arterial Latest Ref Range: 7.350 - 7.450  7.457 (H) 7.218 (L) 7.286 (L) 7.366 7.366   pCO2, Arterial Latest Ref Range: 35.0 - 45.0 mmHg 49.8 (H) 93.4 (HH) 72.8 (HH) 60.0 (H) 58.5 (H)   pO2, Arterial Latest Ref Range: 75.0 - 108.0 mmHg 53.4 (L) 87.2 115.2 (H) 57.9 (L) 103.9   HCO3, Arterial Latest Ref Range: 21.0 - 29.0 mmol/L 35.2 (H) 38.0 (H) 33.9 (H) 33.6 (H) 32.8 (H)   TCO2 (calc), Art Latest Ref Range: Not Established mmol/L 37 41 36.1 35.4 34.6 Base Excess, Arterial Latest Ref Range: -3.0 - 3.0 mmol/L 11 (H) 10 (H) 4.6 (H) 6.2 (H) 5.6 (H)   O2 Sat, Arterial Latest Ref Range: >92 % 88 (L) 93 97.6 90.6 (L) 97.3   O2 Content, Arterial Latest Ref Range: Not Established mL/dL   20 18 19   Methemoglobin, Arterial Latest Ref Range: <1.5 %   0.5 0.5 0.4   Carboxyhgb, Arterial Latest Ref Range: 0.0 - 1.5 %   1.0 0.6 0.3   Sample Type Unknown ART ART            ECHO-    Summary   Technically difficult examination. Normal left ventricle systolic function with an estimated ejection fraction   of 55%. No regional wall motion abnormalities are seen. Normal left   ventricular diastolic filling pressure. The right atrium is mildly dilated. Trace mitral and tricuspid regurgitation. Systolic pulmonary artery pressure (SPAP) is normal and estimated at 34 mmHg   (right atrial pressure 3 mmHg). Assessment and plan:  Acute respiratory failure, hypoxemic and hypercarbic. Weaned and extubated 3/31/2021. BiPAP with oxygen supplementation to keep saturation with 90-94% only  BiPAP to be changed to intermittent basis  Please titrate down the oxygen as per the above parameters  Pulmonary toilet  Monitor for any worsening hypoventilation and hypercarbia requiring patient to be reintubated  Patient has maintained acid-base balance on BiPAP when seen this morning  COPD exacerbation. On bronchodilator and steroid by nebulized route. Completed 5 days of antibiotics  Bilateralpleural effusions, pulmonary edema. CXR improved, BUN has improved Alcohol withdrawal, encephalopathy. Possibility of psychosis or ongoing withdrawal.  Increased dose of Librium, on maximum dose of Precedex, receiving as needed pushes of Ativan as well. Patient's Precedex drip was tapered-will reassess  May require Haldol on as needed basis  KAROL. Renal function improved, though BUN was rising, improved today  Alcoholism. Address once more alert  Smoker.   Asking for cigarettes, will start NRT  DVT

## 2021-04-07 NOTE — PROGRESS NOTES
Hospitalist Progress Note      PCP: Tonya Martinez MD    Date of Admission: 3/25/2021    Chief Complaint: SOB      Subjective:  He is now off BiPAP. Fairly calm and cooperative, but still very confused. Rambling nonsense. Medications:  Reviewed    Infusion Medications    dexmedetomidine 1.4 mcg/kg/hr (04/06/21 1516)     Scheduled Medications    nicotine  1 patch Transdermal Daily    chlordiazePOXIDE  50 mg Oral TID    budesonide  0.5 mg Nebulization BID    ipratropium-albuterol  1 ampule Inhalation 4x daily    cloNIDine  1 patch Transdermal Weekly    famotidine  20 mg Oral BID    thiamine  100 mg Oral Daily    carvedilol  6.25 mg Oral BID WC    amLODIPine  5 mg Oral BID    sodium chloride flush  10 mL Intravenous 2 times per day    enoxaparin  40 mg Subcutaneous Daily     PRN Meds: LORazepam **OR** LORazepam **OR** LORazepam **OR** LORazepam **OR** LORazepam **OR** LORazepam **OR** LORazepam **OR** LORazepam, hydrALAZINE, labetalol, perflutren lipid microspheres, potassium chloride, sodium chloride flush, promethazine **OR** ondansetron, acetaminophen **OR** acetaminophen      Intake/Output Summary (Last 24 hours) at 4/7/2021 0855  Last data filed at 4/7/2021 0751  Gross per 24 hour   Intake 248 ml   Output 4045 ml   Net -3797 ml       Physical Exam Performed:    BP (!) 153/87   Pulse 91   Temp 99.2 °F (37.3 °C) (Oral)   Resp 19   Ht 5' 10\" (1.778 m)   Wt 237 lb 7 oz (107.7 kg)   SpO2 99%   BMI 34.07 kg/m²       General appearance:  NAD, appears stated age  HEENT: Pupils equal, round, and reactive to light. Conjunctivae/corneas clear. Neck: Supple, with full range of motion. No jugular venous distention. Trachea midline. Respiratory:  Normal respiratory effort. Clear to auscultation, bilaterally without Rales/Wheezes/Rhonchi. Diminished bilaterally. Cardiovascular: Regular rate and rhythm with normal S1/S2 without murmurs, rubs or gallops.   Abdomen: Soft, non-tender, non-distended with normal bowel sounds. Musculoskeletal: No clubbing, cyanosis or edema bilaterally. Full range of motion without deformity. Skin: Skin color, texture, turgor normal.  No rashes or lesions. Neurologic: no focal deficits, no sensory or strength deficits, CN's grossly intact  Psychiatric: alert, cooperative, mostly disoriented, does not have insight  Capillary Refill: Brisk,< 3 seconds   Peripheral Pulses: +2 palpable, equal bilaterally       Labs:   Recent Labs     04/05/21  0255 04/06/21 0420 04/07/21  0400   WBC  --  10.2 11.4*   HGB 16.2 13.0* 12.7*   HCT  --  39.8* 39.1*   PLT  --  193 176     Recent Labs     04/05/21 0425 04/06/21  0420 04/07/21  0400    139 139   K 3.8 3.6 3.4*   CL 99 99 98*   CO2 35* 34* 33*   BUN 21* 24* 16   CREATININE 0.6* 0.6* 0.7*   CALCIUM 9.4 9.1 9.1     No results for input(s): AST, ALT, BILIDIR, BILITOT, ALKPHOS in the last 72 hours. No results for input(s): INR in the last 72 hours. No results for input(s): [place] in the last 72 hours. Urinalysis:      Lab Results   Component Value Date    NITRU Negative 03/28/2021    WBCUA None seen 03/28/2021    BACTERIA Rare 03/28/2021    RBCUA None seen 03/28/2021    BLOODU Negative 03/28/2021    SPECGRAV 1.020 03/28/2021    GLUCOSEU Negative 03/28/2021       Radiology:  XR CHEST PORTABLE   Final Result   New small bilateral pleural effusions         XR CHEST PORTABLE   Final Result   Improving airspace opacities and pleural effusion. XR CHEST PORTABLE   Final Result   Interval decreased but persistent right greater than left pulmonary opacities. Increased small bibasilar opacities, left greater than right, which likely   represent atelectasis and small pleural fluid.          XR CHEST PORTABLE   Final Result   Increased severity of pulmonary edema over the past 24 hours         XR CHEST PORTABLE   Final Result   Unchanged appearance of the chest over the past 24 hours with bilateral withdrawal [facility]) [F10.239]        \"62 y.o. male who presented to [facility] with shortness of breath that started a couple days ago and got worse. Today, patient was extremely short of breath. 911 was called. Patient's pulse ox was in the 40s on room air. Patient was placed on a nonrebreather, which brought him up to 76s. At that point, patient was placed on CPAP and brought to the emergency department. Yellow sputum with productive cough past couple days was noted. Has subjective fever and chills. Complains of chest pain in the center of the chest.  Patient is a smoker. \"      Acute hypoxic and hypercarbic respiratory failure 2/2 COPD exacerbation  - extubated 3/31. Wean O2 as able. F/u CTPA. - continue nebs, steroids.  - COVID PCR negative. S/p bronch. BAL NGTD. Completed course of abx without clear evidence of pneumonia. Procalcitonin was negative on admission, negative again on 4/5 repeat. At this point a rapid flu swab wouldn't affect management. Alcohol dependence with withdrawal  - required precedex. Continue librium, ativan PRN. Encourage cessation.  - his mother and other family members apparently were surprised when they heard he had alcohol withdrawal.  They didn't get the impression that he drank heavily. Reportedly the patient told nursing early on during this admission that he drank \"three fifths\" of liquor per day. Later he repeatedly denied any alcohol for months. The situation remains unclear. Pulmonary edema  - improved with intermittent IV furosemide. No history of heart failure, echo with EF 55%. KAROL. 2/2 ott obstruction, improved following replacement. Hypertensive urgency  - started on coreg, norvasc, clonidine    Hypernatremia  - 2/2 free water deficit, improved with hydration    Acute metabolic encephalopathy  - due to all of the above issues, treated accordingly. F/u head CT.     Constipation  - improved with bowel regimen      DVT Prophylaxis:

## 2021-04-07 NOTE — PROGRESS NOTES
04/07/21 0426   NIV Type   $NIV $Daily Charge   NIV Started/Stopped On   Equipment Type V60   Mode Bilevel   Mask Type Full face mask   Mask Size Medium   Settings/Measurements   IPAP 16 cmH20   CPAP/EPAP 8 cmH2O   Rate Ordered 22   Resp 26   FiO2  60 %   I Time/ I Time % 1 s   Vt Exhaled 544 mL   Minute Volume 15.4 Liters   Mask Leak (lpm) 42 lpm   Comfort Level Good   Using Accessory Muscles No   SpO2 97   Alarm Settings   Alarms On Y   Press Low Alarm 6 cmH2O   High Pressure Alarm 30 cmH2O   Delay Alarm 2 sec(s)   Resp Rate Low Alarm 6   High Respiratory Rate 40 br/min

## 2021-04-07 NOTE — PROGRESS NOTES
04/06/21 2348   NIV Type   NIV Started/Stopped On   Equipment Type V60   Mode Bilevel   Mask Type Full face mask   Mask Size Medium   Settings/Measurements   IPAP 16 cmH20   CPAP/EPAP 8 cmH2O   Rate Ordered 22   Resp 23   FiO2  60 %   I Time/ I Time % 1 s   Vt Exhaled 656 mL   Minute Volume 17 Liters   Mask Leak (lpm) 39 lpm   Comfort Level Good   Using Accessory Muscles No   SpO2 96   Breath Sounds   Right Upper Lobe Diminished   Right Middle Lobe Diminished   Right Lower Lobe Diminished   Left Upper Lobe Diminished   Left Lower Lobe Diminished   Alarm Settings   Alarms On Y   Press Low Alarm 6 cmH2O   High Pressure Alarm 30 cmH2O   Delay Alarm 20 sec(s)   Resp Rate Low Alarm 6   High Respiratory Rate 40 br/min

## 2021-04-08 LAB — POTASSIUM SERPL-SCNC: 3.7 MMOL/L (ref 3.5–5.1)

## 2021-04-08 PROCEDURE — 6370000000 HC RX 637 (ALT 250 FOR IP): Performed by: INTERNAL MEDICINE

## 2021-04-08 PROCEDURE — 2580000003 HC RX 258: Performed by: INTERNAL MEDICINE

## 2021-04-08 PROCEDURE — 6360000002 HC RX W HCPCS: Performed by: INTERNAL MEDICINE

## 2021-04-08 PROCEDURE — 97110 THERAPEUTIC EXERCISES: CPT

## 2021-04-08 PROCEDURE — 84132 ASSAY OF SERUM POTASSIUM: CPT

## 2021-04-08 PROCEDURE — 99232 SBSQ HOSP IP/OBS MODERATE 35: CPT | Performed by: INTERNAL MEDICINE

## 2021-04-08 PROCEDURE — 97530 THERAPEUTIC ACTIVITIES: CPT

## 2021-04-08 PROCEDURE — 94640 AIRWAY INHALATION TREATMENT: CPT

## 2021-04-08 PROCEDURE — 2060000000 HC ICU INTERMEDIATE R&B

## 2021-04-08 PROCEDURE — 97116 GAIT TRAINING THERAPY: CPT

## 2021-04-08 PROCEDURE — 2700000000 HC OXYGEN THERAPY PER DAY

## 2021-04-08 PROCEDURE — 94761 N-INVAS EAR/PLS OXIMETRY MLT: CPT

## 2021-04-08 PROCEDURE — 97535 SELF CARE MNGMENT TRAINING: CPT

## 2021-04-08 RX ORDER — HALOPERIDOL 5 MG/ML
5 INJECTION INTRAMUSCULAR EVERY 6 HOURS PRN
Status: DISCONTINUED | OUTPATIENT
Start: 2021-04-08 | End: 2021-04-10 | Stop reason: HOSPADM

## 2021-04-08 RX ORDER — LISINOPRIL 5 MG/1
5 TABLET ORAL DAILY
Status: DISCONTINUED | OUTPATIENT
Start: 2021-04-08 | End: 2021-04-10 | Stop reason: HOSPADM

## 2021-04-08 RX ORDER — CHLORDIAZEPOXIDE HYDROCHLORIDE 25 MG/1
25 CAPSULE, GELATIN COATED ORAL 3 TIMES DAILY
Status: DISCONTINUED | OUTPATIENT
Start: 2021-04-08 | End: 2021-04-09

## 2021-04-08 RX ORDER — AMLODIPINE BESYLATE 5 MG/1
10 TABLET ORAL DAILY
Status: DISCONTINUED | OUTPATIENT
Start: 2021-04-08 | End: 2021-04-10 | Stop reason: HOSPADM

## 2021-04-08 RX ORDER — PROCHLORPERAZINE EDISYLATE 5 MG/ML
10 INJECTION INTRAMUSCULAR; INTRAVENOUS EVERY 6 HOURS PRN
Status: DISCONTINUED | OUTPATIENT
Start: 2021-04-08 | End: 2021-04-10 | Stop reason: HOSPADM

## 2021-04-08 RX ORDER — CARVEDILOL 6.25 MG/1
12.5 TABLET ORAL 2 TIMES DAILY WITH MEALS
Status: DISCONTINUED | OUTPATIENT
Start: 2021-04-08 | End: 2021-04-10 | Stop reason: HOSPADM

## 2021-04-08 RX ADMIN — LISINOPRIL 5 MG: 5 TABLET ORAL at 09:16

## 2021-04-08 RX ADMIN — POTASSIUM CHLORIDE 20 MEQ: 29.8 INJECTION, SOLUTION INTRAVENOUS at 07:30

## 2021-04-08 RX ADMIN — FAMOTIDINE 20 MG: 20 TABLET ORAL at 21:15

## 2021-04-08 RX ADMIN — CHLORDIAZEPOXIDE HYDROCHLORIDE 25 MG: 25 CAPSULE ORAL at 14:50

## 2021-04-08 RX ADMIN — CARVEDILOL 12.5 MG: 6.25 TABLET, FILM COATED ORAL at 17:30

## 2021-04-08 RX ADMIN — CHLORDIAZEPOXIDE HYDROCHLORIDE 25 MG: 25 CAPSULE ORAL at 09:15

## 2021-04-08 RX ADMIN — IPRATROPIUM BROMIDE AND ALBUTEROL SULFATE 1 AMPULE: .5; 2.5 SOLUTION RESPIRATORY (INHALATION) at 15:58

## 2021-04-08 RX ADMIN — SODIUM CHLORIDE, PRESERVATIVE FREE 10 ML: 5 INJECTION INTRAVENOUS at 09:17

## 2021-04-08 RX ADMIN — AMLODIPINE BESYLATE 10 MG: 5 TABLET ORAL at 09:16

## 2021-04-08 RX ADMIN — Medication 100 MG: at 09:15

## 2021-04-08 RX ADMIN — IPRATROPIUM BROMIDE AND ALBUTEROL SULFATE 1 AMPULE: .5; 2.5 SOLUTION RESPIRATORY (INHALATION) at 11:19

## 2021-04-08 RX ADMIN — BUDESONIDE 500 MCG: 0.5 SUSPENSION RESPIRATORY (INHALATION) at 07:36

## 2021-04-08 RX ADMIN — BUDESONIDE 500 MCG: 0.5 SUSPENSION RESPIRATORY (INHALATION) at 21:10

## 2021-04-08 RX ADMIN — IPRATROPIUM BROMIDE AND ALBUTEROL SULFATE 1 AMPULE: .5; 2.5 SOLUTION RESPIRATORY (INHALATION) at 07:36

## 2021-04-08 RX ADMIN — ENOXAPARIN SODIUM 40 MG: 40 INJECTION SUBCUTANEOUS at 09:16

## 2021-04-08 RX ADMIN — POTASSIUM CHLORIDE 20 MEQ: 29.8 INJECTION, SOLUTION INTRAVENOUS at 11:36

## 2021-04-08 RX ADMIN — FAMOTIDINE 20 MG: 20 TABLET ORAL at 09:15

## 2021-04-08 RX ADMIN — CHLORDIAZEPOXIDE HYDROCHLORIDE 25 MG: 25 CAPSULE ORAL at 21:15

## 2021-04-08 RX ADMIN — CARVEDILOL 12.5 MG: 6.25 TABLET, FILM COATED ORAL at 09:15

## 2021-04-08 RX ADMIN — IPRATROPIUM BROMIDE AND ALBUTEROL SULFATE 1 AMPULE: .5; 2.5 SOLUTION RESPIRATORY (INHALATION) at 21:10

## 2021-04-08 RX ADMIN — SODIUM CHLORIDE, PRESERVATIVE FREE 10 ML: 5 INJECTION INTRAVENOUS at 21:15

## 2021-04-08 ASSESSMENT — PAIN SCALES - PAIN ASSESSMENT IN ADVANCED DEMENTIA (PAINAD)
FACIALEXPRESSION: 0
NEGVOCALIZATION: 0
BREATHING: 1
BREATHING: 1
NEGVOCALIZATION: 0
BREATHING: 1
CONSOLABILITY: 0
FACIALEXPRESSION: 0
CONSOLABILITY: 0
FACIALEXPRESSION: 0
NEGVOCALIZATION: 0
NEGVOCALIZATION: 0

## 2021-04-08 NOTE — PROGRESS NOTES
Pulmonary & Critical Care Medicine ICU Progress Note      Events of Last 24 hours: Patient continues to be on BiPAP overnight and patient's oxygen requirements on the BiPAP had come down to 60%, patient was taken off the BiPAP this morning and was placed on 4 L of nasal  oxygen with saturation of 91% when seen, patient was still somewhat confused and agitated, patient continues to have increased agitation intermittently and requires Precedex infusion , patient has had T-max of 99.2 °F, patient did not have any autonomic instability when seen this morning and did not have any tremors patient had normal sinus rhythm on the monitor, patient was hemodynamically maintained, patient had glycemic control which was acceptable, patient has had adequate urine output with cumulative fluid balance of -18 L  No other pertinent review of system could be obtained objectively    Invasive Lines:      CVC left subclavian 3/25/2021        Vitals:  /82   Pulse 83   Temp 98.6 °F (37 °C) (Oral)   Resp 22   Ht 5' 10\" (1.778 m)   Wt 237 lb 7 oz (107.7 kg)   SpO2 92%   BMI 34.07 kg/m²         EXAM:  General: In mild respiratory distress off BiPAP when seen  Eyes: PERRL. No sclera icterus. No conjunctival injection. ENT: No facial asymmetry NG. Mucosa dry. Neck: Large and short neck, no JVD   Resp: No accessory muscle use. Scattered crackles. No wheezing. No rhonchi. CV: Regular rate. Regular rhythm. No mumur or rub. No edema. GI: No hepatosplenomegaly, obese  Skin: Warm and dry. No nodule on exposed extremities. No rash on exposed extremities. Lymph: No cervical lymphadenopathy  M/S: No cyanosis. No joint deformity. No clubbing. Neuro: Having increased agitation without any focal motor deficits;no tremors   Psych: Disoriented and mildly agitated.   Intermittently    Medications:  Scheduled Meds:   nicotine  1 patch Transdermal Daily    chlordiazePOXIDE  50 mg Oral TID    budesonide  0.5 mg Nebulization BID    ipratropium-albuterol  1 ampule Inhalation 4x daily    cloNIDine  1 patch Transdermal Weekly    famotidine  20 mg Oral BID    thiamine  100 mg Oral Daily    carvedilol  6.25 mg Oral BID WC    amLODIPine  5 mg Oral BID    sodium chloride flush  10 mL Intravenous 2 times per day    enoxaparin  40 mg Subcutaneous Daily       PRN Meds:  LORazepam **OR** LORazepam **OR** LORazepam **OR** LORazepam **OR** LORazepam **OR** LORazepam **OR** LORazepam **OR** LORazepam, hydrALAZINE, labetalol, perflutren lipid microspheres, potassium chloride, sodium chloride flush, promethazine **OR** ondansetron, acetaminophen **OR** acetaminophen    Results:  CBC:   Recent Labs     04/05/21  0255 04/06/21 0420 04/07/21  0400   WBC  --  10.2 11.4*   HGB 16.2 13.0* 12.7*   HCT  --  39.8* 39.1*   MCV  --  95.3 95.2   PLT  --  193 176     BMP:   Recent Labs     04/05/21  0425 04/06/21  0420 04/07/21  0400    139 139   K 3.8 3.6 3.4*   CL 99 99 98*   CO2 35* 34* 33*   BUN 21* 24* 16   CREATININE 0.6* 0.6* 0.7*     Results for Ne Grewal (MRN [id number]) as of 4/7/2021 21:36   Ref.  Range 4/6/2021 04:20 4/6/2021 08:31 4/7/2021 04:00   Sodium Latest Ref Range: 136 - 145 mmol/L 139  139   Potassium Latest Ref Range: 3.5 - 5.1 mmol/L 3.6  3.4 (L)   Chloride Latest Ref Range: 99 - 110 mmol/L 99  98 (L)   CO2 Latest Ref Range: 21 - 32 mmol/L 34 (H)  33 (H)   BUN Latest Ref Range: 7 - 20 mg/dL 24 (H)  16   Creatinine Latest Ref Range: 0.9 - 1.3 mg/dL 0.6 (L)  0.7 (L)   Anion Gap Latest Ref Range: 3 - 16  6  8   GFR Non- Latest Ref Range: >60  >60  >60   GFR  Latest Ref Range: >60  >60  >60   Magnesium Latest Ref Range: 1.80 - 2.40 mg/dL   1.90   Glucose Latest Ref Range: 70 - 99 mg/dL 108 (H)  100 (H)   Calcium Latest Ref Range: 8.3 - 10.6 mg/dL 9.1  9.1   WBC Latest Ref Range: 4.0 - 11.0 K/uL 10.2  11.4 (H)   RBC Latest Ref Range: 4.20 - 5.90 M/uL 4.18 (L)  4.11 (L)   Hemoglobin Quant Latest Ref Range: 13.5 - 17.5 g/dL 13.0 (L)  12.7 (L)   Hematocrit Latest Ref Range: 40.5 - 52.5 % 39.8 (L)  39.1 (L)   MCV Latest Ref Range: 80.0 - 100.0 fL 95.3  95.2   MCH Latest Ref Range: 26.0 - 34.0 pg 31.0  30.9   MCHC Latest Ref Range: 31.0 - 36.0 g/dL 32.5  32.5   MPV Latest Ref Range: 5.0 - 10.5 fL 9.9  9.6   RDW Latest Ref Range: 12.4 - 15.4 % 13.6  13.5   Platelet Count Latest Ref Range: 135 - 450 K/uL 193  176   D-Dimer, Quant Latest Ref Range: 0 - 229 ng/mL DDU   1740 (H)       ECHO-    Summary   Technically difficult examination. Normal left ventricle systolic function with an estimated ejection fraction   of 55%. No regional wall motion abnormalities are seen. Normal left   ventricular diastolic filling pressure. The right atrium is mildly dilated. Trace mitral and tricuspid regurgitation. Systolic pulmonary artery pressure (SPAP) is normal and estimated at 34 mmHg   (right atrial pressure 3 mmHg). Assessment and plan:  Acute respiratory failure, hypoxemic and hypercarbic. Weaned and extubated 3/31/2021. BiPAP with oxygen supplementation to keep saturation with 90-94% only  BiPAP to be changed to intermittent basis  Please titrate down the oxygen as per the above parameters  Pulmonary toilet  Monitor for any worsening hypoventilation and hypercarbia   IM had ordered d-dimer which was elevated and CT chest being contemplated when seen   COPD exacerbation. On bronchodilator and steroid by nebulized route. Completed 5 days of antibiotics  Bilateralpleural effusions, pulmonary edema. CXR improved, BUN has improved Alcohol withdrawal, encephalopathy. Possibility of psychosis or ongoing withdrawal.  Increased dose of Librium, on maximum dose of Precedex, receiving as needed pushes of Ativan as well. Patient's Precedex drip was tapered-will reassess  May require Haldol on as needed basis  KAROL. Renal function improved, though BUN was rising, improved today  Alcoholism. Address once more alert  Smoker. Asking for cigarettes, will start NRT  DVT prophylaxis. Lovenox. PUD prophylaxis. Pepcid.         Case discussed with ICU team and IM    Continue ICU care      Electronically signed by:  Donato Hamilton MD    4/7/2021    9:37 PM.

## 2021-04-08 NOTE — PROGRESS NOTES
Occupational Therapy  Facility/Department: [facility]  Daily Treatment Note  NAME: Donato Sevilla  : 1960  MRN: [id number]    Date of Service: 2021    Discharge Recommendations:  [address]      If pt discharges prior to next session, this note will serve as discharge summary. See case management note for discharge disposition. Assessment   Performance deficits / Impairments: Decreased functional mobility ; Decreased balance;Decreased ADL status; Decreased cognition;Decreased endurance;Decreased high-level IADLs  Assessment: Pt pleasantly confused but cooperative. Pt cont to require mod A x 2 in vianey stedy for transfers, pt remains impulsive with poor safety (has sitter). Pt with decreased insight into deficits, decreased body awareness. Pt able to complete grooming with assist, UE ex with cues for tech/reps. Cont POC. OT Education: OT Role;Plan of Care;Home Exercise Program;Precautions; ADL Adaptive Strategies;Transfer Training  Patient Education: importance of continued activity and OOB activity  Activity Tolerance  Activity Tolerance: Patient Tolerated treatment well;Treatment limited secondary to decreased cognition  Safety Devices  Safety Devices in place: Yes  Type of devices: Call light within reach; Chair alarm in place; Left in chair;Nurse notified;Gait belt         Patient Diagnosis(es): The primary encounter diagnosis was COPD exacerbation ([address] Diagnoses of Acute respiratory failure with hypoxia and hypercapnia (HCC) and Cough were also pertinent to this visit. has a past medical history of COPD (chronic obstructive pulmonary disease) ([address] and Hypertension. has no past surgical history on file.     Restrictions  Restrictions/Precautions  Restrictions/Precautions: General Precautions, Fall Risk  Position Activity Restriction  Other position/activity restrictions: up with assist  Subjective   General  Chart Reviewed: Yes  Patient assessed for rehabilitation services?: Yes  Family / Caregiver Present: Yes  Referring Practitioner: BC Vazquez CNP  Diagnosis: diagnosis of COPD Exacerbation with intubation 3/25-31  Subjective  Subjective: Pt agreeable to therapy  General Comment  Comments: RN approved therapy  Vital Signs  Patient Currently in Pain: Denies   Orientation  Orientation  Overall Orientation Status: Within Functional Limits  Objective    ADL  Grooming: Maximum assistance(max A to comb hair, pt able to wash face with setup)  LE Dressing: Dependent/Total  Toileting: Dependent/Total(ott)        Balance  Sitting Balance: Moderate assistance(EOB)  Standing Balance: Dependent/Total  Standing Balance  Time: ~1 minute x2  Activity: transfer to chair, mod A x 2 in vianey stedy  Bed mobility  Supine to Sit: Moderate assistance;2 Person assistance  Transfers  Sit to stand: 2 Person assistance; Moderate assistance  Stand to sit: 2 Person assistance; Moderate assistance                       Cognition  Overall Cognitive Status: Exceptions  Arousal/Alertness: Delayed responses to stimuli  Following Commands:  Follows one step commands with repetition  Attention Span: Attends with cues to redirect  Memory: Decreased long term memory;Decreased short term memory  Safety Judgement: Decreased awareness of need for assistance  Problem Solving: Assistance required to generate solutions;Assistance required to identify errors made  Insights: Not aware of deficits  Initiation: Requires cues for all  Sequencing: Requires cues for all  Cognition Comment: pt confused in conversation with cues to redirect                    Type of ROM/Therapeutic Exercise  Type of ROM/Therapeutic Exercise: AROM  Exercises  Scapular Protraction: x15  Scapular Retraction: x15  Shoulder Flexion: x15  Elbow Flexion: x15  Elbow Extension: x15  Supination: x15  Pronation: x15  Wrist Flexion: x15  Wrist Extension: x15  Finger Flexion: x15  Finger Extension: x15                    Plan   Plan  Times per week: 4-5x/wk  Current Treatment Recommendations: Strengthening, Endurance Training, Balance Training, Functional Mobility Training, Safety Education & Training    AM-PAC Score        AM-PAC Inpatient Daily Activity Raw Score: 9 (04/08/21 1431)  AM-PAC Inpatient ADL T-Scale Score : 25.33 (04/08/21 1431)  ADL Inpatient CMS 0-100% Score: 79.59 (04/08/21 1431)  ADL Inpatient CMS G-Code Modifier : CL (04/08/21 1431)    Goals  Short term goals  Time Frame for Short term goals: 1 week (4/13/21)  Short term goal 1: PT will complete LB dressing with min A. Short term goal 2: Pt will complete toileting with CGA.,  Short term goal 3: Pt will complete 15 reps of BUE exercises for increased endurance.  (4/10/21)  Patient Goals   Patient goals : \"to get up to the chair\"       Therapy Time   Individual Concurrent Group Co-treatment   Time In 0833         Time Out 0913         Minutes 40         Timed Code Treatment Minutes: [address] [facility] [address], OT

## 2021-04-08 NOTE — CARE COORDINATION
Spoke with patient and family for introduction as patient is new to this CM. Patient is awake but oriented to self only. Per chart review, CM had reached out to Shruti Mayer with [facility] on 4/6/21 for screening. Placed call to Shruti Mayer with [facility] to discuss the referral.  She states that she has been following but that she would like to revisit possible discharge to [facility] tomorrow due to she is not sure patient will need that level of care. She states she will look to see if patient has improved clinically. If not, could begin to discuss rec's of SNF with family. Will need for therapy to assess when patient medically ready.

## 2021-04-08 NOTE — PROGRESS NOTES
Report given to [facility]  C4 at bedside. 4 Eyes Skin Assessment     The patient is being assess for   Transfer to [facility]    I agree that 2 RN's have performed a thorough Head to Toe Skin Assessment on the patient. ALL assessment sites listed below have been assessed. Areas assessed by both nurses:   [x]   Head, Face, and Ears   [x]   Shoulders, Back, and Chest, Abdomen  [x]   Arms, Elbows, and Hands   [x]   Coccyx, Sacrum, and Ischium  [x]   Legs, Feet, and Heels            **SHARE this note so that the co-signing nurse is able to place an eSignature**    Co-signer eSignature: Electronically signed by Kirsty Madrid RN on 4/8/21 at 1:02 PM EDT    Does the Patient have Skin Breakdown?   {Blank single:75835::\"No\",\"Yes LDA WOUND CARE was Initiated documentation include the Laurie-wound, Wound Assessment, Measurements, Dressing Treatment, Drainage, and Color\",\"}          Leo Prevention initiated:  {YES/NO:19732}   Wound Care Orders initiated:  {YES/NO:19732}      [facility] nurse consulted for Pressure Injury (Stage 3,4, Unstageable, DTI, NWPT, Complex wounds)and New or Established Ostomies:  {YES/NO:19732}      Primary Nurse eSignature: {Esignature:[id number]}

## 2021-04-08 NOTE — PROGRESS NOTES
Hospitalist Progress Note      PCP: Asael Rosales MD    Date of Admission: 3/25/2021    Chief Complaint: SOB      Subjective:  He is breathing easily on 8LNC. Still pulling off his oxygen from time to time, at which point he desaturates. Seems much more alert. Easily protecting his airway and managing his secretions. Sitter tells me he has not been violent or aggressive. He is off precedex. Will transfer out of ICU. He thinks he is in the [facility] ED and that the year is 2001. Medications:  Reviewed    Infusion Medications     Scheduled Medications    chlordiazePOXIDE  25 mg Oral TID    nicotine  1 patch Transdermal Daily    budesonide  0.5 mg Nebulization BID    ipratropium-albuterol  1 ampule Inhalation 4x daily    cloNIDine  1 patch Transdermal Weekly    famotidine  20 mg Oral BID    thiamine  100 mg Oral Daily    carvedilol  6.25 mg Oral BID WC    amLODIPine  5 mg Oral BID    sodium chloride flush  10 mL Intravenous 2 times per day    enoxaparin  40 mg Subcutaneous Daily     PRN Meds: prochlorperazine, haloperidol lactate, hydrALAZINE, labetalol, perflutren lipid microspheres, potassium chloride, sodium chloride flush, acetaminophen **OR** acetaminophen      Intake/Output Summary (Last 24 hours) at 4/8/2021 0643  Last data filed at 4/7/2021 1813  Gross per 24 hour   Intake 675 ml   Output 2475 ml   Net -1800 ml       Physical Exam Performed:    BP (!) 153/76   Pulse 94   Temp 98.6 °F (37 °C) (Oral)   Resp 19   Ht 5' 10\" (1.778 m)   Wt 237 lb 7 oz (107.7 kg)   SpO2 90%   BMI 34.07 kg/m²       General appearance:  NAD, appears stated age  HEENT: Pupils equal, round, and reactive to light. Conjunctivae/corneas clear. Neck: Supple, with full range of motion. No jugular venous distention. Trachea midline. Respiratory:  Normal respiratory effort. Clear to auscultation, bilaterally without Rales/Wheezes/Rhonchi. Diminished bilaterally.   Cardiovascular: Regular rate and rhythm with normal S1/S2 without murmurs, rubs or gallops. Abdomen: Soft, non-tender, non-distended with normal bowel sounds. Musculoskeletal: No clubbing, cyanosis or edema bilaterally. Full range of motion without deformity. Skin: Skin color, texture, turgor normal.  No rashes or lesions. Neurologic: no focal deficits, no sensory or strength deficits, CN's grossly intact  Psychiatric: alert, cooperative, mostly disoriented, does not have insight  Capillary Refill: Brisk,< 3 seconds   Peripheral Pulses: +2 palpable, equal bilaterally       Labs:   Recent Labs     04/06/21 0420 04/07/21  0400   WBC 10.2 11.4*   HGB 13.0* 12.7*   HCT 39.8* 39.1*    176     Recent Labs     04/06/21 0420 04/07/21  0400    139   K 3.6 3.4*   CL 99 98*   CO2 34* 33*   BUN 24* 16   CREATININE 0.6* 0.7*   CALCIUM 9.1 9.1     No results for input(s): AST, ALT, BILIDIR, BILITOT, ALKPHOS in the last 72 hours. No results for input(s): INR in the last 72 hours. No results for input(s): Paralee Fontan in the last 72 hours. Urinalysis:      Lab Results   Component Value Date    NITRU Negative 03/28/2021    WBCUA None seen 03/28/2021    BACTERIA Rare 03/28/2021    RBCUA None seen 03/28/2021    BLOODU Negative 03/28/2021    SPECGRAV 1.020 03/28/2021    GLUCOSEU Negative 03/28/2021       Radiology:  CT CHEST PULMONARY EMBOLISM W CONTRAST   Final Result   1. Trace bilateral pleural effusions. 2. Mediastinal adenopathy, likely reactive. CT HEAD WO CONTRAST   Final Result   1. No evidence of acute intracranial abnormality. 2. Bilateral mastoid sinus disease, left more so than right, as described   above. XR CHEST PORTABLE   Final Result   New small bilateral pleural effusions         XR CHEST PORTABLE   Final Result   Improving airspace opacities and pleural effusion. XR CHEST PORTABLE   Final Result   Interval decreased but persistent right greater than left pulmonary opacities.       Increased small bibasilar opacities, left greater than right, which likely   represent atelectasis and small pleural fluid. XR CHEST PORTABLE   Final Result   Increased severity of pulmonary edema over the past 24 hours         XR CHEST PORTABLE   Final Result   Unchanged appearance of the chest over the past 24 hours with bilateral   pleural effusions, bibasilar atelectasis and pulmonary edema         XR ABDOMEN (KUB) (SINGLE AP VIEW)   Final Result   Negative supine of abdominal radiographs with the reservation that there is   minimal bowel gas. If there is increasing distention this could be due to   fluid-filled small bowel without bowel air, ascites, or even free air as   these images were obtained supine. XR CHEST PORTABLE   Final Result   Mildly increased bilateral pulmonary disease over the past 24 hours, likely   due to increasing edema. Bilateral pleural effusions without significant change remain present         XR CHEST PORTABLE   Final Result   Persistent bibasilar airspace disease and pleural effusions. Mild cardiac   silhouette enlargement. No change in life support. XR CHEST PORTABLE   Final Result   Supportive tubing projects in stable positions. Continued bibasilar airspace disease, with possible layered pleural effusions. XR CHEST PORTABLE   Final Result   Supportive tubing is in stable position. Stable pattern of bilateral airspace disease and possible layered effusions. Pulmonary edema or pneumonia are possible. XR ABDOMEN (KUB) (SINGLE AP VIEW)   Final Result   Satisfactory enteric tube placement. XR CHEST PORTABLE   Final Result   Support lines and tubes in satisfactory position. No pneumothorax. Worsening bibasilar predominant airspace disease. Small right and trace left   pleural effusions suspected.          XR CHEST PORTABLE   Final Result              Assessment/Plan:    Active Hospital Problems    Diagnosis    Elevated d-dimer [R79.89]    Alcohol use [Z72.89]    Agitation requiring sedation protocol [R45.1]    Current smoker [F17.200]    Acute respiratory failure with hypoxia and hypercapnia (HCC) [J96.01, J96.02]    COPD exacerbation (HCC) [J44.1]    Alcohol withdrawal [facility]) [F10.239]        \"62 y.o. male who presented to [facility] with shortness of breath that started a couple days ago and got worse. Today, patient was extremely short of breath. 911 was called. Patient's pulse ox was in the 40s on room air. Patient was placed on a nonrebreather, which brought him up to 76s. At that point, patient was placed on CPAP and brought to the emergency department. Yellow sputum with productive cough past couple days was noted. Has subjective fever and chills. Complains of chest pain in the center of the chest.  Patient is a smoker. \"      Acute hypoxic and hypercarbic respiratory failure 2/2 COPD exacerbation  - extubated 3/31. Wean O2 as able. Negative CTPA. - continue nebs, steroids.  - COVID PCR negative. S/p bronch. BAL NGTD. Completed course of abx without clear evidence of pneumonia. Procalcitonin was negative on admission, negative again on 4/5 repeat. At this point a rapid flu swab wouldn't affect management. Alcohol dependence with withdrawal  - weaned off precedex. Taper librium, s/p ativan PRN. Encourage cessation.  - his mother and other family members apparently were surprised when they heard he had alcohol withdrawal.  They didn't get the impression that he drank heavily. Reportedly the patient told nursing early on during this admission that he drank \"three fifths\" of liquor per day. Later he repeatedly denied any alcohol for months. The situation remains unclear. Pulmonary edema  - improved with intermittent IV furosemide. No history of heart failure, echo with EF 55%. KAROL. 2/2 ott obstruction, improved following replacement.   Will try to remove ott again when he is stronger and more coherent. Hypertensive urgency  - started on coreg, norvasc, lisinopril    Hypernatremia  - 2/2 free water deficit, improved with hydration    Acute metabolic encephalopathy  - due to all of the above issues, treated accordingly. Negative head CT, no focal deficits. If he does not start thinking clearly in the next few days then will consider MRI brain. Constipation  - improved with bowel regimen      DVT Prophylaxis: enoxaparin  Diet: DIET GENERAL;  Dietary Nutrition Supplements: Standard High Calorie Oral Supplement  Code Status: Full Code    PT/OT Eval Status: eval ordered    Dispo - when he is requiring less oxygen and when and his behavior has improved. Perhaps 4/10-13. He came from home.       Alessia Trinh MD

## 2021-04-08 NOTE — PLAN OF CARE
Problem: Falls - Risk of:  Goal: Will remain free from falls  Description: Will remain free from falls  Outcome: Ongoing  Goal: Absence of physical injury  Description: Absence of physical injury  Outcome: Ongoing     Problem: Skin Integrity:  Goal: Will show no infection signs and symptoms  Description: Will show no infection signs and symptoms  Outcome: Ongoing  Goal: Absence of new skin breakdown  Description: Absence of new skin breakdown  Outcome: Ongoing  Goal: Patient's dignity will be maintained  Outcome: Ongoing   Problem: Nutrition  Goal: Optimal nutrition therapy  Description: Nutrition Problem #1: Increased nutrient needs  Intervention: Food and/or Nutrient Delivery: Continue Current Diet, Continue Oral Nutrition Supplement  Nutritional Goals: Patient will eat 50% or greater of meals and supplements.     Problem: Discharge Planning:  Goal: Participates in care planning  Description: Participates in care planning  Outcome: Ongoing  Goal: Discharged to appropriate level of care  Description: Discharged to appropriate level of care  Outcome: Ongoing     Problem: Airway Clearance - Ineffective:  Goal: Ability to maintain a clear airway will improve  Description: Ability to maintain a clear airway will improve  Outcome: Ongoing     Problem: Anxiety/Stress:  Goal: Level of anxiety will decrease  Description: Level of anxiety will decrease  Outcome: Ongoing     Problem: Aspiration:  Goal: Absence of aspiration  Description: Absence of aspiration  Outcome: Ongoing     Problem: Gas Exchange - Impaired:  Goal: Levels of oxygenation will improve  Description: Levels of oxygenation will improve  Outcome: Ongoing   Outcome: Ongoing     Catalino Alvarez RN

## 2021-04-08 NOTE — PROGRESS NOTES
medical history of COPD (chronic obstructive pulmonary disease) ([facility] [address] and Hypertension. has no past surgical history on file. Restrictions  Restrictions/Precautions  Restrictions/Precautions: General Precautions, Fall Risk  Position Activity Restriction  Other position/activity restrictions: up with assist  Subjective   General  Chart Reviewed: Yes  Response To Previous Treatment: Patient with no complaints from previous session. Family / Caregiver Present: No  Referring Practitioner: CODI Schreiber  Subjective  Subjective: pt agreeable to therapy  General Comment  Comments: Pt resting in bed on approach; RN cleared pt for therapy  Pain Screening  Patient Currently in Pain: Denies       Objective   Bed mobility  Supine to Sit: Moderate assistance;2 Person assistance  Sit to Supine: Unable to assess(pt up in chair at end of session)     Transfers  Sit to Stand: Moderate Assistance;2 Person Assistance  Stand to sit: Moderate Assistance;2 Person Assistance     Ambulation  Ambulation?: Yes  Ambulation 1  Surface: level tile  Device: Hand-Held Assist  Assistance: Moderate assistance;2 Person assistance  Quality of Gait: Improved posture with cues this date. Shuffling gait with cues for sequencing. Mod A x 2 for balance/safety. Pt impulsive with mobility  Gait Deviations: Shuffles; Slow Gabrielle;Decreased step length;Decreased step height  Distance: 5 ft to chair     Balance  Comments: CGA to sit EOB, no posterior lean this date but impulsive with mobility     Exercises  Hip Flexion: Seated marches x 12 BLE  Hip Abduction: Seated clamshells x 12 BLE  Knee Long Arc Quad: x 12 BLE  Ankle Pumps: x 12 BLE  Comments: Cues for technique/sequencing. Increased time d/t decreased attention and decreased activity tolerance.                 AM-PAC Score  AM-PAC Inpatient Mobility Raw Score : 12 (04/08/21 0953)  AM-PAC Inpatient T-Scale Score : 35.33 (04/08/21 0953)  Mobility Inpatient CMS 0-100% Score: 68.66 (04/08/21

## 2021-04-08 NOTE — PROGRESS NOTES
Patient admitted from ICU. He is resting at this time, mum is at bedside. VS and assessment as per chart. Avasys activated. Bed in lowest position, call light and personal belongings within reach.

## 2021-04-09 LAB
ANION GAP SERPL CALCULATED.3IONS-SCNC: 8 MMOL/L (ref 3–16)
BUN BLDV-MCNC: 9 MG/DL (ref 7–20)
CALCIUM SERPL-MCNC: 9.3 MG/DL (ref 8.3–10.6)
CHLORIDE BLD-SCNC: 100 MMOL/L (ref 99–110)
CO2: 34 MMOL/L (ref 21–32)
CREAT SERPL-MCNC: 0.7 MG/DL (ref 0.9–1.3)
GFR AFRICAN AMERICAN: >60
GFR NON-AFRICAN AMERICAN: >60
GLUCOSE BLD-MCNC: 111 MG/DL (ref 70–99)
HCT VFR BLD CALC: 41.9 % (ref 40.5–52.5)
HEMOGLOBIN: 14 G/DL (ref 13.5–17.5)
MAGNESIUM: 2 MG/DL (ref 1.8–2.4)
MCH RBC QN AUTO: 31.6 PG (ref 26–34)
MCHC RBC AUTO-ENTMCNC: 33.3 G/DL (ref 31–36)
MCV RBC AUTO: 95 FL (ref 80–100)
PDW BLD-RTO: 13.6 % (ref 12.4–15.4)
PLATELET # BLD: 169 K/UL (ref 135–450)
PMV BLD AUTO: 9.5 FL (ref 5–10.5)
POTASSIUM REFLEX MAGNESIUM: 3.3 MMOL/L (ref 3.5–5.1)
RBC # BLD: 4.41 M/UL (ref 4.2–5.9)
SODIUM BLD-SCNC: 142 MMOL/L (ref 136–145)
TROPONIN: <0.01 NG/ML
WBC # BLD: 9.8 K/UL (ref 4–11)

## 2021-04-09 PROCEDURE — 97110 THERAPEUTIC EXERCISES: CPT

## 2021-04-09 PROCEDURE — 6370000000 HC RX 637 (ALT 250 FOR IP): Performed by: INTERNAL MEDICINE

## 2021-04-09 PROCEDURE — 94640 AIRWAY INHALATION TREATMENT: CPT

## 2021-04-09 PROCEDURE — 84484 ASSAY OF TROPONIN QUANT: CPT

## 2021-04-09 PROCEDURE — 6360000002 HC RX W HCPCS: Performed by: INTERNAL MEDICINE

## 2021-04-09 PROCEDURE — 99232 SBSQ HOSP IP/OBS MODERATE 35: CPT | Performed by: INTERNAL MEDICINE

## 2021-04-09 PROCEDURE — 80048 BASIC METABOLIC PNL TOTAL CA: CPT

## 2021-04-09 PROCEDURE — 97530 THERAPEUTIC ACTIVITIES: CPT

## 2021-04-09 PROCEDURE — 2700000000 HC OXYGEN THERAPY PER DAY

## 2021-04-09 PROCEDURE — 2580000003 HC RX 258: Performed by: INTERNAL MEDICINE

## 2021-04-09 PROCEDURE — 97116 GAIT TRAINING THERAPY: CPT

## 2021-04-09 PROCEDURE — 2060000000 HC ICU INTERMEDIATE R&B

## 2021-04-09 PROCEDURE — 85027 COMPLETE CBC AUTOMATED: CPT

## 2021-04-09 PROCEDURE — 97535 SELF CARE MNGMENT TRAINING: CPT

## 2021-04-09 PROCEDURE — 94761 N-INVAS EAR/PLS OXIMETRY MLT: CPT

## 2021-04-09 PROCEDURE — 83735 ASSAY OF MAGNESIUM: CPT

## 2021-04-09 RX ORDER — IPRATROPIUM BROMIDE AND ALBUTEROL SULFATE 2.5; .5 MG/3ML; MG/3ML
1 SOLUTION RESPIRATORY (INHALATION) 2 TIMES DAILY
Status: DISCONTINUED | OUTPATIENT
Start: 2021-04-10 | End: 2021-04-10 | Stop reason: HOSPADM

## 2021-04-09 RX ORDER — CHLORDIAZEPOXIDE HYDROCHLORIDE 5 MG/1
5 CAPSULE, GELATIN COATED ORAL 3 TIMES DAILY
Status: COMPLETED | OUTPATIENT
Start: 2021-04-09 | End: 2021-04-09

## 2021-04-09 RX ADMIN — CHLORDIAZEPOXIDE HYDROCHLORIDE 5 MG: 5 CAPSULE ORAL at 21:20

## 2021-04-09 RX ADMIN — SODIUM CHLORIDE, PRESERVATIVE FREE 10 ML: 5 INJECTION INTRAVENOUS at 09:10

## 2021-04-09 RX ADMIN — LISINOPRIL 5 MG: 5 TABLET ORAL at 09:10

## 2021-04-09 RX ADMIN — CARVEDILOL 12.5 MG: 6.25 TABLET, FILM COATED ORAL at 09:15

## 2021-04-09 RX ADMIN — Medication 100 MG: at 09:10

## 2021-04-09 RX ADMIN — POTASSIUM CHLORIDE 20 MEQ: 29.8 INJECTION, SOLUTION INTRAVENOUS at 19:31

## 2021-04-09 RX ADMIN — CARVEDILOL 12.5 MG: 6.25 TABLET, FILM COATED ORAL at 17:03

## 2021-04-09 RX ADMIN — POTASSIUM CHLORIDE 20 MEQ: 29.8 INJECTION, SOLUTION INTRAVENOUS at 18:33

## 2021-04-09 RX ADMIN — FAMOTIDINE 20 MG: 20 TABLET ORAL at 21:20

## 2021-04-09 RX ADMIN — FAMOTIDINE 20 MG: 20 TABLET ORAL at 09:10

## 2021-04-09 RX ADMIN — IPRATROPIUM BROMIDE AND ALBUTEROL SULFATE 1 AMPULE: .5; 2.5 SOLUTION RESPIRATORY (INHALATION) at 19:30

## 2021-04-09 RX ADMIN — IPRATROPIUM BROMIDE AND ALBUTEROL SULFATE 1 AMPULE: .5; 2.5 SOLUTION RESPIRATORY (INHALATION) at 08:48

## 2021-04-09 RX ADMIN — CHLORDIAZEPOXIDE HYDROCHLORIDE 25 MG: 25 CAPSULE ORAL at 09:10

## 2021-04-09 RX ADMIN — BUDESONIDE 500 MCG: 0.5 SUSPENSION RESPIRATORY (INHALATION) at 08:56

## 2021-04-09 RX ADMIN — ENOXAPARIN SODIUM 40 MG: 40 INJECTION SUBCUTANEOUS at 09:10

## 2021-04-09 RX ADMIN — SODIUM CHLORIDE, PRESERVATIVE FREE 10 ML: 5 INJECTION INTRAVENOUS at 21:22

## 2021-04-09 RX ADMIN — CHLORDIAZEPOXIDE HYDROCHLORIDE 5 MG: 5 CAPSULE ORAL at 13:29

## 2021-04-09 RX ADMIN — AMLODIPINE BESYLATE 10 MG: 5 TABLET ORAL at 09:10

## 2021-04-09 RX ADMIN — IPRATROPIUM BROMIDE AND ALBUTEROL SULFATE 1 AMPULE: .5; 2.5 SOLUTION RESPIRATORY (INHALATION) at 12:25

## 2021-04-09 RX ADMIN — BUDESONIDE 500 MCG: 0.5 SUSPENSION RESPIRATORY (INHALATION) at 19:35

## 2021-04-09 ASSESSMENT — PAIN SCALES - GENERAL
PAINLEVEL_OUTOF10: 0
PAINLEVEL_OUTOF10: 0

## 2021-04-09 NOTE — PROGRESS NOTES
flush  10 mL Intravenous 2 times per day    enoxaparin  40 mg Subcutaneous Daily       PRN Meds:  prochlorperazine, haloperidol lactate, hydrALAZINE, labetalol, perflutren lipid microspheres, potassium chloride, sodium chloride flush, acetaminophen **OR** acetaminophen    Results:  CBC:   Recent Labs     04/06/21 0420 04/07/21  0400   WBC 10.2 11.4*   HGB 13.0* 12.7*   HCT 39.8* 39.1*   MCV 95.3 95.2    176     BMP:   Recent Labs     04/06/21  0420 04/07/21  0400 04/08/21  1445    139  --    K 3.6 3.4* 3.7   CL 99 98*  --    CO2 34* 33*  --    BUN 24* 16  --    CREATININE 0.6* 0.7*  --        Results for Lv Castaneda (MRN [id number]) as of 4/8/2021 23:59   Ref.  Range 4/6/2021 08:31 4/7/2021 04:00   Sodium Latest Ref Range: 136 - 145 mmol/L  139   Potassium Latest Ref Range: 3.5 - 5.1 mmol/L  3.4 (L)   Chloride Latest Ref Range: 99 - 110 mmol/L  98 (L)   CO2 Latest Ref Range: 21 - 32 mmol/L  33 (H)   BUN Latest Ref Range: 7 - 20 mg/dL  16   Creatinine Latest Ref Range: 0.9 - 1.3 mg/dL  0.7 (L)   Anion Gap Latest Ref Range: 3 - 16   8   GFR Non- Latest Ref Range: >60   >60   GFR  Latest Ref Range: >60   >60   Magnesium Latest Ref Range: 1.80 - 2.40 mg/dL  1.90   Glucose Latest Ref Range: 70 - 99 mg/dL  100 (H)   Calcium Latest Ref Range: 8.3 - 10.6 mg/dL  9.1   WBC Latest Ref Range: 4.0 - 11.0 K/uL  11.4 (H)   RBC Latest Ref Range: 4.20 - 5.90 M/uL  4.11 (L)   Hemoglobin Quant Latest Ref Range: 13.5 - 17.5 g/dL  12.7 (L)   Hematocrit Latest Ref Range: 40.5 - 52.5 %  39.1 (L)   MCV Latest Ref Range: 80.0 - 100.0 fL  95.2   MCH Latest Ref Range: 26.0 - 34.0 pg  30.9   MCHC Latest Ref Range: 31.0 - 36.0 g/dL  32.5   MPV Latest Ref Range: 5.0 - 10.5 fL  9.6   RDW Latest Ref Range: 12.4 - 15.4 %  13.5   Platelet Count Latest Ref Range: 135 - 450 K/uL  176   D-Dimer, Quant Latest Ref Range: 0 - 229 ng/mL DDU  1740 (H)   O2 Therapy Unknown Unknown    Hemoglobin, Art,

## 2021-04-09 NOTE — FLOWSHEET NOTE
04/08/21 2000   Assessment   Charting Type Shift assessment   Neurological   Neuro (WDL) X   Level of Consciousness Alert (0)   Orientation Level Disoriented to time;Disoriented to situation;Oriented to place;Oriented to person   Cognition Impulsive;Poor judgement;Poor safety awareness;Poor attention/concentration   Language Other (Comment)  (garbled at times, mostly clear)   Size R Pupil (mm) 3   R Pupil Shape Round   R Pupil Reaction Brisk   Size L Pupil (mm) 3   L Pupil Shape Round   L Pupil Reaction Brisk   R Hand  Moderate   L Hand  Moderate   R Foot Dorsiflexion Moderate   L Foot Dorsiflexion Moderate   R Foot Plantar Flexion Moderate   L Foot Plantar Flexion Moderate   RUE Motor Response Responds to command;Normal extension;Normal flexion   Sensation RUE Full sensation   LUE Motor Response Responds to command;Normal extension;Normal flexion   Sensation LUE Full sensation   RLE Motor Response Responds to command;Normal extension;Normal flexion   Sensation RLE Full sensation   LLE Motor Response Responds to command;Normal extension;Normal flexion   Sensation LLE Full sensation   Gag Present   Tongue Deviation None   NIH/MNIHSS Stroke Scale Assessed No   Bruno Coma Scale   Eye Opening 4   Best Verbal Response 4   Best Motor Response 6   Bruno Coma Scale Score 14   HEENT   HEENT (WDL) X   Right Eye Intact   Left Eye Intact   Nose Intact   Throat Intact   Neck Symmetrical;Trachea midline   Tongue Pink & moist   Voice Normal   Mucous Membrane Moist;Pink   Teeth Missing teeth   Respiratory   Respiratory (WDL) X   Respiratory Pattern Regular   Respiratory Depth Shallow   Respiratory Quality/Effort Unlabored   Chest Assessment Chest expansion symmetrical;Trachea midline   L Breath Sounds Diminished   R Breath Sounds Diminished   Breath Sounds   Right Upper Lobe Diminished   Right Middle Lobe Diminished   Right Lower Lobe Diminished   Left Upper Lobe Diminished   Left Lower Lobe Diminished   Cough/Sputum Cough Non-productive   Cardiac   Cardiac (WDL) WDL   Cardiac Rhythm NSR   Rhythm Interpretation   Pulse 76   Cardiac Monitor   Telemetry Monitor On Yes   Telemetry Audible Yes   Telemetry Alarms Set Yes   Gastrointestinal   Abdominal (WDL) WDL   Abdomen Inspection Soft;Rounded   Tenderness Soft;Nontender   RUQ Bowel Sounds Hypoactive   LUQ Bowel Sounds Hypoactive   RLQ Bowel Sounds Hypoactive   LLQ Bowel Sounds Hypoactive   Peripheral Vascular   Peripheral Vascular (WDL) X   Edema Right lower extremity; Left lower extremity   Edema Generalized Trace   RLE Edema Pitting;+1   LLE Edema Pitting;+1   RUE Neurovascular Assessment   Capillary Refill Less than/equal to 3 seconds   Color Appropriate for ethnicity   Temperature Warm   R Radial Pulse +2   LUE Neurovascular Assessment   Capillary Refill Less than/equal to 3 seconds   Color Appropriate for ethnicity   Temperature Warm   L Radial Pulse +2   RLE Neurovascular Assessment   Capillary Refill Less than/equal to 3 seconds   Color Appropriate for ethnicity   Temperature Warm   R Post Tibial Pulse +2   R Pedal Pulse +2   LLE Neurovascular Assessment   Capillary Refill Less than/equal to 3 seconds   Color Appropriate for ethnicity   Temperature Warm   L Post Tibial Pulse +2   L Pedal Pulse +2   Skin Color/Condition   Skin Color/Condition (WDL) X   Skin Color Appropriate for ethnicity   Skin Condition/Temp Warm;Dry   Skin Integrity   Skin Integrity (WDL) X   Skin Integrity Bruising   Location scattered   Preventative Dressing Yes   Skin Fold Management No   Multiple Skin Integrity Sites No   Dressing Site Sacrum   Assessed this shift?  Yes   Musculoskeletal   Musculoskeletal (WDL) X   RUE Weakness   LUE Weakness   RL Extremity Weakness   LL Extremity Weakness   Genitourinary   Genitourinary (WDL) X  (ott catheter to BSD)   Flank Tenderness No   Suprapubic Tenderness No   Dysuria JULES   Urine Assessment   Incontinence No  (ott catheter)   Urine Color Yellow/straw Urine Appearance Clear   Urine Odor No odor   Anus/Rectum   Anus/Rectum (WDL) WDL   Urethral Catheter Temperature probe 16 fr   Placement Date/Time: 03/28/21 0728   Inserted by: Anne  Catheter Type: Temperature probe  Tube Size (fr): 16 fr  Catheter Balloon Size: 10 mL  Urine Returned: Yes   Catheter Indications Need for fluid management in critically ill patients in a critical care setting not able to be managed by other means such as BSC with hat, bedpan, urinal, condom catheter, or short term intermittent urethral catherization   Site Assessment Pink; No urethral drainage   Urine Color Stefany;Yellow   Urine Appearance Sediment   Output (mL) 45 mL   Psychosocial   Psychosocial (WDL) WDL   Patient Behaviors Calm

## 2021-04-09 NOTE — PROGRESS NOTES
Physical Therapy  Facility/Department: [facility] C4 PCU  Daily Treatment Note  NAME: Jazmyn Barboza  : 1960  MRN: [id number]    Date of Service: 2021    Discharge Recommendations:  Subacute/Skilled Nursing Facility   PT Equipment Recommendations  Equipment Needed: Yes  Other: pt presently demos need for 4L spo2 for activities    Assessment   Body structures, Functions, Activity limitations: Decreased functional mobility ; Decreased balance;Decreased safe awareness;Decreased cognition;Decreased endurance  Assessment: Pt demos cga/min A for transfers this date and required min A x 2 for amb rw. Requires frequent rest breaks d/t fatigue and decreased activity tolerance. Pt would benefit from continued skilled therapy to address deficits. Recommend SNF at d/c due to currently requiring Ax2 and lives alone. Treatment Diagnosis: impaired functional mobility  Prognosis: Good  Decision Making: High Complexity  Barriers to Learning: cognition  REQUIRES PT FOLLOW UP: Yes  Activity Tolerance  Activity Tolerance: 145/76 BP  HR 82  O2 sats on 4L spo2 95% at rest but decreased to <90% after activities and decreased to 85% on RA (when pt took his nasal canula off)     Patient Diagnosis(es): The primary encounter diagnosis was COPD exacerbation ([facility] [address] Diagnoses of Acute respiratory failure with hypoxia and hypercapnia (HCC) and Cough were also pertinent to this visit. has a past medical history of COPD (chronic obstructive pulmonary disease) ([address] and Hypertension. has no past surgical history on file. Restrictions  Restrictions/Precautions  Restrictions/Precautions: General Precautions, Fall Risk  Position Activity Restriction  Other position/activity restrictions: up with assist  Subjective   General  Chart Reviewed: Yes  Response To Previous Treatment: Patient with no complaints from previous session.   Family / Caregiver Present: Yes(2, including mother)  Referring Practitioner: Chelsea Herron APRN-CNP  Subjective  Subjective: pt agreeable to therapy  General Comment  Comments: RN cleared pt for therapy  Pain Screening  Patient Currently in Pain: Denies  Vital Signs  Patient Currently in Pain: Denies       Orientation  Orientation  Overall Orientation Status: Within Functional Limits  Cognition      Objective      Transfers  Sit to Stand: Contact guard assistance;Minimal Assistance  Stand to sit: Contact guard assistance;Minimal Assistance  Ambulation  Ambulation?: Yes  Ambulation 1  Surface: level tile  Device: Rolling Walker  Assistance: 2 Person assistance;Minimal assistance  Quality of Gait: Improved posture with cues this date. Shuffling gait with cues for sequencing safe turning. Pt impulsive with mobility  Gait Deviations: Shuffles; Slow Gabrielle;Decreased step length;Decreased step height  Distance: 70', 60' x 2  Stairs/Curb  Stairs?: Yes  Stairs  # Steps : 4  Stairs Height: 6\"  Rails: Right ascending  Device: No Device  Assistance: Contact guard assistance     Balance  Posture: Fair  Sitting - Static: Good  Sitting - Dynamic: Fair;+  Standing - Static: Fair;+  Standing - Dynamic: Fair(w/ rw)  Exercises  Gluteal Sets: 10  Hip Flexion: Seated marches x 12 BLE  Hip Abduction: Seated clamshells x 12 BLE  Knee Long Arc Quad: x 12 BLE  Ankle Pumps: x 12 BLE  Comments: Cues for technique/sequencing. Increased time d/t decreased attention and decreased activity tolerance. AM-PAC Score  AM-PAC Inpatient Mobility Raw Score : 14 (04/09/21 1306)  AM-PAC Inpatient T-Scale Score : 38.1 (04/09/21 1306)  Mobility Inpatient CMS 0-100% Score: 61.29 (04/09/21 1306)  Mobility Inpatient CMS G-Code Modifier : CL (04/09/21 1306)          Goals  Short term goals  Time Frame for Short term goals: 1 week (4/13) unless otherwise specified  Short term goal 1: Pt will be CGA for bed mobility.  -- mod A x 2 4/08  -4/09 cga/min A  Short term goal 2: Pt will be min A x 2 for sit<>stand and bed<>chair transfers with LRAD. -- mod A x 2 4/08  -4/09 cga/min A  Short term goal 3: Pt will ambulate 25 ft with mod A x 2 and LRAD. -- 5 ft mod A x 2 4/08  -4/09 min Ax2  Short term goal 4: 4/10: Pt will participate in 12-15 reps of BLE exercises to promote strength and activity tolerance. -- GOAL MET 4/09 and ongoing  Patient Goals   Patient goals : \"to get better\"    Plan    Plan  Times per week: 3-5x/wk  Times per day: Daily  Specific instructions for Next Treatment: progress mobility as tolerated  Current Treatment Recommendations: Strengthening, Neuromuscular Re-education, Safety Education & Training, Balance Training, Endurance Training, Functional Mobility Training, Transfer Training, Gait Training, Equipment Evaluation, Education, & procurement, Patient/Caregiver Education & Training  Safety Devices  Type of devices:  All fall risk precautions in place, Call light within reach, Chair alarm in place, Gait belt, Patient at risk for falls, Nurse notified, Sitter present, Left in chair     Therapy Time   Individual Concurrent Group Co-treatment   Time In 1102         Time Out 1140         Minutes 38         Timed Code Treatment Minutes: [address]

## 2021-04-09 NOTE — PROGRESS NOTES
L subclavian triple lumen site cleansed with chlroaprep, Biopatch applied with transparent occlusive dressing. Pt tolerated well.   [facility]  4/9/2021

## 2021-04-09 NOTE — PROGRESS NOTES
Occupational Therapy  Facility/Department: [facility]  Daily Treatment Note  NAME: Constantine Mcallister  : 1960  MRN: [id number]    Date of Service: 2021    Discharge Recommendations:  [address]      If pt discharges prior to next session, this note will serve as discharge summary. See case management note for discharge disposition. Assessment   Performance deficits / Impairments: Decreased functional mobility ; Decreased balance;Decreased ADL status; Decreased cognition;Decreased endurance;Decreased high-level IADLs  Assessment: Pt seen for cotx with PT d/t assist level, cognition, potential for agitation and line mgmt. Pt remains confused, but improved, pt remains impulsive with decreased insight. Pt needs assist to manage RW and is unsteady wtih turns. Family present and arguing with pt whether they can care for pt. Cont to recommned SNF to decrease caregiver burden. OT Education: OT Role;Plan of Care;Home Exercise Program;Precautions; ADL Adaptive Strategies;Transfer Training  Patient Education: importance of continued activity and OOB activity, safety  Activity Tolerance  Activity Tolerance: Patient Tolerated treatment well;Treatment limited secondary to decreased cognition  Activity Tolerance: 145/76 HR 82  Safety Devices  Type of devices: Call light within reach; Chair alarm in place; Left in chair;Nurse notified;Gait belt         Patient Diagnosis(es): The primary encounter diagnosis was COPD exacerbation ([address] Diagnoses of Acute respiratory failure with hypoxia and hypercapnia (HCC) and Cough were also pertinent to this visit. has a past medical history of COPD (chronic obstructive pulmonary disease) ([address] and Hypertension. has no past surgical history on file.     Restrictions  Restrictions/Precautions  Restrictions/Precautions: General Precautions, Fall Risk  Position Activity Restriction  Other position/activity restrictions: up with assist  Subjective General  Chart Reviewed: Yes  Patient assessed for rehabilitation services?: Yes  Family / Caregiver Present: Yes(mother and female family/friend)  Referring Practitioner: BC Hutchinson CNP  Diagnosis: diagnosis of COPD Exacerbation with intubation 3/25-31  Subjective  Subjective: Pt agreeable to therapy  General Comment  Comments: RN approved therapy  Vital Signs  Patient Currently in Pain: Denies   Orientation  Orientation  Overall Orientation Status: Within Functional Limits  Objective    ADL  LE Dressing: Minimal assistance(assist to thread catheter in pants)  Toileting: Dependent/Total(ott)        Balance  Sitting Balance: Stand by assistance  Standing Balance: Minimal assistance  Standing Balance  Time: 3-4 min x 2  Activity: mobility in room/daniels  Functional Mobility  Functional - Mobility Device: Rolling Walker  Activity: Other  Assist Level: Minimal assistance  Functional Mobility Comments: min A x 2 for safety and RW mgmt at times     Transfers  Sit to stand: Minimal assistance;2 Person assistance  Stand to sit: Minimal assistance;2 Person assistance  Transfer Comments: min A x 1-2, A x 2 for mobility for lines and assist with RW mgmt                       Cognition  Overall Cognitive Status: Exceptions  Arousal/Alertness: Delayed responses to stimuli  Following Commands:  Follows one step commands with repetition  Attention Span: Attends with cues to redirect  Memory: Decreased long term memory;Decreased short term memory  Safety Judgement: Decreased awareness of need for assistance  Problem Solving: Assistance required to generate solutions;Assistance required to identify errors made  Insights: Not aware of deficits  Initiation: Requires cues for all  Sequencing: Requires cues for all  Cognition Comment: pt confused in conversation with cues to redirect, poor insight into deficits                    Type of ROM/Therapeutic Exercise  Type of ROM/Therapeutic Exercise: AROM  Exercises  Scapular Protraction: x15  Scapular Retraction: x15  Shoulder Flexion: x15  Elbow Flexion: x15  Elbow Extension: x15  Supination: x15  Pronation: x15  Wrist Flexion: x15  Wrist Extension: x15  Finger Flexion: x15  Finger Extension: x15                    Plan   Plan  Times per week: 4-5x/wk  Current Treatment Recommendations: Strengthening, Endurance Training, Balance Training, Functional Mobility Training, Safety Education & Training    AM-PAC Score        AM-PAC Inpatient Daily Activity Raw Score: 13 (04/09/21 1433)  AM-PAC Inpatient ADL T-Scale Score : 32.03 (04/09/21 1433)  ADL Inpatient CMS 0-100% Score: 63.03 (04/09/21 1433)  ADL Inpatient CMS G-Code Modifier : CL (04/09/21 1433)    Goals  Short term goals  Time Frame for Short term goals: 1 week (4/13/21)  Short term goal 1: PT will complete LB dressing with min A-MET 4/9  Short term goal 2: Pt will complete toileting with CGA.,  Short term goal 3: Pt will complete 15 reps of BUE exercises for increased endurance.  (4/10/21)-MET 4/9  Patient Goals   Patient goals : \"to get up to the chair\"       Therapy Time   Individual Concurrent Group Co-treatment   Time In 1102         Time Out 1140         Minutes 38         Timed Code Treatment Minutes: [place]

## 2021-04-09 NOTE — PROGRESS NOTES
Physical Exam Performed:    BP (!) 149/80   Pulse 86   Temp 99.3 °F (37.4 °C) (Oral)   Resp 16   Ht 5' 10\" (1.778 m)   Wt 234 lb 12.8 oz (106.5 kg)   SpO2 91%   BMI 33.69 kg/m²       General appearance:  NAD, appears stated age  HEENT: Pupils equal, round, and reactive to light. Conjunctivae/corneas clear. Neck: Supple, with full range of motion. No jugular venous distention. Trachea midline. Respiratory:  Normal respiratory effort. Clear to auscultation, bilaterally without Rales/Wheezes/Rhonchi. Diminished bilaterally. Cardiovascular: Regular rate and rhythm with normal S1/S2 without murmurs, rubs or gallops. Abdomen: Soft, non-tender, non-distended with normal bowel sounds. Musculoskeletal: No clubbing, cyanosis or edema bilaterally. Full range of motion without deformity. Skin: Skin color, texture, turgor normal.  No rashes or lesions. Neurologic: no focal deficits, no sensory or strength deficits, CN's grossly intact  Psychiatric: alert, oppositional, mostly oriented, has some insight  Capillary Refill: Brisk,< 3 seconds   Peripheral Pulses: +2 palpable, equal bilaterally       Labs:   Recent Labs     04/07/21  0400 04/09/21  0535   WBC 11.4* 9.8   HGB 12.7* 14.0   HCT 39.1* 41.9    169     Recent Labs     04/07/21  0400 04/08/21  1445 04/09/21  0535     --  142   K 3.4* 3.7 3.3*   CL 98*  --  100   CO2 33*  --  34*   BUN 16  --  9   CREATININE 0.7*  --  0.7*   CALCIUM 9.1  --  9.3     No results for input(s): AST, ALT, BILIDIR, BILITOT, ALKPHOS in the last 72 hours. No results for input(s): INR in the last 72 hours.   Recent Labs     04/09/21  0535   TROPONINI <0.01       Urinalysis:      Lab Results   Component Value Date    NITRU Negative 03/28/2021    WBCUA None seen 03/28/2021    BACTERIA Rare 03/28/2021    RBCUA None seen 03/28/2021    BLOODU Negative 03/28/2021    SPECGRAV 1.020 03/28/2021    GLUCOSEU Negative 03/28/2021       Radiology:  CT CHEST PULMONARY EMBOLISM W CONTRAST   Final Result   1. Trace bilateral pleural effusions. 2. Mediastinal adenopathy, likely reactive. CT HEAD WO CONTRAST   Final Result   1. No evidence of acute intracranial abnormality. 2. Bilateral mastoid sinus disease, left more so than right, as described   above. XR CHEST PORTABLE   Final Result   New small bilateral pleural effusions         XR CHEST PORTABLE   Final Result   Improving airspace opacities and pleural effusion. XR CHEST PORTABLE   Final Result   Interval decreased but persistent right greater than left pulmonary opacities. Increased small bibasilar opacities, left greater than right, which likely   represent atelectasis and small pleural fluid. XR CHEST PORTABLE   Final Result   Increased severity of pulmonary edema over the past 24 hours         XR CHEST PORTABLE   Final Result   Unchanged appearance of the chest over the past 24 hours with bilateral   pleural effusions, bibasilar atelectasis and pulmonary edema         XR ABDOMEN (KUB) (SINGLE AP VIEW)   Final Result   Negative supine of abdominal radiographs with the reservation that there is   minimal bowel gas. If there is increasing distention this could be due to   fluid-filled small bowel without bowel air, ascites, or even free air as   these images were obtained supine. XR CHEST PORTABLE   Final Result   Mildly increased bilateral pulmonary disease over the past 24 hours, likely   due to increasing edema. Bilateral pleural effusions without significant change remain present         XR CHEST PORTABLE   Final Result   Persistent bibasilar airspace disease and pleural effusions. Mild cardiac   silhouette enlargement. No change in life support. XR CHEST PORTABLE   Final Result   Supportive tubing projects in stable positions. Continued bibasilar airspace disease, with possible layered pleural effusions.          XR CHEST PORTABLE   Final Result   Supportive tubing is in stable position. Stable pattern of bilateral airspace disease and possible layered effusions. Pulmonary edema or pneumonia are possible. XR ABDOMEN (KUB) (SINGLE AP VIEW)   Final Result   Satisfactory enteric tube placement. XR CHEST PORTABLE   Final Result   Support lines and tubes in satisfactory position. No pneumothorax. Worsening bibasilar predominant airspace disease. Small right and trace left   pleural effusions suspected. XR CHEST PORTABLE   Final Result              Assessment/Plan:    Active Hospital Problems    Diagnosis    Elevated d-dimer [R79.89]    Alcohol use [Z72.89]    Agitation requiring sedation protocol [R45.1]    Current smoker [F17.200]    Acute respiratory failure with hypoxia and hypercapnia (HCC) [J96.01, J96.02]    COPD exacerbation (HCC) [J44.1]    Alcohol withdrawal [facility]) [F10.239]        \"62 y.o. male who presented to [facility] with shortness of breath that started a couple days ago and got worse. Today, patient was extremely short of breath. 911 was called. Patient's pulse ox was in the 40s on room air. Patient was placed on a nonrebreather, which brought him up to 76s. At that point, patient was placed on CPAP and brought to the emergency department. Yellow sputum with productive cough past couple days was noted. Has subjective fever and chills. Complains of chest pain in the center of the chest.  Patient is a smoker. \"      Acute hypoxic and hypercarbic respiratory failure 2/2 COPD exacerbation  - extubated 3/31. Wean O2 as able, he does not wear oxygen at baseline. Negative CTPA. - continue nebs, steroids.  - COVID PCR negative. S/p bronch. BAL NGTD. Completed course of abx without clear evidence of pneumonia. Procalcitonin was negative on admission, negative again on 4/5 repeat. At this point a rapid flu swab wouldn't affect management. Alcohol dependence with withdrawal  - weaned off precedex.

## 2021-04-09 NOTE — PLAN OF CARE
Problem: Nutrition  Goal: Optimal nutrition therapy  Description: Nutrition Problem #1: Increased nutrient needs  Intervention: Food and/or Nutrient Delivery: Continue Current Diet, Continue Oral Nutrition Supplement  Nutritional Goals: Patient will eat 50% or greater of meals and supplements. Outcome: Ongoing  Note: Pt with poor appetite this shift. Problem: Mental Status - Impaired:  Goal: Mental status will be restored to baseline  Description: Mental status will be restored to baseline  Outcome: Ongoing  Note: Degree of confusion intermittent this shift, re-orients easily. Problem: OXYGENATION/RESPIRATORY FUNCTION  Goal: Patient will achieve/maintain normal respiratory rate/effort  Description: Respiratory rate and effort will be within normal limits for the patient  Intervention: ASSESS OXYGENATION AS ORDERED  Note: O2 increased to 5L O2 per nasal cannula to keep O2 sats 90-94% as ordered.

## 2021-04-09 NOTE — PROGRESS NOTES
RESPIRATORY THERAPY ASSESSMENT    Name:  Fide Gonzalez  Medical Record Number:  [id number]  Age: 62 y.o. Gender: male  : 1960  Today's Date:  2021  Room:  [facility]/0129-63    Assessment     Is the patient being admitted for a COPD or Asthma exacerbation? No   (If yes the patient will be seen every 4 hours for the first 24 hours and then reassessed)    Patient Admission Diagnosis      Allergies  No Known Allergies    Minimum Predicted Vital Capacity:     NA          Actual Vital Capacity:      NA              Pulmonary History:COPD  Home Oxygen Therapy:  room air  Home Respiratory Therapy:Albuterol PRN  Current Respiratory Therapy:  Duoneb QID, Pulmocort BID  Treatment Type: HHN  Medications: Budesonide    Respiratory Severity Index(RSI)   Patients with orders for inhalation medications, oxygen, or any therapeutic treatment modality will be placed on Respiratory Protocol. They will be assessed with the first treatment and at least every 72 hours thereafter. The following severity scale will be used to determine frequency of treatment intervention. Smoking History: Pulmonary Disease or Smoking History, Greater than 15 pack year = 2    Social History  Social History     Tobacco Use    Smoking status: Current Every Day Smoker     Packs/day: 2.00    Smokeless tobacco: Never Used   Substance Use Topics    Alcohol use: Yes     Alcohol/week: 42.0 standard drinks     Types: 42 Cans of beer per week     Comment: hasn't drank in 3 days    Drug use: No       Recent Surgical History: None = 0  Past Surgical History  History reviewed. No pertinent surgical history.     Level of Consciousness: Alert, Oriented, and Cooperative = 0    Level of Activity: Walking with assistance = 1    Respiratory Pattern: Regular Pattern; RR 8-20 = 0    Breath Sounds: Clear = 0    Sputum  Sputum Color: None, Tenacity: None, Sputum How Obtained: Cough on request  Cough: Strong, spontaneous, non-productive = 0    Vital Signs   BP (!) 145/85   Pulse 93   Temp 98.4 °F (36.9 °C)   Resp 18   Ht 5' 10\" (1.778 m)   Wt 234 lb 12.8 oz (106.5 kg)   SpO2 94%   BMI 33.69 kg/m²   SPO2 (COPD values may differ): 88-89% on room air or greater than 92% on FiO2 28- 35% = 2    Peak Flow (asthma only): not applicable = 0    RSI: 7-8 = BID and Q4HPRN (every four hours as needed) for dyspnea        Plan       Goals: medication delivery, mobilize retained secretions, volume expansion and improve oxygenation    Patient/caregiver was educated on the proper method of use for Respiratory Care Devices:  Yes      Level of patient/caregiver understanding able to:   ? Verbalize understanding   ? Demonstrate understanding       ? Teach back        ? Needs reinforcement       ? No available caregiver               ? Other:     Response to education:  Good     Is patient being placed on Home Treatment Regimen? No     Does the patient have everything they need prior to discharge? Yes     Comments: pt assessed and chart reviewed    Plan of Care: 4/12    Electronically signed by Leonarda He RCP on 4/9/2021 at 7:46 PM    Respiratory Protocol Guidelines     1. Assessment and treatment by Respiratory Therapy will be initiated for medication and therapeutic interventions upon initiation of aerosolized medication. 2. Physician will be contacted for respiratory rate (RR) greater than 35 breaths per minute. Therapy will be held for heart rate (HR) greater than 140 beats per minute, pending direction from physician. 3. Bronchodilators will be administered via Metered Dose Inhaler (MDI) with spacer when the following criteria are met:  a. Alert and cooperative     b. HR < 140 bpm  c. RR < 30 bpm                d. Can demonstrate a 2-3 second inspiratory hold  4. Bronchodilators will be administered via Hand Held Nebulizer [facility]) to patients when ANY of the following criteria are met  a. Incognizant or uncooperative          b.  Patients treated with HHN at Home

## 2021-04-09 NOTE — PROGRESS NOTES
Comprehensive Nutrition Assessment    Type and Reason for Visit:  Reassess    Nutrition Recommendations/Plan:   1. Continue General diet with Ensure supplements   2. Will monitor nutritional adequacy, nutrition-related labs, weights, BMs, and clinical progress     Nutrition Assessment:  Follow up:  Continues on general diet with Ensure supplements. Po intake improved to eating % meals and supplements. On 4 liters O2. Malnutrition Assessment:  Malnutrition Status: At risk for malnutrition (Comment)(decreasing weight and nutritional intake, increased protein needs)    Context:  Acute Illness       Estimated Daily Nutrient Needs:  Energy (kcal):  3752-1719 kcals/day; Weight Used for Energy Requirements:  Ideal     Protein (g):  75-91 g/day; Weight Used for Protein Requirements:  Ideal(1-1.2 g/day)        Fluid (ml/day):  1500 ml; Method Used for Fluid Requirements:  1 ml/kcal      Nutrition Related Findings:  +1 bilateral pitting edema; last BM on 4/7/21      Wounds:  (scattered bruising)       Current Nutrition Therapies:    DIET GENERAL;  Dietary Nutrition Supplements: Standard High Calorie Oral Supplement    Anthropometric Measures:  · Height: 5' 10\" (177.8 cm)  · Current Body Weight: 234 lb 12 oz (106.5 kg)   · Admission Body Weight: 248 lb 14 oz (112.9 kg)     · Ideal Body Weight: 166 lbs; % Ideal Body Weight 149.9 %   · BMI: 33.7  · Adjusted Body Weight:  ; No Adjustment   · BMI Categories: Obese Class 2 (BMI 35.0 -39.9)       Nutrition Diagnosis:   · Increased nutrient needs related to increase demand for energy/nutrients as evidenced by (extended hospital stay)    Nutrition Interventions:   Food and/or Nutrient Delivery:  Continue Current Diet, Continue Oral Nutrition Supplement  Nutrition Education/Counseling:  No recommendation at this time   Coordination of Nutrition Care:  Continue to monitor while inpatient    Goals:  Patient will eat 50% or greater of meals and supplements.        Nutrition Monitoring and Evaluation:   Behavioral-Environmental Outcomes:  None Identified   Food/Nutrient Intake Outcomes:  Food and Nutrient Intake, Supplement Intake  Physical Signs/Symptoms Outcomes:  Biochemical Data, Nutrition Focused Physical Findings     Discharge Planning:    Continue Oral Nutrition Supplement, Continue current diet     Electronically signed by Jonel Mcdonald RD, LD on 4/9/21 at 1:54 PM EDT    Contact: Office: [phone]; [address] [place] [address] Road: [phone]

## 2021-04-09 NOTE — PROGRESS NOTES
Pulmonary & Critical Care Medicine Progress Note      Reason for visit: Acute respiratory failure, continued high oxygen requirement, COPD exacerbation, pleural effusions, pulmonary edema, presumed alcohol withdrawal, KAROL, alcoholism. Events of Last 24 hours: The patient gradually improved in the ICU, with slightly improved mentation and improved oxygenation. He was transferred up to the floor. He still has a sitter at the bedside. Reportedly he is eating poorly, drinking a lot of Ensure. He remains afebrile and hemodynamically stable. As per nursing oxygen had to be turned up from 4 LPM to 5 LPM.  The patient denies any complaints, says he wants to go home tomorrow. 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 He says he drank a few beers recently, about a sixpack over a week, was only a heavy drinker when he was younger. The rest of his review of systems was negative. Invasive Lines:      CVC left subclavian 3/25/2021    Art Line left radial        MV: 3/25/2021, extubated 3/31/2021  IV:      Vitals:  BP (!) 145/85   Pulse 93   Temp 98.4 °F (36.9 °C)   Resp 18   Ht 5' 10\" (1.778 m)   Wt 234 lb 12.8 oz (106.5 kg)   SpO2 94%   BMI 33.69 kg/m²         Intake/Output Summary (Last 24 hours) at 4/9/2021 1939  Last data filed at 4/9/2021 1828  Gross per 24 hour   Intake 1000 ml   Output 1790 ml   Net -790 ml       EXAM:  General: No distress. Overweight, awake, communicative  Eyes: PERRL. No sclera icterus. No conjunctival injection. ENT: Class III airway, no oral lesions  Neck: Large and short neck, no JVD   Resp: No accessory muscle use. No crackles. No wheezing. No rhonchi. CV: Regular rate. Regular rhythm. No mumur or rub. No edema. GI: Deferred. Skin: Warm and dry. No nodule on exposed extremities. No rash on exposed extremities. Lymph: Deferred. M/S: No cyanosis. No joint deformity. No clubbing. Neuro: Awake, oriented. Moving all extremities.   No obvious focal deficit  Psych: Calm and oriented. Medications:  Scheduled Meds:   chlordiazePOXIDE  5 mg Oral TID    amLODIPine  10 mg Oral Daily    carvedilol  12.5 mg Oral BID WC    lisinopril  5 mg Oral Daily    nicotine  1 patch Transdermal Daily    budesonide  0.5 mg Nebulization BID    ipratropium-albuterol  1 ampule Inhalation 4x daily    famotidine  20 mg Oral BID    thiamine  100 mg Oral Daily    sodium chloride flush  10 mL Intravenous 2 times per day    enoxaparin  40 mg Subcutaneous Daily       PRN Meds:  prochlorperazine, haloperidol lactate, hydrALAZINE, labetalol, perflutren lipid microspheres, potassium chloride, sodium chloride flush, acetaminophen **OR** acetaminophen    Results:  CBC:   Recent Labs     04/07/21  0400 04/09/21  0535   WBC 11.4* 9.8   HGB 12.7* 14.0   HCT 39.1* 41.9   MCV 95.2 95.0    169     BMP:   Recent Labs     04/07/21  0400 04/08/21  1445 04/09/21  0535     --  142   K 3.4* 3.7 3.3*   CL 98*  --  100   CO2 33*  --  34*   BUN 16  --  9   CREATININE 0.7*  --  0.7*       Cultures:  Bronchoscopy cultures negative     Films:  CXR reviewed by me  CT chest PE protocol 4/7/21 reviewed by me personally    Assessment and plan:  Acute respiratory failure, hypoxemic and hypercarbic. Continued significant oxygen requirement. Wean to keep SaO2 >90%. Probably has significant underlying COPD  COPD exacerbation. On bronchodilator and steroid by nebulized route. Completed 5 days of antibiotics. CT chest without emphysema. CT shows tracheobronchomalacia  Bilateral large pleural effusions, pulmonary edema. Significantly improved on CT chest, only small residual pleural effusions   Alcohol withdrawal, encephalopathy, alcoholism. Continues to remain on low-dose benzodiazepines, claims he was not drinking for 40 days, was not a heavy drinker, but unclear about veracity  KAROL. Resolved  Smoker. NRT  DVT prophylaxis. Lovenox. PUD prophylaxis. Pepcid.       Discussed with nursing, patient.       Electronically signed by:  Rao Finch MD    4/9/2021    7:39 PM.

## 2021-04-09 NOTE — PROGRESS NOTES
Pt up in chair with family and sitter at bedside. Pt denies pain, discomfort or shortness of breath. Lungs diminished. Pt on 4L O2 per nasal cannula. Laws removed at this time per MD order, pt tolerated well;  urinal placed at bedside. Pt denies any needs at this time. Bedside table and call light within reach. Will continue to monitor.   [facility]  4/9/2021

## 2021-04-09 NOTE — PROGRESS NOTES
Comprehensive Nutrition Assessment    Type and Reason for Visit:  Reassess    Nutrition Recommendations/Plan:     Nutrition Assessment:  Follow up:  Continues on general diet with Ensure supplements eating % meals and supplements. Malnutrition Assessment:  Malnutrition Status: At risk for malnutrition (Comment)(decreasing weight and nutritional intake, increased protein needs)    Context:  Acute Illness       Estimated Daily Nutrient Needs:  Energy (kcal):  6104-8884 kcals/day; Weight Used for Energy Requirements:  Ideal     Protein (g):  75-91 g/day; Weight Used for Protein Requirements:  Ideal(1-1.2 g/day)        Fluid (ml/day):  1500 ml; Method Used for Fluid Requirements:  1 ml/kcal      Nutrition Related Findings:  +1 bilateral pitting edema; last BM on 4/7/21      Wounds:  (scattered bruising)       Current Nutrition Therapies:    DIET GENERAL;  Dietary Nutrition Supplements: Standard High Calorie Oral Supplement    Anthropometric Measures:  · Height: 5' 10\" (177.8 cm)  · Current Body Weight: 234 lb 12 oz (106.5 kg)   · Admission Body Weight: 248 lb 14 oz (112.9 kg)    · Ideal Body Weight: 166 lbs; % Ideal Body Weight 149.9 %   · BMI: 33.7  · Adjusted Body Weight:  ; No Adjustment   · BMI Categories: Obese Class 2 (BMI 35.0 -39.9)       Nutrition Diagnosis:   · Increased nutrient needs related to increase demand for energy/nutrients as evidenced by (extended hospital stay)    Nutrition Interventions:   Food and/or Nutrient Delivery:  Continue Current Diet, Continue Oral Nutrition Supplement  Nutrition Education/Counseling:  No recommendation at this time   Coordination of Nutrition Care:  Continue to monitor while inpatient    Goals:  Patient will eat 50% or greater of meals and supplements.        Nutrition Monitoring and Evaluation:   Behavioral-Environmental Outcomes:  None Identified   Food/Nutrient Intake Outcomes:  Food and Nutrient Intake, Supplement Intake  Physical Signs/Symptoms

## 2021-04-09 NOTE — PROGRESS NOTES
K 3.3 this a.m, first of two 20meq potassium chloride IVPB infusing per prn replacement that is in place.   [facility]  4/9/2021

## 2021-04-09 NOTE — PROGRESS NOTES
Writer returned pt's son Harpal's call, given update on pt and regarding plan of care.   [facility] [place] [place] [facility]  4/9/2021

## 2021-04-10 ENCOUNTER — APPOINTMENT (OUTPATIENT)
Dept: GENERAL RADIOLOGY | Age: 61
DRG: 130 | End: 2021-04-10
Payer: MEDICAID

## 2021-04-10 VITALS
TEMPERATURE: 98.5 F | HEART RATE: 86 BPM | HEIGHT: 70 IN | OXYGEN SATURATION: 95 % | RESPIRATION RATE: 18 BRPM | DIASTOLIC BLOOD PRESSURE: 77 MMHG | SYSTOLIC BLOOD PRESSURE: 115 MMHG | BODY MASS INDEX: 33.61 KG/M2 | WEIGHT: 234.8 LBS

## 2021-04-10 LAB — POTASSIUM SERPL-SCNC: 3.3 MMOL/L (ref 3.5–5.1)

## 2021-04-10 PROCEDURE — 94761 N-INVAS EAR/PLS OXIMETRY MLT: CPT

## 2021-04-10 PROCEDURE — 6370000000 HC RX 637 (ALT 250 FOR IP): Performed by: INTERNAL MEDICINE

## 2021-04-10 PROCEDURE — 6360000002 HC RX W HCPCS: Performed by: INTERNAL MEDICINE

## 2021-04-10 PROCEDURE — 84132 ASSAY OF SERUM POTASSIUM: CPT

## 2021-04-10 PROCEDURE — 99232 SBSQ HOSP IP/OBS MODERATE 35: CPT | Performed by: INTERNAL MEDICINE

## 2021-04-10 PROCEDURE — 2580000003 HC RX 258: Performed by: INTERNAL MEDICINE

## 2021-04-10 PROCEDURE — 2700000000 HC OXYGEN THERAPY PER DAY

## 2021-04-10 PROCEDURE — 94640 AIRWAY INHALATION TREATMENT: CPT

## 2021-04-10 PROCEDURE — 71045 X-RAY EXAM CHEST 1 VIEW: CPT

## 2021-04-10 RX ORDER — AMLODIPINE BESYLATE 10 MG/1
10 TABLET ORAL DAILY
Qty: 30 TABLET | Refills: 3 | Status: SHIPPED | OUTPATIENT
Start: 2021-04-11

## 2021-04-10 RX ORDER — PREDNISONE 20 MG/1
20 TABLET ORAL DAILY
Qty: 4 TABLET | Refills: 0 | Status: SHIPPED | OUTPATIENT
Start: 2021-04-10 | End: 2021-04-14

## 2021-04-10 RX ORDER — LISINOPRIL 5 MG/1
5 TABLET ORAL DAILY
Qty: 30 TABLET | Refills: 3 | Status: SHIPPED | OUTPATIENT
Start: 2021-04-11

## 2021-04-10 RX ORDER — BUDESONIDE AND FORMOTEROL FUMARATE DIHYDRATE 160; 4.5 UG/1; UG/1
2 AEROSOL RESPIRATORY (INHALATION) 2 TIMES DAILY
Qty: 3 INHALER | Refills: 1 | Status: SHIPPED | OUTPATIENT
Start: 2021-04-10

## 2021-04-10 RX ORDER — TAMSULOSIN HYDROCHLORIDE 0.4 MG/1
0.4 CAPSULE ORAL DAILY
Qty: 30 CAPSULE | Refills: 3 | Status: SHIPPED | OUTPATIENT
Start: 2021-04-10

## 2021-04-10 RX ORDER — NICOTINE 21 MG/24HR
1 PATCH, TRANSDERMAL 24 HOURS TRANSDERMAL DAILY
Qty: 30 PATCH | Refills: 0 | Status: SHIPPED | OUTPATIENT
Start: 2021-04-11

## 2021-04-10 RX ORDER — TAMSULOSIN HYDROCHLORIDE 0.4 MG/1
0.4 CAPSULE ORAL DAILY
Status: DISCONTINUED | OUTPATIENT
Start: 2021-04-10 | End: 2021-04-10 | Stop reason: HOSPADM

## 2021-04-10 RX ORDER — LANOLIN ALCOHOL/MO/W.PET/CERES
100 CREAM (GRAM) TOPICAL DAILY
Qty: 30 TABLET | Refills: 3 | Status: SHIPPED | OUTPATIENT
Start: 2021-04-11

## 2021-04-10 RX ORDER — CARVEDILOL 12.5 MG/1
12.5 TABLET ORAL 2 TIMES DAILY WITH MEALS
Qty: 60 TABLET | Refills: 3 | Status: SHIPPED | OUTPATIENT
Start: 2021-04-10

## 2021-04-10 RX ADMIN — BUDESONIDE 500 MCG: 0.5 SUSPENSION RESPIRATORY (INHALATION) at 08:20

## 2021-04-10 RX ADMIN — LISINOPRIL 5 MG: 5 TABLET ORAL at 09:30

## 2021-04-10 RX ADMIN — CARVEDILOL 12.5 MG: 6.25 TABLET, FILM COATED ORAL at 09:30

## 2021-04-10 RX ADMIN — IPRATROPIUM BROMIDE AND ALBUTEROL SULFATE 1 AMPULE: .5; 3 SOLUTION RESPIRATORY (INHALATION) at 08:20

## 2021-04-10 RX ADMIN — ENOXAPARIN SODIUM 40 MG: 40 INJECTION SUBCUTANEOUS at 09:29

## 2021-04-10 RX ADMIN — FAMOTIDINE 20 MG: 20 TABLET ORAL at 09:30

## 2021-04-10 RX ADMIN — POTASSIUM CHLORIDE 20 MEQ: 29.8 INJECTION, SOLUTION INTRAVENOUS at 11:46

## 2021-04-10 RX ADMIN — Medication 100 MG: at 09:30

## 2021-04-10 RX ADMIN — POTASSIUM CHLORIDE 20 MEQ: 29.8 INJECTION, SOLUTION INTRAVENOUS at 10:45

## 2021-04-10 RX ADMIN — AMLODIPINE BESYLATE 10 MG: 5 TABLET ORAL at 09:30

## 2021-04-10 RX ADMIN — SODIUM CHLORIDE, PRESERVATIVE FREE 10 ML: 5 INJECTION INTRAVENOUS at 09:30

## 2021-04-10 RX ADMIN — TAMSULOSIN HYDROCHLORIDE 0.4 MG: 0.4 CAPSULE ORAL at 10:33

## 2021-04-10 ASSESSMENT — PAIN SCALES - GENERAL: PAINLEVEL_OUTOF10: 0

## 2021-04-10 NOTE — CARE COORDINATION
CASE MANAGEMENT DISCHARGE SUMMARY      Discharge to: home with [address] ordered/agency: [facility] for home oxygen. Tank delivered to room. Transportation:    Family/car: private     [facility]     Patient: yes     Facility/Agency, name:  [facility] RN, name: Franca Figueredo    Note: Discharging nurse to complete MEGAN, reconcile AVS, and place final copy with patient's discharge packet. RN to ensure that written prescriptions for  Level II medications are sent with patient to the facility as per protocol.

## 2021-04-10 NOTE — DISCHARGE SUMMARY
Hospital Medicine Discharge Summary    Patient ID: Kolton Mireles      Patient's PCP: Flex Torres MD    Admit Date: 3/25/2021     Discharge Date:   04/10/21    Admitting Physician: Moira Barry MD     Discharge Physician: Vickie Roman MD     Discharge Diagnoses: Active Hospital Problems    Diagnosis    Elevated d-dimer [R79.89]    Alcohol use [Z72.89]    Agitation requiring sedation protocol [R45.1]    Current smoker [F17.200]    Acute respiratory failure with hypoxia and hypercapnia (HCC) [J96.01, J96.02]    COPD exacerbation (HCC) [J44.1]    Alcohol withdrawal ([address] [F10.239]       The patient was seen and examined on day of discharge and this discharge summary is in conjunction with any daily progress note from day of discharge. Hospital Course:  \"62 y. o. male who presented to [facility] with shortness of breath that started a couple days ago and got worse.  Today, patient was extremely short of breath.  911 was called.  Patient's pulse ox was in the 40s on room air.  Patient was placed on a nonrebreather, which brought him up to 70s.  At that point, patient was placed on CPAP and brought to the emergency department. Yellow sputum with productive cough past couple days was noted.  Has subjective fever and chills. Complains of chest pain in the center of the chest.  Patient is a smoker. \"        Acute hypoxic and hypercarbic respiratory failure 2/2 COPD exacerbation  - extubated 3/31. Discharged with new 4LNC. Wean O2 in the coming weeks as able, he does not wear oxygen at baseline (92% on RA with PCP recently). Negative CTPA. - continue nebs, steroids. Discharged with another 4 days of PO prednisone.   - COVID PCR negative. S/p bronch. BAL NGTD. Completed course of abx without clear evidence of pneumonia. Procalcitonin was negative on admission, negative again on 4/5 repeat. Doubt influenza.     Alcohol dependence with withdrawal  - weaned off precedex. with normal bowel sounds. Musculoskeletal: No clubbing, cyanosis or edema bilaterally.  Full range of motion without deformity. Skin: Skin color, texture, turgor normal.  No rashes or lesions. Neurologic: no focal deficits, no sensory or strength deficits, CN's grossly intact  Psychiatric: alert, oppositional, oriented, has insight  Capillary Refill: Brisk,< 3 seconds   Peripheral Pulses: +2 palpable, equal bilaterally       Labs: For convenience and continuity at follow-up the following most recent labs are provided:      CBC:    Lab Results   Component Value Date    WBC 9.8 04/09/2021    HGB 14.0 04/09/2021    HCT 41.9 04/09/2021     04/09/2021       Renal:    Lab Results   Component Value Date     04/09/2021    K 3.3 04/10/2021    K 3.3 04/09/2021     04/09/2021    CO2 34 04/09/2021    BUN 9 04/09/2021    CREATININE 0.7 04/09/2021    CALCIUM 9.3 04/09/2021    PHOS 3.3 03/28/2021         Significant Diagnostic Studies    Radiology:   CT CHEST PULMONARY EMBOLISM W CONTRAST   Final Result   1. Trace bilateral pleural effusions. 2. Mediastinal adenopathy, likely reactive. CT HEAD WO CONTRAST   Final Result   1. No evidence of acute intracranial abnormality. 2. Bilateral mastoid sinus disease, left more so than right, as described   above. XR CHEST PORTABLE   Final Result   New small bilateral pleural effusions         XR CHEST PORTABLE   Final Result   Improving airspace opacities and pleural effusion. XR CHEST PORTABLE   Final Result   Interval decreased but persistent right greater than left pulmonary opacities. Increased small bibasilar opacities, left greater than right, which likely   represent atelectasis and small pleural fluid.          XR CHEST PORTABLE   Final Result   Increased severity of pulmonary edema over the past 24 hours         XR CHEST PORTABLE   Final Result   Unchanged appearance of the chest over the past 24 hours with bilateral   pleural effusions, bibasilar atelectasis and pulmonary edema         XR ABDOMEN (KUB) (SINGLE AP VIEW)   Final Result   Negative supine of abdominal radiographs with the reservation that there is   minimal bowel gas. If there is increasing distention this could be due to   fluid-filled small bowel without bowel air, ascites, or even free air as   these images were obtained supine. XR CHEST PORTABLE   Final Result   Mildly increased bilateral pulmonary disease over the past 24 hours, likely   due to increasing edema. Bilateral pleural effusions without significant change remain present         XR CHEST PORTABLE   Final Result   Persistent bibasilar airspace disease and pleural effusions. Mild cardiac   silhouette enlargement. No change in life support. XR CHEST PORTABLE   Final Result   Supportive tubing projects in stable positions. Continued bibasilar airspace disease, with possible layered pleural effusions. XR CHEST PORTABLE   Final Result   Supportive tubing is in stable position. Stable pattern of bilateral airspace disease and possible layered effusions. Pulmonary edema or pneumonia are possible. XR ABDOMEN (KUB) (SINGLE AP VIEW)   Final Result   Satisfactory enteric tube placement. XR CHEST PORTABLE   Final Result   Support lines and tubes in satisfactory position. No pneumothorax. Worsening bibasilar predominant airspace disease. Small right and trace left   pleural effusions suspected. XR CHEST PORTABLE   Final Result      XR CHEST PORTABLE    (Results Pending)          Consults:     IP CONSULT TO HOSPITALIST  IP CONSULT TO PULMONOLOGY  IP CONSULT TO SOCIAL WORK  IP CONSULT TO DIETITIAN  IP CONSULT TO NEPHROLOGY  IP CONSULT TO HOME CARE NEEDS    Disposition:  Home with PT/OT (he refused SNF but was walking fairly well.)     Condition at Discharge: Stable    Discharge Instructions/Follow-up:  Follow up with PCP within 1-2 weeks. Code Status:  Full Code     Activity: activity as tolerated    Diet: regular diet      Discharge Medications:     Current Discharge Medication List           Details   lisinopril (PRINIVIL;ZESTRIL) 5 MG tablet Take 1 tablet by mouth daily  Qty: 30 tablet, Refills: 3      carvedilol (COREG) 12.5 MG tablet Take 1 tablet by mouth 2 times daily (with meals)  Qty: 60 tablet, Refills: 3      amLODIPine (NORVASC) 10 MG tablet Take 1 tablet by mouth daily  Qty: 30 tablet, Refills: 3      tamsulosin (FLOMAX) 0.4 MG capsule Take 1 capsule by mouth daily  Qty: 30 capsule, Refills: 3      nicotine (NICODERM CQ) 21 MG/24HR Place 1 patch onto the skin daily  Qty: 30 patch, Refills: 0      thiamine 100 MG tablet Take 1 tablet by mouth daily  Qty: 30 tablet, Refills: 3      budesonide-formoterol (SYMBICORT) 160-4.5 MCG/ACT AERO Inhale 2 puffs into the lungs 2 times daily  Qty: 3 Inhaler, Refills: 1      predniSONE (DELTASONE) 20 MG tablet Take 1 tablet by mouth daily for 4 days  Qty: 4 tablet, Refills: 0              Details   albuterol sulfate HFA (VENTOLIN HFA) 108 (90 Base) MCG/ACT inhaler Inhale 2 puffs into the lungs every 6 hours as needed for Wheezing  Qty: 1 Inhaler, Refills: 1               Time Spent on discharge is more than 30 minutes in the examination, evaluation, counseling and review of medications and discharge plan. Signed:    Saran Auguste MD   4/10/2021      Thank you Aakash An MD for the opportunity to be involved in this patient's care. If you have any questions or concerns please feel free to contact me at [phone]. Patient was evaluated today for the diagnosis of COPD. I entered a DME order for home oxygen because the diagnosis and testing requires the patient to have supplemental oxygen. Condition will improve or be benefited by oxygen use.   The patient is  able to perform good mobility in a home setting and therefore does require the use of a portable oxygen system. The need for this equipment was discussed with the patient and he understands and is in agreement.

## 2021-04-10 NOTE — FLOWSHEET NOTE
04/09/21 1947   Assessment   Charting Type Shift assessment   Neurological   Neuro (WDL) X   Level of Consciousness Alert (0)   Orientation Level Oriented to person;Oriented to place; Disoriented to time   Marilin commands; Impulsive;Poor safety awareness;Poor judgement;Poor attention/concentration   Language Clear   Size R Pupil (mm) 3   R Pupil Shape Round   R Pupil Reaction Brisk   Size L Pupil (mm) 3   L Pupil Shape Round   L Pupil Reaction Brisk   R Hand  Strong   L Hand  Strong   R Foot Dorsiflexion Moderate   L Foot Dorsiflexion Moderate   R Foot Plantar Flexion Moderate   L Foot Plantar Flexion Moderate   RUE Motor Response Responds to command;Normal extension;Normal flexion   Sensation RUE Full sensation   LUE Motor Response Responds to command;Normal extension;Normal flexion   Sensation LUE Full sensation   RLE Motor Response Responds to command;Normal extension;Normal flexion   Sensation RLE Full sensation   LLE Motor Response Responds to command;Normal extension;Normal flexion   Sensation LLE Full sensation   Gag Present   Tongue Deviation None   NIH/MNIHSS Stroke Scale Assessed No   Earth Coma Scale   Eye Opening 4   Best Verbal Response 4   Best Motor Response 6   Jessica Coma Scale Score 14   HEENT   HEENT (WDL) X  (wears glasses)   Right Eye Impaired vision   Left Eye Impaired vision   Nose Intact   Throat Intact   Neck Symmetrical;Trachea midline   Tongue Pink & moist   Voice Normal   Mucous Membrane Moist;Pink   Teeth Missing teeth   Respiratory   Respiratory (WDL) X   Respiratory Pattern Regular   Respiratory Depth Normal   Respiratory Quality/Effort Unlabored   Chest Assessment Chest expansion symmetrical;Trachea midline   L Breath Sounds Diminished   R Breath Sounds Diminished   Breath Sounds   Right Upper Lobe Diminished   Right Middle Lobe Diminished   Right Lower Lobe Diminished   Left Upper Lobe Diminished   Left Lower Lobe Diminished   Cardiac   Cardiac (WDL) WDL Cardiac Rhythm NSR   Rhythm Interpretation   Pulse 80   Cardiac Monitor   Telemetry Monitor On Yes   Telemetry Audible Yes   Telemetry Alarms Set Yes   Gastrointestinal   Abdominal (WDL) WDL   Abdomen Inspection Soft;Rounded   Tenderness Soft;Nontender   RUQ Bowel Sounds Active   LUQ Bowel Sounds Active   RLQ Bowel Sounds Active   LLQ Bowel Sounds Active   Peripheral Vascular   Peripheral Vascular (WDL) X   Edema Right lower extremity; Left lower extremity   RLE Edema Pitting;+1   LLE Edema Pitting;+1   RUE Neurovascular Assessment   Capillary Refill Less than/equal to 3 seconds   Color Appropriate for ethnicity   Temperature Warm   R Radial Pulse +2   LUE Neurovascular Assessment   Capillary Refill Less than/equal to 3 seconds   Color Appropriate for ethnicity   Temperature Warm   L Radial Pulse +2   RLE Neurovascular Assessment   Capillary Refill Less than/equal to 3 seconds   Color Appropriate for ethnicity   Temperature Warm   R Post Tibial Pulse +2   R Pedal Pulse +2   LLE Neurovascular Assessment   Capillary Refill Less than/equal to 3 seconds   Color Appropriate for ethnicity   Temperature Warm   L Post Tibial Pulse +2   L Pedal Pulse +2   Skin Color/Condition   Skin Color/Condition (WDL) X   Skin Color Appropriate for ethnicity   Skin Condition/Temp Warm;Dry   Skin Integrity   Skin Integrity (WDL) X   Skin Integrity Bruising   Location scattered   Preventative Dressing Yes   Skin Fold Management No   Multiple Skin Integrity Sites No   Assessed this shift?  Yes   Musculoskeletal   Musculoskeletal (WDL) X   RUE Weakness   LUE Weakness   RL Extremity Weakness   LL Extremity Weakness   Genitourinary   Genitourinary (WDL) X  (ott removed at this time)   Flank Tenderness No   Suprapubic Tenderness No   Dysuria No   Urine Assessment   Incontinence No   Urine Color Yellow/straw   Urine Appearance Clear   Urine Odor No odor   Anus/Rectum   Anus/Rectum (WDL) WDL   Psychosocial   Psychosocial (WDL) WDL   Patient Behaviors Cooperative

## 2021-04-10 NOTE — PLAN OF CARE
parameters  Description: Bowel elimination is within specified parameters  Outcome: Ongoing     Problem: Cardiac Output - Decreased:  Goal: Hemodynamic stability will improve  Description: Hemodynamic stability will improve  Outcome: Ongoing     Problem: Fluid Volume - Imbalance:  Goal: Absence of imbalanced fluid volume signs and symptoms  Description: Absence of imbalanced fluid volume signs and symptoms  Outcome: Ongoing     Problem: Gas Exchange - Impaired:  Goal: Levels of oxygenation will improve  Description: Levels of oxygenation will improve  Outcome: Ongoing     Problem: Mental Status - Impaired:  Goal: Mental status will be restored to baseline  Description: Mental status will be restored to baseline  4/10/2021 0432 by Gladis Melissa RN  Outcome: Ongoing  4/9/2021 1850 by Mino Stanford RN  Outcome: Ongoing  Note: Degree of confusion intermittent this shift, re-orients easily.        Problem: Nutrition Deficit:  Goal: Ability to achieve adequate nutritional intake will improve  Description: Ability to achieve adequate nutritional intake will improve  Outcome: Ongoing     Problem: Pain:  Goal: Pain level will decrease  Description: Pain level will decrease  Outcome: Ongoing  Goal: Recognizes and communicates pain  Description: Recognizes and communicates pain  Outcome: Ongoing  Goal: Control of acute pain  Description: Control of acute pain  Outcome: Ongoing  Goal: Control of chronic pain  Description: Control of chronic pain  Outcome: Ongoing     Problem: Serum Glucose Level - Abnormal:  Goal: Ability to maintain appropriate glucose levels will improve to within specified parameters  Description: Ability to maintain appropriate glucose levels will improve to within specified parameters  Outcome: Ongoing     Problem: Skin Integrity - Impaired:  Goal: Will show no infection signs and symptoms  Description: Will show no infection signs and symptoms  Outcome: Ongoing  Goal: Absence of new skin

## 2021-04-10 NOTE — DISCHARGE INSTR - COC
Continuity of Care Form    Patient Name: Tena Hazel   :  1960  MRN:  [id number]    Admit date:  3/25/2021  Discharge date:  4/10/21    Code Status Order: Full Code   Advance Directives:   Advance Care Flowsheet Documentation       Date/Time Healthcare Directive Type of Healthcare Directive Copy in [address] Agent's Name Healthcare Agent's Phone Number    21 [phone]  No, patient does not have an advance directive for healthcare treatment -- -- -- -- --            Admitting Physician:  Libby Resendiz MD  PCP: Jose M Macias MD    Discharging Nurse: [facility] Unit/Room#: 0886/3925-56  Discharging Unit Phone Number: [phone]    Emergency Contact:   Extended Emergency Contact Information  Primary Emergency Contact: Maxene Goldberg  Home Phone: [phone]  Relation: Parent  Secondary Emergency Contact: Immanuel Bui  [employer] Phone: [phone]  Relation: Domestic Partner   needed? No    Past Surgical History:  History reviewed. No pertinent surgical history. Immunization History: There is no immunization history on file for this patient.     Active Problems:  Patient Active Problem List   Diagnosis Code    COPD exacerbation ([facility] [address] J44.1    Alcohol withdrawal ([facility] [address] F10.239    Acute respiratory failure with hypoxia and hypercapnia ([facility]) J96.01, J96.02    Alcohol use Z72.89    Agitation requiring sedation protocol R45.1    Current smoker F17.200    Elevated d-dimer R79.89       Isolation/Infection:   Isolation            No Isolation          Patient Infection Status       Infection Onset Added Last Indicated Last Indicated By Review Planned Expiration Resolved Resolved By    None active    Resolved    COVID-19 Rule Out 21 COVID-19 (Ordered)   21 Rule-Out Test Resulted    COVID-19 Rule Out 21 COVID-19, Rapid (Ordered)   21 Rule-Out Test Resulted 4/10/21 at 10:03 AM EDT    PHYSICIAN SECTION    Prognosis: Good    Condition at Discharge: Stable    Rehab Potential (if transferring to Rehab): Good    Recommended Labs or Other Treatments After Discharge: Follow up with PCP within 1-2 weeks. Physician Certification: I certify the above information and transfer of Bob Goodwin  is necessary for the continuing treatment of the diagnosis listed and that he requires [address] for less 30 days.      Update Admission H&P: No change in H&P    PHYSICIAN SIGNATURE:  Electronically signed by Gio Morales MD on 4/10/21 at 9:53 AM EDT

## 2021-04-10 NOTE — PROGRESS NOTES
Pulmonary & Critical Care Medicine ICU Progress Note      Events of Last 24 hours: Patient when seen this morning was feeling better, patient was not having any significant cough expectoration shortness of breath or wheezing, patient was alert and oriented, patient was not agitated, patient was afebrile and hemodynamically maintained, patient had normal sinus rhythm on the monitor, patient was on 4 L of nasal cannula oxygen with saturation of 94% when seen, patient had glycemic control which was acceptable, patient has had adequate urine output with cumulative fluid balance of -21.3 L, patient was tolerating oral diet  No other pertinent review of system of concern        Vitals:  /77   Pulse 86   Temp 98.5 °F (36.9 °C) (Oral)   Resp 18   Ht 5' 10\" (1.778 m)   Wt 234 lb 12.8 oz (106.5 kg)   SpO2 95%   BMI 33.69 kg/m²         EXAM:  General: In no respiratory stress when seen  Eyes: PERRL. No sclera icterus. No conjunctival injection. ENT: No facial asymmetry NG. Mucosa dry. Neck: Large and short neck, no JVD   Resp: No accessory muscle use. Scattered crackles. No wheezing. No rhonchi. CV: Regular rate. Regular rhythm. No mumur or rub. No edema. GI: No hepatosplenomegaly, obese  Skin: Warm and dry. No nodule on exposed extremities. No rash on exposed extremities. Lymph: No cervical lymphadenopathy  M/S: No cyanosis. No joint deformity. No clubbing.    Neuro: n without any focal motor deficits;no tremors   Psych: Alert and communicative with no focal cranial nerve deficits    Medications:  Scheduled Meds:   tamsulosin  0.4 mg Oral Daily    ipratropium-albuterol  1 ampule Inhalation BID    amLODIPine  10 mg Oral Daily    carvedilol  12.5 mg Oral BID     lisinopril  5 mg Oral Daily    nicotine  1 patch Transdermal Daily    budesonide  0.5 mg Nebulization BID    famotidine  20 mg Oral BID    thiamine  100 mg Oral Daily    sodium chloride flush  10 mL Intravenous 2 times per day    enoxaparin  40 mg Subcutaneous Daily       PRN Meds:  prochlorperazine, haloperidol lactate, hydrALAZINE, labetalol, perflutren lipid microspheres, potassium chloride, sodium chloride flush, acetaminophen **OR** acetaminophen    Results:  CBC:   Recent Labs     21  0535   WBC 9.8   HGB 14.0   HCT 41.9   MCV 95.0        BMP:   Recent Labs     21  1445 21  0535 04/10/21  0412   NA  --  142  --    K 3.7 3.3* 3.3*   CL  --  100  --    CO2  --  34*  --    BUN  --  9  --    CREATININE  --  0.7*  --          Results for Monisha Sawyer (MRN [id number]) as of 4/10/2021 14:00   Ref. Range 2021 04:20 2021 04:00 2021 14:45 2021 05:35 4/10/2021 04:12   Sodium Latest Ref Range: 136 - 145 mmol/L 139 139  142    Potassium Latest Ref Range: 3.5 - 5.1 mmol/L 3.6 3.4 (L) 3.7 3.3 (L) 3.3 (L)   Chloride Latest Ref Range: 99 - 110 mmol/L 99 98 (L)  100    CO2 Latest Ref Range: 21 - 32 mmol/L 34 (H) 33 (H)  34 (H)    BUN Latest Ref Range: 7 - 20 mg/dL 24 (H) 16  9    Creatinine Latest Ref Range: 0.9 - 1.3 mg/dL 0.6 (L) 0.7 (L)  0.7 (L)    Anion Gap Latest Ref Range: 3 - 16  6 8  8    GFR Non- Latest Ref Range: >60  >60 >60  >60    GFR  Latest Ref Range: >60  >60 >60  >60    Magnesium Latest Ref Range: 1.80 - 2.40 mg/dL  1.90  2.00    Glucose Latest Ref Range: 70 - 99 mg/dL 108 (H) 100 (H)  111 (H)    Calcium Latest Ref Range: 8.3 - 10.6 mg/dL 9.1 9.1  9.3      Results for Monisha Sawyer (MRN [id number]) as of 4/10/2021 14:00   Ref. Range 3/26/2021 13:10 3/27/2021 12:45   CULTURE, RESPIRATORY Unknown  Rpt   Gram Stain Result Unknown  No WBCs or organisms seen   Urine Reflex to Culture Unknown Not Indicated    CULTURE, RESPIRATORY Unknown  Normal respiratory neema       Assessment and plan:  Acute respiratory failure, hypoxemic and hypercarbic. Weaned and extubated 3/31/2021.   Oxygen supplementation to keep saturation with 90-94% only  Patient continues to be on 4 L of nasal cannula oxygen to maintain saturation  Please titrate down the oxygen as per the above parameters  Pulmonary toilet  COPD exacerbation. On bronchodilator and steroid by nebulized route. Completed 5 days of antibiotics  Bilateralpleural effusions, pulmonary edema. CXR improved, BUN has improved Alcohol withdrawal, encephalopathy. Off precedex infusion   KAROL. Resolved   Smoker. NRT  DVT prophylaxis. Lovenox. PUD prophylaxis. Pepcid.     Case d/w patient/family and nursing       Electronically signed by:  Benita Hills MD    4/10/2021    1:59 PM.

## 2021-04-10 NOTE — PROGRESS NOTES
Potassium replaced per PRN protocol. Per MD Henson, potassium does not need to be checked again after potassium is replaced. Pt d/c'd home with home health. Removed central line and stopped bleeding. Catheter intact. Pt tolerated well. No redness noted at site. Notified [facility] and removed tele box. AVASYS aware of discharge. Reviewed d/c instructions, home meds, and  f/u information utilizing teach-back method with patient and family member. Patient verbalized understanding. Medications picked up from outpatient pharmacy by family member prior to discharge. Home O2 delivered to patient's room. Patient on 4L. Patient off floor in wheelchair with writer at this time.

## 2021-04-12 NOTE — CARE COORDINATION
nonadmit Counts include 234 beds at the [facility]  Pt refused services 4/11    Shaune Kanner RN, BSN CTN  [facility] [phone]

## 2021-07-20 ENCOUNTER — APPOINTMENT (OUTPATIENT)
Dept: GENERAL RADIOLOGY | Age: 61
DRG: 133 | End: 2021-07-20
Payer: MEDICAID

## 2021-07-20 ENCOUNTER — HOSPITAL ENCOUNTER (INPATIENT)
Age: 61
LOS: 3 days | Discharge: HOME | DRG: 133 | End: 2021-07-23
Attending: EMERGENCY MEDICINE | Admitting: INTERNAL MEDICINE
Payer: MEDICAID

## 2021-07-20 DIAGNOSIS — J18.9 PNEUMONIA OF LEFT LOWER LOBE DUE TO INFECTIOUS ORGANISM: ICD-10-CM

## 2021-07-20 DIAGNOSIS — J96.02 ACUTE RESPIRATORY FAILURE WITH HYPOXIA AND HYPERCAPNIA (HCC): ICD-10-CM

## 2021-07-20 DIAGNOSIS — J96.01 ACUTE RESPIRATORY FAILURE WITH HYPOXIA AND HYPERCAPNIA (HCC): ICD-10-CM

## 2021-07-20 DIAGNOSIS — J44.1 COPD EXACERBATION (HCC): Primary | ICD-10-CM

## 2021-07-20 PROBLEM — J96.91 RESPIRATORY FAILURE WITH HYPOXIA (HCC): Status: ACTIVE | Noted: 2021-07-20

## 2021-07-20 PROBLEM — E66.811 CLASS 1 OBESITY IN ADULT: Status: ACTIVE | Noted: 2021-07-20

## 2021-07-20 PROBLEM — Z87.898 HISTORY OF ALCOHOL USE: Status: ACTIVE | Noted: 2021-07-20

## 2021-07-20 PROBLEM — J96.21 ACUTE ON CHRONIC RESPIRATORY FAILURE WITH HYPOXIA AND HYPERCAPNIA (HCC): Status: ACTIVE | Noted: 2021-07-20

## 2021-07-20 PROBLEM — J96.22 ACUTE ON CHRONIC RESPIRATORY FAILURE WITH HYPOXIA AND HYPERCAPNIA (HCC): Status: ACTIVE | Noted: 2021-07-20

## 2021-07-20 PROBLEM — E66.9 CLASS 1 OBESITY IN ADULT: Status: ACTIVE | Noted: 2021-07-20

## 2021-07-20 PROBLEM — R91.8 PULMONARY INFILTRATE: Status: ACTIVE | Noted: 2021-07-20

## 2021-07-20 LAB
A/G RATIO: 1.5 (ref 1.1–2.2)
ALBUMIN SERPL-MCNC: 4 G/DL (ref 3.4–5)
ALP BLD-CCNC: 102 U/L (ref 40–129)
ALT SERPL-CCNC: 21 U/L (ref 10–40)
ANION GAP SERPL CALCULATED.3IONS-SCNC: 10 MMOL/L (ref 3–16)
AST SERPL-CCNC: 18 U/L (ref 15–37)
BASE EXCESS ARTERIAL: 2 MMOL/L (ref -3–3)
BASE EXCESS VENOUS: 6.4 MMOL/L (ref -3–3)
BASE EXCESS VENOUS: 6.9 MMOL/L (ref -3–3)
BASOPHILS ABSOLUTE: 0.1 K/UL (ref 0–0.2)
BASOPHILS RELATIVE PERCENT: 0.7 %
BILIRUB SERPL-MCNC: 0.4 MG/DL (ref 0–1)
BUN BLDV-MCNC: 8 MG/DL (ref 7–20)
CALCIUM SERPL-MCNC: 8.9 MG/DL (ref 8.3–10.6)
CARBOXYHEMOGLOBIN ARTERIAL: 2.7 % (ref 0–1.5)
CARBOXYHEMOGLOBIN: 7.4 % (ref 0–1.5)
CARBOXYHEMOGLOBIN: 9.8 % (ref 0–1.5)
CHLORIDE BLD-SCNC: 101 MMOL/L (ref 99–110)
CO2: 33 MMOL/L (ref 21–32)
CREAT SERPL-MCNC: 0.7 MG/DL (ref 0.9–1.3)
EKG ATRIAL RATE: 112 BPM
EKG DIAGNOSIS: NORMAL
EKG P AXIS: 61 DEGREES
EKG P-R INTERVAL: 128 MS
EKG Q-T INTERVAL: 314 MS
EKG QRS DURATION: 72 MS
EKG QTC CALCULATION (BAZETT): 428 MS
EKG R AXIS: 12 DEGREES
EKG T AXIS: 43 DEGREES
EKG VENTRICULAR RATE: 112 BPM
EOSINOPHILS ABSOLUTE: 0.1 K/UL (ref 0–0.6)
EOSINOPHILS RELATIVE PERCENT: 0.5 %
GFR AFRICAN AMERICAN: >60
GFR NON-AFRICAN AMERICAN: >60
GLOBULIN: 2.6 G/DL
GLUCOSE BLD-MCNC: 113 MG/DL (ref 70–99)
HCO3 ARTERIAL: 32.5 MMOL/L (ref 21–29)
HCO3 VENOUS: 37 MMOL/L (ref 23–29)
HCO3 VENOUS: 38 MMOL/L (ref 23–29)
HCT VFR BLD CALC: 44.5 % (ref 40.5–52.5)
HEMOGLOBIN, ART, EXTENDED: 15.5 G/DL (ref 13.5–17.5)
HEMOGLOBIN: 14.6 G/DL (ref 13.5–17.5)
LACTIC ACID: 1 MMOL/L (ref 0.4–2)
LYMPHOCYTES ABSOLUTE: 1.5 K/UL (ref 1–5.1)
LYMPHOCYTES RELATIVE PERCENT: 12.1 %
MCH RBC QN AUTO: 31.4 PG (ref 26–34)
MCHC RBC AUTO-ENTMCNC: 32.7 G/DL (ref 31–36)
MCV RBC AUTO: 96 FL (ref 80–100)
METHEMOGLOBIN ARTERIAL: 0.7 %
METHEMOGLOBIN VENOUS: 0.4 %
METHEMOGLOBIN VENOUS: 0.5 %
MONOCYTES ABSOLUTE: 0.9 K/UL (ref 0–1.3)
MONOCYTES RELATIVE PERCENT: 7.2 %
NEUTROPHILS ABSOLUTE: 10 K/UL (ref 1.7–7.7)
NEUTROPHILS RELATIVE PERCENT: 79.5 %
O2 SAT, ARTERIAL: 93.2 %
O2 SAT, VEN: 77 %
O2 SAT, VEN: 93 %
O2 THERAPY: ABNORMAL
PCO2 ARTERIAL: 79.1 MMHG (ref 35–45)
PCO2, VEN: 79 MMHG (ref 40–50)
PCO2, VEN: 91.8 MMHG (ref 40–50)
PDW BLD-RTO: 17.3 % (ref 12.4–15.4)
PH ARTERIAL: 7.23 (ref 7.35–7.45)
PH VENOUS: 7.24 (ref 7.35–7.45)
PH VENOUS: 7.29 (ref 7.35–7.45)
PLATELET # BLD: 254 K/UL (ref 135–450)
PMV BLD AUTO: 7.9 FL (ref 5–10.5)
PO2 ARTERIAL: 71.3 MMHG (ref 75–108)
PO2, VEN: 38.7 MMHG (ref 25–40)
PO2, VEN: 68.9 MMHG (ref 25–40)
POTASSIUM SERPL-SCNC: 4.2 MMOL/L (ref 3.5–5.1)
RBC # BLD: 4.64 M/UL (ref 4.2–5.9)
SODIUM BLD-SCNC: 144 MMOL/L (ref 136–145)
TCO2 ARTERIAL: 34.9 MMOL/L
TCO2 CALC VENOUS: 40 MMOL/L
TCO2 CALC VENOUS: 41 MMOL/L
TOTAL PROTEIN: 6.6 G/DL (ref 6.4–8.2)
TROPONIN: <0.01 NG/ML
WBC # BLD: 12.6 K/UL (ref 4–11)

## 2021-07-20 PROCEDURE — 93005 ELECTROCARDIOGRAM TRACING: CPT | Performed by: EMERGENCY MEDICINE

## 2021-07-20 PROCEDURE — 93010 ELECTROCARDIOGRAM REPORT: CPT | Performed by: INTERNAL MEDICINE

## 2021-07-20 PROCEDURE — 2060000000 HC ICU INTERMEDIATE R&B

## 2021-07-20 PROCEDURE — 96366 THER/PROPH/DIAG IV INF ADDON: CPT

## 2021-07-20 PROCEDURE — 99285 EMERGENCY DEPT VISIT HI MDM: CPT

## 2021-07-20 PROCEDURE — 2580000003 HC RX 258: Performed by: INTERNAL MEDICINE

## 2021-07-20 PROCEDURE — 6360000002 HC RX W HCPCS: Performed by: INTERNAL MEDICINE

## 2021-07-20 PROCEDURE — 85025 COMPLETE CBC W/AUTO DIFF WBC: CPT

## 2021-07-20 PROCEDURE — 87040 BLOOD CULTURE FOR BACTERIA: CPT

## 2021-07-20 PROCEDURE — 80053 COMPREHEN METABOLIC PANEL: CPT

## 2021-07-20 PROCEDURE — 6360000002 HC RX W HCPCS: Performed by: EMERGENCY MEDICINE

## 2021-07-20 PROCEDURE — 36600 WITHDRAWAL OF ARTERIAL BLOOD: CPT

## 2021-07-20 PROCEDURE — 82803 BLOOD GASES ANY COMBINATION: CPT

## 2021-07-20 PROCEDURE — 96365 THER/PROPH/DIAG IV INF INIT: CPT

## 2021-07-20 PROCEDURE — 94645 CONT INHLJ TX EACH ADDL HOUR: CPT

## 2021-07-20 PROCEDURE — 6370000000 HC RX 637 (ALT 250 FOR IP): Performed by: EMERGENCY MEDICINE

## 2021-07-20 PROCEDURE — 94640 AIRWAY INHALATION TREATMENT: CPT

## 2021-07-20 PROCEDURE — 94660 CPAP INITIATION&MGMT: CPT

## 2021-07-20 PROCEDURE — 84484 ASSAY OF TROPONIN QUANT: CPT

## 2021-07-20 PROCEDURE — 71045 X-RAY EXAM CHEST 1 VIEW: CPT

## 2021-07-20 PROCEDURE — 94761 N-INVAS EAR/PLS OXIMETRY MLT: CPT

## 2021-07-20 PROCEDURE — 96375 TX/PRO/DX INJ NEW DRUG ADDON: CPT

## 2021-07-20 PROCEDURE — 99223 1ST HOSP IP/OBS HIGH 75: CPT | Performed by: INTERNAL MEDICINE

## 2021-07-20 PROCEDURE — 2700000000 HC OXYGEN THERAPY PER DAY

## 2021-07-20 PROCEDURE — 94644 CONT INHLJ TX 1ST HOUR: CPT

## 2021-07-20 PROCEDURE — 2580000003 HC RX 258: Performed by: NURSE PRACTITIONER

## 2021-07-20 PROCEDURE — 83605 ASSAY OF LACTIC ACID: CPT

## 2021-07-20 PROCEDURE — 6370000000 HC RX 637 (ALT 250 FOR IP): Performed by: INTERNAL MEDICINE

## 2021-07-20 RX ORDER — LEVOFLOXACIN 750 MG/1
750 TABLET ORAL DAILY
Qty: 7 TABLET | Refills: 0 | Status: SHIPPED | OUTPATIENT
Start: 2021-07-20 | End: 2021-07-27

## 2021-07-20 RX ORDER — LEVOFLOXACIN 5 MG/ML
750 INJECTION, SOLUTION INTRAVENOUS ONCE
Status: COMPLETED | OUTPATIENT
Start: 2021-07-20 | End: 2021-07-20

## 2021-07-20 RX ORDER — POLYETHYLENE GLYCOL 3350 17 G/17G
17 POWDER, FOR SOLUTION ORAL DAILY PRN
Status: DISCONTINUED | OUTPATIENT
Start: 2021-07-20 | End: 2021-07-23 | Stop reason: HOSPADM

## 2021-07-20 RX ORDER — AMLODIPINE BESYLATE 5 MG/1
10 TABLET ORAL DAILY
Status: DISCONTINUED | OUTPATIENT
Start: 2021-07-20 | End: 2021-07-23 | Stop reason: HOSPADM

## 2021-07-20 RX ORDER — SODIUM CHLORIDE 9 MG/ML
INJECTION, SOLUTION INTRAVENOUS CONTINUOUS
Status: ACTIVE | OUTPATIENT
Start: 2021-07-20 | End: 2021-07-21

## 2021-07-20 RX ORDER — ONDANSETRON 4 MG/1
4 TABLET, ORALLY DISINTEGRATING ORAL EVERY 8 HOURS PRN
Status: DISCONTINUED | OUTPATIENT
Start: 2021-07-20 | End: 2021-07-23 | Stop reason: HOSPADM

## 2021-07-20 RX ORDER — SODIUM CHLORIDE 0.9 % (FLUSH) 0.9 %
5-40 SYRINGE (ML) INJECTION EVERY 12 HOURS SCHEDULED
Status: DISCONTINUED | OUTPATIENT
Start: 2021-07-20 | End: 2021-07-23 | Stop reason: HOSPADM

## 2021-07-20 RX ORDER — METHYLPREDNISOLONE SODIUM SUCCINATE 125 MG/2ML
125 INJECTION, POWDER, LYOPHILIZED, FOR SOLUTION INTRAMUSCULAR; INTRAVENOUS ONCE
Status: COMPLETED | OUTPATIENT
Start: 2021-07-20 | End: 2021-07-20

## 2021-07-20 RX ORDER — SODIUM CHLORIDE 9 MG/ML
INJECTION, SOLUTION INTRAVENOUS CONTINUOUS
Status: DISCONTINUED | OUTPATIENT
Start: 2021-07-20 | End: 2021-07-20

## 2021-07-20 RX ORDER — SODIUM CHLORIDE 0.9 % (FLUSH) 0.9 %
5-40 SYRINGE (ML) INJECTION PRN
Status: DISCONTINUED | OUTPATIENT
Start: 2021-07-20 | End: 2021-07-23 | Stop reason: HOSPADM

## 2021-07-20 RX ORDER — ALBUTEROL SULFATE 2.5 MG/3ML
10 SOLUTION RESPIRATORY (INHALATION) ONCE
Status: COMPLETED | OUTPATIENT
Start: 2021-07-20 | End: 2021-07-20

## 2021-07-20 RX ORDER — PREDNISONE 20 MG/1
20 TABLET ORAL 2 TIMES DAILY
Qty: 10 TABLET | Refills: 0 | Status: SHIPPED | OUTPATIENT
Start: 2021-07-20 | End: 2021-07-25

## 2021-07-20 RX ORDER — ACETAMINOPHEN 650 MG/1
650 SUPPOSITORY RECTAL EVERY 6 HOURS PRN
Status: DISCONTINUED | OUTPATIENT
Start: 2021-07-20 | End: 2021-07-23 | Stop reason: HOSPADM

## 2021-07-20 RX ORDER — METHYLPREDNISOLONE SODIUM SUCCINATE 40 MG/ML
40 INJECTION, POWDER, LYOPHILIZED, FOR SOLUTION INTRAMUSCULAR; INTRAVENOUS EVERY 8 HOURS
Status: DISCONTINUED | OUTPATIENT
Start: 2021-07-20 | End: 2021-07-21

## 2021-07-20 RX ORDER — SODIUM CHLORIDE 9 MG/ML
25 INJECTION, SOLUTION INTRAVENOUS PRN
Status: DISCONTINUED | OUTPATIENT
Start: 2021-07-20 | End: 2021-07-23 | Stop reason: HOSPADM

## 2021-07-20 RX ORDER — ONDANSETRON 2 MG/ML
4 INJECTION INTRAMUSCULAR; INTRAVENOUS EVERY 6 HOURS PRN
Status: DISCONTINUED | OUTPATIENT
Start: 2021-07-20 | End: 2021-07-23 | Stop reason: HOSPADM

## 2021-07-20 RX ORDER — LISINOPRIL 5 MG/1
5 TABLET ORAL DAILY
Status: DISCONTINUED | OUTPATIENT
Start: 2021-07-20 | End: 2021-07-23 | Stop reason: HOSPADM

## 2021-07-20 RX ORDER — PANTOPRAZOLE SODIUM 40 MG/1
40 TABLET, DELAYED RELEASE ORAL
Status: DISCONTINUED | OUTPATIENT
Start: 2021-07-21 | End: 2021-07-23 | Stop reason: HOSPADM

## 2021-07-20 RX ORDER — IPRATROPIUM BROMIDE AND ALBUTEROL SULFATE 2.5; .5 MG/3ML; MG/3ML
3 SOLUTION RESPIRATORY (INHALATION) ONCE
Status: COMPLETED | OUTPATIENT
Start: 2021-07-20 | End: 2021-07-20

## 2021-07-20 RX ORDER — LEVOFLOXACIN 5 MG/ML
750 INJECTION, SOLUTION INTRAVENOUS EVERY 24 HOURS
Status: DISCONTINUED | OUTPATIENT
Start: 2021-07-21 | End: 2021-07-23 | Stop reason: HOSPADM

## 2021-07-20 RX ORDER — NICOTINE 21 MG/24HR
1 PATCH, TRANSDERMAL 24 HOURS TRANSDERMAL DAILY
Status: DISCONTINUED | OUTPATIENT
Start: 2021-07-20 | End: 2021-07-23 | Stop reason: HOSPADM

## 2021-07-20 RX ORDER — IPRATROPIUM BROMIDE AND ALBUTEROL SULFATE 2.5; .5 MG/3ML; MG/3ML
1 SOLUTION RESPIRATORY (INHALATION)
Status: DISCONTINUED | OUTPATIENT
Start: 2021-07-20 | End: 2021-07-23 | Stop reason: HOSPADM

## 2021-07-20 RX ORDER — CARVEDILOL 6.25 MG/1
12.5 TABLET ORAL 2 TIMES DAILY WITH MEALS
Status: DISCONTINUED | OUTPATIENT
Start: 2021-07-20 | End: 2021-07-23 | Stop reason: HOSPADM

## 2021-07-20 RX ORDER — ACETAMINOPHEN 325 MG/1
650 TABLET ORAL EVERY 6 HOURS PRN
Status: DISCONTINUED | OUTPATIENT
Start: 2021-07-20 | End: 2021-07-23 | Stop reason: HOSPADM

## 2021-07-20 RX ORDER — TAMSULOSIN HYDROCHLORIDE 0.4 MG/1
0.4 CAPSULE ORAL DAILY
Status: DISCONTINUED | OUTPATIENT
Start: 2021-07-20 | End: 2021-07-23 | Stop reason: HOSPADM

## 2021-07-20 RX ADMIN — LEVOFLOXACIN 750 MG: 5 INJECTION, SOLUTION INTRAVENOUS at 09:30

## 2021-07-20 RX ADMIN — ENOXAPARIN SODIUM 40 MG: 40 INJECTION SUBCUTANEOUS at 16:34

## 2021-07-20 RX ADMIN — IPRATROPIUM BROMIDE AND ALBUTEROL SULFATE 1 AMPULE: .5; 2.5 SOLUTION RESPIRATORY (INHALATION) at 16:13

## 2021-07-20 RX ADMIN — IPRATROPIUM BROMIDE AND ALBUTEROL SULFATE 3 AMPULE: .5; 3 SOLUTION RESPIRATORY (INHALATION) at 06:52

## 2021-07-20 RX ADMIN — SODIUM CHLORIDE: 9 INJECTION, SOLUTION INTRAVENOUS at 15:33

## 2021-07-20 RX ADMIN — SODIUM CHLORIDE: 9 INJECTION, SOLUTION INTRAVENOUS at 22:27

## 2021-07-20 RX ADMIN — IPRATROPIUM BROMIDE AND ALBUTEROL SULFATE 1 AMPULE: .5; 2.5 SOLUTION RESPIRATORY (INHALATION) at 20:39

## 2021-07-20 RX ADMIN — SODIUM CHLORIDE, PRESERVATIVE FREE 10 ML: 5 INJECTION INTRAVENOUS at 21:36

## 2021-07-20 RX ADMIN — ALBUTEROL SULFATE 10 MG/HR: 2.5 SOLUTION RESPIRATORY (INHALATION) at 08:21

## 2021-07-20 RX ADMIN — METHYLPREDNISOLONE SODIUM SUCCINATE 40 MG: 40 INJECTION, POWDER, FOR SOLUTION INTRAMUSCULAR; INTRAVENOUS at 15:33

## 2021-07-20 RX ADMIN — METHYLPREDNISOLONE SODIUM SUCCINATE 125 MG: 125 INJECTION, POWDER, FOR SOLUTION INTRAMUSCULAR; INTRAVENOUS at 07:17

## 2021-07-20 RX ADMIN — METHYLPREDNISOLONE SODIUM SUCCINATE 40 MG: 40 INJECTION, POWDER, FOR SOLUTION INTRAMUSCULAR; INTRAVENOUS at 21:36

## 2021-07-20 ASSESSMENT — PAIN SCALES - GENERAL
PAINLEVEL_OUTOF10: 0
PAINLEVEL_OUTOF10: 0

## 2021-07-20 NOTE — PROGRESS NOTES
07/20/21 0821   Treatment   Treatment Type HHN   Medications Albuterol  (10 mg/hr)   Pre-Tx Pulse 104   Pre-Tx Resps 31   Breath Sounds Pre-Tx FLAVIO Rhonchi;Expiratory Wheezes   Breath Sounds Pre-Tx LLL Rhonchi;Expiratory Wheezes   Breath Sounds Pre-Tx RUL Rhonchi;Expiratory Wheezes   Breath Sounds Pre-Tx RML Rhonchi;Expiratory Wheezes   Breath Sounds Pre-Tx RLL Rhonchi;Expiratory Wheezes   Breath Sounds Post-Tx FLAVIO Rhonchi;Expiratory Wheezes   Breath Sounds Post-Tx LLL Rhonchi;Expiratory Wheezes   Breath Sounds Post-Tx RUL Rhonchi;Expiratory Wheezes   Breath Sounds Post-Tx RML Rhonchi;Expiratory Wheezes   Breath Sounds Post-Tx RLL Rhonchi;Expiratory Wheezes   Post-Tx Pulse 104   Post-Tx Resps 31   Delivery Source Oxygen  (via BiPAP)   Position Semi-Dowd's   Tx Tolerance Well   Is patient on O2? Y   Oxygen Therapy/Pulse Ox   O2 Therapy Oxygen   O2 Device PAP (positive airway pressure)   FiO2  70 %  (decreased to 60% at this time. /)   Resp (!) 31

## 2021-07-20 NOTE — ED NOTES
Patient identified as a positive fall risk on the ED triage fall screening. Patient placed in fall precautions which includes:  yellow fall risk bracelet on wrist, yellow socks on feet, \"Be Safe\" sign placed on patient's door, and bed alarm placed under patient/alarm turned on. Patient instructed on importance of not getting out of bed or ambulating without assistance for safety.           [facility]  07/20/21 5261

## 2021-07-20 NOTE — PROGRESS NOTES
07/20/21 1616   NIV Type   NIV Started/Stopped On   Equipment Type v60   Mode Bilevel   Mask Type Full face mask   Mask Size Medium   Settings/Measurements   IPAP 18 cmH20   CPAP/EPAP 6 cmH2O   Resp 27   Insp Rise Time (%) 2 %   FiO2  60 %   I Time/ I Time % 1 s   Vt Exhaled 360 mL   Minute Volume 9.3 Liters   Mask Leak (lpm) 30 lpm   Comfort Level Good   Using Accessory Muscles No   SpO2 96   Alarm Settings   Alarms On Y

## 2021-07-20 NOTE — ED NOTES
Patient refusing to stay for remaining Levaquin administration. Advised patient that Levaquin is infusing and only has 7 minutes left, patient requesting that IV be removed and be d/c'ed home. This nurse removed IV and while speaking to patient he requesting to speak to MD another time about staying in hospital. Dr. Sergio Hudson notified.          Candace Lieberman RN  07/20/21 5890

## 2021-07-20 NOTE — ED NOTES
Upon entering room, patient took off mask and drinking water. Patient advised that he may not drink water as this is a risk for aspiration. Patient continued to take a drink of water and then reapplied mask. Patient then began pressing buttons on BIPAP, this nurse asking patient to stop pressing buttons and moved BIPAP from bedside out of patient reach. Patient states \"I will pull it back\" while tugging on bipap tubing and chuckling.  Patient now resting in bed     Serge Roberto RN  07/20/21 5089

## 2021-07-20 NOTE — ED NOTES
Blood culture set #2 drawn from [facility] with butterfly. Bottle tops scrubbed with alcohol pads. Site prepped with [employer] swab, 15 seconds per side, and allowed to dry for 30 seconds prior to venipuncture. Red waste tube drawn prior to collection of specimen.          Rhiannon Lemus RN  07/20/21 1606

## 2021-07-20 NOTE — ED NOTES
Patient continues to take mask off intermittently while speaking. Patient voiced that he will not be staying in the hospital as he \"has things to do,\" and will follow up with his physician at [facility] on Thursday for a second opinion. Nurse advises patient that when admitted he will be able to speak to a different doctor. Patient continues to insist that he will be going home and states that his birthday is on [birth year] and would like to be home for birthday. This nurse continues to explain to patient that he is at a big risk to leaving hospital AMA dur to respiratory status. Patients significant other remains at bedside during conversation.       Ingrid Rice RN  07/20/21 1257

## 2021-07-20 NOTE — H&P
Hospital Medicine History & Physical      PCP: Gurdeep Prabhakar MD    Date of Admission: 7/20/2021    Date of Service: Pt seen/examined on 7/20/2021 and Admitted to Inpatient with expected LOS greater than two midnights due to medical therapy. Chief Complaint: Shortness of breath and hypoxia      History Of Present Illness:  (    65 y.o. male who presented to [facility] with above complaint. He has a history of COPD and developed above complaint last night. He was found to be very hypoxic by EMS this morning and brought to the emergency room. He used to take 4 L oxygen at home and his saturation was 60 at home and 80 in the emergency room. Currently he is on BiPAP. He denies fever worsening cough or colored sputum. His appetite is okay no nausea vomiting diarrhea or any urinary complaints. No change in mental status. Past Medical History:          Diagnosis Date    COPD (chronic obstructive pulmonary disease) ([facility] [address]     Hypertension        Past Surgical History:      History reviewed. No pertinent surgical history. Medications Prior to Admission:      Prior to Admission medications    Medication Sig Start Date End Date Taking?  Authorizing Provider   predniSONE (DELTASONE) 20 MG tablet Take 1 tablet by mouth 2 times daily for 5 days 7/20/21 7/25/21 Yes Chris Saleh MD   levoFLOXacin (LEVAQUIN) 750 MG tablet Take 1 tablet by mouth daily for 7 days 7/20/21 7/27/21 Yes Chris Saleh MD   lisinopril (PRINIVIL;ZESTRIL) 5 MG tablet Take 1 tablet by mouth daily 4/11/21   Michael Keith MD   carvedilol (COREG) 12.5 MG tablet Take 1 tablet by mouth 2 times daily (with meals) 4/10/21   Michael Keith MD   amLODIPine (NORVASC) 10 MG tablet Take 1 tablet by mouth daily 4/11/21   Michael Keith MD   tamsulosin (FLOMAX) 0.4 MG capsule Take 1 capsule by mouth daily 4/10/21   Michael Keith MD   nicotine (NICODERM CQ) 21 MG/24HR Place 1 patch onto the skin daily 4/11/21   Duncan Mocha T MD Sixto   thiamine 100 MG tablet Take 1 tablet by mouth daily 4/11/21   Jazmin Velasco MD   budesonide-formoterol (SYMBICORT) 160-4.5 MCG/ACT AERO Inhale 2 puffs into the lungs 2 times daily 4/10/21   Jazmin Velasco MD   albuterol sulfate HFA (VENTOLIN HFA) 108 (90 Base) MCG/ACT inhaler Inhale 2 puffs into the lungs every 6 hours as needed for Wheezing 5/9/19   GABRIELA Alan       Allergies:  Patient has no known allergies. Social History:      The patient currently lives at home    TOBACCO:   reports that he has been smoking. He has been smoking about 2.00 packs per day. He has never used smokeless tobacco.  ETOH:   reports current alcohol use of about 42.0 standard drinks of alcohol per week. E-Cigarettes/Vaping Use     Questions Responses    E-Cigarette/Vaping Use     Start Date     Passive Exposure     Quit Date     Counseling Given     Comments             Family History:      Reviewed in detail and negative for DM, CAD, Cancer, CVA. Positive as follows:    History reviewed. No pertinent family history. REVIEW OF SYSTEMS COMPLETED:   Pertinent positives as noted in the HPI. All other systems reviewed and negative. PHYSICAL EXAM PERFORMED:    /86   Pulse 98   Temp 100 °F (37.8 °C) (Oral)   Resp 21   Ht 5' 10\" (1.778 m)   Wt 234 lb (106.1 kg)   SpO2 94%   BMI 33.58 kg/m²     General appearance:  No apparent distress, appears stated age and cooperative. On BiPAP  HEENT:  Normal cephalic, atraumatic without obvious deformity. Pupils equal, round, and reactive to light. Extra ocular muscles intact. Conjunctivae/corneas clear. Neck: Supple, with full range of motion. No jugular venous distention. Trachea midline. Respiratory:  Normal respiratory effort. , bilaterally without Rales has/bilateral wheezes/Rhonchi. Cardiovascular:  Regular rate and rhythm with normal S1/S2 without murmurs, rubs or gallops. Abdomen: Soft, non-tender, non-distended with normal bowel sounds. Obese  Musculoskeletal:  No clubbing, cyanosis , has trace edema bilaterally. Full range of motion without deformity. Skin: Skin color, texture, turgor normal.  No rashes or lesions. Neurologic:  Neurovascularly intact without any focal sensory/motor deficits. Cranial nerves: II-XII intact, grossly non-focal.  Psychiatric:  Alert and oriented, thought content appropriate, normal insight  Capillary Refill: Brisk,3 seconds, normal  Peripheral Pulses: +2 palpable, equal bilaterally       Labs:     Recent Labs     07/20/21  0643   WBC 12.6*   HGB 14.6   HCT 44.5        Recent Labs     07/20/21  0643      K 4.2      CO2 33*   BUN 8   CREATININE 0.7*   CALCIUM 8.9     Recent Labs     07/20/21  0643   AST 18   ALT 21   BILITOT 0.4   ALKPHOS 102     No results for input(s): INR in the last 72 hours. Recent Labs     07/20/21  0643   TROPONINI <0.01       Urinalysis:      Lab Results   Component Value Date    NITRU Negative 03/28/2021    WBCUA None seen 03/28/2021    BACTERIA Rare 03/28/2021    RBCUA None seen 03/28/2021    BLOODU Negative 03/28/2021    SPECGRAV 1.020 03/28/2021    GLUCOSEU Negative 03/28/2021       Radiology:     CXR: I have reviewed the CXR with the following interpretation:   EKG:  I have reviewed the EKG with the following interpretation: Sinus tachycardia 112/min    XR CHEST PORTABLE   Final Result   Increased airspace disease on the left, either due to atelectasis or   pneumonia.              ASSESSMENT:    Active Hospital Problems    Diagnosis Date Noted    Respiratory failure with hypoxia ([facility] [address] [J96.91] 07/20/2021         PLAN:  Acute on chronic hypoxic respiratory failure with mild hypercapnia with COPD exacerbation-admitted, continue BiPAP, repeat ABG, DuoNeb, IV Solu-Medrol, given the history of recent admission to [facility] with pneumonia, continue Levaquin  Pulmonary consult was placed    Mild leukocytosis-possibly secondary to above monitor    Respiratory acidosis-secondary to above    Smoker- counseled    Hypertension-blood pressures controlled continue home medication Norvasc and Coreg          DVT Prophylaxis: Lovenox  Diet: 2 g sodium  Code Status: Full code    PT/OT Eval Status:     Dispo -admitted to Rosalio Perales MD    Thank you Billy Ac MD for the opportunity to be involved in this patient's care. If you have any questions or concerns please feel free to contact me at [phone].

## 2021-07-20 NOTE — ED NOTES
Bed: 03  Expected date:   Expected time:   Means of arrival: CJFED  Comments:  [facility] [address] [facility]  07/20/21 2098

## 2021-07-20 NOTE — PROGRESS NOTES
07/20/21 5904   Treatment   Treatment Type HHN   Medications Albuterol/Ipratropium   Pre-Tx Pulse 110   Pre-Tx Resps 14   Breath Sounds Pre-Tx FLAVIO Rhonchi;Expiratory Wheezes   Breath Sounds Pre-Tx LLL Rhonchi;Expiratory Wheezes   Breath Sounds Pre-Tx RUL Rhonchi;Expiratory Wheezes   Breath Sounds Pre-Tx RML Rhonchi;Expiratory Wheezes   Breath Sounds Pre-Tx RLL Rhonchi;Expiratory Wheezes   Breath Sounds Post-Tx FLAVIO Rhonchi;Expiratory Wheezes   Breath Sounds Post-Tx LLL Rhonchi;Expiratory Wheezes   Breath Sounds Post-Tx RUL Rhonchi;Expiratory Wheezes   Breath Sounds Post-Tx RML Rhonchi;Expiratory Wheezes   Breath Sounds Post-Tx RLL Rhonchi;Expiratory Wheezes   Post-Tx Pulse 110   Post-Tx Resps 14   Delivery Source Oxygen  (via BiPAP)   Position Semi-Dowd's   Tx Tolerance Well   Is patient on O2?  Y   Oxygen Therapy/Pulse Ox   O2 Therapy Oxygen humidified   $Oxygen $Daily Charge   O2 Device PAP (positive airway pressure)   FiO2  80 %  (decreased to 70% at this time.)   Resp 22   SpO2 99 %   Skin Assessment Clean, dry, & intact   Skin Protection for O2 Device Yes   Location Nose   Intervention(s) Skin Barrier   $Pulse Oximeter $Spot check (multiple/continuous)

## 2021-07-20 NOTE — ED NOTES
Report given to Ganesh RN, floor nurse.  Patient left unit with all belongings including but not limited to; clothing       Lina Gudino RN  07/20/21 6692

## 2021-07-20 NOTE — ED PROVIDER NOTES
[facility] Emergency Department      CHIEF COMPLAINT  Shortness of Breath (per squad report patient wears home oxygen. called for SOB. recently discharged from [place] where he was admitted for COPD exacerbation. Squad reports patient was wearing his home oxygen while smoking upon their arrival. squad reports x 3 duonebs given in route to ED )      HISTORY OF PRESENT ILLNESS  Aramis Wilde is a 62 y.o. male with a history of COPD and hypertension presents with shortness of breath. He was just admitted at [facility] for similar symptoms and had a pneumonia. He has only been home for a few days. He states he has been very short of breath again and was slightly confused this morning according to EMS. EMS states when they arrived he was on his home 4 L of oxygen while smoking a cigarette. Apparently he was pale and dusky appearing and his oxygen saturations were in the 60s. He was given 3 DuoNeb's in route to the hospital.  The patient denies chest pain or fever. No other complaints, modifying factors or associated symptoms. I have reviewed the following from the nursing documentation. Past Medical History:   Diagnosis Date    COPD (chronic obstructive pulmonary disease) ([address]     Hypertension      History reviewed. No pertinent surgical history. History reviewed. No pertinent family history. Social History     Socioeconomic History    Marital status: Single     Spouse name: Not on file    Number of children: Not on file    Years of education: Not on file    Highest education level: Not on file   Occupational History    Not on file   Tobacco Use    Smoking status: Current Every Day Smoker     Packs/day: 2.00    Smokeless tobacco: Never Used   Substance and Sexual Activity    Alcohol use:  Yes     Alcohol/week: 42.0 standard drinks     Types: 42 Cans of beer per week     Comment: hasn't drank in 3 days    Drug use: No    Sexual activity: Not on file   Other Topics Concern  Not on file   Social History Narrative    Not on file     Social Determinants of Health     Financial Resource Strain:     Difficulty of Paying Living Expenses:    Food Insecurity:     Worried About Running Out of Food in the Last Year:     [address] in the Last Year:    Transportation Needs:     Lack of Transportation (Medical):      Lack of Transportation (Non-Medical):    Physical Activity:     Days of Exercise per Week:     Minutes of Exercise per Session:    Stress:     Feeling of Stress :    Social Connections:     Frequency of Communication with Friends and Family:     Frequency of Social Gatherings with Friends and Family:     Attends Rastafari Services:     Active Member of Clubs or Organizations:     Attends Club or Organization Meetings:     Marital Status:    Intimate Partner Violence:     Fear of Current or Ex-Partner:     Emotionally Abused:     Physically Abused:     Sexually Abused:      Current Facility-Administered Medications   Medication Dose Route Frequency Provider Last Rate Last Admin    levoFLOXacin (LEVAQUIN) 750 MG/150ML infusion 750 mg  750 mg Intravenous Once Dot MD Tena 100 mL/hr at 07/20/21 0930 750 mg at 07/20/21 0930     Current Outpatient Medications   Medication Sig Dispense Refill    lisinopril (PRINIVIL;ZESTRIL) 5 MG tablet Take 1 tablet by mouth daily 30 tablet 3    carvedilol (COREG) 12.5 MG tablet Take 1 tablet by mouth 2 times daily (with meals) 60 tablet 3    amLODIPine (NORVASC) 10 MG tablet Take 1 tablet by mouth daily 30 tablet 3    tamsulosin (FLOMAX) 0.4 MG capsule Take 1 capsule by mouth daily 30 capsule 3    nicotine (NICODERM CQ) 21 MG/24HR Place 1 patch onto the skin daily 30 patch 0    thiamine 100 MG tablet Take 1 tablet by mouth daily 30 tablet 3    budesonide-formoterol (SYMBICORT) 160-4.5 MCG/ACT AERO Inhale 2 puffs into the lungs 2 times daily 3 Inhaler 1    albuterol sulfate HFA (VENTOLIN HFA) 108 (90 Base) MCG/ACT inhaler Inhale 2 puffs into the lungs every 6 hours as needed for Wheezing 1 Inhaler 1     No Known Allergies    REVIEW OF SYSTEMS  10 systems reviewed, pertinent positives per HPI otherwise noted to be negative. PHYSICAL EXAM  BP (!) 133/95   Pulse 104   Temp 100 °F (37.8 °C) (Oral)   Resp (!) 33   Ht 5' 10\" (1.778 m)   Wt 234 lb (106.1 kg)   SpO2 95%   BMI 33.58 kg/m²   GENERAL APPEARANCE: Awake and alert. Cooperative. No acute distress. HEAD: Normocephalic. Atraumatic. EYES: PERRL. EOM's grossly intact. ENT: Mucous membranes are moist.   NECK: Supple, trachea midline. HEART: Tachycardic, normal rhythm. LUNGS: Respiratory distress. Diffuse expiratory wheezing with diminished breath sounds at the left base. ABDOMEN: Soft. Non-distended. Non-tender. No guarding or rebound. EXTREMITIES: No peripheral edema. MAEE. No acute deformities. SKIN: Warm, dry and intact. No acute rashes. NEUROLOGICAL: Alert and oriented X 3. CN II-XII grossly intact. Strength and sensation grossly intact. PSYCHIATRIC: Normal mood and affect. LABS  I have reviewed all labs for this visit.    Results for orders placed or performed during the hospital encounter of 07/20/21   CBC auto differential   Result Value Ref Range    WBC 12.6 (H) 4.0 - 11.0 K/uL    RBC 4.64 4.20 - 5.90 M/uL    Hemoglobin 14.6 13.5 - 17.5 g/dL    Hematocrit 44.5 40.5 - 52.5 %    MCV 96.0 80.0 - 100.0 fL    MCH 31.4 26.0 - 34.0 pg    MCHC 32.7 31.0 - 36.0 g/dL    RDW 17.3 (H) 12.4 - 15.4 %    Platelets 623 403 - 558 K/uL    MPV 7.9 5.0 - 10.5 fL    Neutrophils % 79.5 %    Lymphocytes % 12.1 %    Monocytes % 7.2 %    Eosinophils % 0.5 %    Basophils % 0.7 %    Neutrophils Absolute 10.0 (H) 1.7 - 7.7 K/uL    Lymphocytes Absolute 1.5 1.0 - 5.1 K/uL    Monocytes Absolute 0.9 0.0 - 1.3 K/uL    Eosinophils Absolute 0.1 0.0 - 0.6 K/uL    Basophils Absolute 0.1 0.0 - 0.2 K/uL   Comprehensive metabolic panel   Result Value Ref Range    Sodium 144 136 - 145 mmol/L    Potassium 4.2 3.5 - 5.1 mmol/L    Chloride 101 99 - 110 mmol/L    CO2 33 (H) 21 - 32 mmol/L    Anion Gap 10 3 - 16    Glucose 113 (H) 70 - 99 mg/dL    BUN 8 7 - 20 mg/dL    CREATININE 0.7 (L) 0.9 - 1.3 mg/dL    GFR Non-African American >60 >60    GFR African American >60 >60    Calcium 8.9 8.3 - 10.6 mg/dL    Total Protein 6.6 6.4 - 8.2 g/dL    Albumin 4.0 3.4 - 5.0 g/dL    Albumin/Globulin Ratio 1.5 1.1 - 2.2    Total Bilirubin 0.4 0.0 - 1.0 mg/dL    Alkaline Phosphatase 102 40 - 129 U/L    ALT 21 10 - 40 U/L    AST 18 15 - 37 U/L    Globulin 2.6 g/dL   Troponin   Result Value Ref Range    Troponin <0.01 <0.01 ng/mL   Blood gas, venous   Result Value Ref Range    pH, Gilbert 7.289 (L) 7.350 - 7.450    pCO2, Gilbert 79.0 (H) 40.0 - 50.0 mmHg    pO2, Gilbert 38.7 25 - 40 mmHg    HCO3, Venous 37.0 (H) 23.0 - 29.0 mmol/L    Base Excess, Gilbert 6.9 (H) -3.0 - 3.0 mmol/L    O2 Sat, Gilbert 77 Not Established %    Carboxyhemoglobin 9.8 (H) 0.0 - 1.5 %    MetHgb, Gilbert 0.5 <1.5 %    TC02 (Calc), Gilbert 40 Not Established mmol/L    O2 Therapy Unknown    Lactic acid, plasma   Result Value Ref Range    Lactic Acid 1.0 0.4 - 2.0 mmol/L   Blood Gas, Venous   Result Value Ref Range    pH, Gilbert 7.235 (L) 7.350 - 7.450    pCO2, Gilbert 91.8 (H) 40.0 - 50.0 mmHg    pO2, Gilbert 68.9 (H) 25 - 40 mmHg    HCO3, Venous 38.0 (H) 23.0 - 29.0 mmol/L    Base Excess, Gilbert 6.4 (H) -3.0 - 3.0 mmol/L    O2 Sat, Gilbert 93 Not Established %    Carboxyhemoglobin 7.4 (H) 0.0 - 1.5 %    MetHgb, Gilbert 0.4 <1.5 %    TC02 (Calc), Gilbert 41 Not Established mmol/L    O2 Therapy Unknown    EKG 12 Lead   Result Value Ref Range    Ventricular Rate 112 BPM    Atrial Rate 112 BPM    P-R Interval 128 ms    QRS Duration 72 ms    Q-T Interval 314 ms    QTc Calculation (Bazett) 428 ms    P Axis 61 degrees    R Axis 12 degrees    T Axis 43 degrees    Diagnosis       Sinus tachycardiaOtherwise normal ECGWhen compared with ECG of 25-MAR-2021 09:09,No significant change was found       EKG  The Ekg interpreted by myself  sinus tachycardia, butu=715 with a rate of 112  Axis is   Normal  QTc is  normal  Intervals and Durations are unremarkable. No specific ST-T wave changes appreciated. No evidence of acute ischemia. No significant change from prior EKG dated 3/25/21    Cardiac Monitoring: The cardiac monitor revealed sinus tachycardia as interpreted by me. The cardiac monitor was ordered secondary to the patient's complaint of shortness of breath and to monitor the patient for dysrhythmia. RADIOLOGY  X-RAYS:  I have reviewed radiologic plain film image(s). ALL OTHER NON-PLAIN FILM IMAGES SUCH AS CT, ULTRASOUND AND MRI HAVE BEEN READ BY THE RADIOLOGIST. XR CHEST PORTABLE   Final Result   Increased airspace disease on the left, either due to atelectasis or   pneumonia. Rechecks: Physical assessment performed. Patient's oxygenation is improving on BiPAP. He keeps requesting to take it off. We do try to remove it his sats immediately drop into the 70s. Critical Care:I personally spent a total of 75 minutes of critical care time in obtaining history, performing a physical exam, bedside monitoring of interventions, collecting and interpreting tests and discussion with consultants but excluding time spent performing procedures, treating other patients and teaching time. Smoking Cessation counseling: At least 3-10 minutes of smoking cessation education was provided to the patient including assessing the patient's readiness for change, identifying barriers to change, suggesting specific actions for change, motivational counseling and arranging follow-up. ED COURSE/MDM  Patient seen and evaluated. Here the patient is afebrile and hypoxic on arrival with initial sats in the 60s.  The highest I can get a stats to go on his home 4 l nasal cannula is in the mid 70s. He was placed on BiPAP and given three DuoNeb's in addition to steroids. Chest x-ray shows left lower lobe infiltrate. I have ordered antibiotics here cultures have been sent. After the three DuoNeb's he still has diffuse wheezing, tight air movement. I ordered a 10 mg continuous albuterol neb. The patient did inform me that he wanted to go. He states he does not want to be admitted and wants to go home. His birthday is in 4 days and he wants to be home for his birthday. I spent an extensive amount of time as did his nurse counseling him on the importance of admission. He did remove his BiPAP and had oxygen saturations for period of time in the 70s. He was about to leave and agreed to sign out [address] but then at the last minute with the help with his significant other he did change his mind and is agreeable to admission. . Old records reviewed. Labs and imaging reviewed and results discussed with patient. Patient was reassessed as noted above . Plan of care discussed with patient. Patient in agreement with plan. CLINICAL IMPRESSION  1. COPD exacerbation ([address]    2. Pneumonia of left lower lobe due to infectious organism    3. Acute respiratory failure with hypoxia and hypercapnia (HCC)        Blood pressure (!) 133/95, pulse 104, temperature 100 °F (37.8 °C), temperature source Oral, resp. rate (!) 33, height 5' 10\" (1.778 m), weight 234 lb (106.1 kg), SpO2 95 %. DISPOSITION  Landrum Buerger was admitted in stable condition.     (Please note this note was completed with a voice recognition program.  Efforts were made to edit the dictations but occasionally words are mis-transcribed.)       Chris Saleh MD  07/20/21 6887

## 2021-07-20 NOTE — PROGRESS NOTES
07/20/21 1702   Oxygen Therapy/Pulse Ox   Blood Gas  Performed? Yes   $ABG $ABG   Kurtis's Test #1 Pos   Site #1 Right Radial   Site Prepped #1 Yes   Number of Attempts #1 1   Pressure Held #1 Yes   Complications #1 None   Post-procedure #1 Standard   Specimen Status #1 To lab   How Tolerated?  Tolerated well

## 2021-07-20 NOTE — CONSULTS
INPATIENT PULMONARY CRITICAL CARE CONSULT NOTE      Chief Complaint/Referring Provider:  Patient is being seen at the request of Dr. Mere Mohan  for a consultation for acute on chronic hypoxemic respiratory failure with hypercapnia     Presenting HPI: Patient was brought to the hospital because of increasing shortness of breath and hypoxemia    As per the ER provider-Donato CABRERA Tiffani Chandra is a 62 y.o. male with a history of COPD and hypertension presents with shortness of breath. He was just admitted at [facility] for similar symptoms and had a pneumonia. He has only been home for a few days. He states he has been very short of breath again and was slightly confused this morning according to EMS. EMS states when they arrived he was on his home 4 L of oxygen while smoking a cigarette. Apparently he was pale and dusky appearing and his oxygen saturations were in the 60s. He was given 3 DuoNeb's in route to the hospital.  The patient denies chest pain or fever. No other complaints, modifying factors or associated symptoms. ED course-Patient seen and evaluated. Here the patient is afebrile and hypoxic on arrival with initial sats in the 60s. The highest I can get a stats to go on his home 4 l nasal cannula is in the mid 76s. He was placed on BiPAP and given three DuoNeb's in addition to steroids. Chest x-ray shows left lower lobe infiltrate. I have ordered antibiotics here cultures have been sent. After the three DuoNeb's he still has diffuse wheezing, tight air movement. I ordered a 10 mg continuous albuterol neb. The patient did inform me that he wanted to go. He states he does not want to be admitted and wants to go home. His birthday is in 4 days and he wants to be home for his birthday. I spent an extensive amount of time as did his nurse counseling him on the importance of admission. He did remove his BiPAP and had oxygen saturations for period of time in the 70s.  He was about to leave and agreed to sign out [address] but then at the last minute with the help with his significant other he did change his mind and is agreeable to admission. . Old records reviewed. Patient when seen on the floor was lying in the bed on BiPAP, patient was having IPAP of 12 and EPAP of 6 along with backup rate of 10 along with 60% oxygen to maintain saturation of 94%, patient was generating a tidal volume of only to 98 mL on the current settings, patient was somewhat obtunded when seen, patient had a T-max of 100 °F in the ER but was having a temperature of 97.9 degrees on the floor, patient had a sinus rhythm on the monitor which was ranging from normal sinus rhythm to sinus tachycardia, patient was hemodynamically maintained, patient glycemic control was acceptable, no other pertinent review of system could be obtained because of patient's clinical status     Patient Active Problem List    Diagnosis Date Noted    Respiratory failure with hypoxia ([address] 07/20/2021    Acute on chronic respiratory failure with hypoxia and hypercapnia ([address] 07/20/2021    Pulmonary infiltrate 07/20/2021    History of alcohol use 07/20/2021    Class 1 obesity in adult 07/20/2021    Elevated d-dimer 04/07/2021    Alcohol use 04/05/2021    Agitation requiring sedation protocol 04/05/2021    Current smoker 04/05/2021    Acute respiratory failure with hypoxia and hypercapnia ([address]     COPD exacerbation ([address] 03/25/2021    Alcohol withdrawal ([address] 03/25/2021       Past Medical History:   Diagnosis Date    COPD (chronic obstructive pulmonary disease) ([address]     Hypertension         History reviewed. No pertinent surgical history. History reviewed. No pertinent family history. Social History     Tobacco Use    Smoking status: Current Every Day Smoker     Packs/day: 2.00    Smokeless tobacco: Never Used   Substance Use Topics    Alcohol use:  Yes     Alcohol/week: 42.0 standard drinks     Types: 42 Cans of beer per week Comment: hasn't drank in 3 days        No Known Allergies            Physical Exam:  Blood pressure 137/83, pulse 90, temperature 97.9 °F (36.6 °C), temperature source Axillary, resp. rate 27, height 5' 10\" (1.778 m), weight 234 lb (106.1 kg), SpO2 94 %.'     Constitutional:  No acute distress on BiPAP. HENT: BiPAP mask intact no thyromegaly. Eyes:  Conjunctivae are normal. Pupils equal, round, and reactive to light. No scleral icterus. Neck: . No tracheal deviation present. No obvious thyroid mass. Short enlarged neck  Cardiovascular: Normal rate, regular rhythm, normal heart sounds. No right ventricular heave. No lower extremity edema. Pulmonary/Chest: No wheezes. Minimal scattered rales. Chest wall is not dull to percussion. No accessory muscle usage or stridor. Decreased breath sound density  Abdominal: Soft. Bowel sounds present. No distension or hernia. No tenderness. Obese  Musculoskeletal: No cyanosis. No clubbing. No obvious joint deformity. Lymphadenopathy: No cervical or supraclavicular adenopathy. Skin: Skin is warm and dry. No rash or nodules on the exposed extremities. Neurologic: Sleepy/obtunded on BiPAP when seen        Results:  CBC:   Recent Labs     07/20/21  0643   WBC 12.6*   HGB 14.6   HCT 44.5   MCV 96.0        BMP:   Recent Labs     07/20/21  0643      K 4.2      CO2 33*   BUN 8   CREATININE 0.7*     LIVER PROFILE:   Recent Labs     07/20/21  0643   AST 18   ALT 21   BILITOT 0.4   ALKPHOS 102       Imaging:  I have reviewed radiology images personally. XR CHEST PORTABLE   Final Result   Increased airspace disease on the left, either due to atelectasis or   pneumonia.            XR CHEST PORTABLE    Result Date: 7/20/2021  EXAMINATION: ONE XRAY VIEW OF THE CHEST 7/20/2021 6:43 am COMPARISON: 04/10/2021 HISTORY: ORDERING SYSTEM PROVIDED HISTORY: SOB TECHNOLOGIST PROVIDED HISTORY: Reason for exam:->SOB Reason for Exam: sob FINDINGS: Lung volumes are low accentuating heart size and bronchovascular markings at the lung bases. Patient is rotated Heart size is stable. Mediastinal contours are stable A few scattered opacities are seen in the left lung,, increased compared to prior     Increased airspace disease on the left, either due to atelectasis or pneumonia. Results for Carolann Go (MRN [id number]) as of 7/20/2021 16:43   Ref. Range 7/20/2021 06:43 7/20/2021 08:58   pH, Gilbert Latest Ref Range: 7.350 - 7.450  7.289 (L) 7.235 (L)   pCO2, Gilbert Latest Ref Range: 40.0 - 50.0 mmHg 79.0 (H) 91.8 (H)   pO2, Gilbert Latest Ref Range: 25 - 40 mmHg 38.7 68.9 (H)   HCO3, Venous Latest Ref Range: 23.0 - 29.0 mmol/L 37.0 (H) 38.0 (H)   TC02 (Calc), Gilbert Latest Ref Range: Not Established mmol/L 40 41   Base Excess, Gilbert Latest Ref Range: -3.0 - 3.0 mmol/L 6.9 (H) 6.4 (H)   MetHgb, Gilbert Latest Ref Range: <1.5 % 0.5 0.4   O2 Sat, Gilbert Latest Ref Range: Not Established % 77 93     Low dose CT in 2020-IMPRESSION:     1. No suspicious pulmonary nodules are identified. 2. Mild atherosclerotic calcification of the left anterior descending coronary artery. Echocardiogram:Technically difficult examination. Normal left ventricle systolic function with an estimated ejection fraction   of 55%. No regional wall motion abnormalities are seen. Normal left   ventricular diastolic filling pressure. The right atrium is mildly dilated. Trace mitral and tricuspid regurgitation. Systolic pulmonary artery pressure (SPAP) is normal and estimated at 34 mmHg   (right atrial pressure 3 mmHg). PFT: None in Epic         Assessment:  Active Problems:    COPD exacerbation (HCC)    Current smoker    Acute on chronic respiratory failure with hypoxia and hypercapnia (HCC)    Pulmonary infiltrate    History of alcohol use    Class 1 obesity in adult  Resolved Problems:    * No resolved hospital problems.  *          Plan:   · BiPAP with oxygen supplementation to keep saturation between 90 to

## 2021-07-20 NOTE — PROGRESS NOTES
07/20/21 1108   NIV Type   Skin Assessment Clean, dry, & intact   Skin Protection for O2 Device Yes   NIV Started/Stopped On   Equipment Type V60   Mode Bilevel   Mask Type Full face mask   Mask Size Medium   Settings/Measurements   IPAP 12 cmH20   CPAP/EPAP 6 cmH2O   Resp 13   FiO2  60 %   Vt Exhaled 732 mL   Minute Volume 12.2 Liters   Mask Leak (lpm) 6 lpm   Comfort Level Good   Using Accessory Muscles No   SpO2 96   Alarm Settings   Alarms On Y

## 2021-07-20 NOTE — PROGRESS NOTES
07/20/21 0652   NIV Type   $NIV $Daily Charge   Skin Assessment Clean, dry, & intact   Skin Protection for O2 Device Yes   Location Nose   Intervention(s) Skin Barrier   NIV Started/Stopped On   Equipment Type V60   Mode Bilevel   Mask Type Full face mask   Mask Size Medium   Settings/Measurements   IPAP 12 cmH20   CPAP/EPAP 6 cmH2O   Resp 22   FiO2  80 %  (decreased to 70% at this time.)   Vt Exhaled 600 mL   Minute Volume 11.1 Liters   Mask Leak (lpm) 1 lpm   Comfort Level Good   Using Accessory Muscles No   SpO2 98   Breath Sounds   Right Upper Lobe Rhonchi;Expiratory Wheezes   Right Middle Lobe Rhonchi;Expiratory Wheezes   Right Lower Lobe Rhonchi;Expiratory Wheezes   Left Upper Lobe Rhonchi;Expiratory Wheezes   Left Lower Lobe Rhonchi;Expiratory Wheezes   Alarm Settings   Alarms On Y   Press Low Alarm 6 cmH2O   High Pressure Alarm 20 cmH2O   Apnea (secs) 20 secs   Resp Rate Low Alarm 6   High Respiratory Rate 40 br/min

## 2021-07-21 LAB
ANION GAP SERPL CALCULATED.3IONS-SCNC: 9 MMOL/L (ref 3–16)
BASE EXCESS ARTERIAL: 6.9 MMOL/L (ref -3–3)
BASOPHILS ABSOLUTE: 0 K/UL (ref 0–0.2)
BASOPHILS RELATIVE PERCENT: 0.3 %
BUN BLDV-MCNC: 15 MG/DL (ref 7–20)
CALCIUM SERPL-MCNC: 9.3 MG/DL (ref 8.3–10.6)
CARBOXYHEMOGLOBIN ARTERIAL: 1.1 % (ref 0–1.5)
CHLORIDE BLD-SCNC: 99 MMOL/L (ref 99–110)
CO2: 32 MMOL/L (ref 21–32)
CREAT SERPL-MCNC: 0.7 MG/DL (ref 0.9–1.3)
EOSINOPHILS ABSOLUTE: 0 K/UL (ref 0–0.6)
EOSINOPHILS RELATIVE PERCENT: 0 %
GFR AFRICAN AMERICAN: >60
GFR NON-AFRICAN AMERICAN: >60
GLUCOSE BLD-MCNC: 149 MG/DL (ref 70–99)
HCO3 ARTERIAL: 36.5 MMOL/L (ref 21–29)
HCT VFR BLD CALC: 43.5 % (ref 40.5–52.5)
HEMOGLOBIN, ART, EXTENDED: 15.5 G/DL (ref 13.5–17.5)
HEMOGLOBIN: 14.4 G/DL (ref 13.5–17.5)
LYMPHOCYTES ABSOLUTE: 0.6 K/UL (ref 1–5.1)
LYMPHOCYTES RELATIVE PERCENT: 5.2 %
MCH RBC QN AUTO: 31.3 PG (ref 26–34)
MCHC RBC AUTO-ENTMCNC: 33 G/DL (ref 31–36)
MCV RBC AUTO: 94.8 FL (ref 80–100)
METHEMOGLOBIN ARTERIAL: 0.7 %
MONOCYTES ABSOLUTE: 0.3 K/UL (ref 0–1.3)
MONOCYTES RELATIVE PERCENT: 2.8 %
NEUTROPHILS ABSOLUTE: 10.4 K/UL (ref 1.7–7.7)
NEUTROPHILS RELATIVE PERCENT: 91.7 %
O2 SAT, ARTERIAL: 97.6 %
O2 THERAPY: ABNORMAL
PCO2 ARTERIAL: 75.1 MMHG (ref 35–45)
PDW BLD-RTO: 16.5 % (ref 12.4–15.4)
PH ARTERIAL: 7.3 (ref 7.35–7.45)
PLATELET # BLD: 263 K/UL (ref 135–450)
PMV BLD AUTO: 7.7 FL (ref 5–10.5)
PO2 ARTERIAL: 98.5 MMHG (ref 75–108)
POTASSIUM REFLEX MAGNESIUM: 5.1 MMOL/L (ref 3.5–5.1)
RBC # BLD: 4.59 M/UL (ref 4.2–5.9)
SODIUM BLD-SCNC: 140 MMOL/L (ref 136–145)
TCO2 ARTERIAL: 38.8 MMOL/L
WBC # BLD: 11.3 K/UL (ref 4–11)

## 2021-07-21 PROCEDURE — 80048 BASIC METABOLIC PNL TOTAL CA: CPT

## 2021-07-21 PROCEDURE — 6370000000 HC RX 637 (ALT 250 FOR IP): Performed by: INTERNAL MEDICINE

## 2021-07-21 PROCEDURE — 82803 BLOOD GASES ANY COMBINATION: CPT

## 2021-07-21 PROCEDURE — 99233 SBSQ HOSP IP/OBS HIGH 50: CPT | Performed by: INTERNAL MEDICINE

## 2021-07-21 PROCEDURE — 2700000000 HC OXYGEN THERAPY PER DAY

## 2021-07-21 PROCEDURE — 2060000000 HC ICU INTERMEDIATE R&B

## 2021-07-21 PROCEDURE — 94660 CPAP INITIATION&MGMT: CPT

## 2021-07-21 PROCEDURE — 36415 COLL VENOUS BLD VENIPUNCTURE: CPT

## 2021-07-21 PROCEDURE — 2580000003 HC RX 258: Performed by: INTERNAL MEDICINE

## 2021-07-21 PROCEDURE — 94762 N-INVAS EAR/PLS OXIMTRY CONT: CPT

## 2021-07-21 PROCEDURE — 6360000002 HC RX W HCPCS: Performed by: INTERNAL MEDICINE

## 2021-07-21 PROCEDURE — 94761 N-INVAS EAR/PLS OXIMETRY MLT: CPT

## 2021-07-21 PROCEDURE — 85025 COMPLETE CBC W/AUTO DIFF WBC: CPT

## 2021-07-21 PROCEDURE — 94640 AIRWAY INHALATION TREATMENT: CPT

## 2021-07-21 RX ORDER — METHYLPREDNISOLONE SODIUM SUCCINATE 40 MG/ML
40 INJECTION, POWDER, LYOPHILIZED, FOR SOLUTION INTRAMUSCULAR; INTRAVENOUS EVERY 12 HOURS
Status: DISCONTINUED | OUTPATIENT
Start: 2021-07-21 | End: 2021-07-23

## 2021-07-21 RX ADMIN — AMLODIPINE BESYLATE 10 MG: 5 TABLET ORAL at 09:03

## 2021-07-21 RX ADMIN — TAMSULOSIN HYDROCHLORIDE 0.4 MG: 0.4 CAPSULE ORAL at 09:03

## 2021-07-21 RX ADMIN — IPRATROPIUM BROMIDE AND ALBUTEROL SULFATE 1 AMPULE: .5; 2.5 SOLUTION RESPIRATORY (INHALATION) at 11:54

## 2021-07-21 RX ADMIN — LEVOFLOXACIN 750 MG: 5 INJECTION, SOLUTION INTRAVENOUS at 12:39

## 2021-07-21 RX ADMIN — CARVEDILOL 12.5 MG: 6.25 TABLET, FILM COATED ORAL at 09:03

## 2021-07-21 RX ADMIN — SODIUM CHLORIDE, PRESERVATIVE FREE 10 ML: 5 INJECTION INTRAVENOUS at 09:02

## 2021-07-21 RX ADMIN — CARVEDILOL 12.5 MG: 6.25 TABLET, FILM COATED ORAL at 17:25

## 2021-07-21 RX ADMIN — IPRATROPIUM BROMIDE AND ALBUTEROL SULFATE 1 AMPULE: .5; 2.5 SOLUTION RESPIRATORY (INHALATION) at 15:45

## 2021-07-21 RX ADMIN — METHYLPREDNISOLONE SODIUM SUCCINATE 40 MG: 40 INJECTION, POWDER, FOR SOLUTION INTRAMUSCULAR; INTRAVENOUS at 05:59

## 2021-07-21 RX ADMIN — METHYLPREDNISOLONE SODIUM SUCCINATE 40 MG: 40 INJECTION, POWDER, FOR SOLUTION INTRAMUSCULAR; INTRAVENOUS at 17:25

## 2021-07-21 RX ADMIN — IPRATROPIUM BROMIDE AND ALBUTEROL SULFATE 1 AMPULE: .5; 2.5 SOLUTION RESPIRATORY (INHALATION) at 19:54

## 2021-07-21 RX ADMIN — SODIUM CHLORIDE, PRESERVATIVE FREE 10 ML: 5 INJECTION INTRAVENOUS at 23:23

## 2021-07-21 RX ADMIN — IPRATROPIUM BROMIDE AND ALBUTEROL SULFATE 1 AMPULE: .5; 2.5 SOLUTION RESPIRATORY (INHALATION) at 07:58

## 2021-07-21 RX ADMIN — PANTOPRAZOLE SODIUM 40 MG: 40 TABLET, DELAYED RELEASE ORAL at 09:03

## 2021-07-21 RX ADMIN — IPRATROPIUM BROMIDE AND ALBUTEROL SULFATE 1 AMPULE: .5; 2.5 SOLUTION RESPIRATORY (INHALATION) at 00:14

## 2021-07-21 RX ADMIN — ENOXAPARIN SODIUM 40 MG: 40 INJECTION SUBCUTANEOUS at 09:07

## 2021-07-21 RX ADMIN — LISINOPRIL 5 MG: 5 TABLET ORAL at 09:03

## 2021-07-21 ASSESSMENT — PAIN SCALES - GENERAL
PAINLEVEL_OUTOF10: 0
PAINLEVEL_OUTOF10: 0

## 2021-07-21 ASSESSMENT — PULMONARY FUNCTION TESTS: PEFR_L/MIN: 95

## 2021-07-21 NOTE — PROGRESS NOTES
Hospitalist Progress Note      PCP: Tiffany Banegas MD    Date of Admission: 7/20/2021    Chief Complaint: Shortness of breath and hypoxia    Hospital Course:   62 y.o. male who presented to [facility] with above complaint. He has a history of COPD and developed above complaint last night. He was found to be very hypoxic by EMS this morning and brought to the emergency room. He used to take 4 L oxygen at home and his saturation was 60 at home and 80 in the emergency room. Currently he is on BiPAP. He denies fever worsening cough or colored sputum. His appetite is okay no nausea vomiting diarrhea or any urinary complaints.   No change in mental status    Subjective:   Off BiPAP currently on 4 L oxygen, comfortable no shortness of breath fever or change in mental status      Medications:  Reviewed    Infusion Medications    sodium chloride      sodium chloride 75 mL/hr at 07/20/21 2227     Scheduled Medications    nicotine  1 patch Transdermal Daily    amLODIPine  10 mg Oral Daily    carvedilol  12.5 mg Oral BID WC    lisinopril  5 mg Oral Daily    tamsulosin  0.4 mg Oral Daily    sodium chloride flush  5-40 mL Intravenous 2 times per day    enoxaparin  40 mg Subcutaneous Daily    ipratropium-albuterol  1 ampule Inhalation Q4H WA    levofloxacin  750 mg Intravenous Q24H    methylPREDNISolone  40 mg Intravenous Q8H    pantoprazole  40 mg Oral QAM AC     PRN Meds: sodium chloride flush, sodium chloride, ondansetron **OR** ondansetron, polyethylene glycol, acetaminophen **OR** acetaminophen      Intake/Output Summary (Last 24 hours) at 7/21/2021 0921  Last data filed at 7/21/2021 0631  Gross per 24 hour   Intake 528.08 ml   Output 300 ml   Net 228.08 ml       Physical Exam Performed:    /78   Pulse 87   Temp 97.9 °F (36.6 °C) (Axillary)   Resp 22   Ht 5' 10\" (1.778 m)   Wt 263 lb 7.2 oz (119.5 kg)   SpO2 95%   BMI 37.80 kg/m²     General appearance: No apparent distress, appears stated age and cooperative. On oxygen  HEENT: t. Conjunctivae/corneas clear. Neck: Supple, with full range of motion. No jugular venous distention. Trachea midline. Respiratory:  Normal respiratory effort. Reduced air entry bilaterally with okay Rales/no wheezes/Rhonchi. Cardiovascular: Regular rate and rhythm with normal S1/S2 without murmurs, rubs or gallops. Abdomen: Soft, non-tender, non-distended with normal bowel sounds. Obese  Musculoskeletal: No clubbing, cyanosis or edema bilaterally. Full range of motion without deformity. Skin: Skin color, texture, turgor normal.  No rashes or lesions. Neurologic:  Neurovascularly intact without any focal sensory/motor deficits. .  Psychiatric: Alert and oriented, thought content appropriate, normal insight  Capillary Refill: Brisk,3 seconds, normal   Peripheral Pulses: +2 palpable, equal bilaterally       Labs:   Recent Labs     07/20/21  0643 07/21/21  0546   WBC 12.6* 11.3*   HGB 14.6 14.4   HCT 44.5 43.5    263     Recent Labs     07/20/21  0643 07/21/21  0546    140   K 4.2 5.1    99   CO2 33* 32   BUN 8 15   CREATININE 0.7* 0.7*   CALCIUM 8.9 9.3     Recent Labs     07/20/21  0643   AST 18   ALT 21   BILITOT 0.4   ALKPHOS 102     No results for input(s): INR in the last 72 hours. Recent Labs     07/20/21  0643   TROPONINI <0.01       Urinalysis:      Lab Results   Component Value Date    NITRU Negative 03/28/2021    WBCUA None seen 03/28/2021    BACTERIA Rare 03/28/2021    RBCUA None seen 03/28/2021    BLOODU Negative 03/28/2021    SPECGRAV 1.020 03/28/2021    GLUCOSEU Negative 03/28/2021       Radiology:  XR CHEST PORTABLE   Final Result   Increased airspace disease on the left, either due to atelectasis or   pneumonia.                  Assessment/Plan:    Active Hospital Problems    Diagnosis     Acute on chronic respiratory failure with hypoxia and hypercapnia (HCC) [J96.21, J96.22]     Pulmonary infiltrate [R91.8]     History of alcohol use [Z87.898]     Class 1 obesity in adult [E66.9]     Current smoker [F17.200]     COPD exacerbation (HCC) [J44.1]      Acute on chronic hypoxic respiratory failure with mild hypercapnia with COPD exacerbation-admitted, continue BiPAP, repeat ABG, DuoNeb, IV Solu-Medrol,  Levaquin  Pulmonary consult was placed, input appreciated following  May cut down IV Solu-Medrol to twice daily     Mild leukocytosis-possibly secondary to above monitor     Respiratory acidosis-secondary to above, improving     Smoker- counseled     Hypertension-blood pressures controlled continue home medication Norvasc, lisinopril and Coreg    DVT Prophylaxis: Lovenox  Diet: ADULT DIET; Regular;  Low Sodium (2 gm)  Code Status: Full Code    PT/OT Eval Status: Ordered    Dispo -1 to 2 days    Alec Bradley MD

## 2021-07-21 NOTE — PROGRESS NOTES
07/21/21 0335   NIV Type   Skin Protection for O2 Device Yes   Location Nose   Equipment Type v60   Mode Bilevel   Mask Type Full face mask   Mask Size Medium   Settings/Measurements   IPAP 18 cmH20   CPAP/EPAP 6 cmH2O   Rate Ordered 10   Resp 19   FiO2  65 %   I Time/ I Time % 1 s   Vt Exhaled 488 mL   Minute Volume 7 Liters   Comfort Level Good   Using Accessory Muscles No   SpO2 92   Alarm Settings   Alarms On Y   Press Low Alarm 6 cmH2O   High Pressure Alarm 30 cmH2O   Apnea (secs) 20 secs   Resp Rate Low Alarm 6   High Respiratory Rate 40 br/min

## 2021-07-21 NOTE — PROGRESS NOTES
07/20/21 2038   NIV Type   Equipment Type v60   Mode Bilevel   Mask Type Full face mask   Mask Size Medium   Settings/Measurements   IPAP 16 cmH20   CPAP/EPAP 6 cmH2O   Rate Ordered 10   Resp 26   FiO2  60 %   I Time/ I Time % 1 s   Vt Exhaled 462 mL   Minute Volume 11.4 Liters   Mask Leak (lpm) 14 lpm   Comfort Level Good   Using Accessory Muscles No   SpO2 92   Alarm Settings   Alarms On Y   Press Low Alarm 6 cmH2O   High Pressure Alarm 30 cmH2O   Apnea (secs) 20 secs   Resp Rate Low Alarm 6   High Respiratory Rate 40 br/min

## 2021-07-21 NOTE — PLAN OF CARE
Problem: Falls - Risk of:  Goal: Will remain free from falls  Description: Will remain free from falls  7/20/2021 2201 by Santa Bacon RN  Outcome: Met This Shift  7/20/2021 1831 by Pedrito Trujillo RN  Outcome: Ongoing  Goal: Absence of physical injury  Description: Absence of physical injury  7/20/2021 2201 by Santa Bacon RN  Outcome: Met This Shift  7/20/2021 1831 by Pedrito Trujillo RN  Outcome: Ongoing     Problem: Gas Exchange - Impaired:  Goal: Levels of oxygenation will improve  Description: Levels of oxygenation will improve  Outcome: Ongoing

## 2021-07-21 NOTE — PROGRESS NOTES
07/21/21 0010   NIV Type   $NIV $Daily Charge   Equipment Type v60   Mode Bilevel   Mask Type Full face mask   Mask Size Medium   Settings/Measurements   IPAP 18 cmH20   CPAP/EPAP 6 cmH2O   Rate Ordered 10   Resp 22   FiO2  60 %   I Time/ I Time % 1 s   Vt Exhaled 459 mL   Minute Volume 12.8 Liters   Mask Leak (lpm) 6 lpm   Comfort Level Good   Using Accessory Muscles No   SpO2 92   Alarm Settings   Alarms On Y   Press Low Alarm 6 cmH2O   High Pressure Alarm 30 cmH2O   Apnea (secs) 20 secs   Resp Rate Low Alarm 6   High Respiratory Rate 40 br/min

## 2021-07-22 PROCEDURE — 94761 N-INVAS EAR/PLS OXIMETRY MLT: CPT

## 2021-07-22 PROCEDURE — 2060000000 HC ICU INTERMEDIATE R&B

## 2021-07-22 PROCEDURE — 94640 AIRWAY INHALATION TREATMENT: CPT

## 2021-07-22 PROCEDURE — 94660 CPAP INITIATION&MGMT: CPT

## 2021-07-22 PROCEDURE — 6360000002 HC RX W HCPCS: Performed by: INTERNAL MEDICINE

## 2021-07-22 PROCEDURE — 2700000000 HC OXYGEN THERAPY PER DAY

## 2021-07-22 PROCEDURE — 6370000000 HC RX 637 (ALT 250 FOR IP): Performed by: INTERNAL MEDICINE

## 2021-07-22 PROCEDURE — 2580000003 HC RX 258: Performed by: INTERNAL MEDICINE

## 2021-07-22 PROCEDURE — 99232 SBSQ HOSP IP/OBS MODERATE 35: CPT | Performed by: INTERNAL MEDICINE

## 2021-07-22 RX ADMIN — LISINOPRIL 5 MG: 5 TABLET ORAL at 08:28

## 2021-07-22 RX ADMIN — METHYLPREDNISOLONE SODIUM SUCCINATE 40 MG: 40 INJECTION, POWDER, FOR SOLUTION INTRAMUSCULAR; INTRAVENOUS at 06:03

## 2021-07-22 RX ADMIN — IPRATROPIUM BROMIDE AND ALBUTEROL SULFATE 1 AMPULE: .5; 2.5 SOLUTION RESPIRATORY (INHALATION) at 20:05

## 2021-07-22 RX ADMIN — AMLODIPINE BESYLATE 10 MG: 5 TABLET ORAL at 08:28

## 2021-07-22 RX ADMIN — PANTOPRAZOLE SODIUM 40 MG: 40 TABLET, DELAYED RELEASE ORAL at 08:22

## 2021-07-22 RX ADMIN — CARVEDILOL 12.5 MG: 6.25 TABLET, FILM COATED ORAL at 17:59

## 2021-07-22 RX ADMIN — LEVOFLOXACIN 750 MG: 5 INJECTION, SOLUTION INTRAVENOUS at 11:30

## 2021-07-22 RX ADMIN — ENOXAPARIN SODIUM 40 MG: 40 INJECTION SUBCUTANEOUS at 08:28

## 2021-07-22 RX ADMIN — IPRATROPIUM BROMIDE AND ALBUTEROL SULFATE 1 AMPULE: .5; 2.5 SOLUTION RESPIRATORY (INHALATION) at 11:28

## 2021-07-22 RX ADMIN — CARVEDILOL 12.5 MG: 6.25 TABLET, FILM COATED ORAL at 08:22

## 2021-07-22 RX ADMIN — SODIUM CHLORIDE, PRESERVATIVE FREE 10 ML: 5 INJECTION INTRAVENOUS at 20:46

## 2021-07-22 RX ADMIN — METHYLPREDNISOLONE SODIUM SUCCINATE 40 MG: 40 INJECTION, POWDER, FOR SOLUTION INTRAMUSCULAR; INTRAVENOUS at 18:00

## 2021-07-22 RX ADMIN — IPRATROPIUM BROMIDE AND ALBUTEROL SULFATE 1 AMPULE: .5; 2.5 SOLUTION RESPIRATORY (INHALATION) at 16:55

## 2021-07-22 RX ADMIN — IPRATROPIUM BROMIDE AND ALBUTEROL SULFATE 1 AMPULE: .5; 2.5 SOLUTION RESPIRATORY (INHALATION) at 07:49

## 2021-07-22 RX ADMIN — TAMSULOSIN HYDROCHLORIDE 0.4 MG: 0.4 CAPSULE ORAL at 08:28

## 2021-07-22 ASSESSMENT — PAIN SCALES - GENERAL
PAINLEVEL_OUTOF10: 0
PAINLEVEL_OUTOF10: 0

## 2021-07-22 NOTE — PROGRESS NOTES
Pt placed on overnite pulse ox. Pt placed on [facility] per MD order. No complications.

## 2021-07-22 NOTE — DISCHARGE INSTR - COC
Continuity of Care Form    Patient Name: Adela Smart   :  1960  MRN:  [id number]    Admit date:  2021  Discharge date:  ***    Code Status Order: Full Code   Advance Directives:      Admitting Physician:  Jamaica Altamirano MD  PCP: Pam Stern MD    Discharging Nurse: [facility] Unit/Room#: 9343/6412-40  Discharging Unit Phone Number: ***    Emergency Contact:   Extended Emergency Contact Information  Primary Emergency Contact: Keven Matute  Home Phone: [phone]  Relation: Parent  Secondary Emergency Contact: Nenita Melendez  Mobile Phone: [phone]  Relation: Domestic Partner   needed? No    Past Surgical History:  History reviewed. No pertinent surgical history.     Immunization History:   Immunization History   Administered Date(s) Administered    COVID-19, WHIT Gibson, 30mcg/0.3mL 2021       Active Problems:  Patient Active Problem List   Diagnosis Code    COPD exacerbation ([facility] [address] J44.1    Alcohol withdrawal ([facility] [address] F10.239    Acute respiratory failure with hypoxia and hypercapnia (HCC) J96.01, J96.02    Alcohol use Z72.89    Agitation requiring sedation protocol R45.1    Current smoker F17.200    Elevated d-dimer R79.89    Respiratory failure with hypoxia ([address] J96.91    Acute on chronic respiratory failure with hypoxia and hypercapnia (HCC) J96.21, J96.22    Pulmonary infiltrate R91.8    History of alcohol use Z87.898    Class 1 obesity in adult E66.9       Isolation/Infection:   Isolation            No Isolation          Patient Infection Status       Infection Onset Added Last Indicated Last Indicated By Review Planned Expiration Resolved Resolved By    None active    Resolved    COVID-19 Rule Out 21 COVID-19 (Ordered)   21 Rule-Out Test Resulted    COVID-19 Rule Out 21 COVID-19, Rapid (Ordered)   21 Rule-Out Test Resulted            Nurse Assessment:  Last Vital Signs: /66   Pulse 86   Temp 97.7 °F (36.5 °C) (Axillary)   Resp 14   Ht 5' 10\" (1.778 m)   Wt 264 lb 8.8 oz (120 kg)   SpO2 93%   BMI 37.96 kg/m²     Last documented pain score (0-10 scale): Pain Level: 0  Last Weight:   Wt Readings from Last 1 Encounters:   21 264 lb 8.8 oz (120 kg)     Mental Status:  {IP PT MENTAL STATUS:}    IV Access:  { MEGAN IV ACCESS:871234023}    Nursing Mobility/ADLs:  Walking   {CHP DME RRTF:754029781}  Transfer  {CHP DME PLY}  Bathing  {CHP DME JSJC:541025194}  Dressing  {CHP DME HTDS:849617323}  Toileting  {CHP DME AGWC:168134071}  Feeding  {P DME PARQ:890705384}  Med Admin  {P DME PHTA:359121786}  Med Delivery   { MEGAN MED Delivery:723570800}    Wound Care Documentation and Therapy:        Elimination:  Continence: Bowel: {YES / TD:93924}  Bladder: {YES / A}  Urinary Catheter: {Urinary Catheter:379854746}   Colostomy/Ileostomy/Ileal Conduit: {YES / MF:23760}       Date of Last BM: ***    Intake/Output Summary (Last 24 hours) at 2021 1025  Last data filed at 2021 0300  Gross per 24 hour   Intake 1376.48 ml   Output 625 ml   Net 751.48 ml     I/O last 3 completed shifts:   In: 5232 [P.O.:1080; I.V.:569]  Out: 950 [Urine:950]    Safety Concerns:     508 YES.TAP Safety Concerns:892080085}    Impairments/Disabilities:      508 YES.TAP Impairments/Disabilities:134644941}    Nutrition Therapy:  Current Nutrition Therapy:   508 YES.TAP Diet List:163546300}    Routes of Feeding: {CHP DME Other Feedings:821966509}  Liquids: {Slp liquid thickness:32410}  Daily Fluid Restriction: {CHP DME Yes amt example:513450961}  Last Modified Barium Swallow with Video (Video Swallowing Test): {Done Not Done ETEE:688287414}    Treatments at the Time of Hospital Discharge:   Respiratory Treatments: ***  Oxygen Therapy:  {Therapy; copd oxygen:18865}  Ventilator:    {QUIANA LOVELACE Vent QIDW:589174153}    Rehab Therapies: {THERAPEUTIC INTERVENTION:1673268991}  Weight Bearing Status/Restrictions: {QUIANA CC Weight Bearin}  Other Medical Equipment (for information only, NOT a DME order):  {EQUIPMENT:121421386}  Other Treatments: ***    Patient's personal belongings (please select all that are sent with patient):  {CHP DME Belongings:574169099}    RN SIGNATURE:  {Esignature:397010942}    CASE MANAGEMENT/SOCIAL WORK SECTION    Inpatient Status Date: ***    Readmission Risk Assessment Score:  Readmission Risk              Risk of Unplanned Readmission:  16           Discharging to Facility/ Agency   Name:   Address:  Phone:  Fax:    Dialysis Facility (if applicable)   Name:  Address:  Dialysis Schedule:  Phone:  Fax:    / signature: {Esignature:168932306}    PHYSICIAN SECTION    Prognosis: Fair    Condition at Discharge: Stable    Rehab Potential (if transferring to Rehab): Good    Recommended Labs or Other Treatments After Discharge: Continue CPAP overnight, follow-up with pulmonology and PCP  Smoking cessation advised    Physician Certification: I certify the above information and transfer of Jannette Mario  is necessary for the continuing treatment of the diagnosis listed and that he requires [address] for less 30 days.      Update Admission H&P: No change in H&P    PHYSICIAN SIGNATURE:  Electronically signed by Lisandro Orona MD on 21 at 1:21 PM EDT

## 2021-07-22 NOTE — PROGRESS NOTES
Hospitalist Progress Note      PCP: Pam Stern MD    Date of Admission: 7/20/2021    Chief Complaint: Shortness of breath and hypoxia    Hospital Course:   62 y.o. male who presented to [facility] with above complaint. He has a history of COPD and developed above complaint last night. He was found to be very hypoxic by EMS this morning and brought to the emergency room. He used to take 4 L oxygen at home and his saturation was 60 at home and 80 in the emergency room. Currently he is on BiPAP. He denies fever worsening cough or colored sputum. His appetite is okay no nausea vomiting diarrhea or any urinary complaints.   No change in mental status    Subjective:   Off BiPAP currently on 4 L oxygen, comfortable  Mild  shortness of breath  no fever or change in mental status  Wanted to go home      Medications:  Reviewed    Infusion Medications    sodium chloride       Scheduled Medications    methylPREDNISolone  40 mg Intravenous Q12H    nicotine  1 patch Transdermal Daily    amLODIPine  10 mg Oral Daily    carvedilol  12.5 mg Oral BID WC    lisinopril  5 mg Oral Daily    tamsulosin  0.4 mg Oral Daily    sodium chloride flush  5-40 mL Intravenous 2 times per day    enoxaparin  40 mg Subcutaneous Daily    ipratropium-albuterol  1 ampule Inhalation Q4H WA    levofloxacin  750 mg Intravenous Q24H    pantoprazole  40 mg Oral QAM AC     PRN Meds: sodium chloride flush, sodium chloride, ondansetron **OR** ondansetron, polyethylene glycol, acetaminophen **OR** acetaminophen      Intake/Output Summary (Last 24 hours) at 7/22/2021 1332  Last data filed at 7/22/2021 0300  Gross per 24 hour   Intake 1016.48 ml   Output 300 ml   Net 716.48 ml       Physical Exam Performed:    /73   Pulse 90   Temp 98 °F (36.7 °C) (Axillary)   Resp 16   Ht 5' 10\" (1.778 m)   Wt 264 lb 8.8 oz (120 kg)   SpO2 94%   BMI 37.96 kg/m²     General appearance: No apparent distress, appears stated age and cooperative. On oxygen  HEENT: t. Conjunctivae/corneas clear. Neck: Supple, with full range of motion. No jugular venous distention. Trachea midline. Respiratory:  Normal respiratory effort. Reduced air entry bilaterally with occ  Rales/ b/l  wheezes/Rhonchi. Cardiovascular: Regular rate and rhythm with normal S1/S2 without murmurs, rubs or gallops. Abdomen: Soft, non-tender, non-distended with normal bowel sounds. Obese  Musculoskeletal: No clubbing, cyanosis or edema bilaterally. Full range of motion without deformity. Skin: Skin color, texture, turgor normal.  No rashes or lesions. Neurologic:  Neurovascularly intact without any focal sensory/motor deficits. .  Psychiatric: Alert and oriented, thought content appropriate, normal insight  Capillary Refill: Brisk,3 seconds, normal   Peripheral Pulses: +2 palpable, equal bilaterally       Labs:   Recent Labs     07/20/21  0643 07/21/21  0546   WBC 12.6* 11.3*   HGB 14.6 14.4   HCT 44.5 43.5    263     Recent Labs     07/20/21  0643 07/21/21  0546    140   K 4.2 5.1    99   CO2 33* 32   BUN 8 15   CREATININE 0.7* 0.7*   CALCIUM 8.9 9.3     Recent Labs     07/20/21  0643   AST 18   ALT 21   BILITOT 0.4   ALKPHOS 102     No results for input(s): INR in the last 72 hours. Recent Labs     07/20/21  0643   TROPONINI <0.01       Urinalysis:      Lab Results   Component Value Date    NITRU Negative 03/28/2021    WBCUA None seen 03/28/2021    BACTERIA Rare 03/28/2021    RBCUA None seen 03/28/2021    BLOODU Negative 03/28/2021    SPECGRAV 1.020 03/28/2021    GLUCOSEU Negative 03/28/2021       Radiology:  XR CHEST PORTABLE   Final Result   Increased airspace disease on the left, either due to atelectasis or   pneumonia.                  Assessment/Plan:    Active Hospital Problems    Diagnosis     Acute on chronic respiratory failure with hypoxia and hypercapnia (HCC) [J96.21, J96.22]     Pulmonary infiltrate [R91.8]     History of alcohol use [Y15.894]     Class 1 obesity in adult [E66.9]     Current smoker [F17.200]     COPD exacerbation (HCC) [J44.1]      Acute on chronic hypoxic respiratory failure with mild hypercapnia with COPD exacerbation-admitted, continue BiPAP,  , DuoNeb, IV Solu-Medrol,  Levaquin  Pulmonary consult was placed, input appreciated following   IV Solu-Medrol to twice daily     Mild leukocytosis-possibly secondary to above monitor     Respiratory acidosis-secondary to above, improving     Smoker- counseled     Hypertension-blood pressures controlled continue home medication Norvasc, lisinopril and Coreg    History of alcohol abuse-watch for DTs    DVT Prophylaxis: Lovenox  Diet: ADULT DIET; Regular;  Low Sodium (2 gm)  Code Status: Full Code    PT/OT Eval Status: Ordered    Dispo -1 to 2 days  Discussed with patient and family    Magnus Ruiz MD

## 2021-07-22 NOTE — PROGRESS NOTES
INPATIENT PULMONARY CRITICAL CARE PROGRESS NOTE      Reason for visit    acute on chronic hypoxemic respiratory failure with hypercapnia    SUBJECTIVE: Patient was kept on BiPAP overnight with modified settings and patient required 65% oxygen to maintain saturation, patient was clinically better as well as the ABG was better than last night and patient had requested nursing to be taken off the BiPAP to have breakfast which was done and subsequently patient when seen was sleeping in the bed loudly snoring, patient had mild tachypnea with minimal paradoxical breathing, patient was afebrile and hemodynamically maintained, patient was in sinus rhythm on the monitor, patient had improving urine output as per documentation the epic, patient's blood sugar was slightly on the higher side but acceptable, no other pertinent review of system could be obtained because of patient's clinical status           Physical Exam:  Blood pressure 125/73, pulse 97, temperature 98.2 °F (36.8 °C), temperature source Oral, resp. rate 24, height 5' 10\" (1.778 m), weight 263 lb 7.2 oz (119.5 kg), SpO2 92 %.'     Constitutional:  No acute distress ;sleeping and loudly snoring when seen    HENT: No facial asymmetry no thyromegaly. Eyes:  Conjunctivae are normal. Pupils equal, round, and reactive to light. No scleral icterus. Neck: . No tracheal deviation present. No obvious thyroid mass. Short enlarged neck  Cardiovascular: Normal rate, regular rhythm, normal heart sounds. No right ventricular heave. No lower extremity edema. Pulmonary/Chest: No wheezes. Minimal scattered rales. Chest wall is not dull to percussion. No accessory muscle usage or stridor. Decreased breath sound density  Abdominal: Soft. Bowel sounds present. No distension or hernia. No tenderness. Obese  Musculoskeletal: No cyanosis. No clubbing. No obvious joint deformity. Lymphadenopathy: No cervical or supraclavicular adenopathy. Skin: Skin is warm and dry.  No rash or nodules on the exposed extremities. Neurologic: Sleeping and loudly snoring        Results:  CBC:   Recent Labs     07/20/21  0643 07/21/21  0546   WBC 12.6* 11.3*   HGB 14.6 14.4   HCT 44.5 43.5   MCV 96.0 94.8    263     BMP:   Recent Labs     07/20/21  0643 07/21/21  0546    140   K 4.2 5.1    99   CO2 33* 32   BUN 8 15   CREATININE 0.7* 0.7*     LIVER PROFILE:   Recent Labs     07/20/21  0643   AST 18   ALT 21   BILITOT 0.4   ALKPHOS 102       Imaging:  I have reviewed radiology images personally. XR CHEST PORTABLE   Final Result   Increased airspace disease on the left, either due to atelectasis or   pneumonia. XR CHEST PORTABLE    Result Date: 7/20/2021  EXAMINATION: ONE XRAY VIEW OF THE CHEST 7/20/2021 6:43 am COMPARISON: 04/10/2021 HISTORY: ORDERING SYSTEM PROVIDED HISTORY: SOB TECHNOLOGIST PROVIDED HISTORY: Reason for exam:->SOB Reason for Exam: sob FINDINGS: Lung volumes are low accentuating heart size and bronchovascular markings at the lung bases. Patient is rotated Heart size is stable. Mediastinal contours are stable A few scattered opacities are seen in the left lung,, increased compared to prior     Increased airspace disease on the left, either due to atelectasis or pneumonia. Results for Micah Felty (MRN [id number]) as of 7/21/2021 21:23   Ref.  Range 4/8/2021 14:45 4/9/2021 05:35 4/10/2021 04:12 7/20/2021 06:43 7/21/2021 05:46   Sodium Latest Ref Range: 136 - 145 mmol/L  142  144 140   Potassium Latest Ref Range: 3.5 - 5.1 mmol/L 3.7 3.3 (L) 3.3 (L) 4.2 5.1   Chloride Latest Ref Range: 99 - 110 mmol/L  100  101 99   CO2 Latest Ref Range: 21 - 32 mmol/L  34 (H)  33 (H) 32   BUN Latest Ref Range: 7 - 20 mg/dL  9  8 15   Creatinine Latest Ref Range: 0.9 - 1.3 mg/dL  0.7 (L)  0.7 (L) 0.7 (L)   Anion Gap Latest Ref Range: 3 - 16   8  10 9   GFR Non- Latest Ref Range: >60   >60  >60 >60   GFR  Latest Ref Range: >60 >60  >60 >60   Magnesium Latest Ref Range: 1.80 - 2.40 mg/dL  2.00      Lactic Acid Latest Ref Range: 0.4 - 2.0 mmol/L    1.0    Glucose Latest Ref Range: 70 - 99 mg/dL  111 (H)  113 (H) 149 (H)   Calcium Latest Ref Range: 8.3 - 10.6 mg/dL  9.3  8.9 9.3   Total Protein Latest Ref Range: 6.4 - 8.2 g/dL    6.6      Results for Carlitos [place] (MRN [id number]) as of 7/21/2021 21:23   Ref. Range 4/6/2021 04:20 4/7/2021 04:00 4/9/2021 05:35 7/20/2021 06:43 7/21/2021 05:46   WBC Latest Ref Range: 4.0 - 11.0 K/uL 10.2 11.4 (H) 9.8 12.6 (H) 11.3 (H)   RBC Latest Ref Range: 4.20 - 5.90 M/uL 4.18 (L) 4.11 (L) 4.41 4.64 4.59   Hemoglobin Quant Latest Ref Range: 13.5 - 17.5 g/dL 13.0 (L) 12.7 (L) 14.0 14.6 14.4   Hematocrit Latest Ref Range: 40.5 - 52.5 % 39.8 (L) 39.1 (L) 41.9 44.5 43.5   MCV Latest Ref Range: 80.0 - 100.0 fL 95.3 95.2 95.0 96.0 94.8   MCH Latest Ref Range: 26.0 - 34.0 pg 31.0 30.9 31.6 31.4 31.3   MCHC Latest Ref Range: 31.0 - 36.0 g/dL 32.5 32.5 33.3 32.7 33.0   MPV Latest Ref Range: 5.0 - 10.5 fL 9.9 9.6 9.5 7.9 7.7   RDW Latest Ref Range: 12.4 - 15.4 % 13.6 13.5 13.6 17.3 (H) 16.5 (H)   Platelet Count Latest Ref Range: 135 - 450 K/uL 193 176 169 254 263   Neutrophils % Latest Units: %    79.5 91.7   Lymphocyte % Latest Units: %    12.1 5.2     Results for Carlitos Ke (MRN [id number]) as of 7/21/2021 21:23   Ref.  Range 7/20/2021 06:43 7/20/2021 08:58 7/20/2021 17:00 7/21/2021 08:05   Hemoglobin, Art, Extended Latest Ref Range: 13.5 - 17.5 g/dL   15.5 15.5   pH, Arterial Latest Ref Range: 7.350 - 7.450    7.231 (L) 7.304 (L)   pCO2, Arterial Latest Ref Range: 35.0 - 45.0 mmHg   79.1 (HH) 75.1 (HH)   pO2, Arterial Latest Ref Range: 75.0 - 108.0 mmHg   71.3 (L) 98.5   HCO3, Arterial Latest Ref Range: 21.0 - 29.0 mmol/L   32.5 (H) 36.5 (H)   TCO2 (calc), Art Latest Ref Range: Not Established mmol/L   34.9 38.8   Base Excess, Arterial Latest Ref Range: -3.0 - 3.0 mmol/L   2.0 6.9 (H)   O2 Sat, Arterial Latest Ref Range: >92 %   93.2 97.6   Methemoglobin, Arterial Latest Ref Range: <1.5 %   0.7 0.7   Carboxyhgb, Arterial Latest Ref Range: 0.0 - 1.5 %   2.7 (H) 1.1   pH, Gilbert Latest Ref Range: 7.350 - 7.450  7.289 (L) 7.235 (L)     pCO2, Gilbert Latest Ref Range: 40.0 - 50.0 mmHg 79.0 (H) 91.8 (H)     pO2, Gilbert Latest Ref Range: 25 - 40 mmHg 38.7 68.9 (H)     HCO3, Venous Latest Ref Range: 23.0 - 29.0 mmol/L 37.0 (H) 38.0 (H)     TC02 (Calc), Gilbert Latest Ref Range: Not Established mmol/L 40 41     Base Excess, Gilbert Latest Ref Range: -3.0 - 3.0 mmol/L 6.9 (H) 6.4 (H)     MetHgb, Gilbert Latest Ref Range: <1.5 % 0.5 0.4     O2 Sat, Gilbert Latest Ref Range: Not Established % 77 93       Assessment:  Active Problems:    COPD exacerbation (HCC)    Current smoker    Acute on chronic respiratory failure with hypoxia and hypercapnia (HCC)    Pulmonary infiltrate    History of alcohol use    Class 1 obesity in adult  Resolved Problems:    * No resolved hospital problems.  *          Plan:   · BiPAP with oxygen supplementation to keep saturation between 90 to 94% only  · Patient was taken off the BiPAP for breakfast this morning and patient when seen was in mild tachypnea and was loudly snoring  · Nursing was requested to put the patient back on BiPAP  · Patient serial ABG was reviewed  · Patient does not need any intubation at this time  · Patient does have significant COPD with chronic respiratory failure with hypercarbia and hypoxemia compounding the overall clinical picture  · We will do an overnight pulse oximetry at some point to make sure that patient has any nocturnal hypoxemia and if so we will discuss with patient and the case management about NIV or BiPAP from the hospital  · Patient has history of intubation in the recent past  · Patient has been started on Levaquin which can be continued for now and titration as per clinical status and cultures  · Monitor WBC and fever curve trend  · Titration of antibiotics as per clinical status and cultures  · Bronchodilators  · IV Solu-Medrol can be continued  · Patient has history of alcohol use and as per the epic patient has not drank for last 3 days time and patient has history of alcohol withdrawal with DTs in the recent past which needs to be monitored closely  · Patient also has a history history of pulmonary edema with pleural effusions in the recent past  · Patient's IV fluids were discontinued  · Monitor input output and BMP  · Correct electrolytes and whenever necessary basis  · Nicotine replacement therapy  · Monitor hemodynamics and cardiac rhythm closely  · Neurochecks  · PUD and DVT prophylaxis     Case discussed with nursing and internal medicine team    Patient's overall clinical status remains precarious and needs to monitor closely in the progressive care unit            Electronically signed by:  Shaquille Romero MD    7/21/2021    9:21 PM.

## 2021-07-22 NOTE — FLOWSHEET NOTE
07/22/21 0930   Assessment   Charting Type Shift assessment   Neurological   Neuro (WDL) WDL   Level of Consciousness Alert (0)   Orientation Level Oriented X4   Cognition Appropriate judgement; Appropriate safety awareness; Appropriate attention/concentration; Appropriate for developmental age; Follows commands   Language Clear   Size R Pupil (mm) 3   R Pupil Shape Round   R Pupil Reaction Brisk   Size L Pupil (mm) 3   L Pupil Shape Round   L Pupil Reaction Brisk   R Hand  Moderate   L Hand  Moderate   R Foot Dorsiflexion Moderate   L Foot Dorsiflexion Moderate   R Foot Plantar Flexion Moderate   L Foot Plantar Flexion Moderate   RUE Motor Response Responds to command   Sensation RUE Full sensation   LUE Motor Response Responds to command   Sensation LUE Full sensation   RLE Motor Response Responds to command   Sensation RLE Full sensation   LLE Motor Response Responds to command   Sensation LLE Full sensation   Gag Present   Tongue Deviation None   [place] Coma Scale   Eye Opening 4   Best Verbal Response 5   Best Motor Response 6   [place] Coma Scale Score 15   HEENT   HEENT (WDL) WDL   Right Eye Intact; Impaired vision   Left Eye Intact; Impaired vision   Nose Intact   Throat Intact   Neck Asymmetrical;Trachea midline   Tongue Pink & moist   Voice Normal   Mucous Membrane Moist;Intact   Teeth Missing teeth   Respiratory   Respiratory (WDL) X   Respiratory Pattern Regular   Respiratory Depth Shallow   Respiratory Quality/Effort Dyspnea with exertion   Chest Assessment Trachea midline; Chest expansion symmetrical   L Breath Sounds Diminished   R Breath Sounds Diminished   Breath Sounds   Right Upper Lobe Clear   Right Middle Lobe Expiratory Wheezes   Right Lower Lobe Diminished   Left Upper Lobe Clear   Left Lower Lobe Diminished   Cardiac   Cardiac (WDL) X   Cardiac Regularity Regular   Heart Sounds S1, S2   Cardiac Monitor   Telemetry Monitor On Yes   Telemetry Audible Yes   Telemetry Alarms Set Yes Gastrointestinal   Abdominal (WDL) WDL   Abdomen Inspection Rounded; Soft   RUQ Bowel Sounds Active   LUQ Bowel Sounds Active   RLQ Bowel Sounds Active   LLQ Bowel Sounds Active   Peripheral Vascular   Peripheral Vascular (WDL) X   Edema Generalized   Edema Generalized Trace   RLE Edema Trace   LLE Edema Trace   Skin Color/Condition   Skin Color/Condition (WDL) WDL   Skin Integrity   Skin Integrity (WDL) WDL   Musculoskeletal   Musculoskeletal (WDL) X   Genitourinary   Genitourinary (WDL) WDL   Flank Tenderness No   Suprapubic Tenderness No   Dysuria No   Urine Assessment   Incontinence No   Urine Color Stefany   Urine Appearance Clear   Urine Odor No odor   Anus/Rectum   Anus/Rectum (WDL) WDL   Psychosocial   Psychosocial (WDL) WDL   Patient Behaviors Cooperative; Anxious   Needs Expressed Emotional;Physical   Rest/Sleep for Patient Patient/Family identifies as adequate

## 2021-07-22 NOTE — PROGRESS NOTES
Physician Progress Note      PATIENT:               Ida Mcginnis  CSN #:                  [id number]  :                       1960  ADMIT DATE:       2021 6:37 AM  DISCH DATE:  RESPONDING  PROVIDER #:        Alexandru Zamoar MD          QUERY TEXT:    Pt admitted with acute on chronic respiratory failure. Pt noted to have   recent admission to [facility] with pneumonia. Per ED: chest x-ray shows left   lower lobe infiltrate . Patient continued on Levaquin. If possible, please   document in the progress notes and discharge summary if you are evaluating   and/or treating any of the following: The medical record reflects the following:  Risk Factors: copd, chronic respiratory failure, recent admission to [facility]   for pneumonia, active smoker  Clinical Indicators: per ED: Chest x-ray shows left lower lobe infiltrate. I   have ordered antibiotics here cultures have been sent-Pneumonia, mild   leukocytosis  Treatment: Levaquin, chest xray, bipap, bronchodilators, IV solumedrol,   pulmonary consult    Thank you for your assistance,  Lesly Kaur RN,BSN,CCDS,CRCR  Options provided:  -- Gram negative pneumonia  -- Gram positive pneumonia  -- Bacterial pneumonia  -- Pneumonia has resolved  -- Other - I will add my own diagnosis  -- Disagree - Not applicable / Not valid  -- Disagree - Clinically unable to determine / Unknown  -- Refer to Clinical Documentation Reviewer    PROVIDER RESPONSE TEXT:    This patient has gram positive pneumonia that is still being actively treated   from recent hospitalization.     Query created by: Reddy Winter on 2021 7:03 AM      Electronically signed by:  Alexandru Zamora MD 2021 3:26 PM

## 2021-07-23 VITALS
TEMPERATURE: 97.8 F | HEART RATE: 91 BPM | DIASTOLIC BLOOD PRESSURE: 91 MMHG | HEIGHT: 70 IN | RESPIRATION RATE: 20 BRPM | BODY MASS INDEX: 38.25 KG/M2 | WEIGHT: 267.2 LBS | OXYGEN SATURATION: 93 % | SYSTOLIC BLOOD PRESSURE: 150 MMHG

## 2021-07-23 PROCEDURE — 94660 CPAP INITIATION&MGMT: CPT

## 2021-07-23 PROCEDURE — 6370000000 HC RX 637 (ALT 250 FOR IP): Performed by: INTERNAL MEDICINE

## 2021-07-23 PROCEDURE — 94761 N-INVAS EAR/PLS OXIMETRY MLT: CPT

## 2021-07-23 PROCEDURE — 6360000002 HC RX W HCPCS: Performed by: INTERNAL MEDICINE

## 2021-07-23 PROCEDURE — 99232 SBSQ HOSP IP/OBS MODERATE 35: CPT | Performed by: INTERNAL MEDICINE

## 2021-07-23 PROCEDURE — 2700000000 HC OXYGEN THERAPY PER DAY

## 2021-07-23 PROCEDURE — 2580000003 HC RX 258: Performed by: INTERNAL MEDICINE

## 2021-07-23 PROCEDURE — 94640 AIRWAY INHALATION TREATMENT: CPT

## 2021-07-23 RX ORDER — PANTOPRAZOLE SODIUM 40 MG/1
40 TABLET, DELAYED RELEASE ORAL
Qty: 10 TABLET | Refills: 0 | Status: SHIPPED | OUTPATIENT
Start: 2021-07-24 | End: 2022-10-10

## 2021-07-23 RX ADMIN — IPRATROPIUM BROMIDE AND ALBUTEROL SULFATE 1 AMPULE: .5; 2.5 SOLUTION RESPIRATORY (INHALATION) at 08:31

## 2021-07-23 RX ADMIN — METHYLPREDNISOLONE SODIUM SUCCINATE 40 MG: 40 INJECTION, POWDER, FOR SOLUTION INTRAMUSCULAR; INTRAVENOUS at 05:04

## 2021-07-23 RX ADMIN — LISINOPRIL 5 MG: 5 TABLET ORAL at 08:38

## 2021-07-23 RX ADMIN — PANTOPRAZOLE SODIUM 40 MG: 40 TABLET, DELAYED RELEASE ORAL at 08:55

## 2021-07-23 RX ADMIN — CARVEDILOL 12.5 MG: 6.25 TABLET, FILM COATED ORAL at 08:38

## 2021-07-23 RX ADMIN — ENOXAPARIN SODIUM 40 MG: 40 INJECTION SUBCUTANEOUS at 08:43

## 2021-07-23 RX ADMIN — TAMSULOSIN HYDROCHLORIDE 0.4 MG: 0.4 CAPSULE ORAL at 08:38

## 2021-07-23 RX ADMIN — SODIUM CHLORIDE, PRESERVATIVE FREE 10 ML: 5 INJECTION INTRAVENOUS at 08:48

## 2021-07-23 RX ADMIN — AMLODIPINE BESYLATE 10 MG: 5 TABLET ORAL at 08:40

## 2021-07-23 ASSESSMENT — PAIN SCALES - GENERAL
PAINLEVEL_OUTOF10: 0
PAINLEVEL_OUTOF10: 0

## 2021-07-23 NOTE — PROGRESS NOTES
INPATIENT PULMONARY CRITICAL CARE PROGRESS NOTE      Reason for visit    acute on chronic hypoxemic respiratory failure with hypercapnia    SUBJECTIVE: Patientt when seen was lying comfortably in bed with no increased cough/expectoration/sob/wheezing/chest pain  patient when seen was on 4 L of nasal cannula oxygen with saturation 93% patient was afebrile and hemodynamically maintained, patient was in sinus rhythm on the monitor, patient has 3 L of oxygen per minute at home, patient also has a home CPAP machine since October of last year but patient states that he has not used it so far,, patient's blood sugar was slightly on the higher side but acceptable when checked  no other pertinent review of system of concern           Physical Exam:  Blood pressure (!) 150/91, pulse 91, temperature 97.8 °F (36.6 °C), temperature source Oral, resp. rate 20, height 5' 10\" (1.778 m), weight 267 lb 3.2 oz (121.2 kg), SpO2 93 %.'     Constitutional:  No acute distress ;sleeping and loudly snoring when seen    HENT: No facial asymmetry no thyromegaly. Eyes:  Conjunctivae are normal. Pupils equal, round, and reactive to light. No scleral icterus. Neck: . No tracheal deviation present. No obvious thyroid mass. Short enlarged neck  Cardiovascular: Normal rate, regular rhythm, normal heart sounds. No right ventricular heave. Minimal lower extremity edema. Pulmonary/Chest: No wheezes. No significant rales. Chest wall is not dull to percussion. No accessory muscle usage or stridor. Decreased breath sound density  Abdominal: Soft. Bowel sounds present. No distension or hernia. No tenderness. Obese  Musculoskeletal: No cyanosis. No clubbing. No obvious joint deformity. Lymphadenopathy: No cervical or supraclavicular adenopathy. Skin: Skin is warm and dry. No rash or nodules on the exposed extremities.   Neurologic: alert and oriented with no focal cranial N deficits        Results:  CBC:   Recent Labs     07/21/21  0546 DVT prophylaxis     Case discussed with patient and nursing    Importance of compliance with diet/lifestyle modifications/medications/use of CPAP discussed in detail and if the patient is noncompliant patient will have recurrent hospitalizations and worsening symptomatology which was made clear to the patient    Case discussed with case management and IM    ? Discharge planning               Electronically signed by:  Hayder Arevalo MD    7/23/2021    11:14 AM.

## 2021-07-23 NOTE — PROGRESS NOTES
INPATIENT PULMONARY CRITICAL CARE PROGRESS NOTE      Reason for visit    acute on chronic hypoxemic respiratory failure with hypercapnia    SUBJECTIVE: Patientt when seen was sleeping in the bed loudly snoring, patient had mild tachypnea with minimal paradoxical breathing, patient underwent an overnight pulse oximetry and was found to have significant hypoxemia overnight, patient when seen was on 5 L of nasal cannula oxygen with saturation 94% patient was afebrile and hemodynamically maintained, patient was in sinus rhythm on the monitor, patient had improving urine output as per documentation the epic with cumulative fluid balance of +927 mL, patient's blood sugar was slightly on the higher side but acceptable when checked yesterday, no other pertinent review of system could be obtained because of patient's clinical status           Physical Exam:  Blood pressure (!) 143/84, pulse 89, temperature 98.3 °F (36.8 °C), temperature source Oral, resp. rate 20, height 5' 10\" (1.778 m), weight 264 lb 8.8 oz (120 kg), SpO2 93 %.'     Constitutional:  No acute distress ;sleeping and loudly snoring when seen    HENT: No facial asymmetry no thyromegaly. Eyes:  Conjunctivae are normal. Pupils equal, round, and reactive to light. No scleral icterus. Neck: . No tracheal deviation present. No obvious thyroid mass. Short enlarged neck  Cardiovascular: Normal rate, regular rhythm, normal heart sounds. No right ventricular heave. No lower extremity edema. Pulmonary/Chest: No wheezes. Minimal scattered rales. Chest wall is not dull to percussion. No accessory muscle usage or stridor. Decreased breath sound density  Abdominal: Soft. Bowel sounds present. No distension or hernia. No tenderness. Obese  Musculoskeletal: No cyanosis. No clubbing. No obvious joint deformity. Lymphadenopathy: No cervical or supraclavicular adenopathy. Skin: Skin is warm and dry. No rash or nodules on the exposed extremities.   Neurologic: Sleeping and loudly snoring        Results:  CBC:   Recent Labs     07/20/21  0643 07/21/21  0546   WBC 12.6* 11.3*   HGB 14.6 14.4   HCT 44.5 43.5   MCV 96.0 94.8    263     BMP:   Recent Labs     07/20/21  0643 07/21/21  0546    140   K 4.2 5.1    99   CO2 33* 32   BUN 8 15   CREATININE 0.7* 0.7*     LIVER PROFILE:   Recent Labs     07/20/21  0643   AST 18   ALT 21   BILITOT 0.4   ALKPHOS 102       Imaging:  I have reviewed radiology images personally. XR CHEST PORTABLE   Final Result   Increased airspace disease on the left, either due to atelectasis or   pneumonia. Results for Milagros Wallace (MRN [id number]) as of 7/22/2021 21:18   Ref. Range 4/9/2021 05:35 4/10/2021 04:12 7/20/2021 06:43 7/21/2021 05:46   Sodium Latest Ref Range: 136 - 145 mmol/L 142  144 140   Potassium Latest Ref Range: 3.5 - 5.1 mmol/L 3.3 (L) 3.3 (L) 4.2 5.1   Chloride Latest Ref Range: 99 - 110 mmol/L 100  101 99   CO2 Latest Ref Range: 21 - 32 mmol/L 34 (H)  33 (H) 32   BUN Latest Ref Range: 7 - 20 mg/dL 9  8 15   Creatinine Latest Ref Range: 0.9 - 1.3 mg/dL 0.7 (L)  0.7 (L) 0.7 (L)   Anion Gap Latest Ref Range: 3 - 16  8  10 9   GFR Non- Latest Ref Range: >60  >60  >60 >60   GFR  Latest Ref Range: >60  >60  >60 >60   Magnesium Latest Ref Range: 1.80 - 2.40 mg/dL 2.00      Lactic Acid Latest Ref Range: 0.4 - 2.0 mmol/L   1.0    Glucose Latest Ref Range: 70 - 99 mg/dL 111 (H)  113 (H) 149 (H)   Calcium Latest Ref Range: 8.3 - 10.6 mg/dL 9.3  8.9 9.3   Total Protein Latest Ref Range: 6.4 - 8.2 g/dL   6.6      Results for Milagros Wallace (MRN [id number]) as of 7/22/2021 21:18   Ref.  Range 4/6/2021 04:20 4/7/2021 04:00 4/9/2021 05:35 7/20/2021 06:43 7/21/2021 05:46   WBC Latest Ref Range: 4.0 - 11.0 K/uL 10.2 11.4 (H) 9.8 12.6 (H) 11.3 (H)   RBC Latest Ref Range: 4.20 - 5.90 M/uL 4.18 (L) 4.11 (L) 4.41 4.64 4.59   Hemoglobin Quant Latest Ref Range: 13.5 - 17.5 g/dL 13.0 (L) 12.7 (L) 14.0 14.6 14.4   Hematocrit Latest Ref Range: 40.5 - 52.5 % 39.8 (L) 39.1 (L) 41.9 44.5 43.5   MCV Latest Ref Range: 80.0 - 100.0 fL 95.3 95.2 95.0 96.0 94.8   MCH Latest Ref Range: 26.0 - 34.0 pg 31.0 30.9 31.6 31.4 31.3   MCHC Latest Ref Range: 31.0 - 36.0 g/dL 32.5 32.5 33.3 32.7 33.0   MPV Latest Ref Range: 5.0 - 10.5 fL 9.9 9.6 9.5 7.9 7.7   RDW Latest Ref Range: 12.4 - 15.4 % 13.6 13.5 13.6 17.3 (H) 16.5 (H)   Platelet Count Latest Ref Range: 135 - 450 K/uL 193 176 169 254 263   Neutrophils % Latest Units: %    79.5 91.7   Lymphocyte % Latest Units: %    12.1 5.2   Monocytes % Latest Units: %    7.2 2.8     Results for Maria L Caballero (MRN [id number]) as of 7/22/2021 21:18   Ref.  Range 7/20/2021 06:43 7/20/2021 08:58 7/20/2021 17:00 7/21/2021 08:05   Carboxyhemoglobin Latest Ref Range: 0.0 - 1.5 % 9.8 (H) 7.4 (H)     O2 Therapy Unknown Unknown Unknown Unknown See comment   Hemoglobin, Art, Extended Latest Ref Range: 13.5 - 17.5 g/dL   15.5 15.5   pH, Arterial Latest Ref Range: 7.350 - 7.450    7.231 (L) 7.304 (L)   pCO2, Arterial Latest Ref Range: 35.0 - 45.0 mmHg   79.1 (HH) 75.1 (HH)   pO2, Arterial Latest Ref Range: 75.0 - 108.0 mmHg   71.3 (L) 98.5   HCO3, Arterial Latest Ref Range: 21.0 - 29.0 mmol/L   32.5 (H) 36.5 (H)   TCO2 (calc), Art Latest Ref Range: Not Established mmol/L   34.9 38.8   Base Excess, Arterial Latest Ref Range: -3.0 - 3.0 mmol/L   2.0 6.9 (H)   O2 Sat, Arterial Latest Ref Range: >92 %   93.2 97.6   Methemoglobin, Arterial Latest Ref Range: <1.5 %   0.7 0.7   Carboxyhgb, Arterial Latest Ref Range: 0.0 - 1.5 %   2.7 (H) 1.1   pH, Gilbert Latest Ref Range: 7.350 - 7.450  7.289 (L) 7.235 (L)     pCO2, Gilbert Latest Ref Range: 40.0 - 50.0 mmHg 79.0 (H) 91.8 (H)     pO2, Gilbert Latest Ref Range: 25 - 40 mmHg 38.7 68.9 (H)     HCO3, Venous Latest Ref Range: 23.0 - 29.0 mmol/L 37.0 (H) 38.0 (H)     TC02 (Calc), Gilbert Latest Ref Range: Not Established mmol/L 40 41     Base Excess, Gilbert Latest Ref Range: -3.0 - 3.0 mmol/L 6.9 (H) 6.4 (H)     MetHgb, Gilbert Latest Ref Range: <1.5 % 0.5 0.4     O2 Sat, Gilbert Latest Ref Range: Not Established % 77 93         Assessment:  Active Problems:    COPD exacerbation (HCC)    Current smoker    Acute on chronic respiratory failure with hypoxia and hypercapnia (HCC)    Pulmonary infiltrate    History of alcohol use    Class 1 obesity in adult  Resolved Problems:    * No resolved hospital problems.  *          Plan:   · Oxygen supplementation to keep saturation between 90 to 94% only  · Patient to be given BiPAP on intermittent basis and nursing communication has been sent to that effect  · Overnight pulse oximetry was reviewed and patient has nocturnal hypoxemia  · Patient serial ABG was reviewed  · Patient does have significant COPD with chronic respiratory failure with hypercarbia and hypoxemia compounding the overall clinical picture  · Patient does qualify for NIV or BiPAP from the hospital which was discussed with the case management who will discuss with [employer]  · Patient has been started on Levaquin which can be continued for now and titration as per clinical status and cultures  · Monitor WBC and fever curve trend  · Titration of antibiotics as per clinical status and cultures  · Bronchodilators  · IV Solu-Medrol can be continued  · Patient has history of alcohol use and as per the epic patient has not drank for last 3 days time and patient has history of alcohol withdrawal with DTs in the recent past which needs to be monitored closely  · Patient also has a history history of pulmonary edema with pleural effusions in the recent past  · Patient's IV fluids were discontinued  · Monitor input output and BMP  · Correct electrolytes and whenever necessary basis  · Nicotine replacement therapy  · Monitor hemodynamics and cardiac rhythm closely  · Neurochecks  · PUD and DVT prophylaxis     Case discussed with nursing    Patient's overall clinical status remains precarious and needs to monitor closely in the progressive care unit            Electronically signed by:  Karel Leazma MD    7/22/2021    9:16 PM.

## 2021-07-23 NOTE — PROGRESS NOTES
Patient awake and requesting BIPAP to be removed. Checked with assigned nurse to ensure she was ok with this and she was. BIPAP removed and patient placed on nasal cannula @ 4L. Patient tolerating well and oxygen saturations at 97 at this time.

## 2021-07-23 NOTE — DISCHARGE SUMMARY
Hospital Medicine Discharge Summary    Patient ID: Liam Milner      Patient's PCP: Tiffany Banegas MD    Admit Date: 7/20/2021     Discharge Date: 7/23/2021      Admitting Physician: Kimmy Zaidi MD     Discharge Physician: Kimmy Zaidi MD     Discharge Diagnoses: Active Hospital Problems    Diagnosis     Acute on chronic respiratory failure with hypoxia and hypercapnia (HCC) [J96.21, J96.22]     Pulmonary infiltrate [R91.8]     History of alcohol use [Z87.898]     Class 1 obesity in adult [E66.9]     Current smoker [F17.200]     COPD exacerbation (HCC) [J44.1]        The patient was seen and examined on day of discharge and this discharge summary is in conjunction with any daily progress note from day of discharge. Hospital Course:   Acute on chronic hypoxic respiratory failure with mild hypercapnia with COPD exacerbation-  Improved pulmonology input appreciated  DC on tapering dose of steroid and Levaquin    ADALBERTO-has CPAP and did not use at home advised to use    Mild leukocytosis-improved     Respiratory acidosis-resolved     Smoker- counseled     Hypertension-blood pressures controlled continue home medication Norvasc, lisinopril and Coreg     History of alcohol abuse-      Physical Exam Performed:     BP (!) 150/91   Pulse 91   Temp 97.8 °F (36.6 °C) (Oral)   Resp 20   Ht 5' 10\" (1.778 m)   Wt 267 lb 3.2 oz (121.2 kg)   SpO2 93%   BMI 38.34 kg/m²       General appearance: No apparent distress, appears stated age and cooperative. On oxygen  HEENT: t. Conjunctivae/corneas clear. Neck: Supple, with full range of motion. No jugular venous distention. Trachea midline. Respiratory:  Normal respiratory effort. Reduced air entry bilaterally with occ  Rales/ b/l  wheezes/Rhonchi. Cardiovascular: Regular rate and rhythm with normal S1/S2 without murmurs, rubs or gallops. Abdomen: Soft, non-tender, non-distended with normal bowel sounds.   Obese  Musculoskeletal: No tablet by mouth 2 times daily (with meals), Disp-60 tablet, R-3Normal      amLODIPine (NORVASC) 10 MG tablet Take 1 tablet by mouth daily, Disp-30 tablet, R-3Normal      tamsulosin (FLOMAX) 0.4 MG capsule Take 1 capsule by mouth daily, Disp-30 capsule, R-3Normal      nicotine (NICODERM CQ) 21 MG/24HR Place 1 patch onto the skin daily, Disp-30 patch, R-0Normal      thiamine 100 MG tablet Take 1 tablet by mouth daily, Disp-30 tablet, R-3Normal      budesonide-formoterol (SYMBICORT) 160-4.5 MCG/ACT AERO Inhale 2 puffs into the lungs 2 times daily, Disp-3 Inhaler, R-1Normal      albuterol sulfate HFA (VENTOLIN HFA) 108 (90 Base) MCG/ACT inhaler Inhale 2 puffs into the lungs every 6 hours as needed for Wheezing, Disp-1 Inhaler, R-1Print             Time Spent on discharge is  40minutes in the examination, evaluation, counseling and review of medications and discharge plan. Signed:    Magnus Ruiz MD   7/30/2021      Thank you Mary Kate Porras MD for the opportunity to be involved in this patient's care. If you have any questions or concerns please feel free to contact me at [phone].

## 2021-07-23 NOTE — CARE COORDINATION
CASE MANAGEMENT DISCHARGE SUMMARY      Discharge to: Home and refused home care    IMM given: (date) [address] Medicaid    New Durable Medical Equipment ordered/agency: None    Transportation:    Family/car:Domestic Partner to transport      Confirmed discharge plan with:MD/ Patient and Sandrita Patino RN     Patient: yes     RN, name: Sandrita Patino

## 2021-07-23 NOTE — PROGRESS NOTES
Patient vital signs stable overnight. BIPAP worn overnight. Patient tolerated well, oxygen saturations > 95 while on. Patient reports no issue or concerns at this time.

## 2021-07-23 NOTE — PROGRESS NOTES
07/23/21 0351   NIV Type   Skin Protection for O2 Device Yes   Location Nose   Equipment Type v60   Mode Bilevel   Mask Type Full face mask   Mask Size Medium   Settings/Measurements   IPAP 18 cmH20   CPAP/EPAP 6 cmH2O   Rate Ordered 10   Resp 18   FiO2  45 %   Vt Exhaled 446 mL   Minute Volume 8.2 Liters   Mask Leak (lpm) 0 lpm   Comfort Level Good   Using Accessory Muscles No   SpO2 94   Alarm Settings   Alarms On Y   Press Low Alarm 6 cmH2O   High Pressure Alarm 30 cmH2O   Apnea (secs) 20 secs   Resp Rate Low Alarm 6   High Respiratory Rate 40 br/min

## 2021-07-23 NOTE — PROGRESS NOTES
Hospitalist Progress Note      PCP: Joselito Martinez MD    Date of Admission: 7/20/2021    Chief Complaint: Shortness of breath and hypoxia    Hospital Course:   62 y.o. male who presented to [facility] with above complaint. He has a history of COPD and developed above complaint last night. He was found to be very hypoxic by EMS this morning and brought to the emergency room. He used to take 4 L oxygen at home and his saturation was 60 at home and 80 in the emergency room. Currently he is on BiPAP. He denies fever worsening cough or colored sputum. His appetite is okay no nausea vomiting diarrhea or any urinary complaints.   No change in mental status    Subjective:   Off BiPAP currently on 4 L oxygen, comfortable  Mild  shortness of breath  no fever or change in mental status  Wanted to go home      Medications:  Reviewed    Infusion Medications    sodium chloride       Scheduled Medications    methylPREDNISolone  40 mg Intravenous Q12H    nicotine  1 patch Transdermal Daily    amLODIPine  10 mg Oral Daily    carvedilol  12.5 mg Oral BID WC    lisinopril  5 mg Oral Daily    tamsulosin  0.4 mg Oral Daily    sodium chloride flush  5-40 mL Intravenous 2 times per day    enoxaparin  40 mg Subcutaneous Daily    ipratropium-albuterol  1 ampule Inhalation Q4H WA    levofloxacin  750 mg Intravenous Q24H    pantoprazole  40 mg Oral QAM AC     PRN Meds: sodium chloride flush, sodium chloride, ondansetron **OR** ondansetron, polyethylene glycol, acetaminophen **OR** acetaminophen      Intake/Output Summary (Last 24 hours) at 7/23/2021 0758  Last data filed at 7/22/2021 2027  Gross per 24 hour   Intake 360 ml   Output    Net 360 ml       Physical Exam Performed:    /89   Pulse 90   Temp 98.1 °F (36.7 °C) (Axillary)   Resp 19   Ht 5' 10\" (1.778 m)   Wt 267 lb 3.2 oz (121.2 kg)   SpO2 97%   BMI 38.34 kg/m²     General appearance: No apparent distress, appears stated age and cooperative. On oxygen  HEENT: t. Conjunctivae/corneas clear. Neck: Supple, with full range of motion. No jugular venous distention. Trachea midline. Respiratory:  Normal respiratory effort. Reduced air entry bilaterally with occ  Rales/ b/l  wheezes/Rhonchi. Cardiovascular: Regular rate and rhythm with normal S1/S2 without murmurs, rubs or gallops. Abdomen: Soft, non-tender, non-distended with normal bowel sounds. Obese  Musculoskeletal: No clubbing, cyanosis or edema bilaterally. Full range of motion without deformity. Skin: Skin color, texture, turgor normal.  No rashes or lesions. Neurologic:  Neurovascularly intact without any focal sensory/motor deficits. .  Psychiatric: Alert and oriented, thought content appropriate, normal insight  Capillary Refill: Brisk,3 seconds, normal   Peripheral Pulses: +2 palpable, equal bilaterally       Labs:   Recent Labs     07/21/21  0546   WBC 11.3*   HGB 14.4   HCT 43.5        Recent Labs     07/21/21  0546      K 5.1   CL 99   CO2 32   BUN 15   CREATININE 0.7*   CALCIUM 9.3     No results for input(s): AST, ALT, BILIDIR, BILITOT, ALKPHOS in the last 72 hours. No results for input(s): INR in the last 72 hours. No results for input(s): Verlena Garland in the last 72 hours. Urinalysis:      Lab Results   Component Value Date    NITRU Negative 03/28/2021    WBCUA None seen 03/28/2021    BACTERIA Rare 03/28/2021    RBCUA None seen 03/28/2021    BLOODU Negative 03/28/2021    SPECGRAV 1.020 03/28/2021    GLUCOSEU Negative 03/28/2021       Radiology:  XR CHEST PORTABLE   Final Result   Increased airspace disease on the left, either due to atelectasis or   pneumonia.                  Assessment/Plan:    Active Hospital Problems    Diagnosis     Acute on chronic respiratory failure with hypoxia and hypercapnia (HCC) [J96.21, J96.22]     Pulmonary infiltrate [R91.8]     History of alcohol use [Z87.898]     Class 1 obesity in adult [E66.9]     Current smoker [F17.200]     COPD exacerbation (HCC) [J44.1]      Acute on chronic hypoxic respiratory failure with mild hypercapnia with COPD exacerbation-admitted, continue BiPAP,  , DuoNeb, IV Solu-Medrol,  Levaquin  Pulmonary consult was placed, input appreciated following   IV Solu-Medrol to twice daily     Mild leukocytosis-possibly secondary to above monitor     Respiratory acidosis-secondary to above, improving     Smoker- counseled     Hypertension-blood pressures controlled continue home medication Norvasc, lisinopril and Coreg    History of alcohol abuse-watch for DTs    DVT Prophylaxis: Lovenox  Diet: ADULT DIET; Regular;  Low Sodium (2 gm)  Code Status: Full Code    PT/OT Eval Status: Ordered    Dispo -1 to 2 days  Discussed with patient and family    Jasmin Milner MD

## 2021-07-23 NOTE — PROGRESS NOTES
07/22/21 2352   NIV Type   Skin Assessment Clean, dry, & intact   Skin Protection for O2 Device Yes   Location Nose   NIV Started/Stopped On   Equipment Type v60   Mode Bilevel   Mask Type Full face mask   Mask Size Medium   Settings/Measurements   IPAP 18 cmH20   CPAP/EPAP 6 cmH2O   Rate Ordered 10   Resp 20   FiO2  45 %   Vt Exhaled 965 mL   Minute Volume 17 Liters   Mask Leak (lpm) 31 lpm   Comfort Level Good   Using Accessory Muscles No   SpO2 98   Alarm Settings   Alarms On Y   Press Low Alarm 6 cmH2O   High Pressure Alarm 30 cmH2O   Apnea (secs) 20 secs   Resp Rate Low Alarm 6   High Respiratory Rate 40 br/min

## 2021-07-23 NOTE — PROGRESS NOTES
07/23/21 0003   NIV Type   $NIV $Daily Charge   Skin Protection for O2 Device Yes   Location Nose   Equipment Type v60   Mode Bilevel   Mask Type Full face mask   Mask Size Medium   Settings/Measurements   IPAP 18 cmH20   CPAP/EPAP 6 cmH2O   Rate Ordered 10   Resp 24   FiO2  45 %   Vt Exhaled 422 mL   Minute Volume 11 Liters   Mask Leak (lpm) 2 lpm   Comfort Level Good   Using Accessory Muscles No   SpO2 94   Alarm Settings   Alarms On Y   Press Low Alarm 6 cmH2O   High Pressure Alarm 30 cmH2O   Apnea (secs) 20 secs   Resp Rate Low Alarm 6   High Respiratory Rate 40 br/min

## 2021-07-24 LAB
BLOOD CULTURE, ROUTINE: NORMAL
CULTURE, BLOOD 2: NORMAL

## 2021-08-09 ENCOUNTER — HOSPITAL ENCOUNTER (OUTPATIENT)
Age: 61
Discharge: HOME | End: 2021-08-09
Payer: MEDICAID

## 2021-08-09 ENCOUNTER — HOSPITAL ENCOUNTER (OUTPATIENT)
Dept: GENERAL RADIOLOGY | Age: 61
Discharge: HOME | End: 2021-08-09
Payer: MEDICAID

## 2021-08-09 DIAGNOSIS — J18.9 UNRESOLVED PNEUMONIA: ICD-10-CM

## 2021-08-09 PROCEDURE — 71046 X-RAY EXAM CHEST 2 VIEWS: CPT

## 2021-08-31 ENCOUNTER — APPOINTMENT (OUTPATIENT)
Dept: GENERAL RADIOLOGY | Age: 61
DRG: 139 | End: 2021-08-31
Payer: MEDICAID

## 2021-08-31 ENCOUNTER — HOSPITAL ENCOUNTER (INPATIENT)
Age: 61
LOS: 8 days | Discharge: HOME | DRG: 139 | End: 2021-09-08
Attending: EMERGENCY MEDICINE | Admitting: INTERNAL MEDICINE
Payer: MEDICAID

## 2021-08-31 DIAGNOSIS — J44.1 COPD EXACERBATION (HCC): Primary | ICD-10-CM

## 2021-08-31 DIAGNOSIS — J96.01 ACUTE RESPIRATORY FAILURE WITH HYPOXIA AND HYPERCAPNIA (HCC): ICD-10-CM

## 2021-08-31 DIAGNOSIS — J18.9 PNEUMONIA OF BOTH LOWER LOBES DUE TO INFECTIOUS ORGANISM: ICD-10-CM

## 2021-08-31 DIAGNOSIS — J96.02 ACUTE RESPIRATORY FAILURE WITH HYPOXIA AND HYPERCAPNIA (HCC): ICD-10-CM

## 2021-08-31 PROBLEM — J44.9 COPD (CHRONIC OBSTRUCTIVE PULMONARY DISEASE) (HCC): Status: ACTIVE | Noted: 2021-03-25

## 2021-08-31 PROBLEM — Z87.09: Status: ACTIVE | Noted: 2021-08-31

## 2021-08-31 PROBLEM — K21.9 GERD (GASTROESOPHAGEAL REFLUX DISEASE): Status: ACTIVE | Noted: 2021-08-31

## 2021-08-31 PROBLEM — Z72.0 TOBACCO ABUSE: Status: ACTIVE | Noted: 2021-04-05

## 2021-08-31 PROBLEM — N40.0 BPH (BENIGN PROSTATIC HYPERPLASIA): Status: ACTIVE | Noted: 2021-08-31

## 2021-08-31 PROBLEM — I10 HTN (HYPERTENSION): Status: ACTIVE | Noted: 2021-08-31

## 2021-08-31 LAB
A/G RATIO: 1.4 (ref 1.1–2.2)
ALBUMIN SERPL-MCNC: 3.8 G/DL (ref 3.4–5)
ALP BLD-CCNC: 103 U/L (ref 40–129)
ALT SERPL-CCNC: 27 U/L (ref 10–40)
ANION GAP SERPL CALCULATED.3IONS-SCNC: 4 MMOL/L (ref 3–16)
AST SERPL-CCNC: 21 U/L (ref 15–37)
ATYPICAL LYMPHOCYTE RELATIVE PERCENT: 2 % (ref 0–6)
BANDED NEUTROPHILS RELATIVE PERCENT: 2 % (ref 0–7)
BASE EXCESS VENOUS: 9.6 MMOL/L (ref -3–3)
BASOPHILS ABSOLUTE: 0 K/UL (ref 0–0.2)
BASOPHILS RELATIVE PERCENT: 0 %
BILIRUB SERPL-MCNC: 0.3 MG/DL (ref 0–1)
BUN BLDV-MCNC: 16 MG/DL (ref 7–20)
CALCIUM SERPL-MCNC: 9.4 MG/DL (ref 8.3–10.6)
CARBOXYHEMOGLOBIN: 8.5 % (ref 0–1.5)
CHLORIDE BLD-SCNC: 98 MMOL/L (ref 99–110)
CO2: 41 MMOL/L (ref 21–32)
CREAT SERPL-MCNC: 0.7 MG/DL (ref 0.8–1.3)
EKG ATRIAL RATE: 96 BPM
EKG DIAGNOSIS: NORMAL
EKG P AXIS: 69 DEGREES
EKG P-R INTERVAL: 128 MS
EKG Q-T INTERVAL: 334 MS
EKG QRS DURATION: 74 MS
EKG QTC CALCULATION (BAZETT): 421 MS
EKG R AXIS: 25 DEGREES
EKG T AXIS: 23 DEGREES
EKG VENTRICULAR RATE: 96 BPM
EOSINOPHILS ABSOLUTE: 0 K/UL (ref 0–0.6)
EOSINOPHILS RELATIVE PERCENT: 0 %
ETHANOL: NORMAL MG/DL (ref 0–0.08)
GFR AFRICAN AMERICAN: >60
GFR NON-AFRICAN AMERICAN: >60
GLOBULIN: 2.8 G/DL
GLUCOSE BLD-MCNC: 149 MG/DL (ref 70–99)
HCO3 VENOUS: 41.5 MMOL/L (ref 23–29)
HCT VFR BLD CALC: 44.7 % (ref 40.5–52.5)
HEMOGLOBIN: 14.6 G/DL (ref 13.5–17.5)
LYMPHOCYTES ABSOLUTE: 1.3 K/UL (ref 1–5.1)
LYMPHOCYTES RELATIVE PERCENT: 7 %
MACROCYTES: ABNORMAL
MCH RBC QN AUTO: 30.9 PG (ref 26–34)
MCHC RBC AUTO-ENTMCNC: 32.6 G/DL (ref 31–36)
MCV RBC AUTO: 94.8 FL (ref 80–100)
METHEMOGLOBIN VENOUS: 0.3 %
MONOCYTES ABSOLUTE: 1.1 K/UL (ref 0–1.3)
MONOCYTES RELATIVE PERCENT: 8 %
NEUTROPHILS ABSOLUTE: 11.7 K/UL (ref 1.7–7.7)
NEUTROPHILS RELATIVE PERCENT: 81 %
NUCLEATED RED BLOOD CELLS: 2 /100 WBC
O2 SAT, VEN: 91 %
O2 THERAPY: ABNORMAL
PCO2, VEN: 96.4 MMHG (ref 40–50)
PDW BLD-RTO: 16 % (ref 12.4–15.4)
PH VENOUS: 7.25 (ref 7.35–7.45)
PLATELET # BLD: 286 K/UL (ref 135–450)
PLATELET SLIDE REVIEW: ADEQUATE
PMV BLD AUTO: 7.9 FL (ref 5–10.5)
PO2, VEN: 66.7 MMHG (ref 25–40)
POTASSIUM REFLEX MAGNESIUM: 4.2 MMOL/L (ref 3.5–5.1)
RBC # BLD: 4.72 M/UL (ref 4.2–5.9)
SARS-COV-2, NAAT: NOT DETECTED
SLIDE REVIEW: ABNORMAL
SODIUM BLD-SCNC: 143 MMOL/L (ref 136–145)
TCO2 CALC VENOUS: 45 MMOL/L
TOTAL PROTEIN: 6.6 G/DL (ref 6.4–8.2)
TROPONIN: <0.01 NG/ML
WBC # BLD: 14.1 K/UL (ref 4–11)

## 2021-08-31 PROCEDURE — 2060000000 HC ICU INTERMEDIATE R&B

## 2021-08-31 PROCEDURE — 2580000003 HC RX 258: Performed by: EMERGENCY MEDICINE

## 2021-08-31 PROCEDURE — 82803 BLOOD GASES ANY COMBINATION: CPT

## 2021-08-31 PROCEDURE — 94640 AIRWAY INHALATION TREATMENT: CPT

## 2021-08-31 PROCEDURE — 6360000002 HC RX W HCPCS: Performed by: EMERGENCY MEDICINE

## 2021-08-31 PROCEDURE — 85025 COMPLETE CBC W/AUTO DIFF WBC: CPT

## 2021-08-31 PROCEDURE — 6370000000 HC RX 637 (ALT 250 FOR IP): Performed by: INTERNAL MEDICINE

## 2021-08-31 PROCEDURE — 87635 SARS-COV-2 COVID-19 AMP PRB: CPT

## 2021-08-31 PROCEDURE — 80053 COMPREHEN METABOLIC PANEL: CPT

## 2021-08-31 PROCEDURE — 6370000000 HC RX 637 (ALT 250 FOR IP): Performed by: EMERGENCY MEDICINE

## 2021-08-31 PROCEDURE — 82077 ASSAY SPEC XCP UR&BREATH IA: CPT

## 2021-08-31 PROCEDURE — 6360000002 HC RX W HCPCS: Performed by: INTERNAL MEDICINE

## 2021-08-31 PROCEDURE — 93010 ELECTROCARDIOGRAM REPORT: CPT | Performed by: INTERNAL MEDICINE

## 2021-08-31 PROCEDURE — 99285 EMERGENCY DEPT VISIT HI MDM: CPT

## 2021-08-31 PROCEDURE — 84484 ASSAY OF TROPONIN QUANT: CPT

## 2021-08-31 PROCEDURE — 96374 THER/PROPH/DIAG INJ IV PUSH: CPT

## 2021-08-31 PROCEDURE — 2700000000 HC OXYGEN THERAPY PER DAY

## 2021-08-31 PROCEDURE — 2580000003 HC RX 258: Performed by: INTERNAL MEDICINE

## 2021-08-31 PROCEDURE — 71045 X-RAY EXAM CHEST 1 VIEW: CPT

## 2021-08-31 PROCEDURE — 94660 CPAP INITIATION&MGMT: CPT

## 2021-08-31 PROCEDURE — 94761 N-INVAS EAR/PLS OXIMETRY MLT: CPT

## 2021-08-31 PROCEDURE — 87040 BLOOD CULTURE FOR BACTERIA: CPT

## 2021-08-31 PROCEDURE — 93005 ELECTROCARDIOGRAM TRACING: CPT | Performed by: EMERGENCY MEDICINE

## 2021-08-31 RX ORDER — IPRATROPIUM BROMIDE AND ALBUTEROL SULFATE 2.5; .5 MG/3ML; MG/3ML
3 SOLUTION RESPIRATORY (INHALATION) ONCE
Status: COMPLETED | OUTPATIENT
Start: 2021-08-31 | End: 2021-08-31

## 2021-08-31 RX ORDER — TAMSULOSIN HYDROCHLORIDE 0.4 MG/1
0.4 CAPSULE ORAL DAILY
Status: DISCONTINUED | OUTPATIENT
Start: 2021-09-01 | End: 2021-09-08 | Stop reason: HOSPADM

## 2021-08-31 RX ORDER — SODIUM CHLORIDE 0.9 % (FLUSH) 0.9 %
5-40 SYRINGE (ML) INJECTION EVERY 12 HOURS SCHEDULED
Status: DISCONTINUED | OUTPATIENT
Start: 2021-08-31 | End: 2021-09-07

## 2021-08-31 RX ORDER — PANTOPRAZOLE SODIUM 40 MG/1
40 TABLET, DELAYED RELEASE ORAL
Status: DISCONTINUED | OUTPATIENT
Start: 2021-09-01 | End: 2021-09-08 | Stop reason: HOSPADM

## 2021-08-31 RX ORDER — SODIUM CHLORIDE 9 MG/ML
25 INJECTION, SOLUTION INTRAVENOUS PRN
Status: DISCONTINUED | OUTPATIENT
Start: 2021-08-31 | End: 2021-09-08 | Stop reason: HOSPADM

## 2021-08-31 RX ORDER — METHYLPREDNISOLONE SODIUM SUCCINATE 40 MG/ML
40 INJECTION, POWDER, LYOPHILIZED, FOR SOLUTION INTRAMUSCULAR; INTRAVENOUS 2 TIMES DAILY
Status: DISCONTINUED | OUTPATIENT
Start: 2021-08-31 | End: 2021-09-05

## 2021-08-31 RX ORDER — BUDESONIDE AND FORMOTEROL FUMARATE DIHYDRATE 160; 4.5 UG/1; UG/1
2 AEROSOL RESPIRATORY (INHALATION) 2 TIMES DAILY
Status: DISCONTINUED | OUTPATIENT
Start: 2021-08-31 | End: 2021-09-08 | Stop reason: HOSPADM

## 2021-08-31 RX ORDER — AMLODIPINE BESYLATE 5 MG/1
10 TABLET ORAL DAILY
Status: DISCONTINUED | OUTPATIENT
Start: 2021-09-01 | End: 2021-09-08 | Stop reason: HOSPADM

## 2021-08-31 RX ORDER — ONDANSETRON 4 MG/1
4 TABLET, ORALLY DISINTEGRATING ORAL EVERY 8 HOURS PRN
Status: DISCONTINUED | OUTPATIENT
Start: 2021-08-31 | End: 2021-09-08 | Stop reason: HOSPADM

## 2021-08-31 RX ORDER — NICOTINE 21 MG/24HR
1 PATCH, TRANSDERMAL 24 HOURS TRANSDERMAL DAILY
Status: DISCONTINUED | OUTPATIENT
Start: 2021-09-01 | End: 2021-09-08 | Stop reason: HOSPADM

## 2021-08-31 RX ORDER — SODIUM CHLORIDE 0.9 % (FLUSH) 0.9 %
5-40 SYRINGE (ML) INJECTION PRN
Status: DISCONTINUED | OUTPATIENT
Start: 2021-08-31 | End: 2021-09-08 | Stop reason: HOSPADM

## 2021-08-31 RX ORDER — LANOLIN ALCOHOL/MO/W.PET/CERES
100 CREAM (GRAM) TOPICAL DAILY
Status: DISCONTINUED | OUTPATIENT
Start: 2021-09-01 | End: 2021-09-08 | Stop reason: HOSPADM

## 2021-08-31 RX ORDER — CARVEDILOL 3.12 MG/1
12.5 TABLET ORAL 2 TIMES DAILY WITH MEALS
Status: DISCONTINUED | OUTPATIENT
Start: 2021-08-31 | End: 2021-09-08 | Stop reason: HOSPADM

## 2021-08-31 RX ORDER — ACETAMINOPHEN 650 MG/1
650 SUPPOSITORY RECTAL EVERY 6 HOURS PRN
Status: DISCONTINUED | OUTPATIENT
Start: 2021-08-31 | End: 2021-09-08 | Stop reason: HOSPADM

## 2021-08-31 RX ORDER — ONDANSETRON 2 MG/ML
4 INJECTION INTRAMUSCULAR; INTRAVENOUS EVERY 6 HOURS PRN
Status: DISCONTINUED | OUTPATIENT
Start: 2021-08-31 | End: 2021-09-08 | Stop reason: HOSPADM

## 2021-08-31 RX ORDER — POLYETHYLENE GLYCOL 3350 17 G/17G
17 POWDER, FOR SOLUTION ORAL DAILY PRN
Status: DISCONTINUED | OUTPATIENT
Start: 2021-08-31 | End: 2021-09-08 | Stop reason: HOSPADM

## 2021-08-31 RX ORDER — LISINOPRIL 10 MG/1
5 TABLET ORAL DAILY
Status: DISCONTINUED | OUTPATIENT
Start: 2021-09-01 | End: 2021-09-08 | Stop reason: HOSPADM

## 2021-08-31 RX ORDER — AZITHROMYCIN 250 MG/1
250 TABLET, FILM COATED ORAL DAILY
Status: DISCONTINUED | OUTPATIENT
Start: 2021-09-01 | End: 2021-09-07

## 2021-08-31 RX ORDER — ACETAMINOPHEN 325 MG/1
650 TABLET ORAL EVERY 6 HOURS PRN
Status: DISCONTINUED | OUTPATIENT
Start: 2021-08-31 | End: 2021-09-08 | Stop reason: HOSPADM

## 2021-08-31 RX ORDER — NICOTINE 21 MG/24HR
1 PATCH, TRANSDERMAL 24 HOURS TRANSDERMAL DAILY
Status: DISCONTINUED | OUTPATIENT
Start: 2021-08-31 | End: 2021-09-07

## 2021-08-31 RX ORDER — METHYLPREDNISOLONE SODIUM SUCCINATE 125 MG/2ML
125 INJECTION, POWDER, LYOPHILIZED, FOR SOLUTION INTRAMUSCULAR; INTRAVENOUS ONCE
Status: COMPLETED | OUTPATIENT
Start: 2021-08-31 | End: 2021-08-31

## 2021-08-31 RX ORDER — IPRATROPIUM BROMIDE AND ALBUTEROL SULFATE 2.5; .5 MG/3ML; MG/3ML
1 SOLUTION RESPIRATORY (INHALATION)
Status: DISCONTINUED | OUTPATIENT
Start: 2021-08-31 | End: 2021-09-08 | Stop reason: HOSPADM

## 2021-08-31 RX ADMIN — AZITHROMYCIN MONOHYDRATE 500 MG: 500 INJECTION, POWDER, LYOPHILIZED, FOR SOLUTION INTRAVENOUS at 10:38

## 2021-08-31 RX ADMIN — Medication 2 PUFF: at 19:50

## 2021-08-31 RX ADMIN — IPRATROPIUM BROMIDE AND ALBUTEROL SULFATE 1 AMPULE: .5; 3 SOLUTION RESPIRATORY (INHALATION) at 15:44

## 2021-08-31 RX ADMIN — METHYLPREDNISOLONE SODIUM SUCCINATE 125 MG: 125 INJECTION, POWDER, FOR SOLUTION INTRAMUSCULAR; INTRAVENOUS at 07:40

## 2021-08-31 RX ADMIN — IPRATROPIUM BROMIDE AND ALBUTEROL SULFATE 1 AMPULE: .5; 3 SOLUTION RESPIRATORY (INHALATION) at 19:45

## 2021-08-31 RX ADMIN — CEFTRIAXONE SODIUM 1000 MG: 1 INJECTION, POWDER, FOR SOLUTION INTRAMUSCULAR; INTRAVENOUS at 18:06

## 2021-08-31 RX ADMIN — METHYLPREDNISOLONE SODIUM SUCCINATE 40 MG: 40 INJECTION, POWDER, FOR SOLUTION INTRAMUSCULAR; INTRAVENOUS at 23:12

## 2021-08-31 RX ADMIN — CARVEDILOL 12.5 MG: 3.12 TABLET, FILM COATED ORAL at 18:04

## 2021-08-31 RX ADMIN — IPRATROPIUM BROMIDE AND ALBUTEROL SULFATE 3 AMPULE: .5; 3 SOLUTION RESPIRATORY (INHALATION) at 07:32

## 2021-08-31 ASSESSMENT — PAIN DESCRIPTION - LOCATION: LOCATION: HAND

## 2021-08-31 ASSESSMENT — PAIN DESCRIPTION - ORIENTATION: ORIENTATION: LEFT;RIGHT

## 2021-08-31 ASSESSMENT — PAIN DESCRIPTION - PAIN TYPE: TYPE: CHRONIC PAIN

## 2021-08-31 ASSESSMENT — PAIN SCALES - GENERAL
PAINLEVEL_OUTOF10: 7
PAINLEVEL_OUTOF10: 5

## 2021-08-31 NOTE — ED NOTES
Blood culture set #2 drawn from left hand with angio. Bottle tops scrubbed with alcohol pads. Site prepped with [employer] swab, 15 seconds per side, and allowed to dry for 30 seconds prior to venipuncture. Red waste tube drawn prior to collection of specimen.          Maulik Vazquez RN  08/31/21 5072

## 2021-08-31 NOTE — PROGRESS NOTES
Patient admitted to room 445  from ED. Patient oriented to room, call light, bed rails, phone, lights and bathroom. Patient instructed about the schedule of the day including: vital sign frequency, lab draws, possible tests, frequency of MD and staff rounds, including RN/MD rounding together at bedside, daily weights, and I &O's. Patient instructed about prescribed diet, how to use 8MENU, and television. bed alarm in place, patient aware of placement and reason. Telemetry box 45 in place, patient aware of placement and reason. Bed locked, in lowest position, side rails up 2/4, call light within reach. Will continue to monitor. Suction set up at bedside, cmu able to visualize, patient on continuous pulse ox monitoring.

## 2021-08-31 NOTE — PROGRESS NOTES
08/31/21 1148   NIV Type   Skin Assessment Clean, dry, & intact   Settings/Measurements   IPAP 15 cmH20   CPAP/EPAP 8 cmH2O   Rate Ordered 16   Resp 27   FiO2  100 %   I Time/ I Time % 1.25 s   Vt Exhaled 729 mL   Minute Volume 16.4 Liters   Mask Leak (lpm) 137 lpm   Comfort Level Poor   Using Accessory Muscles No   SpO2 90

## 2021-08-31 NOTE — ED NOTES
Blood culture set #1 drawn from left [facility] with angio. Bottle tops scrubbed with alcohol pads. Site prepped with [employer] swab, 15 seconds per side, and allowed to dry for 30 seconds prior to venipuncture. Red waste tube drawn prior to collection of specimen.          Carlos Zhang RN  08/31/21 1691

## 2021-08-31 NOTE — PROGRESS NOTES
08/31/21 1659   Oxygen Therapy/Pulse Ox   O2 Flow Rate (L/min) 15 L/min   Resp (!) 31   SpO2 95 %   patient on his phone resting comfortably.

## 2021-08-31 NOTE — PROGRESS NOTES
08/31/21 1545   NIV Type   NIV Started/Stopped On   Equipment Type V60   Mode Bilevel   Mask Type Full face mask   Settings/Measurements   IPAP 15 cmH20   CPAP/EPAP 8 cmH2O   Rate Ordered 16   Resp 22   FiO2  60 %   I Time/ I Time % 1 s   Vt Exhaled 800 mL   Minute Volume 14 Liters   Mask Leak (lpm) 70 lpm   Comfort Level Fair   Using Accessory Muscles No   SpO2 97   Alarm Settings   Alarms On Y   Press Low Alarm 10 cmH2O   High Pressure Alarm 30 cmH2O   Delay Alarm 20 sec(s)   Resp Rate Low Alarm 6   High Respiratory Rate 40 br/min

## 2021-08-31 NOTE — PROGRESS NOTES
08/31/21 0733   NIV Type   $NIV $Daily Charge   Skin Assessment Clean, dry, & intact   Mode Bilevel   Mask Type Full face mask   Mask Size Large   Bonnet size Large   Settings/Measurements   IPAP 12 cmH20   CPAP/EPAP 6 cmH2O   Rate Ordered 16   Resp (!) 36   FiO2  100 %   Vt Exhaled 240 mL   Minute Volume 5.3 Liters   Mask Leak (lpm) 25 lpm   Comfort Level Good   Using Accessory Muscles Yes   SpO2 97 No

## 2021-08-31 NOTE — ED NOTES
Patients wife updated at this time. Wife requesting to speak to Dr. Marli Francis regarding results. Dr. Marli Francis aware.      Mary Talley RN  08/31/21 3231

## 2021-08-31 NOTE — ED NOTES
Patient attempting to light cigarettes at this time. RN explained to patient he is unable to smoke while he is in the hospital. Dr. Adamaris Cottrell aware of situation and in route to bedside to speak to patient.        Roya Trammell RN  08/31/21 0220

## 2021-08-31 NOTE — ED NOTES
RN attempted IV insertion left hand, blood samples obtained. Unable to advance IV catheter. Patient denies pain at site. Bleeding controlled and bandage applied.      Dominguez Arredondo RN  08/31/21 9545

## 2021-08-31 NOTE — ED NOTES
Patient O2 88% on BIPAP. RN to bedside. BIPAP mask readjusted, patient remains in upper 80's. Dr. Vincent Craig aware.       Ciro Doyle, RN  08/31/21 3930

## 2021-08-31 NOTE — ED NOTES
Patient removed BIPAP from self. RN to bedside, patient 65% on RA. RN instructed patient to please leave BIPAP on for his safety and to assist in breathing comfortably. Patient states \"I need to get out of here, you don't need to chew me out\". RN explained that Carmell Snide is not \"chewing out\" patient, just explaining the importance of the BIPAP mask.       Mary Talley RN  08/31/21 3393

## 2021-08-31 NOTE — ED PROVIDER NOTES
[facility] Emergency Department      CHIEF COMPLAINT  Shortness of Breath (60% at home, 2 duoneb given, arrived on cpap 93%)      HISTORY OF PRESENT ILLNESS  Adela Smart is a 62 y.o. male with a history of COPD who is on 4 L of home oxygen at baseline presents with shortness of breath. Apparently he called out EMS this morning and when they arrived his sats were in the 62s. He was given 2 DuoNeb's in route and placed on CPAP. He is somewhat confused. He was just admitted here at the end of July for similar presentation. He was found to have a pulmonary infiltrate and was discharged on Levaquin. He had just been admitted to [facility] with similar presentation the week prior to his admission here. He is still smoking. Today he denies chest pain. No fevers. No other complaints, modifying factors or associated symptoms. I have reviewed the following from the nursing documentation. Past Medical History:   Diagnosis Date    COPD (chronic obstructive pulmonary disease) ([address]     Hypertension      History reviewed. No pertinent surgical history. History reviewed. No pertinent family history. Social History     Socioeconomic History    Marital status: Single     Spouse name: Not on file    Number of children: Not on file    Years of education: Not on file    Highest education level: Not on file   Occupational History    Not on file   Tobacco Use    Smoking status: Current Every Day Smoker     Packs/day: 2.00    Smokeless tobacco: Never Used   Substance and Sexual Activity    Alcohol use:  Yes     Alcohol/week: 42.0 standard drinks     Types: 42 Cans of beer per week     Comment: hasn't drank in 3 days    Drug use: No    Sexual activity: Not on file   Other Topics Concern    Not on file   Social History Narrative    Not on file     Social Determinants of Health     Financial Resource Strain:     Difficulty of Paying Living Expenses:    Food Insecurity:     Worried About Running Out of Food in the Last Year:     Bianca of Food in the Last Year:    Transportation Needs:     Lack of Transportation (Medical):      Lack of Transportation (Non-Medical):    Physical Activity:     Days of Exercise per Week:     Minutes of Exercise per Session:    Stress:     Feeling of Stress :    Social Connections:     Frequency of Communication with Friends and Family:     Frequency of Social Gatherings with Friends and Family:     Attends Restorationist Services:     Active Member of Clubs or Organizations:     Attends Club or Organization Meetings:     Marital Status:    Intimate Partner Violence:     Fear of Current or Ex-Partner:     Emotionally Abused:     Physically Abused:     Sexually Abused:      Current Facility-Administered Medications   Medication Dose Route Frequency Provider Last Rate Last Admin    nicotine (NICODERM CQ) 21 MG/24HR 1 patch  1 patch Transdermal Daily Meche Marte MD   1 patch at 08/31/21 1003     Current Outpatient Medications   Medication Sig Dispense Refill    lisinopril (PRINIVIL;ZESTRIL) 5 MG tablet Take 1 tablet by mouth daily 30 tablet 3    carvedilol (COREG) 12.5 MG tablet Take 1 tablet by mouth 2 times daily (with meals) 60 tablet 3    amLODIPine (NORVASC) 10 MG tablet Take 1 tablet by mouth daily 30 tablet 3    tamsulosin (FLOMAX) 0.4 MG capsule Take 1 capsule by mouth daily 30 capsule 3    nicotine (NICODERM CQ) 21 MG/24HR Place 1 patch onto the skin daily 30 patch 0    thiamine 100 MG tablet Take 1 tablet by mouth daily 30 tablet 3    budesonide-formoterol (SYMBICORT) 160-4.5 MCG/ACT AERO Inhale 2 puffs into the lungs 2 times daily 3 Inhaler 1    albuterol sulfate HFA (VENTOLIN HFA) 108 (90 Base) MCG/ACT inhaler Inhale 2 puffs into the lungs every 6 hours as needed for Wheezing 1 Inhaler 1    pantoprazole (PROTONIX) 40 MG tablet Take 1 tablet by mouth every morning (before breakfast) for 10 days 10 tablet 0     No Known Allergies    REVIEW OF SYSTEMS  Review of systems limited secondary to altered mental status and respiratory distress. PHYSICAL EXAM  BP (!) 154/105   Pulse 86   Temp 97.5 °F (36.4 °C) (Axillary)   Resp 26   Wt 267 lb (121.1 kg)   SpO2 96%   BMI 38.31 kg/m²   GENERAL APPEARANCE: Awake and alert. Cooperative. Respiratory distress, obese. HEAD: Normocephalic. Atraumatic. EYES: PERRL. EOM's grossly intact. ENT: Mucous membranes are moist.   NECK: Supple, trachea midline. HEART: RRR. LUNGS: Tachypneic, respiratory distress. Tight expiratory wheezing noted bilaterally. ABDOMEN: Soft. Non-distended. Non-tender. No guarding or rebound. EXTREMITIES: No peripheral edema. MAEE. No acute deformities. SKIN: Warm, dry and intact. No acute rashes. NEUROLOGICAL: Alert and oriented to self and place. Cranial nerves II through XII grossly intact. No gross motor or sensory deficits. PSYCHIATRIC: Normal mood and affect. LABS  I have reviewed all labs for this visit.    Results for orders placed or performed during the hospital encounter of 08/31/21   COVID-19, Rapid    Specimen: Nasopharyngeal Swab   Result Value Ref Range    SARS-CoV-2, NAAT Not Detected Not Detected   Comprehensive Metabolic Panel w/ Reflex to MG   Result Value Ref Range    Sodium 143 136 - 145 mmol/L    Potassium reflex Magnesium 4.2 3.5 - 5.1 mmol/L    Chloride 98 (L) 99 - 110 mmol/L    CO2 41 (H) 21 - 32 mmol/L    Anion Gap 4 3 - 16    Glucose 149 (H) 70 - 99 mg/dL    BUN 16 7 - 20 mg/dL    CREATININE 0.7 (L) 0.8 - 1.3 mg/dL    GFR Non-African American >60 >60    GFR African American >60 >60    Calcium 9.4 8.3 - 10.6 mg/dL    Total Protein 6.6 6.4 - 8.2 g/dL    Albumin 3.8 3.4 - 5.0 g/dL    Albumin/Globulin Ratio 1.4 1.1 - 2.2    Total Bilirubin 0.3 0.0 - 1.0 mg/dL    Alkaline Phosphatase 103 40 - 129 U/L    ALT 27 10 - 40 U/L    AST 21 15 - 37 U/L    Globulin 2.8 g/dL   CBC Auto Differential   Result Value Ref Range    WBC 14.1 (H) 4.0 - 11.0 K/uL    RBC 4.72 4.20 - 5.90 M/uL    Hemoglobin 14.6 13.5 - 17.5 g/dL    Hematocrit 44.7 40.5 - 52.5 %    MCV 94.8 80.0 - 100.0 fL    MCH 30.9 26.0 - 34.0 pg    MCHC 32.6 31.0 - 36.0 g/dL    RDW 16.0 (H) 12.4 - 15.4 %    Platelets 288 888 - 805 K/uL    MPV 7.9 5.0 - 10.5 fL    PLATELET SLIDE REVIEW Adequate     SLIDE REVIEW see below     Neutrophils % 81.0 %    Lymphocytes % 7.0 %    Monocytes % 8.0 %    Eosinophils % 0.0 %    Basophils % 0.0 %    Neutrophils Absolute 11.7 (H) 1.7 - 7.7 K/uL    Lymphocytes Absolute 1.3 1.0 - 5.1 K/uL    Monocytes Absolute 1.1 0.0 - 1.3 K/uL    Eosinophils Absolute 0.0 0.0 - 0.6 K/uL    Basophils Absolute 0.0 0.0 - 0.2 K/uL    Bands Relative 2 0 - 7 %    Atypical Lymphocytes Relative 2 0 - 6 %    nRBC 2 (A) /100 WBC    Macrocytes Occasional (A)    Blood Gas, Venous   Result Value Ref Range    pH, Gilbert 7.252 (L) 7.350 - 7.450    pCO2, Gilbert 96.4 (H) 40.0 - 50.0 mmHg    pO2, Gilbert 66.7 (H) 25 - 40 mmHg    HCO3, Venous 41.5 (H) 23.0 - 29.0 mmol/L    Base Excess, Gilbert 9.6 (H) -3.0 - 3.0 mmol/L    O2 Sat, Gilbert 91 Not Established %    Carboxyhemoglobin 8.5 (H) 0.0 - 1.5 %    MetHgb, Gilbert 0.3 <1.5 %    TC02 (Calc), Gilbert 45 Not Established mmol/L    O2 Therapy Unknown    Troponin   Result Value Ref Range    Troponin <0.01 <0.01 ng/mL   Ethanol   Result Value Ref Range    Ethanol Lvl None Detected mg/dL   EKG 12 Lead   Result Value Ref Range    Ventricular Rate 96 BPM    Atrial Rate 96 BPM    P-R Interval 128 ms    QRS Duration 74 ms    Q-T Interval 334 ms    QTc Calculation (Bazett) 421 ms    P Axis 69 degrees    R Axis 25 degrees    T Axis 23 degrees    Diagnosis       Normal sinus rhythmNormal ECGWhen compared with ECG of 30-AUG-2021 16:42,Incomplete right bundle branch block is no longer PresentCriteria for Inferior infarct are no longer Present       EKG  The Ekg interpreted by myself  normal sinus rhythm with a rate of 96  Axis is   Normal  QTc is  normal  Intervals and Durations are unremarkable. No specific ST-T wave changes appreciated. No evidence of acute ischemia. No significant change from prior EKG dated 7/20/21    Cardiac Monitoring: The cardiac monitor revealed normal sinus rhythm as interpreted by me. The cardiac monitor was ordered secondary to the patient's complaint of shortness of breath and to monitor the patient for dysrhythmia. RADIOLOGY  X-RAYS:  I have reviewed radiologic plain film image(s). ALL OTHER NON-PLAIN FILM IMAGES SUCH AS CT, ULTRASOUND AND MRI HAVE BEEN READ BY THE RADIOLOGIST. XR CHEST PORTABLE   Preliminary Result   1. Cardiomegaly. 2. Patchy bilateral streaky pulmonary opacities suggestive of an atypical   pneumonitis. These findings are mildly progressed compared to August 9, 2021. Continued radiographic follow-up is recommended. Rechecks: Physical assessment performed. After roughly 30 minutes on BiPAP the patient's mental status is improving. He is less confused. He is now somewhat more agitated and wants to leave and go smoke a cigarette. This behavior is similar to when I saw the patient on his previous admission. Critical Care:I personally spent a total of 50 minutes of critical care time in obtaining history, performing a physical exam, bedside monitoring of interventions, collecting and interpreting tests and discussion with consultants but excluding time spent performing procedures, treating other patients and teaching time. Smoking Cessation counseling: At least 3 minutes of smoking cessation education was provided to the patient including assessing the patient's readiness for change, identifying barriers to change, suggesting specific actions for change, motivational counseling and arranging follow-up. ED COURSE/MDM  Patient seen and evaluated.  Here the patient is afebrile with hypoxia noted even when we are switching him from the EMS CPAP to our BiPAP. He is in respiratory distress. He is tachypneic with diffuse expiratory wheezing, tight air movement. . Old records reviewed, including recent admission for acute on chronic respiratory failure with hypoxia and hypercapnia along with a pulmonary infiltrate. He was just here July 20. I have ordered chest x-ray and lab work including a VBG and an alcohol level. He apparently does have a history of alcoholism as well. 3 DuoNeb's and Solu-Medrol have been ordered. I will reassess. He is on BiPAP at this time. VBG shows respiratory acidosis with hypercapnia with a CO2 of 96. Chest x-ray again shows patchy streaky opacities bilaterally. I have given him antibiotics. He is improving on BiPAP. Mental status is also improving. I spoke with the patient at length and he is now agreeable to stay in the hospital.  He is also been given Solu-Medrol. His Covid test is negative, troponin negative and the remainder of his lab work is unremarkable other than a mild leukocytosis. He will be admitted to Dr. Jaskaran Alexander. Labs and imaging reviewed and results discussed with patient. Patient was reassessed as noted above . Plan of care discussed with patient. Patient in agreement with plan. CLINICAL IMPRESSION  1. COPD exacerbation ([address]    2. Acute respiratory failure with hypoxia and hypercapnia (HCC)    3. Pneumonia of both lower lobes due to infectious organism        Blood pressure (!) 154/105, pulse 86, temperature 97.5 °F (36.4 °C), temperature source Axillary, resp. rate 26, weight 267 lb (121.1 kg), SpO2 96 %. DISPOSITION  Daren Fernández was admitted in stable condition.     (Please note this note was completed with a voice recognition program.  Efforts were made to edit the dictations but occasionally words are mis-transcribed.)       Yessy Smith MD  08/31/21 1011

## 2021-08-31 NOTE — H&P
Hospital Medicine History & Physical      PCP: Afsaneh Roa MD    Date of Admission: 8/31/2021    Date of Service: Pt seen/examined on 31 August and Admitted to Inpatient with expected LOS greater than two midnights due to medical therapy. Chief Complaint:  SOB      History Of Present Illness:     62 y.o. male smoker w/ hx of O2 dependent COPD who [facility] a 1 day hx of increased SOB, found to be Hypoxic by EMS to 60's w/out associated c/o cough or other pulmonary c/o. The patient denies any fever/chills or other signs/sxs of systemic illness or identifiable aggravating/alleviating factors. Past Medical History:          Diagnosis Date    COPD (chronic obstructive pulmonary disease) ([facility] [address] [facility]     Hypertension        Past Surgical History:      History reviewed. No pertinent surgical history. Medications Prior to Admission:      Prior to Admission medications    Medication Sig Start Date End Date Taking?  Authorizing Provider   lisinopril (PRINIVIL;ZESTRIL) 5 MG tablet Take 1 tablet by mouth daily 4/11/21  Yes Cate Vital MD   carvedilol (COREG) 12.5 MG tablet Take 1 tablet by mouth 2 times daily (with meals) 4/10/21  Yes Cate Vital MD   amLODIPine (NORVASC) 10 MG tablet Take 1 tablet by mouth daily 4/11/21  Yes Cate Vital MD   tamsulosin (FLOMAX) 0.4 MG capsule Take 1 capsule by mouth daily 4/10/21  Yes Cate Vital MD   nicotine (NICODERM CQ) 21 MG/24HR Place 1 patch onto the skin daily 4/11/21  Yes Cate Vital MD   thiamine 100 MG tablet Take 1 tablet by mouth daily 4/11/21  Yes Cate Vital MD   budesonide-formoterol (SYMBICORT) 160-4.5 MCG/ACT AERO Inhale 2 puffs into the lungs 2 times daily 4/10/21  Yes Cate Vital MD   albuterol sulfate HFA (VENTOLIN HFA) 108 (90 Base) MCG/ACT inhaler Inhale 2 puffs into the lungs every 6 hours as needed for Wheezing 5/9/19  Yes GABRIELA Arthur   pantoprazole (PROTONIX) 40 MG tablet Take 1 tablet by mouth every morning (before breakfast) for 10 days 7/24/21 8/3/21  Tresa Mota MD       Allergies:  Patient has no known allergies. Social History:      The patient currently lives independently    TOBACCO:   reports that he has been smoking. He has been smoking about 2.00 packs per day. He has never used smokeless tobacco.  ETOH:   reports current alcohol use of about 42.0 standard drinks of alcohol per week. Family History:      Reviewed in detail and negative for DM, CAD, Cancer, CVA. Positive as follows:    History reviewed. No pertinent family history. REVIEW OF SYSTEMS:   Pertinent positives as noted in the HPI. All other systems reviewed and negative. PHYSICAL EXAM:    BP (!) 154/105   Pulse 83   Temp 97.5 °F (36.4 °C) (Axillary)   Resp 28   Wt 267 lb (121.1 kg)   SpO2 95%   BMI 38.31 kg/m²     General appearance:  No apparent distress, appears stated age and cooperative. HEENT:  Normal cephalic, atraumatic without obvious deformity. Pupils equal, round, and reactive to light. Extra ocular muscles intact. Conjunctivae/corneas clear. Neck: Supple, with full range of motion. No jugular venous distention. Trachea midline. Respiratory:  Normal respiratory effort. Clear to auscultation, bilaterally without Rales/Wheezes/Rhonchi. Cardiovascular:  Regular rate and rhythm with normal S1/S2 without murmurs, rubs or gallops. Abdomen: Soft, non-tender, non-distended with normal bowel sounds. Musculoskeletal:  No clubbing, cyanosis or edema bilaterally. Full range of motion without deformity. Skin: Skin color, texture, turgor normal.  No rashes or lesions. Neurologic:  Neurovascularly intact without any focal sensory/motor deficits.  Cranial nerves: II-XII intact, grossly non-focal.  Psychiatric:  Alert and oriented, thought content appropriate, normal insight  Capillary Refill: Brisk,< 3 seconds   Peripheral Pulses: +2 palpable, equal bilaterally       CXR:  I have reviewed the CXR with the following interpretation: Bilateral ASD  EKG:  I have reviewed the EKG with the following interpretation: no acute ischemic changes. Labs:     Recent Labs     08/31/21 0731   WBC 14.1*   HGB 14.6   HCT 44.7        Recent Labs     08/31/21  0731      K 4.2   CL 98*   CO2 41*   BUN 16   CREATININE 0.7*   CALCIUM 9.4     Recent Labs     08/31/21  0731   AST 21   ALT 27   BILITOT 0.3   ALKPHOS 103     No results for input(s): INR in the last 72 hours. Recent Labs     08/31/21 0731   TROPONINI <0.01       Urinalysis:      Lab Results   Component Value Date    NITRU Negative 03/28/2021    WBCUA None seen 03/28/2021    BACTERIA Rare 03/28/2021    RBCUA None seen 03/28/2021    BLOODU Negative 03/28/2021    SPECGRAV 1.020 03/28/2021    GLUCOSEU Negative 03/28/2021         ASSESSMENT:    Active Hospital Problems    Diagnosis Date Noted    HTN (hypertension) [I10] 08/31/2021    GERD (gastroesophageal reflux disease) [K21.9] 08/31/2021    BPH (benign prostatic hyperplasia) [N40.0] 08/31/2021    Hx of chronic respiratory failure [Z87.09] 08/31/2021    Obesity [E66.9] 07/20/2021    Alcohol use [Z72.89] 04/05/2021    Tobacco abuse [Z72.0] 04/05/2021    Acute respiratory failure with hypoxia ([facility]) [J96.01]     COPD [facility]) [facility] [J44.9] 03/25/2021       PLAN:      COPD - w/ chronic respiratory failure on baseline home O2 at 4L. Normally controlled on home medication regimen - continued. Here w/ acute exacerbation. Started on Steroids/HHN. PNA - likely CAP w/ Strep Pneumonia. Started on empiric Rocephin/Azithro on 31 August.  Changed to DAILY dosing. Acute on Chronic Respiratory Failure - w/ hypoxia/hypercapnea, POArrival.  Presence of clinical respiratory distress w/ tachypnea/dyspnea/SOB and wheezing w/ use of accessory muscles to breath - initially requiring BiPap. Supplemental O2 and wean as tolerated.      Encephalopathy - acute hypoxic, 2nd to above. Will continue to follow clinical response w/ supportive care PRN. Tobacco Abuse - active and ongoing. Cessation counseled. Nicotine replacement PRN. Alcohol Abuse - active and ongoing w/ probable dependence but no evidence of w/drawal.  Cessation counseled. NOT YET placed on scheduled or started on CIWA w/ PRN Ativan. HTN - w/out known CAD and no evidence of active signs/sxs of ischemia/failure. Currently controlled on home meds w/ vitals reviewed and documented as above. GERD - w/out active signs/sxs of dysphagia/odynophagia. No evidence of active PUD or hx of GI bleed. Controlled on home PPI - continue. BPH - w/out acute obstructive Uropathy, controlled on home medications as ordered - continued. Obesity -  With Body mass index is 38.31 kg/m². Complicating assessment and treatment. Placing patient at risk for multiple co-morbidities as well as early death and contributing to the patient's presentation. Counseled on weight loss. DVT Prophylaxis: LMWH  Diet: Regular  Code Status: Full Code      PT/OT Eval Status: not yet ordered. Dispo - likely to home Wed/Thurs 1/2 Sept pending clinical course. Isadore Rinne, MD    Thank you Joel Whatley MD for the opportunity to be involved in this patient's care. If you have any questions or concerns please feel free to contact me at [phone].

## 2021-08-31 NOTE — ED NOTES
Patient removed BIPAP mask from self. RN to bedside. Patient states \"I am not wearing this it was on backwards and I want to get out of here\". RN explained to patient that mask will only go on one way and he needs to leave it alone for his safety so he is able to breathe. BIPAP mask reapplied by RN.       Arlena Blizzard, RN  08/31/21 5161

## 2021-09-01 LAB
ALBUMIN SERPL-MCNC: 4.2 G/DL (ref 3.4–5)
ANION GAP SERPL CALCULATED.3IONS-SCNC: 5 MMOL/L (ref 3–16)
BUN BLDV-MCNC: 16 MG/DL (ref 7–20)
CALCIUM SERPL-MCNC: 9.6 MG/DL (ref 8.3–10.6)
CHLORIDE BLD-SCNC: 98 MMOL/L (ref 99–110)
CO2: 41 MMOL/L (ref 21–32)
CREAT SERPL-MCNC: 0.7 MG/DL (ref 0.8–1.3)
GFR AFRICAN AMERICAN: >60
GFR NON-AFRICAN AMERICAN: >60
GLUCOSE BLD-MCNC: 157 MG/DL (ref 70–99)
HCT VFR BLD CALC: 46.7 % (ref 40.5–52.5)
HEMOGLOBIN: 14.6 G/DL (ref 13.5–17.5)
MCH RBC QN AUTO: 30.2 PG (ref 26–34)
MCHC RBC AUTO-ENTMCNC: 31.3 G/DL (ref 31–36)
MCV RBC AUTO: 96.5 FL (ref 80–100)
PDW BLD-RTO: 15.5 % (ref 12.4–15.4)
PHOSPHORUS: 3.5 MG/DL (ref 2.5–4.9)
PLATELET # BLD: 316 K/UL (ref 135–450)
PMV BLD AUTO: 8 FL (ref 5–10.5)
POTASSIUM SERPL-SCNC: 5.2 MMOL/L (ref 3.5–5.1)
RBC # BLD: 4.84 M/UL (ref 4.2–5.9)
SODIUM BLD-SCNC: 144 MMOL/L (ref 136–145)
WBC # BLD: 11.7 K/UL (ref 4–11)

## 2021-09-01 PROCEDURE — 2060000000 HC ICU INTERMEDIATE R&B

## 2021-09-01 PROCEDURE — 6370000000 HC RX 637 (ALT 250 FOR IP): Performed by: INTERNAL MEDICINE

## 2021-09-01 PROCEDURE — 80069 RENAL FUNCTION PANEL: CPT

## 2021-09-01 PROCEDURE — 2580000003 HC RX 258: Performed by: INTERNAL MEDICINE

## 2021-09-01 PROCEDURE — 94640 AIRWAY INHALATION TREATMENT: CPT

## 2021-09-01 PROCEDURE — 94669 MECHANICAL CHEST WALL OSCILL: CPT

## 2021-09-01 PROCEDURE — 94761 N-INVAS EAR/PLS OXIMETRY MLT: CPT

## 2021-09-01 PROCEDURE — 36415 COLL VENOUS BLD VENIPUNCTURE: CPT

## 2021-09-01 PROCEDURE — 6360000002 HC RX W HCPCS: Performed by: INTERNAL MEDICINE

## 2021-09-01 PROCEDURE — 2700000000 HC OXYGEN THERAPY PER DAY

## 2021-09-01 PROCEDURE — 85027 COMPLETE CBC AUTOMATED: CPT

## 2021-09-01 RX ADMIN — ENOXAPARIN SODIUM 40 MG: 40 INJECTION SUBCUTANEOUS at 09:22

## 2021-09-01 RX ADMIN — METHYLPREDNISOLONE SODIUM SUCCINATE 40 MG: 40 INJECTION, POWDER, FOR SOLUTION INTRAMUSCULAR; INTRAVENOUS at 09:22

## 2021-09-01 RX ADMIN — IPRATROPIUM BROMIDE AND ALBUTEROL SULFATE 1 AMPULE: .5; 3 SOLUTION RESPIRATORY (INHALATION) at 08:10

## 2021-09-01 RX ADMIN — TAMSULOSIN HYDROCHLORIDE 0.4 MG: 0.4 CAPSULE ORAL at 09:23

## 2021-09-01 RX ADMIN — SODIUM CHLORIDE, PRESERVATIVE FREE 10 ML: 5 INJECTION INTRAVENOUS at 09:29

## 2021-09-01 RX ADMIN — CARVEDILOL 12.5 MG: 3.12 TABLET, FILM COATED ORAL at 17:03

## 2021-09-01 RX ADMIN — LISINOPRIL 5 MG: 10 TABLET ORAL at 09:21

## 2021-09-01 RX ADMIN — METHYLPREDNISOLONE SODIUM SUCCINATE 40 MG: 40 INJECTION, POWDER, FOR SOLUTION INTRAMUSCULAR; INTRAVENOUS at 22:44

## 2021-09-01 RX ADMIN — AZITHROMYCIN DIHYDRATE 250 MG: 250 TABLET, FILM COATED ORAL at 09:20

## 2021-09-01 RX ADMIN — CEFTRIAXONE SODIUM 1000 MG: 1 INJECTION, POWDER, FOR SOLUTION INTRAMUSCULAR; INTRAVENOUS at 11:44

## 2021-09-01 RX ADMIN — Medication 2 PUFF: at 20:41

## 2021-09-01 RX ADMIN — IPRATROPIUM BROMIDE AND ALBUTEROL SULFATE 1 AMPULE: .5; 3 SOLUTION RESPIRATORY (INHALATION) at 15:52

## 2021-09-01 RX ADMIN — IPRATROPIUM BROMIDE AND ALBUTEROL SULFATE 1 AMPULE: .5; 3 SOLUTION RESPIRATORY (INHALATION) at 20:30

## 2021-09-01 RX ADMIN — AMLODIPINE BESYLATE 10 MG: 5 TABLET ORAL at 09:21

## 2021-09-01 RX ADMIN — PANTOPRAZOLE SODIUM 40 MG: 40 TABLET, DELAYED RELEASE ORAL at 07:51

## 2021-09-01 RX ADMIN — Medication 2 PUFF: at 08:11

## 2021-09-01 RX ADMIN — CARVEDILOL 12.5 MG: 3.12 TABLET, FILM COATED ORAL at 10:13

## 2021-09-01 RX ADMIN — SODIUM CHLORIDE, PRESERVATIVE FREE 10 ML: 5 INJECTION INTRAVENOUS at 22:44

## 2021-09-01 RX ADMIN — CARVEDILOL 12.5 MG: 3.12 TABLET, FILM COATED ORAL at 10:20

## 2021-09-01 RX ADMIN — Medication 100 MG: at 09:20

## 2021-09-01 ASSESSMENT — PAIN SCALES - GENERAL
PAINLEVEL_OUTOF10: 0

## 2021-09-02 LAB
BASE EXCESS ARTERIAL: 12.2 MMOL/L (ref -3–3)
BASE EXCESS ARTERIAL: 13.5 MMOL/L (ref -3–3)
CARBOXYHEMOGLOBIN ARTERIAL: 0.6 % (ref 0–1.5)
CARBOXYHEMOGLOBIN ARTERIAL: 0.8 % (ref 0–1.5)
HCO3 ARTERIAL: 45 MMOL/L (ref 21–29)
HCO3 ARTERIAL: 45.6 MMOL/L (ref 21–29)
HCT VFR BLD CALC: 45.3 % (ref 40.5–52.5)
HEMOGLOBIN, ART, EXTENDED: 14.6 G/DL (ref 13.5–17.5)
HEMOGLOBIN, ART, EXTENDED: 15.5 G/DL (ref 13.5–17.5)
HEMOGLOBIN: 14.2 G/DL (ref 13.5–17.5)
LACTIC ACID, SEPSIS: 3.5 MMOL/L (ref 0.4–1.9)
MCH RBC QN AUTO: 30.5 PG (ref 26–34)
MCHC RBC AUTO-ENTMCNC: 31.3 G/DL (ref 31–36)
MCV RBC AUTO: 97.3 FL (ref 80–100)
METHEMOGLOBIN ARTERIAL: 0.4 %
METHEMOGLOBIN ARTERIAL: 0.6 %
O2 SAT, ARTERIAL: 92.7 %
O2 SAT, ARTERIAL: 96.1 %
O2 THERAPY: ABNORMAL
O2 THERAPY: ABNORMAL
PCO2 ARTERIAL: 116.6 MMHG (ref 35–45)
PCO2 ARTERIAL: 93.5 MMHG (ref 35–45)
PDW BLD-RTO: 15.7 % (ref 12.4–15.4)
PH ARTERIAL: 7.21 (ref 7.35–7.45)
PH ARTERIAL: 7.3 (ref 7.35–7.45)
PLATELET # BLD: 358 K/UL (ref 135–450)
PMV BLD AUTO: 7.8 FL (ref 5–10.5)
PO2 ARTERIAL: 70.6 MMHG (ref 75–108)
PO2 ARTERIAL: 85.9 MMHG (ref 75–108)
RBC # BLD: 4.65 M/UL (ref 4.2–5.9)
TCO2 ARTERIAL: 47.8 MMOL/L
TCO2 ARTERIAL: 49.2 MMOL/L
WBC # BLD: 14.8 K/UL (ref 4–11)

## 2021-09-02 PROCEDURE — 99223 1ST HOSP IP/OBS HIGH 75: CPT | Performed by: INTERNAL MEDICINE

## 2021-09-02 PROCEDURE — 2700000000 HC OXYGEN THERAPY PER DAY

## 2021-09-02 PROCEDURE — 6370000000 HC RX 637 (ALT 250 FOR IP): Performed by: INTERNAL MEDICINE

## 2021-09-02 PROCEDURE — 85027 COMPLETE CBC AUTOMATED: CPT

## 2021-09-02 PROCEDURE — 36415 COLL VENOUS BLD VENIPUNCTURE: CPT

## 2021-09-02 PROCEDURE — 36600 WITHDRAWAL OF ARTERIAL BLOOD: CPT

## 2021-09-02 PROCEDURE — 94640 AIRWAY INHALATION TREATMENT: CPT

## 2021-09-02 PROCEDURE — 6360000002 HC RX W HCPCS: Performed by: INTERNAL MEDICINE

## 2021-09-02 PROCEDURE — 2060000000 HC ICU INTERMEDIATE R&B

## 2021-09-02 PROCEDURE — 94761 N-INVAS EAR/PLS OXIMETRY MLT: CPT

## 2021-09-02 PROCEDURE — 82803 BLOOD GASES ANY COMBINATION: CPT

## 2021-09-02 PROCEDURE — 83605 ASSAY OF LACTIC ACID: CPT

## 2021-09-02 PROCEDURE — 6360000002 HC RX W HCPCS: Performed by: NURSE PRACTITIONER

## 2021-09-02 PROCEDURE — 94660 CPAP INITIATION&MGMT: CPT

## 2021-09-02 PROCEDURE — 87040 BLOOD CULTURE FOR BACTERIA: CPT

## 2021-09-02 PROCEDURE — 2580000003 HC RX 258: Performed by: INTERNAL MEDICINE

## 2021-09-02 RX ORDER — DIAZEPAM 5 MG/1
5 TABLET ORAL 3 TIMES DAILY
Status: DISCONTINUED | OUTPATIENT
Start: 2021-09-02 | End: 2021-09-05

## 2021-09-02 RX ORDER — SODIUM CHLORIDE 0.9 % (FLUSH) 0.9 %
5-40 SYRINGE (ML) INJECTION PRN
Status: DISCONTINUED | OUTPATIENT
Start: 2021-09-02 | End: 2021-09-08 | Stop reason: HOSPADM

## 2021-09-02 RX ORDER — HYDROXYZINE HYDROCHLORIDE 50 MG/ML
50 INJECTION, SOLUTION INTRAMUSCULAR ONCE
Status: COMPLETED | OUTPATIENT
Start: 2021-09-02 | End: 2021-09-02

## 2021-09-02 RX ORDER — SODIUM CHLORIDE 9 MG/ML
25 INJECTION, SOLUTION INTRAVENOUS PRN
Status: DISCONTINUED | OUTPATIENT
Start: 2021-09-02 | End: 2021-09-08 | Stop reason: HOSPADM

## 2021-09-02 RX ORDER — LORAZEPAM 1 MG/1
3 TABLET ORAL
Status: DISCONTINUED | OUTPATIENT
Start: 2021-09-02 | End: 2021-09-02

## 2021-09-02 RX ORDER — LORAZEPAM 1 MG/1
2 TABLET ORAL
Status: DISCONTINUED | OUTPATIENT
Start: 2021-09-02 | End: 2021-09-08 | Stop reason: HOSPADM

## 2021-09-02 RX ORDER — LORAZEPAM 2 MG/ML
3 INJECTION INTRAMUSCULAR
Status: DISCONTINUED | OUTPATIENT
Start: 2021-09-02 | End: 2021-09-02

## 2021-09-02 RX ORDER — LORAZEPAM 2 MG/ML
2 INJECTION INTRAMUSCULAR
Status: DISCONTINUED | OUTPATIENT
Start: 2021-09-02 | End: 2021-09-08 | Stop reason: HOSPADM

## 2021-09-02 RX ORDER — SODIUM CHLORIDE 0.9 % (FLUSH) 0.9 %
5-40 SYRINGE (ML) INJECTION EVERY 12 HOURS SCHEDULED
Status: DISCONTINUED | OUTPATIENT
Start: 2021-09-02 | End: 2021-09-03

## 2021-09-02 RX ORDER — LORAZEPAM 1 MG/1
4 TABLET ORAL
Status: DISCONTINUED | OUTPATIENT
Start: 2021-09-02 | End: 2021-09-02

## 2021-09-02 RX ORDER — CHLORDIAZEPOXIDE HYDROCHLORIDE 25 MG/1
25 CAPSULE, GELATIN COATED ORAL 4 TIMES DAILY
Status: DISCONTINUED | OUTPATIENT
Start: 2021-09-02 | End: 2021-09-05

## 2021-09-02 RX ORDER — LORAZEPAM 2 MG/ML
1 INJECTION INTRAMUSCULAR
Status: DISCONTINUED | OUTPATIENT
Start: 2021-09-02 | End: 2021-09-08 | Stop reason: HOSPADM

## 2021-09-02 RX ORDER — LORAZEPAM 2 MG/ML
2 INJECTION INTRAMUSCULAR ONCE
Status: DISCONTINUED | OUTPATIENT
Start: 2021-09-02 | End: 2021-09-08 | Stop reason: HOSPADM

## 2021-09-02 RX ORDER — LORAZEPAM 2 MG/ML
4 INJECTION INTRAMUSCULAR
Status: DISCONTINUED | OUTPATIENT
Start: 2021-09-02 | End: 2021-09-02

## 2021-09-02 RX ORDER — LORAZEPAM 1 MG/1
1 TABLET ORAL
Status: DISCONTINUED | OUTPATIENT
Start: 2021-09-02 | End: 2021-09-08 | Stop reason: HOSPADM

## 2021-09-02 RX ADMIN — HYDROXYZINE HYDROCHLORIDE 50 MG: 50 INJECTION, SOLUTION INTRAMUSCULAR at 22:14

## 2021-09-02 RX ADMIN — CEFTRIAXONE SODIUM 1000 MG: 1 INJECTION, POWDER, FOR SOLUTION INTRAMUSCULAR; INTRAVENOUS at 08:40

## 2021-09-02 RX ADMIN — LISINOPRIL 5 MG: 10 TABLET ORAL at 08:36

## 2021-09-02 RX ADMIN — SODIUM CHLORIDE, PRESERVATIVE FREE 10 ML: 5 INJECTION INTRAVENOUS at 21:34

## 2021-09-02 RX ADMIN — TAMSULOSIN HYDROCHLORIDE 0.4 MG: 0.4 CAPSULE ORAL at 08:36

## 2021-09-02 RX ADMIN — ENOXAPARIN SODIUM 40 MG: 40 INJECTION SUBCUTANEOUS at 08:36

## 2021-09-02 RX ADMIN — AMLODIPINE BESYLATE 10 MG: 5 TABLET ORAL at 08:36

## 2021-09-02 RX ADMIN — CARVEDILOL 12.5 MG: 3.12 TABLET, FILM COATED ORAL at 08:36

## 2021-09-02 RX ADMIN — SODIUM CHLORIDE, PRESERVATIVE FREE 10 ML: 5 INJECTION INTRAVENOUS at 10:18

## 2021-09-02 RX ADMIN — AZITHROMYCIN DIHYDRATE 250 MG: 250 TABLET, FILM COATED ORAL at 08:36

## 2021-09-02 RX ADMIN — IPRATROPIUM BROMIDE AND ALBUTEROL SULFATE 1 AMPULE: .5; 3 SOLUTION RESPIRATORY (INHALATION) at 20:22

## 2021-09-02 RX ADMIN — Medication 2 PUFF: at 08:40

## 2021-09-02 RX ADMIN — PANTOPRAZOLE SODIUM 40 MG: 40 TABLET, DELAYED RELEASE ORAL at 07:02

## 2021-09-02 RX ADMIN — IPRATROPIUM BROMIDE AND ALBUTEROL SULFATE 1 AMPULE: .5; 3 SOLUTION RESPIRATORY (INHALATION) at 12:12

## 2021-09-02 RX ADMIN — IPRATROPIUM BROMIDE AND ALBUTEROL SULFATE 1 AMPULE: .5; 3 SOLUTION RESPIRATORY (INHALATION) at 08:39

## 2021-09-02 RX ADMIN — METHYLPREDNISOLONE SODIUM SUCCINATE 40 MG: 40 INJECTION, POWDER, FOR SOLUTION INTRAMUSCULAR; INTRAVENOUS at 08:37

## 2021-09-02 RX ADMIN — Medication 100 MG: at 08:36

## 2021-09-02 RX ADMIN — LORAZEPAM 2 MG: 2 INJECTION INTRAMUSCULAR; INTRAVENOUS at 22:26

## 2021-09-02 RX ADMIN — SODIUM CHLORIDE, PRESERVATIVE FREE 10 ML: 5 INJECTION INTRAVENOUS at 08:52

## 2021-09-02 RX ADMIN — IPRATROPIUM BROMIDE AND ALBUTEROL SULFATE 1 AMPULE: .5; 3 SOLUTION RESPIRATORY (INHALATION) at 15:38

## 2021-09-02 RX ADMIN — METHYLPREDNISOLONE SODIUM SUCCINATE 40 MG: 40 INJECTION, POWDER, FOR SOLUTION INTRAMUSCULAR; INTRAVENOUS at 21:34

## 2021-09-02 RX ADMIN — LORAZEPAM 4 MG: 2 INJECTION INTRAMUSCULAR; INTRAVENOUS at 09:54

## 2021-09-02 ASSESSMENT — PAIN SCALES - GENERAL
PAINLEVEL_OUTOF10: 0
PAINLEVEL_OUTOF10: 0

## 2021-09-02 NOTE — PROGRESS NOTES
09/02/21 1558   NIV Type   NIV Started/Stopped On   Equipment Type V60   Mode AVAPS   Mask Type Full face mask   Mask Size Large   Bonnet size Large   Settings/Measurements   IPAP Min 24 cmH2O   IPAP Max   (40)   Vt Ordered 600 mL   Resp 22   FiO2  100 %   Vt Exhaled 605 mL   Minute Volume 13.1 Liters   Mask Leak (lpm) 22 lpm   Comfort Level Fair   Using Accessory Muscles No   SpO2 99   Alarm Settings   Alarms On Y

## 2021-09-02 NOTE — PROGRESS NOTES
Beginning the day Pt was confused but pleasant on initial assessment. This RN was rounding on Pt every 30 minutes due to confusion and pulling at tele monitor. Approximately 0930:  Pt became physically and verbally aggressive. Ripping off Tele monitor, oxygen, and SPO2. Pacing the room, flipping over furniture, ripping the blinds down, and trying to \"jump out the window\" per patient. 0940: This RN called for assistance and left the room closing the door behind and called a Kevin Guzmán. Messaged Dr. Kelsey Morel and MD called RN with new orders. 36:  Security assisted this RN to get patient back into bed and Pt placed in 3 point restraints per verbal order with Dr. Kelsey Morel. O2 placed back on patient, patient slowly recovering from lack of O2. Pt now resting in bed with 15L high flow and NRB at 15 L. O2 Sats 93%    AVASYS put into place. To help monitor patient. Will continue to monitor.

## 2021-09-02 NOTE — PROGRESS NOTES
Hospitalist Progress Note      PCP: Leah Leos MD    Date of Admission: 8/31/2021    Chief Complaint: SOB    Subjective: increasingly agitated - [address] AM 2 Sept w/ subsequent Rapid Response. Medications:  Reviewed    Infusion Medications    sodium chloride      sodium chloride       Scheduled Medications    LORazepam  2 mg IntraVENous Once    diazePAM  5 mg Oral TID    sodium chloride flush  5-40 mL IntraVENous 2 times per day    nicotine  1 patch TransDERmal Daily    amLODIPine  10 mg Oral Daily    budesonide-formoterol  2 puff Inhalation BID    carvedilol  12.5 mg Oral BID WC    lisinopril  5 mg Oral Daily    nicotine  1 patch TransDERmal Daily    pantoprazole  40 mg Oral QAM AC    tamsulosin  0.4 mg Oral Daily    thiamine  100 mg Oral Daily    cefTRIAXone (ROCEPHIN) IV  1,000 mg IntraVENous Daily    azithromycin  250 mg Oral Daily    methylPREDNISolone  40 mg IntraVENous BID    ipratropium-albuterol  1 ampule Inhalation Q4H WA    sodium chloride flush  5-40 mL IntraVENous 2 times per day    enoxaparin  40 mg SubCUTAneous Daily     PRN Meds: sodium chloride flush, sodium chloride, LORazepam **OR** LORazepam **OR** LORazepam **OR** LORazepam **OR** LORazepam **OR** LORazepam **OR** LORazepam **OR** LORazepam, sodium chloride flush, sodium chloride, ondansetron **OR** ondansetron, polyethylene glycol, acetaminophen **OR** acetaminophen      Intake/Output Summary (Last 24 hours) at 9/2/2021 0948  Last data filed at 9/2/2021 0640  Gross per 24 hour   Intake 1200 ml   Output 1150 ml   Net 50 ml       Physical Exam Performed:    BP (!) 141/83   Pulse 92   Temp 97.9 °F (36.6 °C) (Oral)   Resp 22   Ht 5' 10\" (1.778 m)   Wt 257 lb (116.6 kg)   SpO2 91%   BMI 36.88 kg/m²     General appearance: No apparent distress, appears stated age and cooperative. HEENT: Pupils equal, round, and reactive to light. Conjunctivae/corneas clear.   Neck: Supple, with full range of motion. No jugular venous distention. Trachea midline. Respiratory:  Normal respiratory effort. Clear to auscultation, bilaterally without Rales/Wheezes/Rhonchi. Cardiovascular: Regular rate and rhythm with normal S1/S2 without murmurs, rubs or gallops. Abdomen: Soft, non-tender, non-distended with normal bowel sounds. Musculoskeletal: No clubbing, cyanosis or edema bilaterally. Full range of motion without deformity. Skin: Skin color, texture, turgor normal.  No rashes or lesions. Neurologic:  Neurovascularly intact without any focal sensory/motor deficits. Cranial nerves: II-XII intact, grossly non-focal.  Psychiatric: Alert and oriented, thought content appropriate, normal insight  Capillary Refill: Brisk,< 3 seconds   Peripheral Pulses: +2 palpable, equal bilaterally       Labs:   Recent Labs     08/31/21  0731 09/01/21  0457   WBC 14.1* 11.7*   HGB 14.6 14.6   HCT 44.7 46.7    316     Recent Labs     08/31/21  0731 09/01/21  0457    144   K 4.2 5.2*   CL 98* 98*   CO2 41* 41*   BUN 16 16   CREATININE 0.7* 0.7*   CALCIUM 9.4 9.6   PHOS  --  3.5     Recent Labs     08/31/21  0731   AST 21   ALT 27   BILITOT 0.3   ALKPHOS 103     No results for input(s): INR in the last 72 hours.   Recent Labs     08/31/21  0731   TROPONINI <0.01       Urinalysis:      Lab Results   Component Value Date    NITRU Negative 03/28/2021    WBCUA None seen 03/28/2021    BACTERIA Rare 03/28/2021    RBCUA None seen 03/28/2021    BLOODU Negative 03/28/2021    SPECGRAV 1.020 03/28/2021    GLUCOSEU Negative 03/28/2021       Consults:    IP CONSULT TO HOSPITALIST  IP CONSULT TO SOCIAL WORK      Assessment/Plan:    Active Hospital Problems    Diagnosis     HTN (hypertension) [I10]     GERD (gastroesophageal reflux disease) [K21.9]     BPH (benign prostatic hyperplasia) [N40.0]     Hx of chronic respiratory failure [Z87.09]     Obesity [E66.9]     Alcohol use [Z72.89]     Tobacco abuse [Z72.0]     Acute respiratory failure with hypoxia ([facility]) [J96.01]     COPD (chronic obstructive pulmonary disease) ([facility]) [J44.9]        COPD - w/ chronic respiratory failure on baseline home O2 at 4L. Normally controlled on home medication regimen - continued. Here w/ acute exacerbation. Started on Steroids/HHN - continued.       PNA - likely CAP w/ Strep Pneumonia. Started on empiric Rocephin/Azithro on 31 August.  Changed to DAILY dosing.     Acute on Chronic Respiratory Failure - w/ hypoxia/hypercapnea, POArrival.  Presence of clinical respiratory distress w/ tachypnea/dyspnea/SOB and wheezing w/ use of accessory muscles to breath - initially requiring BiPap, now off. Supplemental O2 and wean as tolerated.      Encephalopathy - acute hypoxic, 2nd to above. Will continue to follow clinical response w/ supportive care PRN.    Tobacco Abuse - active and ongoing. Cessation counseled. Nicotine replacement PRN.     Alcohol Abuse - active and ongoing w/ probable dependence but no evidence of w/drawal - though currently agitated. Cessation counseled. Placed on scheduled Medium dose Valium and started on CIWA w/ PRN Ativan.       HTN - w/out known CAD and no evidence of active signs/sxs of ischemia/failure. Currently controlled on home meds w/ vitals reviewed and documented as above.     GERD - w/out active signs/sxs of dysphagia/odynophagia. No evidence of active PUD or hx of GI bleed. Controlled on home PPI - continue.     BPH - w/out acute obstructive Uropathy, controlled on home medications as ordered - continued.      Obesity -  With Body mass index is 38.31 kg/m². Complicating assessment and treatment. Placing patient at risk for multiple co-morbidities as well as early death and contributing to the patient's presentation. Counseled on weight loss.         DVT Prophylaxis: LMWH     Recent Labs     08/31/21  0731 09/01/21  0457    316     Diet: ADULT DIET;  Regular  Code Status: Full Code      PT/OT Eval Status: not yet

## 2021-09-02 NOTE — CONSULTS
Consult Note            Date:9/2/2021        Patient Name:Donato Alvarenga     YOB: 1960     Age:61 y.o. Inpatient consult to Pulmonology  Consult performed by: Yvonne Casas MD  Consult ordered by: Kb Madrid MD  Reason for consult: Acute on chronic respiratory failure, alcohol withdrawal          Chief Complaint     Chief Complaint   Patient presents with    Shortness of Breath     60% at home, 2 duoneb given, arrived on cpap 93%      Acute on chronic respiratory failure, alcohol withdrawal    History Obtained From   domestic partner, mother, family member - sister    History of Present Illness   Is a 71-year-old gentleman who is known to have severe COPD along with alcohol use and tobacco use who comes into the hospital because of acute respiratory failure. Patient was having shortness of breath about a day prior to coming into the hospital and found by EMS to be hypoxic upon arrival at the house. The patient's significant other who is at the bedside. Along with his mother, who is my patient, and sister. Patient is unable to give any history and was on BiPAP therapy  Patient apparently this morning had an issue of becoming confused and moving furniture around the room and trying to get out of the window. Patient was subdued and placed on BiPAP  However was not getting good tidal volumes. Past Medical History     Past Medical History:   Diagnosis Date    COPD (chronic obstructive pulmonary disease) ([address]     Hypertension         Past Surgical History   History reviewed. No pertinent surgical history. Medications     Prior to Admission medications    Medication Sig Start Date End Date Taking?  Authorizing Provider   lisinopril (PRINIVIL;ZESTRIL) 5 MG tablet Take 1 tablet by mouth daily 4/11/21  Yes Rand Morrow MD   carvedilol (COREG) 12.5 MG tablet Take 1 tablet by mouth 2 times daily (with meals) 4/10/21  Yes Rand Morrow MD   amLODIPine (NORVASC) 10 MG tablet Take 1 tablet by mouth daily 4/11/21  Yes Cali French MD   tamsulosin (FLOMAX) 0.4 MG capsule Take 1 capsule by mouth daily 4/10/21  Yes Clai French MD   nicotine (NICODERM CQ) 21 MG/24HR Place 1 patch onto the skin daily 4/11/21  Yes Cali French MD   thiamine 100 MG tablet Take 1 tablet by mouth daily 4/11/21  Yes Cali French MD   budesonide-formoterol (SYMBICORT) 160-4.5 MCG/ACT AERO Inhale 2 puffs into the lungs 2 times daily 4/10/21  Yes Cali French MD   albuterol sulfate HFA (VENTOLIN HFA) 108 (90 Base) MCG/ACT inhaler Inhale 2 puffs into the lungs every 6 hours as needed for Wheezing 5/9/19  Yes GABRIELA Power   pantoprazole (PROTONIX) 40 MG tablet Take 1 tablet by mouth every morning (before breakfast) for 10 days 7/24/21 8/3/21  Fito Laurent MD        LORazepam (ATIVAN) injection 2 mg, Once  diazePAM (VALIUM) tablet 5 mg, TID  sodium chloride flush 0.9 % injection 5-40 mL, 2 times per day  sodium chloride flush 0.9 % injection 5-40 mL, PRN  0.9 % sodium chloride infusion, PRN  LORazepam (ATIVAN) tablet 1 mg, Q1H PRN   Or  LORazepam (ATIVAN) injection 1 mg, Q1H PRN   Or  LORazepam (ATIVAN) tablet 2 mg, Q1H PRN   Or  LORazepam (ATIVAN) injection 2 mg, Q1H PRN  nicotine (NICODERM CQ) 21 MG/24HR 1 patch, Daily  amLODIPine (NORVASC) tablet 10 mg, Daily  budesonide-formoterol (SYMBICORT) 160-4.5 MCG/ACT inhaler 2 puff, BID  carvedilol (COREG) tablet 12.5 mg, BID WC  lisinopril (PRINIVIL;ZESTRIL) tablet 5 mg, Daily  nicotine (NICODERM CQ) 21 MG/24HR 1 patch, Daily  pantoprazole (PROTONIX) tablet 40 mg, QAM AC  tamsulosin (FLOMAX) capsule 0.4 mg, Daily  thiamine tablet 100 mg, Daily  cefTRIAXone (ROCEPHIN) 1000 mg IVPB in 50 mL D5W minibag, Daily  azithromycin (ZITHROMAX) tablet 250 mg, Daily  methylPREDNISolone sodium (SOLU-MEDROL) injection 40 mg, BID  ipratropium-albuterol (DUONEB) nebulizer solution 1 ampule, Q4H WA  sodium chloride flush 0.9 % injection 5-40 mL, 2 times per day  sodium chloride flush 0.9 % injection 5-40 mL, PRN  0.9 % sodium chloride infusion, PRN  enoxaparin (LOVENOX) injection 40 mg, Daily  ondansetron (ZOFRAN-ODT) disintegrating tablet 4 mg, Q8H PRN   Or  ondansetron (ZOFRAN) injection 4 mg, Q6H PRN  polyethylene glycol (GLYCOLAX) packet 17 g, Daily PRN  acetaminophen (TYLENOL) tablet 650 mg, Q6H PRN   Or  acetaminophen (TYLENOL) suppository 650 mg, Q6H PRN        Allergies   Patient has no known allergies. Social History     Social History     Tobacco History     Smoking Status  Current Every Day Smoker Smoking Frequency  2 packs/day    Smokeless Tobacco Use  Never Used          Alcohol History     Alcohol Use Status  Not Currently Drinks/Week  42 Cans of beer per week Amount  42.0 standard drinks of alcohol/wk Comment  Quit earlier this year \"around february\"          Drug Use     Drug Use Status  No          Sexual Activity     Sexually Active  Not Asked                Family History   History reviewed. No pertinent family history. Review of Systems   Review of Systems   Unable to perform ROS: Mental status change        Physical Exam   BP (!) 142/92   Pulse 71   Temp 97.9 °F (36.6 °C) (Oral)   Resp 18   Ht 5' 10\" (1.778 m)   Wt 257 lb (116.6 kg)   SpO2 96%   BMI 36.88 kg/m²      Physical Exam  Vitals and nursing note reviewed. Constitutional:       Appearance: He is obese. He is ill-appearing. HENT:      Head: Normocephalic and atraumatic. Right Ear: External ear normal.      Left Ear: External ear normal.      Nose: Nose normal.   Eyes:      General: No scleral icterus. Extraocular Movements: Extraocular movements intact. Conjunctiva/sclera: Conjunctivae normal.      Pupils: Pupils are equal, round, and reactive to light. Cardiovascular:      Rate and Rhythm: Regular rhythm. Tachycardia present. Pulses: Normal pulses. Heart sounds: Normal heart sounds. No murmur heard. No friction rub.    Pulmonary:      Effort: No respiratory distress. Breath sounds: No stridor. No wheezing or rhonchi. Comments: Tachypneic, decreased breath sounds bilaterally  Chest:      Chest wall: No tenderness. Abdominal:      General: Abdomen is flat. Bowel sounds are normal. There is no distension. Tenderness: There is no abdominal tenderness. There is no guarding. Comments: Obese   Musculoskeletal:         General: No swelling or tenderness. Normal range of motion. Cervical back: Normal range of motion and neck supple. No rigidity. Right lower leg: Edema present. Left lower leg: Edema present. Skin:     General: Skin is warm and dry. Coloration: Skin is not jaundiced. Neurological:      Mental Status: He is disoriented. Labs    CBC:  Recent Labs     08/31/21  0731 09/01/21  0457 09/02/21  1006   WBC 14.1* 11.7* 14.8*   RBC 4.72 4.84 4.65   HGB 14.6 14.6 14.2   HCT 44.7 46.7 45.3   MCV 94.8 96.5 97.3   RDW 16.0* 15.5* 15.7*    316 358     CHEMISTRIES:  Recent Labs     08/31/21  0731 09/01/21  0457    144   K 4.2 5.2*   CL 98* 98*   CO2 41* 41*   BUN 16 16   CREATININE 0.7* 0.7*   GLUCOSE 149* 157*   PHOS  --  3.5     PT/INR:No results for input(s): PROTIME, INR in the last 72 hours. APTT:No results for input(s): APTT in the last 72 hours. LIVER PROFILE:  Recent Labs     08/31/21  0731   AST 21   ALT 27   BILITOT 0.3   ALKPHOS 103       Imaging/Diagnostics   XR CHEST PORTABLE    Result Date: 8/31/2021  1. Cardiomegaly. 2. Patchy bilateral streaky pulmonary opacities suggestive of an atypical pneumonitis. These findings are mildly progressed compared to August 9, 2021. Continued radiographic follow-up is recommended.        Assessment      Hospital Problems         Last Modified POA    * (Principal) COPD (chronic obstructive pulmonary disease) ([facility] [address] 8/31/2021 Yes    Acute respiratory failure with hypoxia ([facility] [address] 8/31/2021 Yes    Alcohol use 8/31/2021 Yes    Tobacco abuse 8/31/2021 Yes Obesity 8/31/2021 Yes    HTN (hypertension) 8/31/2021 Yes    GERD (gastroesophageal reflux disease) 8/31/2021 Yes    BPH (benign prostatic hyperplasia) 8/31/2021 Yes    Hx of chronic respiratory failure 8/31/2021 Yes          Plan   1.   I had seen the patient the patient was on BiPAP therapy not getting good tidal volumes and an elevated PCO2    I have changed him from a BiPAP therapy to AVAPS therapy, with a rate of 22, tidal volume of 600, EPAP of 14, IPAP minimum of 24 and an IPAP max of 40, TI of 0.9 and 100% FiO2 and a rise of 3    We will get an ABG now to see how the patient responds to this      We will ask to see if we can have him moved to the ICU for possible alcohol withdrawal and acute respiratory failure with hypercapnia      Discussed with the floor nurse and discussed with ICU charge nurse      Patient has been vaccinated for Covid and has had both shots    We will continue with Symbicort  Continue with the duo nebs  Solu-Medrol 40 IV q. twice daily    Agree with Rocephin and azithromycin      Thiamine  We will place on CIWA protocol          Electronically signed by Caryle Becker, MD on 9/2/21 at 3:41 PM EDT

## 2021-09-02 NOTE — PROGRESS NOTES
09/02/21 1237   NIV Type   NIV Started/Stopped On   Equipment Type V60   Mode Bilevel   Mask Type Full face mask   Mask Size Large   Bonnet size Large   Settings/Measurements   IPAP 22 cmH20   CPAP/EPAP 10 cmH2O   Resp 18   FiO2  100 %   Vt Exhaled 515 mL   Minute Volume 9.2 Liters   Mask Leak (lpm) 0 lpm   Using Accessory Muscles No   SpO2 91

## 2021-09-02 NOTE — CARE COORDINATION
CASE MANAGEMENT INITIAL ASSESSMENT      Reviewed chart and completed assessment via telephone with: significant other   Explained Case Management role/services. Health Care Decision Maker :   Primary Decision Maker: Joselin Hussein - Child - [phone]    Secondary Decision Maker: Maximo Cedillo - Domestic Partner - [phone]    Supplemental (Other) Decision Maker: Isadora Cullen - Parent - [phone]          Can this person be reached and be able to respond quickly, such as within a few minutes or hours? Yes    Admit date/status: 8/31/21 Inpatient   Diagnosis: COPD exacerbation    Is this a Readmission?:  No      Insurance: [facility]  Precert required for SNF: Yes       3 night stay required: No    Living arrangements, Adls, care needs, prior to admission: lives in an apartment alone     Transportation: patient      [address] [facility] at home:  Walker__Cane__RTS__ BSC__Shower Chair__  02_X ([facility])_ HHN__ CPAP_X_  BiPap__  Hospital Bed__ W/C___ Other__________    Services in the home and/or outpatient, prior to admission: none, has had [facility] in the past     Dialysis Facility (if applicable)   · Name:  · Address:  · Dialysis Schedule:  · Phone:  · Fax:    PT/OT recs: none    Hospital Exemption Notification (HEN): not initiated     Barriers to discharge: none    Plan/comments: Patient is independent at home. 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 He has had [facility] in the past.  CM will need to follow up about substance abuse rehab when patient is able to engage in a conversation. Patient is currently confused and code violet and then code rapid just called on patient.

## 2021-09-02 NOTE — PROGRESS NOTES
Physician Progress Note      PATIENT:               Adonay Dawson  CSN #:                  [id number]  :                       1960  ADMIT DATE:       2021 7:18 AM  DISCH DATE:  RESPONDING  PROVIDER #:        Sudarshan Huitron MD        QUERY TEXT:    Type of Encephalopathy: Please provide further specificity, if known. Clinical indicators include: fever, alcohol use, alcohol, encephalopathy,   alcohol abuse  Options provided:  -- Anoxic/hypoxic encephalopathy  -- Metabolic encephalopathy  -- Toxic encephalopathy  -- Hepatic encephalopathy  -- Hypertensive encephalopathy  -- Other - I will add my own diagnosis  -- Disagree - Not applicable / Not valid  -- Disagree - Clinically Unable to determine / Unknown        PROVIDER RESPONSE TEXT:    The patient has anoxic/hypoxic encephalopathy. QUERY TEXT:    Pt admitted with PNA/COPDAE. Pt noted to have Sepsis indicators on arrival .   If possible, please document in the progress notes and discharge summary if   you are evaluating and /or treating any of the following: The medical record reflects the following:  Risk Factors: COPD, chronic resp failure with O2 dependence    Clinical Indicators: On arrival - HR 97, RR 26-36 requiring O2 at 15 L NC   above 4 L baseline, WBC 14.1  CXR-Patchy bilateral streaky pulmonary opacities suggestive of an atypical   pneumonitis    Treatment: O2, labs, Zithromax, Rocephin, Duoneb, Solumedrol, ongoing   supportive care and monitoring    Thank you,  Elvi Carrilol RN, BSN  [email or web address]  Options provided:  -- Sepsis, present on arrival  -- PNA only without Sepsis  -- Other - I will add my own diagnosis  -- Disagree - Not applicable / Not valid  -- Disagree - Clinically unable to determine / Unknown  -- Refer to Clinical Documentation Reviewer    PROVIDER RESPONSE TEXT:    This patient has PNA only without Sepsis.     Query created by: Will Ivey on 2021 3:25 PM      Electronically signed by:

## 2021-09-02 NOTE — FLOWSHEET NOTE
09/02/21 1135   Encounter Summary   Services provided to: Family   Support System Significant other;Family members   Continue Visiting   (9-2, code violet, rapid, supported family)   Complexity of Encounter Moderate   Length of Encounter 45 minutes   Crisis   Type Rapid response   Assessment Calm; Approachable   Intervention Active listening;Explored feelings, thoughts, concerns;Prayer;Discussed illness/injury and it's impact   Outcome Expressed feelings/needs/concerns;Receptive;Venting emotion   I supported family and significant other during rapid response. They state patient has been resistant to wearing oxygen as prescribed, which has been frustrating. Family has experienced multiple losses over the past year. Patient's sister verbalizing he might be depressed when asked. We talked about understanding what influences his decision making. They are worried he is not safe to go home. He has no advance directives. Offered prayer support which they welcome. Continue prn support.

## 2021-09-02 NOTE — PROGRESS NOTES
09/02/21 1112   NIV Type   $NIV $Daily Charge   NIV Started/Stopped On   Equipment Type v60   Mode Bilevel   Mask Type Full face mask   Mask Size Large   Bonnet size Large   Settings/Measurements   IPAP 22 cmH20   CPAP/EPAP 10 cmH2O   Resp 18   FiO2  100 %   Vt Exhaled 633 mL   Mask Leak (lpm) 6 lpm   Comfort Level Fair   Using Accessory Muscles No   SpO2 92   Alarm Settings   Alarms On Y

## 2021-09-03 ENCOUNTER — APPOINTMENT (OUTPATIENT)
Dept: GENERAL RADIOLOGY | Age: 61
DRG: 139 | End: 2021-09-03
Payer: MEDICAID

## 2021-09-03 LAB
ALBUMIN SERPL-MCNC: 3.5 G/DL (ref 3.4–5)
ALP BLD-CCNC: 76 U/L (ref 40–129)
ALT SERPL-CCNC: 33 U/L (ref 10–40)
ANION GAP SERPL CALCULATED.3IONS-SCNC: 4 MMOL/L (ref 3–16)
AST SERPL-CCNC: 16 U/L (ref 15–37)
BASE EXCESS ARTERIAL: 13.9 MMOL/L (ref -3–3)
BASE EXCESS ARTERIAL: 15 MMOL/L (ref -3–3)
BILIRUB SERPL-MCNC: 0.3 MG/DL (ref 0–1)
BILIRUBIN DIRECT: <0.2 MG/DL (ref 0–0.3)
BILIRUBIN, INDIRECT: ABNORMAL MG/DL (ref 0–1)
BUN BLDV-MCNC: 26 MG/DL (ref 7–20)
CALCIUM SERPL-MCNC: 9.7 MG/DL (ref 8.3–10.6)
CARBOXYHEMOGLOBIN ARTERIAL: 0.4 % (ref 0–1.5)
CARBOXYHEMOGLOBIN ARTERIAL: 0.4 % (ref 0–1.5)
CHLORIDE BLD-SCNC: 95 MMOL/L (ref 99–110)
CO2: 43 MMOL/L (ref 21–32)
CREAT SERPL-MCNC: 0.8 MG/DL (ref 0.8–1.3)
FERRITIN: 21.5 NG/ML (ref 30–400)
GFR AFRICAN AMERICAN: >60
GFR NON-AFRICAN AMERICAN: >60
GLUCOSE BLD-MCNC: 142 MG/DL (ref 70–99)
HCO3 ARTERIAL: 45.4 MMOL/L (ref 21–29)
HCO3 ARTERIAL: 46.9 MMOL/L (ref 21–29)
HCT VFR BLD CALC: 46.8 % (ref 40.5–52.5)
HEMOGLOBIN, ART, EXTENDED: 15.7 G/DL (ref 13.5–17.5)
HEMOGLOBIN, ART, EXTENDED: 16.2 G/DL (ref 13.5–17.5)
HEMOGLOBIN: 14.9 G/DL (ref 13.5–17.5)
MAGNESIUM: 2.1 MG/DL (ref 1.8–2.4)
MCH RBC QN AUTO: 30.1 PG (ref 26–34)
MCHC RBC AUTO-ENTMCNC: 31.9 G/DL (ref 31–36)
MCV RBC AUTO: 94.5 FL (ref 80–100)
METHEMOGLOBIN ARTERIAL: 0.4 %
METHEMOGLOBIN ARTERIAL: 0.5 %
O2 SAT, ARTERIAL: 95.6 %
O2 SAT, ARTERIAL: 96 %
O2 THERAPY: ABNORMAL
O2 THERAPY: ABNORMAL
PCO2 ARTERIAL: 91.7 MMHG (ref 35–45)
PCO2 ARTERIAL: 96.3 MMHG (ref 35–45)
PDW BLD-RTO: 15.5 % (ref 12.4–15.4)
PH ARTERIAL: 7.3 (ref 7.35–7.45)
PH ARTERIAL: 7.31 (ref 7.35–7.45)
PHOSPHORUS: 4 MG/DL (ref 2.5–4.9)
PLATELET # BLD: 337 K/UL (ref 135–450)
PMV BLD AUTO: 8.3 FL (ref 5–10.5)
PO2 ARTERIAL: 84.2 MMHG (ref 75–108)
PO2 ARTERIAL: 88.3 MMHG (ref 75–108)
POTASSIUM SERPL-SCNC: 5.1 MMOL/L (ref 3.5–5.1)
RBC # BLD: 4.96 M/UL (ref 4.2–5.9)
SODIUM BLD-SCNC: 142 MMOL/L (ref 136–145)
TCO2 ARTERIAL: 48.3 MMOL/L
TCO2 ARTERIAL: 49.8 MMOL/L
TOTAL PROTEIN: 6.3 G/DL (ref 6.4–8.2)
TRIGL SERPL-MCNC: 146 MG/DL (ref 0–150)
WBC # BLD: 14.7 K/UL (ref 4–11)

## 2021-09-03 PROCEDURE — 94640 AIRWAY INHALATION TREATMENT: CPT

## 2021-09-03 PROCEDURE — 83735 ASSAY OF MAGNESIUM: CPT

## 2021-09-03 PROCEDURE — 6360000002 HC RX W HCPCS: Performed by: INTERNAL MEDICINE

## 2021-09-03 PROCEDURE — 2580000003 HC RX 258: Performed by: INTERNAL MEDICINE

## 2021-09-03 PROCEDURE — 2000000000 HC ICU R&B

## 2021-09-03 PROCEDURE — 84478 ASSAY OF TRIGLYCERIDES: CPT

## 2021-09-03 PROCEDURE — 82803 BLOOD GASES ANY COMBINATION: CPT

## 2021-09-03 PROCEDURE — 6370000000 HC RX 637 (ALT 250 FOR IP): Performed by: INTERNAL MEDICINE

## 2021-09-03 PROCEDURE — 36600 WITHDRAWAL OF ARTERIAL BLOOD: CPT

## 2021-09-03 PROCEDURE — 2700000000 HC OXYGEN THERAPY PER DAY

## 2021-09-03 PROCEDURE — 94660 CPAP INITIATION&MGMT: CPT

## 2021-09-03 PROCEDURE — 94761 N-INVAS EAR/PLS OXIMETRY MLT: CPT

## 2021-09-03 PROCEDURE — 80076 HEPATIC FUNCTION PANEL: CPT

## 2021-09-03 PROCEDURE — 80069 RENAL FUNCTION PANEL: CPT

## 2021-09-03 PROCEDURE — 82728 ASSAY OF FERRITIN: CPT

## 2021-09-03 PROCEDURE — 85027 COMPLETE CBC AUTOMATED: CPT

## 2021-09-03 PROCEDURE — 99233 SBSQ HOSP IP/OBS HIGH 50: CPT | Performed by: INTERNAL MEDICINE

## 2021-09-03 PROCEDURE — 71045 X-RAY EXAM CHEST 1 VIEW: CPT

## 2021-09-03 PROCEDURE — 36415 COLL VENOUS BLD VENIPUNCTURE: CPT

## 2021-09-03 PROCEDURE — 2500000003 HC RX 250 WO HCPCS: Performed by: NURSE PRACTITIONER

## 2021-09-03 RX ORDER — DEXMEDETOMIDINE HYDROCHLORIDE 4 UG/ML
.2-1.4 INJECTION, SOLUTION INTRAVENOUS CONTINUOUS
Status: DISCONTINUED | OUTPATIENT
Start: 2021-09-03 | End: 2021-09-05

## 2021-09-03 RX ADMIN — Medication 0.4 MCG/KG/HR: at 22:13

## 2021-09-03 RX ADMIN — LORAZEPAM 2 MG: 2 INJECTION INTRAMUSCULAR; INTRAVENOUS at 01:17

## 2021-09-03 RX ADMIN — SODIUM CHLORIDE, PRESERVATIVE FREE 10 ML: 5 INJECTION INTRAVENOUS at 19:44

## 2021-09-03 RX ADMIN — LORAZEPAM 2 MG: 2 INJECTION INTRAMUSCULAR; INTRAVENOUS at 08:30

## 2021-09-03 RX ADMIN — LORAZEPAM 2 MG: 2 INJECTION INTRAMUSCULAR; INTRAVENOUS at 02:49

## 2021-09-03 RX ADMIN — IPRATROPIUM BROMIDE AND ALBUTEROL SULFATE 1 AMPULE: .5; 3 SOLUTION RESPIRATORY (INHALATION) at 16:31

## 2021-09-03 RX ADMIN — IPRATROPIUM BROMIDE AND ALBUTEROL SULFATE 1 AMPULE: .5; 3 SOLUTION RESPIRATORY (INHALATION) at 12:18

## 2021-09-03 RX ADMIN — LORAZEPAM 2 MG: 2 INJECTION INTRAMUSCULAR; INTRAVENOUS at 00:00

## 2021-09-03 RX ADMIN — LORAZEPAM 2 MG: 2 INJECTION INTRAMUSCULAR; INTRAVENOUS at 05:59

## 2021-09-03 RX ADMIN — IPRATROPIUM BROMIDE AND ALBUTEROL SULFATE 1 AMPULE: .5; 3 SOLUTION RESPIRATORY (INHALATION) at 20:39

## 2021-09-03 RX ADMIN — LORAZEPAM 2 MG: 2 INJECTION INTRAMUSCULAR; INTRAVENOUS at 19:44

## 2021-09-03 RX ADMIN — METHYLPREDNISOLONE SODIUM SUCCINATE 40 MG: 40 INJECTION, POWDER, FOR SOLUTION INTRAMUSCULAR; INTRAVENOUS at 22:47

## 2021-09-03 RX ADMIN — LORAZEPAM 2 MG: 2 INJECTION INTRAMUSCULAR; INTRAVENOUS at 11:21

## 2021-09-03 RX ADMIN — METHYLPREDNISOLONE SODIUM SUCCINATE 40 MG: 40 INJECTION, POWDER, FOR SOLUTION INTRAMUSCULAR; INTRAVENOUS at 08:30

## 2021-09-03 RX ADMIN — LORAZEPAM 2 MG: 2 INJECTION INTRAMUSCULAR; INTRAVENOUS at 22:09

## 2021-09-03 RX ADMIN — LORAZEPAM 2 MG: 2 INJECTION INTRAMUSCULAR; INTRAVENOUS at 14:48

## 2021-09-03 RX ADMIN — Medication 2 PUFF: at 20:39

## 2021-09-03 RX ADMIN — LORAZEPAM 2 MG: 2 INJECTION INTRAMUSCULAR; INTRAVENOUS at 21:07

## 2021-09-03 RX ADMIN — LORAZEPAM 2 MG: 1 TABLET ORAL at 16:48

## 2021-09-03 RX ADMIN — IPRATROPIUM BROMIDE AND ALBUTEROL SULFATE 1 AMPULE: .5; 3 SOLUTION RESPIRATORY (INHALATION) at 08:06

## 2021-09-03 RX ADMIN — ENOXAPARIN SODIUM 40 MG: 40 INJECTION SUBCUTANEOUS at 08:30

## 2021-09-03 RX ADMIN — LORAZEPAM 2 MG: 2 INJECTION INTRAMUSCULAR; INTRAVENOUS at 09:50

## 2021-09-03 RX ADMIN — LORAZEPAM 2 MG: 2 INJECTION INTRAMUSCULAR; INTRAVENOUS at 03:56

## 2021-09-03 RX ADMIN — LORAZEPAM 2 MG: 2 INJECTION INTRAMUSCULAR; INTRAVENOUS at 18:40

## 2021-09-03 RX ADMIN — SODIUM CHLORIDE, PRESERVATIVE FREE 10 ML: 5 INJECTION INTRAVENOUS at 08:32

## 2021-09-03 RX ADMIN — CEFTRIAXONE SODIUM 1000 MG: 1 INJECTION, POWDER, FOR SOLUTION INTRAMUSCULAR; INTRAVENOUS at 08:43

## 2021-09-03 RX ADMIN — LORAZEPAM 2 MG: 2 INJECTION INTRAMUSCULAR; INTRAVENOUS at 12:35

## 2021-09-03 RX ADMIN — SODIUM CHLORIDE, PRESERVATIVE FREE 10 ML: 5 INJECTION INTRAVENOUS at 22:33

## 2021-09-03 ASSESSMENT — PAIN SCALES - GENERAL
PAINLEVEL_OUTOF10: 0

## 2021-09-03 NOTE — PROGRESS NOTES
Progressive Care Progress Note    Patient: Sweta Rahman MRN: [id number]  Date of  Admission: 8/31/2021   YOB: 1960  Age: 62 y.o. Sex: male    Unit: [address] [address] PCU  Room/Bed: 6503/9139-25 Attending Physician: Faviola Smith MD   Admitting Physician: Oneal Holstein        Chief Complaint   Patient presents with    Shortness of Breath       60% at home, 2 duoneb given, arrived on cpap 93%      Acute on chronic respiratory failure, alcohol withdrawal     History Obtained From   domestic partner, mother, family member - sister     History of Present Illness   Is a 26-year-old gentleman who is known to have severe COPD along with alcohol use and tobacco use who comes into the hospital because of acute respiratory failure. Patient was having shortness of breath about a day prior to coming into the hospital and found by EMS to be hypoxic upon arrival at the house. The patient's significant other who is at the bedside. Along with his mother, who is my patient, and sister. Patient is unable to give any history and was on BiPAP therapy  Patient apparently this morning had an issue of becoming confused and moving furniture around the room and trying to get out of the window. Patient was subdued and placed on BiPAP  However was not getting good tidal volumes. Subjective: Somewhat confused but little bit more awake today than yesterday  Still using the AVAPS  No chest pains    ROS:Review of systems not obtained due to patient factors. Objective: Intake and Output:   Current Shift:   No intake/output data recorded.   Last three shifts:   09/02 0701 - 09/03 0700  In: 0   Out: 500 [Urine:500]        Hemodynamic parameters for last 24 hours:  [unfilled]    Physical Exam:   Patient Vitals for the past 24 hrs:   BP Temp Temp src Pulse Resp SpO2 Weight   09/03/21 1220     28     09/03/21 1045 (!) 181/100 98.9 °F (37.2 °C) Axillary 89      09/03/21 0941 (!) 177/94   85    09/03/21 0828 (!) 154/98 97.3 °F (36.3 °C) Axillary 137 28 95 %    09/03/21 0804     28     09/03/21 0600 127/78   78      09/03/21 0542     23 95 %    09/03/21 0446       263 lb (119.3 kg)   09/03/21 0423     22     09/03/21 0351 106/66 97.9 °F (36.6 °C) Axillary 75 22 96 %    09/03/21 0249 128/74   75      09/03/21 0120 105/67   76      09/03/21 0015     22     09/02/21 2357 130/74 97.8 °F (36.6 °C) Oral 80 22 98 %    09/02/21 2138 109/71   81      09/02/21 2022     22 96 %    09/02/21 2019     22     09/02/21 1945 (!) 141/90 98.1 °F (36.7 °C) Axillary 83 27 97 %    09/02/21 1702 115/75   70 24 97 %    09/02/21 1602 130/79         09/02/21 1558     22     09/02/21 1539      96 %    09/02/21 1431 (!) 142/92   70           Physical Exam  Vitals and nursing note reviewed. Constitutional:       Appearance: Normal appearance. He is obese. HENT:      Head: Normocephalic and atraumatic. Right Ear: External ear normal.      Left Ear: External ear normal.      Nose: Nose normal.      Mouth/Throat:      Mouth: Mucous membranes are moist.      Pharynx: Oropharynx is clear. Eyes:      General:         Right eye: No discharge. Left eye: No discharge. Extraocular Movements: Extraocular movements intact. Pupils: Pupils are equal, round, and reactive to light. Cardiovascular:      Rate and Rhythm: Normal rate and regular rhythm. Pulses: Normal pulses. Heart sounds: No murmur heard. Pulmonary:      Effort: No respiratory distress. Breath sounds: No stridor. No wheezing, rhonchi or rales. Comments: Decreased breath sounds bilaterally  Chest:      Chest wall: No tenderness. Abdominal:      General: Bowel sounds are normal. There is no distension. Palpations: Abdomen is soft. There is no mass. Tenderness: There is no abdominal tenderness. Hernia: No hernia is present. Musculoskeletal:         General: No swelling. Normal range of motion. Cervical back: Normal range of motion. No rigidity. Right lower leg: Edema present. Left lower leg: Edema present. Skin:     General: Skin is warm and dry. Coloration: Skin is not jaundiced or pale. Neurological:      Mental Status: He is disoriented. Cranial Nerves: No cranial nerve deficit. Labs:   Recent Labs     09/01/21  0457 09/02/21  1006 09/03/21  0447   WBC 11.7* 14.8* 14.7*   HGB 14.6 14.2 14.9   HCT 46.7 45.3 46.8    358 337      Recent Labs     09/01/21  0457 09/03/21  0447    142   K 5.2* 5.1   CL 98* 95*   CO2 41* 43*   BUN 16 26*   GLUCOSE 157* 142*           Radiology, images personally reviewed. Yes  XR CHEST PORTABLE [[id number]] Collected: 09/03/21 0804      Order Status: Completed Updated: 09/03/21 0808     Narrative:       EXAMINATION:   ONE XRAY VIEW OF THE CHEST     9/3/2021 6:40 am     COMPARISON:   08/31/2021     HISTORY:   ORDERING SYSTEM PROVIDED HISTORY: Shortness of breath   TECHNOLOGIST PROVIDED HISTORY:   Reason for exam:->Shortness of breath   Reason for Exam: Shortness of breath     FINDINGS:   Cardiac leads project over the chest.  Left basilar pleuroparenchymal opacity   has developed with decreased definition of the left hemidiaphragm.  Diffuse   heterogeneous right-sided opacity is seen.  No pneumothorax.  Cardiac and   mediastinal silhouettes are reflective of patient rotation.      Impression:       Bilateral airspace disease and small to moderate left pleural effusion. Pneumonia or asymmetric edema are considerations.             Other imaging:not indicated      Micro: Negative growth to date    Assessment:     Principal Problem:    COPD (chronic obstructive pulmonary disease) (HCC)  Active Problems:    Acute respiratory failure with hypoxia (HCC)    Alcohol use    Tobacco abuse    Obesity    HTN (hypertension)    GERD (gastroesophageal reflux disease)    BPH (benign prostatic hyperplasia)    Hx of chronic respiratory failure  Resolved Problems:    * No resolved hospital problems. *      Discussion / Plan:         1. Patient continue with the AVAPS  2. We will continue with alcohol withdrawal CIWA protocol  3. I had changed him to AVAPS therapy, with a rate of 28, tidal volume of 600, EPAP of 14, IPAP minimum of 24 and an IPAP max of 40, TI of 0.9 and 100% FiO2 and a rise of 3  4. Repeat blood gas shows the PCO2 down to 91  5. We will need to continue with AVAPS therapy during the day and when he is sleeping at night  6. I would like to try to see if we can switch him over to regular nasal cannula during the daytime  7. We will get ABG in the morning  8. I would recommend the patient be diuresed further today             Mamadou Holly MD    [facility] Pulmonary, Critical Care and Sleep Medicine  [phone]    Please note that some or all of this record was generated using voice recognition software. If there are any questions about the content of this document, please contact the author as some errors in transcription may have occurred.

## 2021-09-03 NOTE — PLAN OF CARE
Problem: Falls - Risk of:  Goal: Will remain free from falls  Description: Will remain free from falls  Outcome: Ongoing  Goal: Absence of physical injury  Description: Absence of physical injury  Outcome: Ongoing     Problem: Discharge Planning:  Goal: Discharged to appropriate level of care  Description: Discharged to appropriate level of care  Outcome: Ongoing     Problem: Activity Intolerance:  Goal: Ability to tolerate increased activity will improve  Description: Ability to tolerate increased activity will improve  Outcome: Ongoing     Problem: Airway Clearance - Ineffective:  Goal: Ability to maintain a clear airway will improve  Description: Ability to maintain a clear airway will improve  Outcome: Ongoing     Problem: Breathing Pattern - Ineffective:  Goal: Ability to achieve and maintain a regular respiratory rate will improve  Description: Ability to achieve and maintain a regular respiratory rate will improve  Outcome: Ongoing     Problem: Gas Exchange - Impaired:  Goal: Levels of oxygenation will improve  Description: Levels of oxygenation will improve  Outcome: Ongoing     Problem: Pain:  Description: Pain management should include both nonpharmacologic and pharmacologic interventions.   Goal: Pain level will decrease  Description: Pain level will decrease  Outcome: Ongoing  Goal: Control of acute pain  Description: Control of acute pain  Outcome: Ongoing  Goal: Control of chronic pain  Description: Control of chronic pain  Outcome: Ongoing     Problem: Skin Integrity:  Goal: Will show no infection signs and symptoms  Description: Will show no infection signs and symptoms  Outcome: Ongoing  Goal: Absence of new skin breakdown  Description: Absence of new skin breakdown  Outcome: Ongoing

## 2021-09-03 NOTE — PROGRESS NOTES
09/03/21 0423   NIV Type   NIV Started/Stopped On   Equipment Type V60   Mode AVAPS   Mask Type Full face mask   Mask Size Large   Settings/Measurements   CPAP/EPAP 14 cmH2O   IPAP Min 24 cmH2O   IPAP Max   (40)   Vt Ordered 600 mL   Rate Ordered 22   Resp 22   FiO2  100 %   Vt Exhaled 621 mL   Minute Volume 13.4 Liters   Mask Leak (lpm) 17 lpm   Comfort Level Good   Using Accessory Muscles No   SpO2 95   Alarm Settings   Alarms On Y

## 2021-09-03 NOTE — PROGRESS NOTES
09/03/21 0804   NIV Type   NIV Started/Stopped On   Equipment Type v60   Mode AVAPS   Mask Type Full face mask   Mask Size Large   Settings/Measurements   CPAP/EPAP 14 cmH2O   IPAP Min 24 cmH2O   IPAP Max   (40)   Vt Ordered 600 mL   Rate Ordered 28   Resp 28   FiO2  100 %   Vt Exhaled 638 mL   Minute Volume 18 Liters   Mask Leak (lpm) 0 lpm   Comfort Level Good   Using Accessory Muscles No   SpO2 95   Alarm Settings   Alarms On Y

## 2021-09-03 NOTE — PROGRESS NOTES
09/03/21 1220   NIV Type   NIV Started/Stopped On   Equipment Type v60   Mode AVAPS   Mask Type Full face mask   Mask Size Large   Settings/Measurements   CPAP/EPAP 14 cmH2O   IPAP Min 24 cmH2O   IPAP Max   (40)   Vt Ordered 600 mL   Rate Ordered 28   Resp 28   FiO2  100 %   Vt Exhaled 758 mL   Minute Volume 20.5 Liters   Mask Leak (lpm) 30 lpm   Comfort Level Good   Using Accessory Muscles No   SpO2 97   Alarm Settings   Alarms On Y

## 2021-09-03 NOTE — PROGRESS NOTES
Communication with Lizzette Godinez via [employer]    [phone] Hospital or Facility: [facility] From: Gray Shah RE: Yogi Cooleysoledad 1960 RM: 641 Pt scored a 24 on the CIWA scale. There are no prn orders for that score. He is rousable, but easily agitated, resisting care. He is still confused. Do you want to add the 3 or 4 mg Ativan. Last dose of Ativan was at 10 this morning. Last CO2 was 93, pt placed on continuous AVAPS. When do you want to recheck CO2? (FYI, pulm note suggest pt go to ICU for intubation when beds become available). Need Callback: NO CALLBACK REQ C4 PROGRESSIVE CARE ROUTINE  Read 7:57 PM     9/2/21 8:20 PM   Since pt is on continuous avaps, he can not have any medications PO. I noticed there was a protonix and other meds that were listed as PO for him. Did you want to change those orders?   Read 8:21 PM     From Lizzette Godinez:  9/2/21 8:21 PM   Can re-evaluate in am

## 2021-09-03 NOTE — PROGRESS NOTES
9/3/21 6:51 AM   [phone] Hospital or Facility: [facility] From: [facility] RE: Fox Gaytan 1960 RM: 26 Lab called with abnormal result: pCO2 arterial = 96.3 Need Callback: NO CALLBACK REQ C4 PROGRESSIVE CARE ROUTINE  Read 6:53 AM       From Dr. Peace Dickson:  9/3/21 6:55 AM   Whats the bipap setting?     From [facility]  9/3/21 6:58 AM   Settings/Measurements CPAP/EPAP 14 cmH2O IPAP Min 24 cmH2O IPAP Max (40) Vt Ordered 600 mL Rate Ordered 22 Resp 22 FiO2 100 % Vt Exhaled 621 mL Minute Volume 13.4 Liters Mask Leak (lpm) 17 lpm Comfort Level Good Using Accessory Muscles No SpO2 95 Alarm Settings Alarms On Y  Read 6:59 AM     9/3/21 6:59 AM   he is on AVAPS  Read 6:59 AM       From Dr. Peace Dickson:   9/3/21 6:59 AM   Call RT to evaluate please     From [facility]  9/3/21 7:00 AM   Also page primary doc at 7am thank you

## 2021-09-03 NOTE — PROGRESS NOTES
Hospitalist Progress Note      PCP: Trudi Dickey MD    Date of Admission: 8/31/2021    Chief Complaint: SOB    Subjective: no new c/o. Medications:  Reviewed    Infusion Medications    sodium chloride      sodium chloride       Scheduled Medications    LORazepam  2 mg IntraVENous Once    diazePAM  5 mg Oral TID    chlordiazePOXIDE  25 mg Oral 4x Daily    nicotine  1 patch TransDERmal Daily    amLODIPine  10 mg Oral Daily    budesonide-formoterol  2 puff Inhalation BID    carvedilol  12.5 mg Oral BID WC    lisinopril  5 mg Oral Daily    nicotine  1 patch TransDERmal Daily    pantoprazole  40 mg Oral QAM AC    tamsulosin  0.4 mg Oral Daily    thiamine  100 mg Oral Daily    cefTRIAXone (ROCEPHIN) IV  1,000 mg IntraVENous Daily    azithromycin  250 mg Oral Daily    methylPREDNISolone  40 mg IntraVENous BID    ipratropium-albuterol  1 ampule Inhalation Q4H WA    sodium chloride flush  5-40 mL IntraVENous 2 times per day    enoxaparin  40 mg SubCUTAneous Daily     PRN Meds: sodium chloride flush, sodium chloride, LORazepam **OR** LORazepam **OR** LORazepam **OR** LORazepam **OR** [DISCONTINUED] LORazepam **OR** [DISCONTINUED] LORazepam **OR** [DISCONTINUED] LORazepam **OR** [DISCONTINUED] LORazepam, sodium chloride flush, sodium chloride, ondansetron **OR** ondansetron, polyethylene glycol, acetaminophen **OR** acetaminophen      Intake/Output Summary (Last 24 hours) at 9/3/2021 0820  Last data filed at 9/3/2021 0523  Gross per 24 hour   Intake 0 ml   Output 500 ml   Net -500 ml       Physical Exam Performed:    /78   Pulse 78   Temp 97.9 °F (36.6 °C) (Axillary)   Resp 28   Ht 5' 10\" (1.778 m)   Wt 263 lb (119.3 kg)   SpO2 95%   BMI 37.74 kg/m²     General appearance: No apparent distress, appears stated age and cooperative. HEENT: Pupils equal, round, and reactive to light. Conjunctivae/corneas clear. Neck: Supple, with full range of motion.  No jugular venous distention. Trachea midline. Respiratory:  Normal respiratory effort. Clear to auscultation, bilaterally without Rales/Wheezes/Rhonchi. Cardiovascular: Regular rate and rhythm with normal S1/S2 without murmurs, rubs or gallops. Abdomen: Soft, non-tender, non-distended with normal bowel sounds. Musculoskeletal: No clubbing, cyanosis or edema bilaterally. Full range of motion without deformity. Skin: Skin color, texture, turgor normal.  No rashes or lesions. Neurologic:  Neurovascularly intact without any focal sensory/motor deficits. Cranial nerves: II-XII intact, grossly non-focal.  Psychiatric: Alert and oriented, thought content appropriate, normal insight  Capillary Refill: Brisk,< 3 seconds   Peripheral Pulses: +2 palpable, equal bilaterally       Labs:   Recent Labs     09/01/21  0457 09/02/21  1006 09/03/21  0447   WBC 11.7* 14.8* 14.7*   HGB 14.6 14.2 14.9   HCT 46.7 45.3 46.8    358 337     Recent Labs     09/01/21  0457 09/03/21 0447    142   K 5.2* 5.1   CL 98* 95*   CO2 41* 43*   BUN 16 26*   CREATININE 0.7* 0.8   CALCIUM 9.6 9.7   PHOS 3.5 4.0     Recent Labs     09/03/21 0447   AST 16   ALT 33   BILIDIR <0.2   BILITOT 0.3   ALKPHOS 76     No results for input(s): INR in the last 72 hours. No results for input(s): Miladis Teetee in the last 72 hours.     Urinalysis:      Lab Results   Component Value Date    NITRU Negative 03/28/2021    WBCUA None seen 03/28/2021    BACTERIA Rare 03/28/2021    RBCUA None seen 03/28/2021    BLOODU Negative 03/28/2021    SPECGRAV 1.020 03/28/2021    GLUCOSEU Negative 03/28/2021       Consults:    IP CONSULT TO HOSPITALIST  IP CONSULT TO SOCIAL WORK  IP CONSULT TO PULMONOLOGY      Assessment/Plan:    Active Hospital Problems    Diagnosis     HTN (hypertension) [I10]     GERD (gastroesophageal reflux disease) [K21.9]     BPH (benign prostatic hyperplasia) [N40.0]     Hx of chronic respiratory failure [Z87.09]     Obesity [E66.9]     Alcohol use [Z72.89]     Tobacco abuse [Z72.0]     Acute respiratory failure with hypoxia ([facility]) [J96.01]     COPD (chronic obstructive pulmonary disease) ([facility]) [J44.9]        COPD - w/ chronic respiratory failure on baseline home O2 at 4L. Normally controlled on home medication regimen - continued. Here w/ acute exacerbation. Started on Steroids/HHN - continued.       PNA - likely CAP w/ Strep Pneumonia. Started on empiric Rocephin/Azithro on 31 August.  Changed to DAILY dosing. W/out evidence of Sepsis.      Acute on Chronic Respiratory Failure - w/ hypoxia/hypercapnea, POArrival.  Presence of clinical respiratory distress w/ tachypnea/dyspnea/SOB and wheezing w/ use of accessory muscles to breath - initially requiring BiPap, subsequently off and then back on at rapid response. Supplemental O2 and wean as tolerated.      Encephalopathy - acute hypoxic/hypercapneic, 2nd to above. Will continue to follow clinical response w/ supportive care PRN.    Tobacco Abuse - active and ongoing. Cessation counseled. Nicotine replacement PRN.     Alcohol Abuse - active and ongoing w/ probable dependence but no evidence of w/drawal - though currently agitated. Cessation counseled. Placed on scheduled Medium dose Valium and started on CIWA w/ PRN Ativan.       HTN - w/out known CAD and no evidence of active signs/sxs of ischemia/failure. Currently controlled on home meds w/ vitals reviewed and documented as above.     GERD - w/out active signs/sxs of dysphagia/odynophagia. No evidence of active PUD or hx of GI bleed. Controlled on home PPI - continue.     BPH - w/out acute obstructive Uropathy, controlled on home medications as ordered - continued.      Obesity -  With Body mass index is 38.31 kg/m². Complicating assessment and treatment. Placing patient at risk for multiple co-morbidities as well as early death and contributing to the patient's presentation.  Counseled on weight loss.       DVT Prophylaxis: LMWH     Recent

## 2021-09-03 NOTE — PROGRESS NOTES
Patient no longer appears agitated and is resting comfortably. Held hydroxyzine for now. Will continue to monitor patient.

## 2021-09-03 NOTE — PROGRESS NOTES
Message sent to Dr. Martínez Nath: Patient ripped iv out and nursing staff unable to obtain any access. Also patient is very incontinent at this time, can I place a ott to manage i&o's better?

## 2021-09-03 NOTE — PROGRESS NOTES
09/03/21 0015   NIV Type   $NIV $Daily Charge   NIV Started/Stopped On   Equipment Type V60   Mode AVAPS   Mask Type Full face mask   Mask Size Large   Settings/Measurements   CPAP/EPAP 14 cmH2O   IPAP Min 24 cmH2O   IPAP Max   (40)   Vt Ordered 600 mL   Rate Ordered 22   Resp 22   FiO2  100 %   I Time/ I Time % 0.9 s   Vt Exhaled 575 mL   Minute Volume 9.9 Liters   Mask Leak (lpm) 2 lpm   Comfort Level Good   Using Accessory Muscles No   SpO2 96   Alarm Settings   Alarms On Y   Press Low Alarm 6 cmH2O   High Pressure Alarm 30 cmH2O   Delay Alarm 20 sec(s)   Resp Rate Low Alarm 6   High Respiratory Rate 40 br/min

## 2021-09-03 NOTE — PROGRESS NOTES
09/02/21 2019   NIV Type   NIV Started/Stopped On   Equipment Type V60   Mode AVAPS   Mask Type Full face mask   Mask Size Large   Settings/Measurements   CPAP/EPAP 14 cmH2O   IPAP Min 24 cmH2O   IPAP Max   (40)   Vt Ordered 600 mL   Rate Ordered 22   Resp 22   FiO2  100 %   I Time/ I Time % 1 s   Vt Exhaled 650 mL   Minute Volume 14.2 Liters   Mask Leak (lpm) 44 lpm   Comfort Level Good   Using Accessory Muscles No   SpO2 95   Alarm Settings   Alarms On Y   Press Low Alarm 6 cmH2O   High Pressure Alarm 30 cmH2O   Delay Alarm 20 sec(s)   Resp Rate Low Alarm 6   High Respiratory Rate 40 br/min

## 2021-09-04 LAB
ALBUMIN SERPL-MCNC: 3.8 G/DL (ref 3.4–5)
ANION GAP SERPL CALCULATED.3IONS-SCNC: 5 MMOL/L (ref 3–16)
BASE EXCESS ARTERIAL: 13 MMOL/L (ref -3–3)
BLOOD CULTURE, ROUTINE: NORMAL
BUN BLDV-MCNC: 23 MG/DL (ref 7–20)
CALCIUM SERPL-MCNC: 9.7 MG/DL (ref 8.3–10.6)
CARBOXYHEMOGLOBIN ARTERIAL: 0.6 % (ref 0–1.5)
CHLORIDE BLD-SCNC: 96 MMOL/L (ref 99–110)
CO2: 40 MMOL/L (ref 21–32)
CREAT SERPL-MCNC: 0.6 MG/DL (ref 0.8–1.3)
CULTURE, BLOOD 2: NORMAL
GFR AFRICAN AMERICAN: >60
GFR NON-AFRICAN AMERICAN: >60
GLUCOSE BLD-MCNC: 164 MG/DL (ref 70–99)
HCO3 ARTERIAL: 40.9 MMOL/L (ref 21–29)
HCT VFR BLD CALC: 47.6 % (ref 40.5–52.5)
HEMOGLOBIN, ART, EXTENDED: 16.7 G/DL (ref 13.5–17.5)
HEMOGLOBIN: 15.5 G/DL (ref 13.5–17.5)
MAGNESIUM: 2.2 MG/DL (ref 1.8–2.4)
MCH RBC QN AUTO: 30.4 PG (ref 26–34)
MCHC RBC AUTO-ENTMCNC: 32.5 G/DL (ref 31–36)
MCV RBC AUTO: 93.5 FL (ref 80–100)
METHEMOGLOBIN ARTERIAL: 0.5 %
O2 SAT, ARTERIAL: 92.2 %
O2 THERAPY: ABNORMAL
PCO2 ARTERIAL: 63.3 MMHG (ref 35–45)
PDW BLD-RTO: 15.4 % (ref 12.4–15.4)
PH ARTERIAL: 7.43 (ref 7.35–7.45)
PHOSPHORUS: 2.4 MG/DL (ref 2.5–4.9)
PLATELET # BLD: 312 K/UL (ref 135–450)
PMV BLD AUTO: 7.7 FL (ref 5–10.5)
PO2 ARTERIAL: 65.3 MMHG (ref 75–108)
POTASSIUM SERPL-SCNC: 4.3 MMOL/L (ref 3.5–5.1)
PRO-BNP: 233 PG/ML (ref 0–124)
PROCALCITONIN: 0.03 NG/ML (ref 0–0.15)
RBC # BLD: 5.09 M/UL (ref 4.2–5.9)
SODIUM BLD-SCNC: 141 MMOL/L (ref 136–145)
TCO2 ARTERIAL: 42.8 MMOL/L
WBC # BLD: 11.8 K/UL (ref 4–11)

## 2021-09-04 PROCEDURE — 6370000000 HC RX 637 (ALT 250 FOR IP): Performed by: NURSE PRACTITIONER

## 2021-09-04 PROCEDURE — 85027 COMPLETE CBC AUTOMATED: CPT

## 2021-09-04 PROCEDURE — 02HV33Z INSERTION OF INFUSION DEVICE INTO SUPERIOR VENA CAVA, PERCUTANEOUS APPROACH: ICD-10-PCS | Performed by: INTERNAL MEDICINE

## 2021-09-04 PROCEDURE — 2580000003 HC RX 258: Performed by: INTERNAL MEDICINE

## 2021-09-04 PROCEDURE — 83735 ASSAY OF MAGNESIUM: CPT

## 2021-09-04 PROCEDURE — 94660 CPAP INITIATION&MGMT: CPT

## 2021-09-04 PROCEDURE — 82803 BLOOD GASES ANY COMBINATION: CPT

## 2021-09-04 PROCEDURE — 6360000002 HC RX W HCPCS: Performed by: INTERNAL MEDICINE

## 2021-09-04 PROCEDURE — 94640 AIRWAY INHALATION TREATMENT: CPT

## 2021-09-04 PROCEDURE — 99233 SBSQ HOSP IP/OBS HIGH 50: CPT | Performed by: INTERNAL MEDICINE

## 2021-09-04 PROCEDURE — 76937 US GUIDE VASCULAR ACCESS: CPT

## 2021-09-04 PROCEDURE — 2000000000 HC ICU R&B

## 2021-09-04 PROCEDURE — 36415 COLL VENOUS BLD VENIPUNCTURE: CPT

## 2021-09-04 PROCEDURE — 84145 PROCALCITONIN (PCT): CPT

## 2021-09-04 PROCEDURE — 83880 ASSAY OF NATRIURETIC PEPTIDE: CPT

## 2021-09-04 PROCEDURE — 94761 N-INVAS EAR/PLS OXIMETRY MLT: CPT

## 2021-09-04 PROCEDURE — C1751 CATH, INF, PER/CENT/MIDLINE: HCPCS

## 2021-09-04 PROCEDURE — 51702 INSERT TEMP BLADDER CATH: CPT

## 2021-09-04 PROCEDURE — 6370000000 HC RX 637 (ALT 250 FOR IP): Performed by: INTERNAL MEDICINE

## 2021-09-04 PROCEDURE — 80069 RENAL FUNCTION PANEL: CPT

## 2021-09-04 PROCEDURE — 2700000000 HC OXYGEN THERAPY PER DAY

## 2021-09-04 PROCEDURE — 84443 ASSAY THYROID STIM HORMONE: CPT

## 2021-09-04 PROCEDURE — 2500000003 HC RX 250 WO HCPCS: Performed by: NURSE PRACTITIONER

## 2021-09-04 RX ORDER — FOLIC ACID 1 MG/1
1 TABLET ORAL DAILY
Status: DISCONTINUED | OUTPATIENT
Start: 2021-09-04 | End: 2021-09-08 | Stop reason: HOSPADM

## 2021-09-04 RX ORDER — SODIUM CHLORIDE 0.9 % (FLUSH) 0.9 %
5-40 SYRINGE (ML) INJECTION EVERY 12 HOURS SCHEDULED
Status: DISCONTINUED | OUTPATIENT
Start: 2021-09-04 | End: 2021-09-08 | Stop reason: HOSPADM

## 2021-09-04 RX ORDER — FUROSEMIDE 10 MG/ML
40 INJECTION INTRAMUSCULAR; INTRAVENOUS ONCE
Status: COMPLETED | OUTPATIENT
Start: 2021-09-04 | End: 2021-09-04

## 2021-09-04 RX ORDER — HYDRALAZINE HYDROCHLORIDE 20 MG/ML
10 INJECTION INTRAMUSCULAR; INTRAVENOUS 2 TIMES DAILY
Status: DISCONTINUED | OUTPATIENT
Start: 2021-09-04 | End: 2021-09-06

## 2021-09-04 RX ORDER — SODIUM CHLORIDE 0.9 % (FLUSH) 0.9 %
5-40 SYRINGE (ML) INJECTION PRN
Status: DISCONTINUED | OUTPATIENT
Start: 2021-09-04 | End: 2021-09-08 | Stop reason: HOSPADM

## 2021-09-04 RX ORDER — LIDOCAINE HYDROCHLORIDE 10 MG/ML
5 INJECTION, SOLUTION INFILTRATION; PERINEURAL ONCE
Status: DISCONTINUED | OUTPATIENT
Start: 2021-09-04 | End: 2021-09-08 | Stop reason: HOSPADM

## 2021-09-04 RX ORDER — HYDRALAZINE HYDROCHLORIDE 20 MG/ML
10 INJECTION INTRAMUSCULAR; INTRAVENOUS EVERY 4 HOURS PRN
Status: DISCONTINUED | OUTPATIENT
Start: 2021-09-04 | End: 2021-09-04

## 2021-09-04 RX ORDER — SODIUM CHLORIDE 9 MG/ML
25 INJECTION, SOLUTION INTRAVENOUS PRN
Status: DISCONTINUED | OUTPATIENT
Start: 2021-09-04 | End: 2021-09-08 | Stop reason: HOSPADM

## 2021-09-04 RX ADMIN — SODIUM CHLORIDE, PRESERVATIVE FREE 10 ML: 5 INJECTION INTRAVENOUS at 08:21

## 2021-09-04 RX ADMIN — PANTOPRAZOLE SODIUM 40 MG: 40 TABLET, DELAYED RELEASE ORAL at 11:36

## 2021-09-04 RX ADMIN — LORAZEPAM 2 MG: 2 INJECTION INTRAMUSCULAR; INTRAVENOUS at 04:17

## 2021-09-04 RX ADMIN — FOLIC ACID 1 MG: 1 TABLET ORAL at 11:35

## 2021-09-04 RX ADMIN — SODIUM CHLORIDE, PRESERVATIVE FREE 10 ML: 5 INJECTION INTRAVENOUS at 20:30

## 2021-09-04 RX ADMIN — Medication 100 MG: at 11:36

## 2021-09-04 RX ADMIN — Medication 0.6 MCG/KG/HR: at 03:29

## 2021-09-04 RX ADMIN — CHLORDIAZEPOXIDE HYDROCHLORIDE 25 MG: 25 CAPSULE ORAL at 11:36

## 2021-09-04 RX ADMIN — AMLODIPINE BESYLATE 10 MG: 5 TABLET ORAL at 11:35

## 2021-09-04 RX ADMIN — CHLORDIAZEPOXIDE HYDROCHLORIDE 25 MG: 25 CAPSULE ORAL at 20:38

## 2021-09-04 RX ADMIN — SODIUM CHLORIDE, PRESERVATIVE FREE 10 ML: 5 INJECTION INTRAVENOUS at 13:42

## 2021-09-04 RX ADMIN — CEFTRIAXONE SODIUM 1000 MG: 1 INJECTION, POWDER, FOR SOLUTION INTRAMUSCULAR; INTRAVENOUS at 09:21

## 2021-09-04 RX ADMIN — DIAZEPAM 5 MG: 5 TABLET ORAL at 11:35

## 2021-09-04 RX ADMIN — AZITHROMYCIN DIHYDRATE 250 MG: 250 TABLET, FILM COATED ORAL at 11:36

## 2021-09-04 RX ADMIN — SODIUM CHLORIDE 25 ML: 9 INJECTION, SOLUTION INTRAVENOUS at 09:21

## 2021-09-04 RX ADMIN — LORAZEPAM 2 MG: 2 INJECTION INTRAMUSCULAR; INTRAVENOUS at 11:56

## 2021-09-04 RX ADMIN — TAMSULOSIN HYDROCHLORIDE 0.4 MG: 0.4 CAPSULE ORAL at 11:36

## 2021-09-04 RX ADMIN — HYDRALAZINE HYDROCHLORIDE 10 MG: 20 INJECTION INTRAMUSCULAR; INTRAVENOUS at 09:17

## 2021-09-04 RX ADMIN — ENOXAPARIN SODIUM 40 MG: 40 INJECTION SUBCUTANEOUS at 09:21

## 2021-09-04 RX ADMIN — IPRATROPIUM BROMIDE AND ALBUTEROL SULFATE 1 AMPULE: .5; 3 SOLUTION RESPIRATORY (INHALATION) at 07:46

## 2021-09-04 RX ADMIN — Medication 0.7 MCG/KG/HR: at 20:00

## 2021-09-04 RX ADMIN — METHYLPREDNISOLONE SODIUM SUCCINATE 40 MG: 40 INJECTION, POWDER, FOR SOLUTION INTRAMUSCULAR; INTRAVENOUS at 20:37

## 2021-09-04 RX ADMIN — Medication 0.7 MCG/KG/HR: at 08:20

## 2021-09-04 RX ADMIN — IPRATROPIUM BROMIDE AND ALBUTEROL SULFATE 1 AMPULE: .5; 3 SOLUTION RESPIRATORY (INHALATION) at 15:15

## 2021-09-04 RX ADMIN — Medication 0.6 MCG/KG/HR: at 13:44

## 2021-09-04 RX ADMIN — HYDRALAZINE HYDROCHLORIDE 10 MG: 20 INJECTION INTRAMUSCULAR; INTRAVENOUS at 18:01

## 2021-09-04 RX ADMIN — IPRATROPIUM BROMIDE AND ALBUTEROL SULFATE 1 AMPULE: .5; 3 SOLUTION RESPIRATORY (INHALATION) at 20:01

## 2021-09-04 RX ADMIN — IPRATROPIUM BROMIDE AND ALBUTEROL SULFATE 1 AMPULE: .5; 3 SOLUTION RESPIRATORY (INHALATION) at 11:56

## 2021-09-04 RX ADMIN — FUROSEMIDE 40 MG: 10 INJECTION, SOLUTION INTRAMUSCULAR; INTRAVENOUS at 13:41

## 2021-09-04 RX ADMIN — DIAZEPAM 5 MG: 5 TABLET ORAL at 20:38

## 2021-09-04 RX ADMIN — SODIUM CHLORIDE, PRESERVATIVE FREE 10 ML: 5 INJECTION INTRAVENOUS at 20:31

## 2021-09-04 RX ADMIN — METHYLPREDNISOLONE SODIUM SUCCINATE 40 MG: 40 INJECTION, POWDER, FOR SOLUTION INTRAMUSCULAR; INTRAVENOUS at 09:17

## 2021-09-04 ASSESSMENT — PAIN SCALES - GENERAL: PAINLEVEL_OUTOF10: 0

## 2021-09-04 NOTE — PROGRESS NOTES
Upon arrival to place PICC line assessed chart for issues related to picc placement, check for consent, and did time out with RN Kar Gooden. Pt. Tolerated PICC placement well, no difficulty accessing basilic vein and 3CG technology used to verify PICC tip placement. Positive P wave with no negative deflection. Printed wave form and placed In chart.  Reported off to [employer]

## 2021-09-04 NOTE — PROGRESS NOTES
Hospitalist Progress Note  9/4/2021 12:00 PM  Subjective:   Admit Date: 8/31/2021  PCP: Misa Sandoval MD Status: Inpatient [101]  Interval History: Hospital Day: 6, admitted with acute on chronic respiratory failure with hypercapnia and metabolic encephalopathy secondary to COPD exacerbation (baseline home oxygen 4 LPM) and Strep pneumonia treated with ceftriaxone and azithromycin. Complicated by alcohol withdrawal refractory to CIWA directed lorazepam requiring transfer to ICU overnight and IV dexmedetomidine. Diet: Regular  Left upper arm peripheral IV (9/3, day #2)  Medications:     dexmedetomidine 0.7 mcg/kg/hr      diazepam  5 mg Oral TID   chlordiazepoxide  25 mg Oral 4x Daily   amlodipine  10 mg Oral Daily   budesonide-formoterol  2 puff Inhalation BID   carvedilol  12.5 mg Oral BID WC   lisinopril  5 mg Oral Daily   nicotine  1 patch TransDERmal Daily   pantoprazole  40 mg Oral QAM AC   tamsulosin  0.4 mg Oral Daily   thiamine  100 mg Oral Daily   ceftriaxone  1,000 mg IntraVENous Daily   azithromycin  250 mg Oral Daily   methylprednisolone  40 mg IntraVENous BID   ipratropium-albuterol  1 ampule Inhalation Q4H WA   enoxaparin  40 mg SubCUTAneous Daily     Recent Labs     09/02/21  1006 09/03/21  0447 09/04/21  0430   WBC 14.8* 14.7* 11.8*   HGB 14.2 14.9 15.5    337 312   MCV 97.3 94.5 93.5     Recent Labs     09/03/21  0447 09/04/21  0430    141   K 5.1 4.3   CL 95* 96*   CO2 43* 40*   BUN 26* 23*   CREATININE 0.8 0.6*   GLUCOSE 142* 164*     Recent Labs     09/03/21  0447   AST 16   ALT 33   BILITOT 0.3   ALKPHOS 76     ABG (9/4) 7.43 / 63 / 65  proBNP (9/4) 233 pg/mL  Magnesium (9/4) 2.20 mg/dL  Lactate (9/2) 3.5 mmol/L    Blood culture x 2 (9/2) no growth to date  SARS-CoV-2 NAAT (8/31) not detected    Portable CXR (9/3) Bilateral airspace disease and small to moderate left pleural effusion. Pneumonia or asymmetric edema are considerations. Portable CXR (8/31) Cardiomegaly. Patchy bilateral streaky pulmonary opacities suggestive of an atypical pneumonitis. Grisel Sanchez findings are mildly progressed compared to August 9, 2021.  Continued radiographic follow-up is recommended. Objective:   Vitals:  /108   Pulse 51   Temp 98.1 °F (axillary)   Resp 28   Ht 5' 10\"  Wt 119.3 kg  SpO2 96%   BMI 37.74 kg/m²   General appearance: alert and cooperative with exam  Lungs: diffuse rhonchi, decresaed overall air movement  Heart: regular rate and rhythm, S1, S2 normal, no murmur, click, rub or gallop  Abdomen: protuberant, audible but decreased bowel sounds, benign  Extremities: extremities normal, atraumatic, no cyanosis or edema  Neurologic:  Sedated on Precedex but otherwise no obvious focal neurologic deficits. Assessment and Plan:   1. Acute on chronic respiratory failure with hypercapnia and metabolic encephalopathy secondary to COPD exacerbation (baseline home oxygen 4 LPM) and Strep pneumonia treated with ceftriaxone and azithromycin. Continues on methylprednisolone 40 mg IV every 12 hours. Symbicort (budesonide-formoterol) 2-puff BID and Duonebs (ipratropium-albuterol) every 4 hours. Followed by pulmonary. CO2 retention on ABG resolving. 2. Alcohol withdrawal.  Continues on scheduled diazepam 5 mg TID and chlordiazepoxide 25 QID with dexmedetomidine (Precedex) 0.7 mcg/kg/hr. 3. Gastric reflux and stress ulcer prophylaxis with pantoprazole (Protonix) 40 mg daily. 4. Nicotine dependence on nicotine replacement therapy with 21 mg nicotine patch. 5. Hypertension:  Continues on amlodipine 10 mg and lisinopril 5 mg daily. 6. Prostate hypertrophy on tamsulosin 0.4 mg daily. 7. Obstructive sleep apnea:  Has CPAP at home but not using. 8. Class II obesity (BMI 53.1) complicates medical management. Advance Directive: Full Code  DVT prophylaxis with enoxaparin 40 mg sub-Q daily.    Discharge planning: will require another two days inpatient status      NILTON CANALES Marzena Anne MD, MD  Rounding Hospitalist

## 2021-09-04 NOTE — PROGRESS NOTES
09/04/21 1157   NIV Type   Skin Protection for O2 Device Yes   Location Nose   Mode AVAPS   Mask Type Full face mask   Mask Size Large   Settings/Measurements   CPAP/EPAP 14 cmH2O   IPAP Min 24 cmH2O   Vt Ordered 600 mL   Rate Ordered 28   Resp (!) 32   FiO2  80 %   I Time/ I Time % 0.7 s   Vt Exhaled 411 mL   Minute Volume 10.9 Liters   Mask Leak (lpm) 22 lpm   Comfort Level Fair   Using Accessory Muscles Yes   SpO2 94

## 2021-09-04 NOTE — PROGRESS NOTES
09/04/21 0414   NIV Type   Equipment Type v60   Mode AVAPS   Mask Type Full face mask   Mask Size Large   Settings/Measurements   CPAP/EPAP 14 cmH2O   IPAP Min 24 cmH2O   IPAP Max   (40)   Vt Ordered 600 mL   Rate Ordered 28   Resp (!) 32   FiO2  60 %   I Time/ I Time % 0.7 s   Vt Exhaled 556 mL   Minute Volume 14.9 Liters   Mask Leak (lpm) 21 lpm   Comfort Level Fair   Using Accessory Muscles Yes   SpO2 91   Alarm Settings   Alarms On Y   Press Low Alarm 6 cmH2O   High Pressure Alarm 45 cmH2O   Delay Alarm 20 sec(s)   Resp Rate Low Alarm 6   High Respiratory Rate 45 br/min

## 2021-09-04 NOTE — PROGRESS NOTES
GYN Pt transferred to ICU. Handoff report given to [facility] RN. [address] notified. RT notified. [facility] transferred with patient.

## 2021-09-04 NOTE — PROGRESS NOTES
Intensive Care Progress Note    Patient: Elsa Clement MRN: [id number]  Date of  Admission: 8/31/2021   YOB: 1960  Age: 62 y.o. Sex: male    Unit: [place] [address] CVU  Room/Bed: 0233/0233-01 Attending Physician: Bel Sanderson MD   Admitting Physician: Christian Stringer        Chief Complaint   Patient presents with    Shortness of Breath       60% at home, 2 duoneb given, arrived on cpap 93%      Acute on chronic respiratory failure, alcohol withdrawal     History Obtained From   domestic partner, mother, family member - sister     History of Present Illness   Is a 43-year-old gentleman who is known to have severe COPD along with alcohol use and tobacco use who comes into the hospital because of acute respiratory failure.  Patient was having shortness of breath about a day prior to coming into the hospital and found by EMS to be hypoxic upon arrival at the house.  The patient's significant other who is at the bedside.  Along with his mother, who is my patient, and sister. Patient is unable to give any history and was on BiPAP therapy  Patient apparently this morning had an issue of becoming confused and moving furniture around the room and trying to get out of the window. Patient was subdued and placed on BiPAP  However was not getting good tidal volumes        9/3/2021moved to the ICU and started on Precedex drip, still on AVAPS      Subjective: Patient tolerated AVAPS better with the Precedex drip  Still having some issues with agitation  No other issues overnight  Blood gas this morning showing improved CO2    ROS:A comprehensive review of systems was negative except for: above    Objective: Intake and Output:   Current Shift:   No intake/output data recorded.   Last three shifts:   09/03 0701 - 09/04 0700  In: 248.9 [I.V.:158.9]  Out: 601 [Urine:601]    Vent settings for last 24 hours:  [unfilled]    Hemodynamic parameters for last 24 hours:  [unfilled]    Physical Exam: Patient Vitals for the past 24 hrs:   BP Temp Temp src Pulse Resp SpO2   09/04/21 0751     28    09/04/21 0750     28    09/04/21 0600 (!) 179/108   51  96 %   09/04/21 0500 (!) 178/108   60  91 %   09/04/21 0415 (!) 162/104        09/04/21 0414     (!) 32    09/04/21 0400 (!) 162/104   56  92 %   09/04/21 0300 (!) 166/100   61  91 %   09/04/21 0200 (!) 161/104   68  93 %   09/04/21 0100 (!) 165/101   61 28 94 %   09/04/21 0000 (!) 158/99   62  95 %   09/03/21 2300 (!) 129/90   66  94 %   09/03/21 2208 (!) 153/94        09/03/21 2200 (!) 153/94   93  95 %   09/03/21 2130 (!) 159/95 98.1 °F (36.7 °C) Axillary      09/03/21 2107 (!) 146/82   91     09/03/21 2055     22    09/03/21 2040      92 %   09/03/21 1946 (!) 155/93 98.3 °F (36.8 °C) Axillary 89 28 98 %   09/03/21 1937 (!) 161/95   90     09/03/21 1633     28 98 %   09/03/21 1452 (!) 154/97 97.4 °F (36.3 °C) Axillary 79 28 98 %   09/03/21 1220     28    09/03/21 1045 (!) 181/100 98.9 °F (37.2 °C) Axillary 89     09/03/21 0941 (!) 177/94   80              Physical Exam  Vitals and nursing note reviewed. Constitutional:       General: He is not in acute distress. Appearance: Normal appearance. He is obese. HENT:      Head: Normocephalic and atraumatic. Right Ear: External ear normal.      Left Ear: External ear normal.      Nose: Nose normal.      Mouth/Throat:      Mouth: Mucous membranes are moist.      Pharynx: Oropharynx is clear. Eyes:      General:         Right eye: No discharge. Left eye: No discharge. Extraocular Movements: Extraocular movements intact. Conjunctiva/sclera: Conjunctivae normal.      Pupils: Pupils are equal, round, and reactive to light. Cardiovascular:      Rate and Rhythm: Normal rate and regular rhythm. Pulses: Normal pulses. Heart sounds: No murmur heard.      Pulmonary:      Effort: No respiratory distress. Breath sounds: No stridor. No wheezing, rhonchi or rales. Comments: Decreased breath sounds bilaterally  Chest:      Chest wall: No tenderness. Abdominal:      General: Bowel sounds are normal. There is no distension. Palpations: Abdomen is soft. There is no mass. Tenderness: There is no abdominal tenderness. Hernia: No hernia is present. Musculoskeletal:         General: Normal range of motion. Cervical back: Normal range of motion. No rigidity. Right lower leg: Edema present. Left lower leg: Edema present. Skin:     General: Skin is warm and dry. Coloration: Skin is not jaundiced or pale. Neurological:      Mental Status: He is alert. Comments: Somnolent on the Precedex drip           Labs:   Recent Labs     09/02/21  1006 09/03/21  0447 09/04/21  0430   WBC 14.8* 14.7* 11.8*   HGB 14.2 14.9 15.5   HCT 45.3 46.8 47.6    337 312      Recent Labs     09/03/21  0447 09/04/21  0430    141   K 5.1 4.3   CL 95* 96*   CO2 43* 40*   BUN 26* 23*   GLUCOSE 142* 164*       Additional labs:    Radiology, images personally reviewed.  Yes  Procedure Component Value Ref Range Date/Time      XR CHEST PORTABLE [[id number]] Collected: 09/03/21 0804     Order Status: Completed Updated: 09/03/21 0808     Narrative:       EXAMINATION:   ONE XRAY VIEW OF THE CHEST     9/3/2021 6:40 am     COMPARISON:   08/31/2021     HISTORY:   ORDERING SYSTEM PROVIDED HISTORY: Shortness of breath   TECHNOLOGIST PROVIDED HISTORY:   Reason for exam:->Shortness of breath   Reason for Exam: Shortness of breath     FINDINGS:   Cardiac leads project over the chest.  Left basilar pleuroparenchymal opacity   has developed with decreased definition of the left hemidiaphragm.  Diffuse   heterogeneous right-sided opacity is seen.  No pneumothorax.  Cardiac and   mediastinal silhouettes are reflective of patient rotation.      Impression:       Bilateral airspace disease and small to moderate left pleural effusion. Pneumonia or asymmetric edema are considerations. Other imaging: Not indicated      Micro: No growth to date    Assessment:     Principal Problem:    COPD (chronic obstructive pulmonary disease) (HCC)  Active Problems:    Acute respiratory failure with hypoxia (HCC)    Alcohol use    Tobacco abuse    Obesity    HTN (hypertension)    GERD (gastroesophageal reflux disease)    BPH (benign prostatic hyperplasia)    Hx of chronic respiratory failure  Resolved Problems:    * No resolved hospital problems.  *      Discussion / Plan:       Neurology  Agitation  Questionable alcohol withdrawal  Patient is on Librium at 25 mg 4 times daily along with Valium 5 mg 3 times daily  Also on Precedex drip  Will start to wean the Precedex drip as tolerated      Cardiovascular  Hypertension  Norvasc  Coreg 12.5 twice daily  Prinivil 5 mg daily      Pulmonary  Patient is fully vaccinated for Covid    COPD  Patient also has chronic respiratory failure with hypercapnia  Patient has been trialed on BiPAP therapy and failed  Patient was moved to AVAPS therapy and has significantly improved    Considerations for the future  Most likely has a restrictive lung disease  We will get bedside spirometry prior to discharge and nocturnal pulse ox on baseline oxygen prior to discharge  We will set up with a AVAPS therapy prior to discharge    ABG in the morning  Current PCO2 down to 63 from a high of 116, has greatly benefit from AVAPS therapy      Solu-Medrol 40 IV twice daily  DuoNeb's  Symbicort      Smoking  Transdermal nicotine patches  Patient had a carboxyhemoglobin of 8.5 upon admission      We will check procalcitonin  Currently on Rocephin and azithromycin    Gastrointestinal   Alcohol withdrawal questionable  On thiamine  We will add folic acid  On Librium and Valium  On Precedex drip    Endo  We will check TSH    Blood sugars controlled    Renal  Making good urine output  We will give dose of Lasix  Creatinine is normal      Prophylaxis  Lovenox 40 subcu daily  Protonix 40 mg daily      Lines  We will get PICC line placement      Hematology  Patient's ferritin was 21, may need iron replacement as an outpatient          Code Status: Full CODE STATUS        We will discuss with family when available      Discussed with nursing and respiratory therapy                                Myesha Andersen MD  [facility]  Pulmonary, Critical Care and Sleep Medicine  Office : [phone]    Please note that some or all of this record was generated using voice recognition software. If there are any questions about the content of this document, please contact the author as some errors in transcription may have occurred.

## 2021-09-04 NOTE — PROGRESS NOTES
09/04/21 1518   NIV Type   Skin Protection for O2 Device Yes   Location Nose   Mode AVAPS   Mask Type Full face mask   Mask Size Large   Settings/Measurements   CPAP/EPAP 14 cmH2O   IPAP Min 24 cmH2O   IPAP Max   (40)   Vt Ordered 600 mL   Rate Ordered 28   Resp 28   FiO2  80 %   I Time/ I Time % 0.7 s   Vt Exhaled 719 mL   Minute Volume 8.4 Liters   Mask Leak (lpm) 28 lpm   Comfort Level Fair   Using Accessory Muscles No   SpO2 97

## 2021-09-04 NOTE — PROGRESS NOTES
09/03/21 2055   NIV Type   NIV Started/Stopped On   Equipment Type v60   Mode AVAPS   Mask Type Full face mask   Mask Size Large   Settings/Measurements   CPAP/EPAP 14 cmH2O   IPAP Min 24 cmH2O   Vt Ordered 600 mL   Rate Ordered 22   Resp 22   Vt Exhaled 555 mL   Minute Volume 16.3 Liters   Mask Leak (lpm) 29 lpm   Comfort Level Poor   Using Accessory Muscles No   SpO2 92   Alarm Settings   Alarms On Y   Press Low Alarm 6 cmH2O   Delay Alarm 20 sec(s)   Resp Rate Low Alarm 6   High Respiratory Rate 40 br/min   Patient is very combative going to be transferred to the ICU

## 2021-09-04 NOTE — PROGRESS NOTES
09/04/21 0100   NIV Type   Equipment Type v60   Mode AVAPS   Mask Type Full face mask   Mask Size Large   Settings/Measurements   CPAP/EPAP 14 cmH2O   IPAP Min 24 cmH2O   IPAP Max   (40)   Vt Ordered 600 mL   Rate Ordered 28   Resp 28   FiO2  60 %   I Time/ I Time % 0.7 s   Vt Exhaled 535 mL   Minute Volume 15.3 Liters   Mask Leak (lpm) 59 lpm   Comfort Level Fair   Using Accessory Muscles Yes   SpO2 94   Alarm Settings   Alarms On Y   Press Low Alarm 6 cmH2O   High Pressure Alarm 45 cmH2O   Delay Alarm 20 sec(s)   Resp Rate Low Alarm 6   High Respiratory Rate 45 br/min

## 2021-09-04 NOTE — PROGRESS NOTES
09/04/21 0750   NIV Type   Skin Protection for O2 Device Yes   Location Nose   Equipment Type v60   Mode AVAPS   Mask Type Full face mask   Mask Size Large   Settings/Measurements   CPAP/EPAP 14 cmH2O   IPAP Min 24 cmH2O   IPAP Max   (40)   Vt Ordered 600 mL   Rate Ordered 28   Resp 28   FiO2  100 %   I Time/ I Time % 0.7 s   Vt Exhaled 764 mL   Minute Volume 20.09 Liters   Mask Leak (lpm) 32 lpm   Comfort Level Good   Using Accessory Muscles Yes   SpO2 99

## 2021-09-05 LAB
ALBUMIN SERPL-MCNC: 3.4 G/DL (ref 3.4–5)
ANION GAP SERPL CALCULATED.3IONS-SCNC: 9 MMOL/L (ref 3–16)
BASE EXCESS ARTERIAL: 11.8 MMOL/L (ref -3–3)
BASOPHILS ABSOLUTE: 0 K/UL (ref 0–0.2)
BASOPHILS RELATIVE PERCENT: 0.2 %
BUN BLDV-MCNC: 26 MG/DL (ref 7–20)
CALCIUM SERPL-MCNC: 9.6 MG/DL (ref 8.3–10.6)
CARBOXYHEMOGLOBIN ARTERIAL: 0.5 % (ref 0–1.5)
CHLORIDE BLD-SCNC: 96 MMOL/L (ref 99–110)
CO2: 36 MMOL/L (ref 21–32)
CREAT SERPL-MCNC: 0.7 MG/DL (ref 0.8–1.3)
EOSINOPHILS ABSOLUTE: 0 K/UL (ref 0–0.6)
EOSINOPHILS RELATIVE PERCENT: 0 %
GFR AFRICAN AMERICAN: >60
GFR NON-AFRICAN AMERICAN: >60
GLUCOSE BLD-MCNC: 142 MG/DL (ref 70–99)
HCO3 ARTERIAL: 37.1 MMOL/L (ref 21–29)
HCT VFR BLD CALC: 49.1 % (ref 40.5–52.5)
HEMOGLOBIN, ART, EXTENDED: 17 G/DL (ref 13.5–17.5)
HEMOGLOBIN: 16.3 G/DL (ref 13.5–17.5)
LYMPHOCYTES ABSOLUTE: 0.7 K/UL (ref 1–5.1)
LYMPHOCYTES RELATIVE PERCENT: 6 %
MCH RBC QN AUTO: 30.1 PG (ref 26–34)
MCHC RBC AUTO-ENTMCNC: 33.1 G/DL (ref 31–36)
MCV RBC AUTO: 90.7 FL (ref 80–100)
METHEMOGLOBIN ARTERIAL: 0.4 %
MONOCYTES ABSOLUTE: 0.6 K/UL (ref 0–1.3)
MONOCYTES RELATIVE PERCENT: 5.5 %
NEUTROPHILS ABSOLUTE: 10.3 K/UL (ref 1.7–7.7)
NEUTROPHILS RELATIVE PERCENT: 88.3 %
O2 SAT, ARTERIAL: 95 %
O2 THERAPY: ABNORMAL
PCO2 ARTERIAL: 48.7 MMHG (ref 35–45)
PDW BLD-RTO: 14.9 % (ref 12.4–15.4)
PH ARTERIAL: 7.5 (ref 7.35–7.45)
PHOSPHORUS: 4.2 MG/DL (ref 2.5–4.9)
PLATELET # BLD: 325 K/UL (ref 135–450)
PMV BLD AUTO: 8.2 FL (ref 5–10.5)
PO2 ARTERIAL: 73.2 MMHG (ref 75–108)
POTASSIUM SERPL-SCNC: 3.8 MMOL/L (ref 3.5–5.1)
RBC # BLD: 5.41 M/UL (ref 4.2–5.9)
SODIUM BLD-SCNC: 141 MMOL/L (ref 136–145)
TCO2 ARTERIAL: 38.6 MMOL/L
TSH REFLEX FT4: 1.45 UIU/ML (ref 0.27–4.2)
WBC # BLD: 11.7 K/UL (ref 4–11)

## 2021-09-05 PROCEDURE — 97530 THERAPEUTIC ACTIVITIES: CPT

## 2021-09-05 PROCEDURE — 85025 COMPLETE CBC W/AUTO DIFF WBC: CPT

## 2021-09-05 PROCEDURE — 97166 OT EVAL MOD COMPLEX 45 MIN: CPT

## 2021-09-05 PROCEDURE — 2580000003 HC RX 258: Performed by: INTERNAL MEDICINE

## 2021-09-05 PROCEDURE — 6360000002 HC RX W HCPCS: Performed by: INTERNAL MEDICINE

## 2021-09-05 PROCEDURE — 99233 SBSQ HOSP IP/OBS HIGH 50: CPT | Performed by: INTERNAL MEDICINE

## 2021-09-05 PROCEDURE — 6370000000 HC RX 637 (ALT 250 FOR IP): Performed by: INTERNAL MEDICINE

## 2021-09-05 PROCEDURE — 94660 CPAP INITIATION&MGMT: CPT

## 2021-09-05 PROCEDURE — 2060000000 HC ICU INTERMEDIATE R&B

## 2021-09-05 PROCEDURE — 94761 N-INVAS EAR/PLS OXIMETRY MLT: CPT

## 2021-09-05 PROCEDURE — 94640 AIRWAY INHALATION TREATMENT: CPT

## 2021-09-05 PROCEDURE — 2700000000 HC OXYGEN THERAPY PER DAY

## 2021-09-05 PROCEDURE — 80069 RENAL FUNCTION PANEL: CPT

## 2021-09-05 PROCEDURE — 6370000000 HC RX 637 (ALT 250 FOR IP): Performed by: NURSE PRACTITIONER

## 2021-09-05 PROCEDURE — 97162 PT EVAL MOD COMPLEX 30 MIN: CPT

## 2021-09-05 PROCEDURE — 82803 BLOOD GASES ANY COMBINATION: CPT

## 2021-09-05 RX ORDER — METHYLPREDNISOLONE SODIUM SUCCINATE 40 MG/ML
40 INJECTION, POWDER, LYOPHILIZED, FOR SOLUTION INTRAMUSCULAR; INTRAVENOUS DAILY
Status: DISCONTINUED | OUTPATIENT
Start: 2021-09-06 | End: 2021-09-07

## 2021-09-05 RX ORDER — DIAZEPAM 5 MG/1
5 TABLET ORAL 2 TIMES DAILY
Status: DISCONTINUED | OUTPATIENT
Start: 2021-09-05 | End: 2021-09-07

## 2021-09-05 RX ORDER — FUROSEMIDE 10 MG/ML
40 INJECTION INTRAMUSCULAR; INTRAVENOUS 2 TIMES DAILY
Status: DISCONTINUED | OUTPATIENT
Start: 2021-09-05 | End: 2021-09-07

## 2021-09-05 RX ORDER — CHLORDIAZEPOXIDE HYDROCHLORIDE 25 MG/1
25 CAPSULE, GELATIN COATED ORAL 3 TIMES DAILY
Status: DISCONTINUED | OUTPATIENT
Start: 2021-09-05 | End: 2021-09-08 | Stop reason: HOSPADM

## 2021-09-05 RX ADMIN — TAMSULOSIN HYDROCHLORIDE 0.4 MG: 0.4 CAPSULE ORAL at 08:55

## 2021-09-05 RX ADMIN — SODIUM CHLORIDE, PRESERVATIVE FREE 10 ML: 5 INJECTION INTRAVENOUS at 21:23

## 2021-09-05 RX ADMIN — PANTOPRAZOLE SODIUM 40 MG: 40 TABLET, DELAYED RELEASE ORAL at 06:42

## 2021-09-05 RX ADMIN — CARVEDILOL 12.5 MG: 3.12 TABLET, FILM COATED ORAL at 16:34

## 2021-09-05 RX ADMIN — CARVEDILOL 12.5 MG: 3.12 TABLET, FILM COATED ORAL at 08:55

## 2021-09-05 RX ADMIN — CEFTRIAXONE SODIUM 1000 MG: 1 INJECTION, POWDER, FOR SOLUTION INTRAMUSCULAR; INTRAVENOUS at 09:20

## 2021-09-05 RX ADMIN — CHLORDIAZEPOXIDE HYDROCHLORIDE 25 MG: 25 CAPSULE ORAL at 21:23

## 2021-09-05 RX ADMIN — SODIUM CHLORIDE, PRESERVATIVE FREE 10 ML: 5 INJECTION INTRAVENOUS at 10:12

## 2021-09-05 RX ADMIN — HYDRALAZINE HYDROCHLORIDE 10 MG: 20 INJECTION INTRAMUSCULAR; INTRAVENOUS at 09:07

## 2021-09-05 RX ADMIN — CHLORDIAZEPOXIDE HYDROCHLORIDE 25 MG: 25 CAPSULE ORAL at 16:34

## 2021-09-05 RX ADMIN — METHYLPREDNISOLONE SODIUM SUCCINATE 40 MG: 40 INJECTION, POWDER, FOR SOLUTION INTRAMUSCULAR; INTRAVENOUS at 09:07

## 2021-09-05 RX ADMIN — Medication 2 PUFF: at 19:48

## 2021-09-05 RX ADMIN — IPRATROPIUM BROMIDE AND ALBUTEROL SULFATE 1 AMPULE: .5; 3 SOLUTION RESPIRATORY (INHALATION) at 08:31

## 2021-09-05 RX ADMIN — IPRATROPIUM BROMIDE AND ALBUTEROL SULFATE 1 AMPULE: .5; 3 SOLUTION RESPIRATORY (INHALATION) at 16:39

## 2021-09-05 RX ADMIN — CHLORDIAZEPOXIDE HYDROCHLORIDE 25 MG: 25 CAPSULE ORAL at 08:56

## 2021-09-05 RX ADMIN — AZITHROMYCIN DIHYDRATE 250 MG: 250 TABLET, FILM COATED ORAL at 09:32

## 2021-09-05 RX ADMIN — AMLODIPINE BESYLATE 10 MG: 5 TABLET ORAL at 08:56

## 2021-09-05 RX ADMIN — DIAZEPAM 5 MG: 5 TABLET ORAL at 21:23

## 2021-09-05 RX ADMIN — ENOXAPARIN SODIUM 40 MG: 40 INJECTION SUBCUTANEOUS at 09:03

## 2021-09-05 RX ADMIN — FUROSEMIDE 40 MG: 10 INJECTION, SOLUTION INTRAMUSCULAR; INTRAVENOUS at 11:06

## 2021-09-05 RX ADMIN — FOLIC ACID 1 MG: 1 TABLET ORAL at 08:55

## 2021-09-05 RX ADMIN — Medication 100 MG: at 08:56

## 2021-09-05 RX ADMIN — DIAZEPAM 5 MG: 5 TABLET ORAL at 08:57

## 2021-09-05 RX ADMIN — IPRATROPIUM BROMIDE AND ALBUTEROL SULFATE 1 AMPULE: .5; 3 SOLUTION RESPIRATORY (INHALATION) at 19:41

## 2021-09-05 RX ADMIN — FUROSEMIDE 40 MG: 10 INJECTION, SOLUTION INTRAMUSCULAR; INTRAVENOUS at 16:35

## 2021-09-05 RX ADMIN — IPRATROPIUM BROMIDE AND ALBUTEROL SULFATE 1 AMPULE: .5; 3 SOLUTION RESPIRATORY (INHALATION) at 11:41

## 2021-09-05 RX ADMIN — LISINOPRIL 5 MG: 10 TABLET ORAL at 08:56

## 2021-09-05 RX ADMIN — TIOTROPIUM BROMIDE INHALATION SPRAY 2 PUFF: 3.12 SPRAY, METERED RESPIRATORY (INHALATION) at 11:40

## 2021-09-05 ASSESSMENT — PAIN SCALES - GENERAL
PAINLEVEL_OUTOF10: 0
PAINLEVEL_OUTOF10: 1
PAINLEVEL_OUTOF10: 0

## 2021-09-05 ASSESSMENT — ENCOUNTER SYMPTOMS: TACHYPNEA: 1

## 2021-09-05 NOTE — PROGRESS NOTES
This note also relates to the following rows which could not be included:  Resp - Cannot attach notes to unvalidated device data  SpO2 - Cannot attach notes to unvalidated device data       09/04/21 2010   Oxygen Therapy/Pulse Ox   O2 Therapy Oxygen humidified   O2 Device High flow nasal cannula   O2 Flow Rate (L/min) 8 L/min

## 2021-09-05 NOTE — PROGRESS NOTES
Occupational Therapy   Occupational Therapy Initial Assessment/Treatment  Date: 2021   Patient Name: Marbella Browning  MRN: [id number]     : 1960    Date of Service: 2021    Discharge Recommendations:  24 hour supervision or assist, Home with Home health OT  OT Equipment Recommendations  Equipment Needed: Yes  Mobility Devices: ADL Assistive Devices  ADL Assistive Devices: Shower Chair with back    Assessment   Performance deficits / Impairments: Decreased functional mobility ; Decreased ADL status; Decreased endurance;Decreased safe awareness;Decreased high-level IADLs    Assessment: Pt is a 66yo male with deficits in the areas listed above after a COPD exaccerbation and encephalopathy. Pt agreeable and pleasant today. Pt requiring min A for BM and CGA for functional mobility and t/fs with HHA. Pt performing ADLS with max A today. Pt would continue to benefit from skilled OT services to increase (I) and endurance for ADLS and functional mobility. Prognosis: Good  Decision Making: Medium Complexity  OT Education: OT Role;Plan of Care;Transfer Training  Patient Education: disease specific: POC while in acute care, general safety during hospitalization, importance of OOB mobility, safe t/fs. Pt verbalized understanding. REQUIRES OT FOLLOW UP: Yes  Activity Tolerance  Activity Tolerance: Patient Tolerated treatment well  Activity Tolerance: /63, HR 86, O2 93% on 8L  Safety Devices  Safety Devices in place: Yes  Type of devices: Nurse notified;Gait belt;Call light within reach; Left in chair (RN in room with pt at end of session.)           Patient Diagnosis(es): The primary encounter diagnosis was COPD exacerbation ([facility] [address] Diagnoses of Acute respiratory failure with hypoxia and hypercapnia (HCC) and Pneumonia of both lower lobes due to infectious organism were also pertinent to this visit. has a past medical history of COPD (chronic obstructive pulmonary disease) ([address] and Hypertension.    has no past surgical history on file. Restrictions  Restrictions/Precautions  Restrictions/Precautions: Up as Tolerated, Fall Risk, Seizure    Subjective   General  Chart Reviewed: Yes  Patient assessed for rehabilitation services?: Yes  Response to previous treatment: Patient with no complaints from previous session  Family / Caregiver Present: No  Referring Practitioner: Mervat Aburto MD  Diagnosis: acute on chronic respiratory failure with hypercapnia and metabolic encephalopathy secondary to COPD exacerbation (baseline home oxygen 4 LPM) and Strep pneumonia treated with ceftriaxone and azithromycin. Complicated by alcohol withdrawal  Subjective  Subjective: Pt agreeable to therapy. PT in room at beginning of session. General Comment  Comments: RN cleared for therapy.   Patient Currently in Pain: Denies  Vital Signs  Pulse: 86  Heart Rate Source: Monitor  Resp: 26  BP: 120/63  BP Location: Right upper arm  MAP (mmHg): 77  Patient Position: Semi fowlers  Patient Currently in Pain: Denies  Oxygen Therapy  SpO2: 93 %  Pulse Oximeter Device Mode: Continuous  Pulse Oximeter Device Location: Finger  O2 Device: High flow nasal cannula  O2 Flow Rate (L/min): 8 L/min  Social/Functional History  Social/Functional History  Lives With: Friend(s)  Type of Home: Apartment  Home Layout: One level (Second floor)  Home Access: Stairs to enter with rails  Entrance Stairs - Number of Steps: 13  Entrance Stairs - Rails: Both  Bathroom Shower/Tub: Tub/Shower unit, Walk-in shower (Uses walk-in shower)  Bathroom Toilet: Standard  Home Equipment: Cane, Rolling walker  ADL Assistance: Independent  Homemaking Responsibilities: No (pt reports friend/roommate completes most cooking/cleaning)  Ambulation Assistance: Independent (Without AD)  Transfer Assistance: Independent  Occupation: Full time employment  Type of occupation: Owns business  Additional Comments: Pt reports friend/roommate works for him, can provide assistance at d/c if needed, pt denies recent hx of falls       Objective   Vision: Impaired  Vision Exceptions: Wears glasses for reading  Hearing: Exceptions to [facility]  Hearing Exceptions: Hard of hearing/hearing concerns    Orientation  Overall Orientation Status: Within Functional Limits  Observation/Palpation  Posture: Fair  Balance  Sitting Balance: Stand by assistance  Standing Balance: Contact guard assistance (with HHA)  Standing Balance  Time: ~20s  Activity: t/f from EOB to w/c  Comment: HHA for t/f  Functional Mobility  Functional - Mobility Device: Other (HHA- recommend RW in the future.)  Activity: Other  Assist Level: Contact guard assistance (t/f from EOB to w/c)  ADL  UE Dressing: Maximum assistance (to don hospital gown.)  LE Dressing: Maximum assistance (to don socks.)  Toileting: Dependent/Total (namrata)  Tone RUE  RUE Tone: Normotonic  Tone LUE  LUE Tone: Normotonic  Coordination  Movements Are Fluid And Coordinated: Yes     Bed mobility  Supine to Sit: Minimal assistance;Contact guard assistance (Min A+CGA with HOB elevated.)  Sit to Supine: Unable to assess  Scooting: Unable to assess  Comment: Pt ended session in w/c  Transfers  Sit to stand: Contact guard assistance  Stand to sit: Contact guard assistance  Transfer Comments: HHA     Cognition  Overall Cognitive Status: Exceptions  Arousal/Alertness: Appropriate responses to stimuli  Following Commands:  Follows all commands without difficulty  Attention Span: Appears intact  Memory: Decreased recall of recent events  Safety Judgement: Good awareness of safety precautions  Problem Solving: Assistance required to generate solutions;Decreased awareness of errors  Insights: Decreased awareness of deficits  Initiation: Does not require cues  Sequencing: Requires cues for some        Sensation  Overall Sensation Status: WFL        LUE AROM (degrees)  LUE AROM : WFL  Left Hand AROM (degrees)  Left Hand AROM: WFL  RUE AROM (degrees)  RUE AROM : WFL  Right Hand AROM (degrees)  Right Hand AROM: WFL  LUE Strength  Gross LUE Strength: WFL  RUE Strength  Gross RUE Strength: WFL                   Plan   Plan  Times per week: 3-5x/week  Current Treatment Recommendations: Strengthening, Functional Mobility Training, Endurance Training, Safety Education & Training, Patient/Caregiver Education & Training, Equipment Evaluation, Education, & procurement, Self-Care / ADL, Home Management Training    AM-PAC Score        AM-PAC Inpatient Daily Activity Raw Score: 14 (09/05/21 1416)  AM-PAC Inpatient ADL T-Scale Score : 33.39 (09/05/21 1416)  ADL Inpatient CMS 0-100% Score: 59.67 (09/05/21 1416)  ADL Inpatient CMS G-Code Modifier : CK (09/05/21 1416)    Goals  Short term goals  Time Frame for Short term goals: 1 week (9/11) unless stated otherwise. Short term goal 1: Pt will LB dress with set up and AE PRN. Short term goal 2: Pt will toilet with supervision and AD PRN. Short term goal 3: Pt will perform BUE ther ex x20 to increase endurance for functional activities (9/8)  Short term goal 4: Pt will perform 2-3 ADLS in stance with supervision and AD PRN. Short term goal 5: Pt will perform functional mobility with supervision and AD PRN. Patient Goals   Patient goals : \"to go home and get better\"       Therapy Time   Individual Concurrent Group Co-treatment   Time In 1313         Time Out 1333         Minutes 20         Timed Code Treatment Minutes: 10 Minutes (10min eval)       Sandra Valentin OTR/L  If pt discharges prior to next session, this note will serve as discharge summary. See case management note for discharge disposition.

## 2021-09-05 NOTE — PROGRESS NOTES
This note also relates to the following rows which could not be included:  Resp - Cannot attach notes to unvalidated device data       09/04/21 2001   NIV Type   Skin Protection for O2 Device Yes   NIV Started/Stopped On   Equipment Type v60   Mode AVAPS   Mask Type Full face mask   Settings/Measurements   CPAP/EPAP 14 cmH2O   IPAP Min 24 cmH2O   IPAP Max   (40)   Vt Ordered 600 mL   Rate Ordered 28   FiO2  60 %   Vt Exhaled 628 mL   Comfort Level Fair

## 2021-09-05 NOTE — PROGRESS NOTES
Received patient from [facility] alert and oriented in no acute distress monitor number [id number] verified with the cmu with continuous pulse oximetry. Up in wheelchair for lunch on 8 liters hi flow oxygen. Chair alarm on patient.

## 2021-09-05 NOTE — PROGRESS NOTES
09/05/21 0020   NIV Type   NIV Started/Stopped On   Equipment Type V60   Mode AVAPS   Mask Type Full face mask   Mask Size Large   Settings/Measurements   CPAP/EPAP 14 cmH2O   IPAP Min 24 cmH2O   IPAP Max   (40)   Vt Ordered 600 mL   Rate Ordered 28   Resp 29   FiO2  70 %   Vt Exhaled 556 mL   Minute Volume 16.3 Liters   Mask Leak (lpm) 31 lpm   Comfort Level Good   Using Accessory Muscles No   SpO2 92   Breath Sounds   Right Upper Lobe Diminished   Right Middle Lobe Diminished   Right Lower Lobe Diminished   Left Upper Lobe Diminished   Left Lower Lobe Diminished   Alarm Settings   Alarms On Y

## 2021-09-05 NOTE — PROGRESS NOTES
Intensive Care Progress Note    Patient: Liam Milner MRN: [id number]  Date of  Admission: 8/31/2021   YOB: 1960  Age: 62 y.o. Sex: male    Unit: [place] [id number] CVU  Room/Bed: 0233/0233-01 Attending Physician: Elaine Kaplan MD   Admitting Physician: Zahra Lyons        Chief Complaint   Patient presents with    Shortness of Breath       60% at home, 2 duoneb given, arrived on cpap 93%      Acute on chronic respiratory failure, alcohol withdrawal     History Obtained From   domestic partner, mother, family member - sister     History of Present Illness   Is a 60-year-old gentleman who is known to have severe COPD along with alcohol use and tobacco use who comes into the hospital because of acute respiratory failure.  Patient was having shortness of breath about a day prior to coming into the hospital and found by EMS to be hypoxic upon arrival at the house.  The patient's significant other who is at the bedside.  Along with his mother, who is my patient, and sister. Patient is unable to give any history and was on BiPAP therapy  Patient apparently this morning had an issue of becoming confused and moving furniture around the room and trying to get out of the window. Patient was subdued and placed on BiPAP  However was not getting good tidal volumes        9/3/2021moved to the ICU and started on Precedex drip, still on AVAPS    9/4/2021tolerated the Precedex drip, tolerated the AVAPS, ABGs significantly improved. Subjective:   Patient awoken today and able to converse,   Precedex will be weaned off    We will get to wean oxygen, and currently on 8 L/min  Patient reports that he stopped alcohol use in April 2021        ROS:A comprehensive review of systems was negative except for: above    Objective: Intake and Output:   Current Shift:   09/05 0701 - 09/05 1500  In: 180 [P.O.:170;  I.V.:10]  Out: -   Last three shifts:   09/04 0701 - 09/05 0700  In: 545.6 [I.V.:495.6]  Out: 5295 [Urine:5295]    Vent settings for last 24 hours:  [unfilled]    Hemodynamic parameters for last 24 hours:  [unfilled]    Physical Exam:   Patient Vitals for the past 24 hrs:   BP Temp Temp src Pulse Resp SpO2   09/05/21 0900 130/78   77 19 94 %   09/05/21 0820    68 23 96 %   09/05/21 0802 119/79 97.6 °F (36.4 °C) Oral 68 12 97 %   09/05/21 0500 111/79   66 10 94 %   09/05/21 0433     28    09/05/21 0400 114/76 99.2 °F (37.3 °C) Bladder 65 28 93 %   09/05/21 0300 108/77   65 28 94 %   09/05/21 0200 113/77 98.7 °F (37.1 °C) Bladder 66 25 94 %   09/05/21 0100 114/78   67 28 92 %   09/05/21 0020     29    09/05/21 0000 135/83   70 13 91 %   09/04/21 2300 137/80   70 18 93 %   09/04/21 2200 132/81   66 16 93 %   09/04/21 2100 135/84   73 20 94 %   09/04/21 2046 135/86 97.4 °F (36.3 °C) Bladder 70 20 93 %   09/04/21 2010     22 95 %   09/04/21 2001     20    09/04/21 2000    84 14 95 %   09/04/21 1905 (!) 132/91   72 28 98 %   09/04/21 1900 (!) 174/94   76 16 97 %   09/04/21 1800    70 13 98 %   09/04/21 1700 (!) 171/107   55 28 98 %   09/04/21 1600 (!) 142/98 96.7 °F (35.9 °C) Bladder (!) 49 28 96 %   09/04/21 1518     28    09/04/21 1500 (!) 158/104   51 28 96 %   09/04/21 1400 (!) 169/105   50 28 96 %   09/04/21 1300 (!) 166/105   50 28 96 %   09/04/21 1200 (!) 168/117 97.2 °F (36.2 °C) Oral 66 14 95 %   09/04/21 1157     (!) 32    09/04/21 1100 (!) 190/111   65  99 %            Physical Exam  Vitals and nursing note reviewed. Constitutional:       General: He is not in acute distress. Appearance: Normal appearance. He is obese. HENT:      Head: Normocephalic and atraumatic. Right Ear: External ear normal.      Left Ear: External ear normal.      Nose: Nose normal.      Mouth/Throat:      Mouth: Mucous membranes are moist.      Pharynx: Oropharynx is clear. Eyes:      General:         Right eye: No discharge.          Left eye: No discharge. Extraocular Movements: Extraocular movements intact. Conjunctiva/sclera: Conjunctivae normal.      Pupils: Pupils are equal, round, and reactive to light. Cardiovascular:      Rate and Rhythm: Normal rate and regular rhythm. Pulses: Normal pulses. Heart sounds: No murmur heard. Pulmonary:      Effort: No respiratory distress. Breath sounds: No stridor. No wheezing, rhonchi or rales. Comments: Decreased breath sounds bilaterally  Chest:      Chest wall: No tenderness. Abdominal:      General: Bowel sounds are normal. There is no distension. Palpations: Abdomen is soft. There is no mass. Tenderness: There is no abdominal tenderness. Hernia: No hernia is present. Musculoskeletal:         General: Normal range of motion. Cervical back: Normal range of motion. No rigidity. Right lower leg: Edema present. Left lower leg: Edema present. Skin:     General: Skin is warm and dry. Coloration: Skin is not jaundiced or pale. Neurological:      Mental Status: He is alert. Comments: Somnolent on the Precedex drip           Labs:   Recent Labs     09/03/21 0447 09/04/21  0430 09/05/21  0455   WBC 14.7* 11.8* 11.7*   HGB 14.9 15.5 16.3   HCT 46.8 47.6 49.1    312 325      Recent Labs     09/03/21 0447 09/04/21  0430 09/05/21  0455    141 141   K 5.1 4.3 3.8   CL 95* 96* 96*   CO2 43* 40* 36*   BUN 26* 23* 26*   GLUCOSE 142* 164* 142*       Additional labs:    Radiology, images personally reviewed.  Yes  Procedure Component Value Ref Range Date/Time      XR CHEST PORTABLE [[id number]] Collected: 09/03/21 0804     Order Status: Completed Updated: 09/03/21 0808     Narrative:       EXAMINATION:   ONE XRAY VIEW OF THE CHEST     9/3/2021 6:40 am     COMPARISON:   08/31/2021     HISTORY:   ORDERING SYSTEM PROVIDED HISTORY: Shortness of breath   TECHNOLOGIST PROVIDED HISTORY:   Reason for exam:->Shortness of breath   Reason for Exam: Shortness of breath     FINDINGS:   Cardiac leads project over the chest.  Left basilar pleuroparenchymal opacity   has developed with decreased definition of the left hemidiaphragm.  Diffuse   heterogeneous right-sided opacity is seen.  No pneumothorax.  Cardiac and   mediastinal silhouettes are reflective of patient rotation.      Impression:       Bilateral airspace disease and small to moderate left pleural effusion. Pneumonia or asymmetric edema are considerations. Other imaging: Not indicated      Micro: No growth to date    Assessment:     Principal Problem:    COPD (chronic obstructive pulmonary disease) (HCC)  Active Problems:    Acute respiratory failure with hypoxia (HCC)    Alcohol use    Tobacco abuse    Obesity    HTN (hypertension)    GERD (gastroesophageal reflux disease)    BPH (benign prostatic hyperplasia)    Hx of chronic respiratory failure  Resolved Problems:    * No resolved hospital problems.  *      Discussion / Plan:       Neurology  Agitation  Questionable alcohol withdrawal  Patient reports that he stopped using alcohol in April 2021  However he seems to be tolerating CIWA protocol  We will start cutting back on the Librium and Valium          Cardiovascular  Hypertension  Norvasc  Coreg 12.5 twice daily  Prinivil 5 mg daily      We will give Lasix twice daily  BNP was slightly elevated at 233    Pulmonary  Patient is fully vaccinated for Covid    COPD  Patient also has chronic respiratory failure with hypercapnia  Patient has been trialed on BiPAP therapy and failed  Patient was moved to AVAPS therapy and has significantly improved    Considerations for the future  Most likely has a restrictive lung disease  We will get bedside spirometry prior to discharge and nocturnal pulse ox on baseline oxygen prior to discharge  We will set up with a AVAPS therapy prior to discharge    We will get bedside PFT    ABG in the morning  Current PCO2 down to 48 from 63 from a high of 116, has greatly benefit from AVAPS therapy    Ordering volume ventilator for nocturnal uses and daytime use as needed to reduce the risk of exacerbation. Bipap insufficient due to severity of condition. Absence of respiratory support will lead to death. Patient has restrictive lung disease. Patient will need targeted tidal volume which cannot be provided via a CPAP. Bipap/Bilevel will not adequately ventilate this patient in order to complete ADLs,   Bipap/Bilevel will not lower the patient's CO2 levels, and this patient will require a set tidal volume. Therefore Astral/Trilogy NIV is being ordered due to the severe state of this patient's disease. Without this therapy, the patient runs a higher risk of expiration.       Patient was tried on a BiPAP therapy  His PCO2 went from 116 to 93 up to 96  Patient failed BiPAP therapy, and BiPAP ST therapy    COPD  Solu-Medrol 40 IV twice daily, will drop this to once daily  DuoNeb's  Symbicort  We will add Spiriva      Smoking  Transdermal nicotine patches  Patient had a carboxyhemoglobin of 8.5 upon admission      Procalcitonin was negative  We will stop the Rocephin  Continue with azithromycin for COPD exacerbation    Wean oxygen as tolerated      Gastrointestinal   Alcohol withdrawal questionable  On thiamine   folic acid  On Librium and Valium  On Precedex drip, will wean    Endo  TSH was normal    Blood sugars controlled    Renal  Making good urine output  We will give dose of Lasix  Creatinine is normal      Prophylaxis  Lovenox 40 subcu daily  Protonix 40 mg daily      Lines  We will get PICC line placement      Hematology  Patient's ferritin was 21, may need iron replacement as an outpatient          Code Status: Full CODE STATUS        We will discuss with family when available      Discussed with nursing and respiratory therapy                                Melida Torres MD  [facility]  Pulmonary, Critical Care and Sleep Medicine  Office :

## 2021-09-05 NOTE — PROGRESS NOTES
Physical Therapy    Facility/Department: [facility] C4 PCU  Initial Assessment/Treatment    NAME: Kam Russell  : 1960  MRN: [id number]    Date of Service: 2021    Discharge Recommendations:  24 hour supervision or assist, Home with Home health PT   PT Equipment Recommendations  Equipment Needed: No  Other: Pt has RW and SPC    Assessment   Body structures, Functions, Activity limitations: Decreased functional mobility ; Decreased balance;Decreased strength;Decreased endurance  Assessment: Pt is a 62 y.o. male admitted to [facility] secondary to acute on chronic respiratory failure. 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 Pt reports he is typically I with functional mobility and gait without an AD. Pt is currently functioning below his baseline demosntrating bed mobility with CGA/Susi, t/f with CGA/Susi and gait x 5' with HHA with CGA/Susi. Pt is limited by decreased activity tolerance and decreased balance. Pt will benefit from continued skilled PT in acute care setting to address above deficits. Recommend pt d/c home with 24hr assist and HHPT. Anticipate no DME needs as pt has RW and SPC at home. Treatment Diagnosis: Impaired balance and gait  Specific instructions for Next Treatment: Progress mobility as tolerated  Prognosis: Good  Decision Making: Medium Complexity  PT Education: Goals; General Safety;Gait Training;PT Role;Disease Specific Education;Plan of Care; Functional Mobility Training;Precautions; Injury Prevention;Transfer Training  Patient Education: Importance of OOB mobility and gait, safety with functional mobility and t/f, PLB to improve oxygenation.  Pt verbalized understanding  Barriers to Learning: [place]  REQUIRES PT FOLLOW UP: Yes  Activity Tolerance  Activity Tolerance: Patient Tolerated treatment well;Patient limited by fatigue;Patient limited by endurance  Activity Tolerance: /72, HR 92, SpO2 92-96% on 8LHF NC, cued on PLB with mobility       Patient Diagnosis(es): The primary encounter diagnosis was COPD exacerbation ([facility] [address] Diagnoses of Acute respiratory failure with hypoxia and hypercapnia (HCC) and Pneumonia of both lower lobes due to infectious organism were also pertinent to this visit. has a past medical history of COPD (chronic obstructive pulmonary disease) ([facility] [address] and Hypertension. has no past surgical history on file.     Restrictions  Restrictions/Precautions  Restrictions/Precautions: Up as Tolerated, Fall Risk, Seizure  Required Braces or Orthoses?: No  Position Activity Restriction  Other position/activity restrictions: 8L HF NC, ott, IV  Vision/Hearing  Vision: Impaired  Vision Exceptions: Wears glasses for reading  Hearing: Exceptions to [facility]  Hearing Exceptions: Hard of hearing/hearing concerns     Subjective  General  Chart Reviewed: Yes  Patient assessed for rehabilitation services?: Yes  Family / Caregiver Present: Yes  Referring Practitioner: Pushpa Pierre MD  Referral Date : 09/05/21  Diagnosis: Acute on chronic respiratory failure  Follows Commands: Within Functional Limits  General Comment  Comments: RN cleared pt for session  Subjective  Subjective: pt resting in bed on approach, pleasant and agreeable to PT evaluation  Pain Screening  Patient Currently in Pain: Denies  Vital Signs  Patient Currently in Pain: Denies       Orientation  Orientation  Overall Orientation Status: Within Functional Limits  Social/Functional History  Social/Functional History  Lives With: Friend(s)  Type of Home: Apartment  Home Layout: One level (Second floor)  Home Access: Stairs to enter with rails  Entrance Stairs - Number of Steps: 13  Entrance Stairs - Rails: Both  Bathroom Shower/Tub: Tub/Shower unit, Walk-in shower (Uses walk-in shower)  Bathroom Toilet: Standard  Home Equipment: Cane, Rolling walker  ADL Assistance: [facility] Responsibilities: No (pt reports friend/roommate completes most cooking/cleaning)  Ambulation Assistance: Independent (Without AD)  Transfer Assistance: Independent  Occupation: Full time employment  Type of occupation: Owns business  Additional Comments: Pt reports friend/roommate works for him, can provide assistance at d/c if needed, pt denies recent hx of falls    Objective     Observation/Palpation  Posture: Fair  Edema: Slight edema BLE    AROM RLE (degrees)  RLE AROM: WFL  AROM LLE (degrees)  LLE AROM : WFL     Strength RLE  Comment: Grossly 4/5  Strength LLE  Comment: Grossly 4/5     Tone RLE  RLE Tone: Normotonic  Tone LLE  LLE Tone: Normotonic  Motor Control  Gross Motor?: WFL     Sensation  Overall Sensation Status: WFL     Bed mobility  Supine to Sit: Minimal assistance;Contact guard assistance (Susi/CGA, HOB elevated, use of BR, increased time to complete)  Sit to Supine: Unable to assess (pt seated in w/c at end of session)  Scooting: Minimal assistance (to scoot to EOB)     Transfers  Sit to Stand: Contact guard assistance;Minimal Assistance  Stand to sit: Contact guard assistance  Comment: EOB no AD CGA/Susi     Ambulation  Ambulation?: Yes  Ambulation 1  Surface: level tile  Device: No Device;Hand-Held Assist  Assistance: Contact guard assistance;Minimal assistance  Quality of Gait: Partial step through pattern, B decreased heel strike, B decreased toe clerance, B decreased hip/knee flexion in swing with short shuffling steps, forward flexed trunk. Pt mildly unsteady with B HHA to steady, no overt LOB. Gait Deviations: Slow Gabrielle; Increased CEDRICK; Decreased step height;Decreased step length;Shuffles  Distance: 5'  Comments: Distances limited by fatigue  Stairs/Curb  Stairs?: No (Deferred d/t safety)        Balance  Posture: Fair  Sitting - Static: Good;-  Sitting - Dynamic: Fair;+  Standing - Static: Fair  Standing - Dynamic: Fair  Comments: SBA sitting EOB, CGA to Susi standing no AD, intermittent HHA for steadying        Plan   Plan  Times per week: 3-5x/wk  Times per day: Daily  Specific instructions for Next Treatment: Progress mobility as tolerated  Current Treatment Recommendations: Strengthening, Home Exercise Program, Safety Education & Training, Balance Training, Endurance Training, Patient/Caregiver Education & Training, Functional Mobility Training, Transfer Training, Gait Training, Stair training, Positioning  Safety Devices  Type of devices: All fall risk precautions in place, Left in chair, Call light within reach, Chair alarm in place, Nurse notified, Gait belt, Patient at risk for falls (RB present in room with pt at end of session)      AM-PAC Score  AM-PAC Inpatient Mobility Raw Score : 17 (09/05/21 1749)  AM-PAC Inpatient T-Scale Score : 42.13 (09/05/21 1749)  Mobility Inpatient CMS 0-100% Score: 50.57 (09/05/21 1749)  Mobility Inpatient CMS G-Code Modifier : CK (09/05/21 1749)          Goals  Short term goals  Time Frame for Short term goals: 1 week 9/12/21 (unless otherwise specified)  Short term goal 1: Pt will complete supine to/from sit with I  Short term goal 2: Pt will complete sit to/from stand with LRAD with MI  Short term goal 3: Pt will ambualte >50' with LRAD with MI without LOB  Short term goal 4: Pt will ascend/descend 13 steps with HR with S without LOB  Short term goal 5: 9/09/21: Pt will participate in 12-15 reps BLE exercises to increase strength and increase I with functional mobility and gait  Patient Goals   Patient goals : \"Get home\"       Therapy Time   Individual Concurrent Group Co-treatment   Time In 1308         Time Out 1333         Minutes 25         Timed Code Treatment Minutes: 15 Minutes (10 minutes for eval)       If pt is unable to be seen after this session, please let this note serve as discharge summary. Please see case management note for discharge disposition. Thank you.     Darcy Sams, PT, DPT

## 2021-09-05 NOTE — PROGRESS NOTES
Hospitalist Progress Note  9/5/2021 8:14 AM  Subjective:   Admit Date: 8/31/2021  PCP: Slime Christianson MD Status: Inpatient [101]  Interval History: Hospital Day: 6, off dexmedetomidine. More alert. Still have Avasys. History of present illness:  Admitted with acute on chronic respiratory failure with hypercapnia and metabolic encephalopathy secondary to COPD exacerbation (baseline home oxygen 4 LPM) and Strep pneumonia treated with ceftriaxone and azithromycin. Complicated by alcohol withdrawal refractory to CIWA directed lorazepam requiring transfer to ICU overnight and IV dexmedetomidine.      Diet: Regular  Right basilic double lumen PICC (9/4, day #2)  Urethral catheter (9/4, day #2)  Medications:     folic acid  1 mg Oral Daily   hydralazine  10 mg IntraVENous BID   diazepam  5 mg Oral TID   chlordiazepoxide  25 mg Oral 4x Daily   nicotine  21 mg patch Transdermal Daily   amLODIPine  10 mg Oral Daily   budesonide-formoterol  2 puff Inhalation BID   carvedilol  12.5 mg Oral BID WC   lisinopril  5 mg Oral Daily   nicotine  1 patch TransDERmal Daily   pantoprazole  40 mg Oral QAM AC   tamsulosin  0.4 mg Oral Daily   thiamine  100 mg Oral Daily   ceftriaxone  1,000 mg IntraVENous Daily  (8/31, day #6)   azithromycin  250 mg Oral Daily (8/31, day #6)   methylprednisolone  40 mg IntraVENous BID   ipratropium-albuterol  1 ampule Inhalation Q4H WA   enoxaparin  40 mg SubCUTAneous Daily     Recent Labs     09/03/21 0447 09/04/21  0430 09/05/21  0455   WBC 14.7* 11.8* 11.7*   HGB 14.9 15.5 16.3    312 325   MCV 94.5 93.5 90.7     Recent Labs     09/03/21 0447 09/04/21  0430 09/05/21  0455    141 141   K 5.1 4.3 3.8   CL 95* 96* 96*   CO2 43* 40* 36*   BUN 26* 23* 26*   CREATININE 0.8 0.6* 0.7*   GLUCOSE 142* 164* 142*     ABG (9/4) 7.43 / 63 / 65  ABG (9/5) 7.50 / 49 / 73    Procalcitonin (9/4) 0.03 ng/mL  proBNP (9/4) 233 pg/mL  Magnesium (9/4) 2.20 mg/dL  Lactate (9/2) 3.5 mmol/L     Blood culture x 2 (9/2) no growth to date  SARS-CoV-2 NAAT (8/31) not detected     Portable CXR (9/3) Bilateral airspace disease and small to moderate left pleural effusion. Pneumonia or asymmetric edema are considerations.     Portable CXR (8/31) Cardiomegaly. Patchy bilateral streaky pulmonary opacities suggestive of an atypical pneumonitis. Kre Drafts findings are mildly progressed compared to August 9, 2021.  Continued radiographic follow-up is recommended. Objective:   Vitals:  /79   Pulse 68   Temp 99.2 °F (bladder)   Resp 12   Ht 5' 10\"  Wt 119.3 kg  SpO2 97% on 8 LPM  BMI 37.74 kg/m²   General appearance: alert and cooperative with exam  Lungs: diffuse rhonchi, decresaed overall air movement  Heart: regular rate and rhythm, S1, S2 normal, no murmur, click, rub or gallop  Abdomen: protuberant, audible but decreased bowel sounds, benign  Extremities: extremities normal, atraumatic, no cyanosis or edema  Neurologic:  Sedated on Precedex but otherwise no obvious focal neurologic deficits. Assessment and Plan:   1. Acute on chronic respiratory failure with hypercapnia and metabolic encephalopathy secondary to COPD exacerbation (baseline home oxygen 4 LPM) and Strep pneumonia treated with ceftriaxone and azithromycin. Continues on methylprednisolone 40 mg IV every 12 hours. Symbicort (budesonide-formoterol) 2-puff BID and Duonebs (ipratropium-albuterol) every 4 hours. Followed by pulmonary. Continues on 8 LPM.    2. COPD with CO2 retention on ABG resolving from high of 116 to 49, chronic CO2 retention with metabolic alkalosis. Unable to tolerated BiPAP, has improved on AVAPS per pulmonary. Most likely has restrictive lung disease. 3. Alcohol withdrawal.  Continues on scheduled diazepam 5 mg TID and chlordiazepoxide 25 QID. Required  dexmedetomidine (Precedex) for three days, discontinued   4. Gastric reflux and stress ulcer prophylaxis with pantoprazole (Protonix) 40 mg daily.    5. Nicotine dependence on nicotine replacement therapy with 21 mg nicotine patch. 6. Hypertension:  Continues on amlodipine 10 mg and lisinopril 5 mg daily. 7. Prostate hypertrophy on tamsulosin 0.4 mg daily. 8. Obstructive sleep apnea:  Has CPAP at home but not using. 9. Class II obesity (BMI 17.0) complicates medical management.      Advance Directive: Full Code  DVT prophylaxis with enoxaparin 40 mg sub-Q daily. Discharge planning: will require another two days inpatient status. Lives in [facility]. PT/OT evaluation for post-acute options. Ambulates with a walker at baseline.        Jia Zhang MD, MD  [facility] Hospitalist

## 2021-09-06 ENCOUNTER — APPOINTMENT (OUTPATIENT)
Dept: GENERAL RADIOLOGY | Age: 61
DRG: 139 | End: 2021-09-06
Payer: MEDICAID

## 2021-09-06 LAB
ANION GAP SERPL CALCULATED.3IONS-SCNC: 9 MMOL/L (ref 3–16)
BLOOD CULTURE, ROUTINE: NORMAL
BUN BLDV-MCNC: 55 MG/DL (ref 7–20)
CALCIUM SERPL-MCNC: 9.6 MG/DL (ref 8.3–10.6)
CHLORIDE BLD-SCNC: 97 MMOL/L (ref 99–110)
CO2: 37 MMOL/L (ref 21–32)
CREAT SERPL-MCNC: 1.4 MG/DL (ref 0.8–1.3)
CULTURE, BLOOD 2: NORMAL
GFR AFRICAN AMERICAN: >60
GFR NON-AFRICAN AMERICAN: 52
GLUCOSE BLD-MCNC: 129 MG/DL (ref 70–99)
HCT VFR BLD CALC: 47.1 % (ref 40.5–52.5)
HEMOGLOBIN: 15.4 G/DL (ref 13.5–17.5)
MAGNESIUM: 2.1 MG/DL (ref 1.8–2.4)
MCH RBC QN AUTO: 30.1 PG (ref 26–34)
MCHC RBC AUTO-ENTMCNC: 32.7 G/DL (ref 31–36)
MCV RBC AUTO: 92.1 FL (ref 80–100)
PDW BLD-RTO: 15.5 % (ref 12.4–15.4)
PHOSPHORUS: 4.1 MG/DL (ref 2.5–4.9)
PLATELET # BLD: 273 K/UL (ref 135–450)
PMV BLD AUTO: 7.6 FL (ref 5–10.5)
POTASSIUM SERPL-SCNC: 3.2 MMOL/L (ref 3.5–5.1)
RBC # BLD: 5.11 M/UL (ref 4.2–5.9)
SODIUM BLD-SCNC: 143 MMOL/L (ref 136–145)
WBC # BLD: 12.6 K/UL (ref 4–11)

## 2021-09-06 PROCEDURE — 83735 ASSAY OF MAGNESIUM: CPT

## 2021-09-06 PROCEDURE — 94669 MECHANICAL CHEST WALL OSCILL: CPT

## 2021-09-06 PROCEDURE — 6370000000 HC RX 637 (ALT 250 FOR IP): Performed by: INTERNAL MEDICINE

## 2021-09-06 PROCEDURE — 2580000003 HC RX 258: Performed by: INTERNAL MEDICINE

## 2021-09-06 PROCEDURE — 99232 SBSQ HOSP IP/OBS MODERATE 35: CPT | Performed by: INTERNAL MEDICINE

## 2021-09-06 PROCEDURE — 85027 COMPLETE CBC AUTOMATED: CPT

## 2021-09-06 PROCEDURE — 6360000002 HC RX W HCPCS: Performed by: INTERNAL MEDICINE

## 2021-09-06 PROCEDURE — 71045 X-RAY EXAM CHEST 1 VIEW: CPT

## 2021-09-06 PROCEDURE — 2060000000 HC ICU INTERMEDIATE R&B

## 2021-09-06 PROCEDURE — 80048 BASIC METABOLIC PNL TOTAL CA: CPT

## 2021-09-06 PROCEDURE — 94761 N-INVAS EAR/PLS OXIMETRY MLT: CPT

## 2021-09-06 PROCEDURE — 2700000000 HC OXYGEN THERAPY PER DAY

## 2021-09-06 PROCEDURE — 94640 AIRWAY INHALATION TREATMENT: CPT

## 2021-09-06 PROCEDURE — 94660 CPAP INITIATION&MGMT: CPT

## 2021-09-06 PROCEDURE — 84100 ASSAY OF PHOSPHORUS: CPT

## 2021-09-06 RX ADMIN — CHLORDIAZEPOXIDE HYDROCHLORIDE 25 MG: 25 CAPSULE ORAL at 15:28

## 2021-09-06 RX ADMIN — AZITHROMYCIN DIHYDRATE 250 MG: 250 TABLET, FILM COATED ORAL at 10:06

## 2021-09-06 RX ADMIN — TAMSULOSIN HYDROCHLORIDE 0.4 MG: 0.4 CAPSULE ORAL at 10:03

## 2021-09-06 RX ADMIN — IPRATROPIUM BROMIDE AND ALBUTEROL SULFATE 1 AMPULE: .5; 3 SOLUTION RESPIRATORY (INHALATION) at 16:16

## 2021-09-06 RX ADMIN — ENOXAPARIN SODIUM 40 MG: 40 INJECTION SUBCUTANEOUS at 10:05

## 2021-09-06 RX ADMIN — Medication 2 PUFF: at 08:28

## 2021-09-06 RX ADMIN — AMLODIPINE BESYLATE 10 MG: 5 TABLET ORAL at 10:02

## 2021-09-06 RX ADMIN — FOLIC ACID 1 MG: 1 TABLET ORAL at 10:03

## 2021-09-06 RX ADMIN — SODIUM CHLORIDE, PRESERVATIVE FREE 10 ML: 5 INJECTION INTRAVENOUS at 10:05

## 2021-09-06 RX ADMIN — PANTOPRAZOLE SODIUM 40 MG: 40 TABLET, DELAYED RELEASE ORAL at 05:21

## 2021-09-06 RX ADMIN — SODIUM CHLORIDE, PRESERVATIVE FREE 10 ML: 5 INJECTION INTRAVENOUS at 20:43

## 2021-09-06 RX ADMIN — CHLORDIAZEPOXIDE HYDROCHLORIDE 25 MG: 25 CAPSULE ORAL at 20:42

## 2021-09-06 RX ADMIN — IPRATROPIUM BROMIDE AND ALBUTEROL SULFATE 1 AMPULE: .5; 3 SOLUTION RESPIRATORY (INHALATION) at 12:13

## 2021-09-06 RX ADMIN — IPRATROPIUM BROMIDE AND ALBUTEROL SULFATE 1 AMPULE: .5; 3 SOLUTION RESPIRATORY (INHALATION) at 08:28

## 2021-09-06 RX ADMIN — CARVEDILOL 12.5 MG: 3.12 TABLET, FILM COATED ORAL at 17:14

## 2021-09-06 RX ADMIN — SODIUM CHLORIDE, PRESERVATIVE FREE 10 ML: 5 INJECTION INTRAVENOUS at 20:44

## 2021-09-06 RX ADMIN — Medication 2 PUFF: at 19:27

## 2021-09-06 RX ADMIN — DIAZEPAM 5 MG: 5 TABLET ORAL at 10:03

## 2021-09-06 RX ADMIN — FUROSEMIDE 40 MG: 10 INJECTION, SOLUTION INTRAMUSCULAR; INTRAVENOUS at 10:02

## 2021-09-06 RX ADMIN — CARVEDILOL 12.5 MG: 3.12 TABLET, FILM COATED ORAL at 10:10

## 2021-09-06 RX ADMIN — SODIUM CHLORIDE, PRESERVATIVE FREE 10 ML: 5 INJECTION INTRAVENOUS at 10:03

## 2021-09-06 RX ADMIN — TIOTROPIUM BROMIDE INHALATION SPRAY 2 PUFF: 3.12 SPRAY, METERED RESPIRATORY (INHALATION) at 08:28

## 2021-09-06 RX ADMIN — Medication 100 MG: at 10:02

## 2021-09-06 RX ADMIN — IPRATROPIUM BROMIDE AND ALBUTEROL SULFATE 1 AMPULE: .5; 3 SOLUTION RESPIRATORY (INHALATION) at 19:22

## 2021-09-06 RX ADMIN — FUROSEMIDE 40 MG: 10 INJECTION, SOLUTION INTRAMUSCULAR; INTRAVENOUS at 17:13

## 2021-09-06 RX ADMIN — DIAZEPAM 5 MG: 5 TABLET ORAL at 20:42

## 2021-09-06 RX ADMIN — METHYLPREDNISOLONE SODIUM SUCCINATE 40 MG: 40 INJECTION, POWDER, FOR SOLUTION INTRAMUSCULAR; INTRAVENOUS at 10:02

## 2021-09-06 RX ADMIN — CHLORDIAZEPOXIDE HYDROCHLORIDE 25 MG: 25 CAPSULE ORAL at 10:03

## 2021-09-06 RX ADMIN — LISINOPRIL 5 MG: 10 TABLET ORAL at 10:02

## 2021-09-06 ASSESSMENT — PAIN SCALES - GENERAL
PAINLEVEL_OUTOF10: 0

## 2021-09-06 NOTE — PROGRESS NOTES
09/06/21 0027   NIV Type   $NIV $Daily Charge   Skin Protection for O2 Device Yes   Location Nose   Equipment Type [id number]   Mode AVAPS   Mask Type Full face mask   Mask Size Large   Settings/Measurements   CPAP/EPAP 14 cmH2O   IPAP Min 24 cmH2O   IPAP Max   (40)   Vt Ordered 600 mL   Rate Ordered 28   Resp 28   FiO2  70 %   I Time/ I Time % 0.7 s   Vt Exhaled 680 mL   Minute Volume 19.1 Liters   Mask Leak (lpm) 3 lpm   Comfort Level Good   Using Accessory Muscles No   SpO2 96   Alarm Settings   Alarms On Y   Press Low Alarm 6 cmH2O   High Pressure Alarm 45 cmH2O   Apnea (secs) 20 secs   Resp Rate Low Alarm 6   High Respiratory Rate 45 br/min

## 2021-09-06 NOTE — PROGRESS NOTES
Neurovascularly intact without any focal sensory/motor deficits. Cranial nerves: II-XII intact, grossly non-focal.  Psychiatric: Alert and oriented, thought content appropriate, normal insight  Skin: Skin color, texture, turgor normal.  No rashes or lesions. Capillary Refill: Brisk,< 3 seconds   Peripheral Pulses: +2 palpable, equal bilaterally       Assessment/Plan:    Active Hospital Problems    Diagnosis     HTN (hypertension) [I10]     GERD (gastroesophageal reflux disease) [K21.9]     BPH (benign prostatic hyperplasia) [N40.0]     Hx of chronic respiratory failure [Z87.09]     Obesity [E66.9]     Alcohol use [Z72.89]     Tobacco abuse [Z72.0]     Acute respiratory failure with hypoxia ([facility] [address] [J96.01]     COPD (chronic obstructive pulmonary disease) ([facility]) [J44.9]      Acute on chronic respiratory failure with hypercapnia and metabolic encephalopathy secondary to COPD exacerbation (baseline home oxygen 4 LPM) and Strep pneumonia treated with ceftriaxone and azithromycin. Continues on methylprednisolone 40 mg IV every 12 hours. Symbicort (budesonide-formoterol) 2-puff BID and Duonebs (ipratropium-albuterol) every 4 hours. Followed by pulmonary. Continues on 8 LPM.  Considering home AVAPS. COPD with CO2 retention on ABG resolving from high of 116 to 49, chronic CO2 retention with metabolic alkalosis. Unable to tolerated BiPAP, has improved on AVAPS per pulmonary. Most likely has restrictive lung disease. Alcohol withdrawal.  Improved. Can wean off Valium soon. No further need for CIWA. Gastric reflux and stress ulcer prophylaxis with pantoprazole (Protonix) 40 mg daily. Nicotine dependence on nicotine replacement therapy with 21 mg nicotine patch. Hypertension:  Continues on amlodipine 10 mg and lisinopril 5 mg daily. Prostate hypertrophy on tamsulosin 0.4 mg daily. Obstructive sleep apnea:  Has CPAP at home but not using.     Morbid Obesity -  With Body mass index is 37.74 kg/m². Complicating assessment and treatment. Placing patient at risk for multiple co-morbidities as well as early death and contributing to the patient's presentation.      DVT Prophylaxis: Lovenox  Diet: ADULT DIET;  Regular  Code Status: Full Code  PT/OT Eval Status: NA    Dispo - Home 1-2 days pending improvement in O2 requirement (Baseline 3.5L PRN)    Moses Sweeney MD

## 2021-09-06 NOTE — PROGRESS NOTES
09/05/21 2253   NIV Type   Skin Assessment Clean, dry, & intact   Skin Protection for O2 Device Yes   Location Nose   NIV Started/Stopped On   Equipment Type v60   Mode AVAPS   Mask Type Full face mask   Mask Size Large   Settings/Measurements   CPAP/EPAP 14 cmH2O   IPAP Min 24 cmH2O   IPAP Max   (40)   Vt Ordered 600 mL   Rate Ordered 28   Resp 30   FiO2  70 %   I Time/ I Time % 0.7 s   Vt Exhaled 815 mL   Minute Volume 23.6 Liters   Mask Leak (lpm) 17 lpm   Comfort Level Good   Using Accessory Muscles No   SpO2 97   Alarm Settings   Alarms On Y   Press Low Alarm 6 cmH2O   High Pressure Alarm 45 cmH2O   Apnea (secs) 20 secs   Resp Rate Low Alarm 6   High Respiratory Rate 45 br/min

## 2021-09-06 NOTE — PROGRESS NOTES
09/06/21 0309   NIV Type   Skin Protection for O2 Device Yes   Location Nose   Equipment Type v60   Mode AVAPS   Mask Type Full face mask   Mask Size Large   Settings/Measurements   CPAP/EPAP 14 cmH2O   IPAP Min 24 cmH2O   IPAP Max   (40)   Vt Ordered 600 mL   Rate Ordered 28   Resp 28   FiO2  70 %   I Time/ I Time % 0.7 s   Vt Exhaled 602 mL   Minute Volume 17 Liters   Mask Leak (lpm) 0 lpm   Comfort Level Good   Using Accessory Muscles No   SpO2 96   Alarm Settings   Alarms On Y   Press Low Alarm 6 cmH2O   High Pressure Alarm 45 cmH2O   Apnea (secs) 20 secs   Resp Rate Low Alarm 6   High Respiratory Rate 45 br/min

## 2021-09-06 NOTE — PROGRESS NOTES
Intensive Care Progress Note    Patient: Sweta Rahman MRN: [id number]  Date of  Admission: 8/31/2021   YOB: 1960  Age: 62 y.o. Sex: male    Unit: [facility]  Room/Bed: 3417/7521-68 Attending Physician: Frank Cortes MD   Admitting Physician: Oneal Holstein        Chief Complaint   Patient presents with    Shortness of Breath       60% at home, 2 duoneb given, arrived on cpap 93%      Acute on chronic respiratory failure, alcohol withdrawal     History Obtained From   domestic partner, mother, family member - sister     History of Present Illness   Is a 58-year-old gentleman who is known to have severe COPD along with alcohol use and tobacco use who comes into the hospital because of acute respiratory failure.  Patient was having shortness of breath about a day prior to coming into the hospital and found by EMS to be hypoxic upon arrival at the house.  The patient's significant other who is at the bedside.  Along with his mother, who is my patient, and sister. Patient is unable to give any history and was on BiPAP therapy  Patient apparently this morning had an issue of becoming confused and moving furniture around the room and trying to get out of the window. Patient was subdued and placed on BiPAP  However was not getting good tidal volumes        9/3/2021moved to the ICU and started on Precedex drip, still on AVAPS    9/4/2021tolerated the Precedex drip, tolerated the AVAPS, ABGs significantly improved. 9/5/2021moved out of the ICU, doing well      Subjective:   Much better cleaned up today  No issues overnight  No chest pains  No nausea or vomiting    ROS:A comprehensive review of systems was negative except for: above    Objective: Intake and Output:   Current Shift:   No intake/output data recorded.   Last three shifts:   09/05 1501 - 09/06 1500  In: 1380 [P.O.:1380]  Out: 1100 [Urine:1100]    Vent settings for last 24 hours:  [unfilled]    Hemodynamic parameters lower leg: Edema present. Left lower leg: Edema present. Skin:     General: Skin is warm and dry. Coloration: Skin is not jaundiced or pale. Neurological:      Mental Status: He is alert. Comments: Somnolent on the Precedex drip           Labs:   Recent Labs     09/04/21  0430 09/05/21 0455 09/06/21  0515   WBC 11.8* 11.7* 12.6*   HGB 15.5 16.3 15.4   HCT 47.6 49.1 47.1    325 273      Recent Labs     09/04/21  0430 09/05/21  0455 09/06/21  0515    141 143   K 4.3 3.8 3.2*   CL 96* 96* 97*   CO2 40* 36* 37*   BUN 23* 26* 55*   GLUCOSE 164* 142* 129*       Additional labs:    Radiology, images personally reviewed. Yes  Procedure Component Value Ref Range Date/Time      XR CHEST PORTABLE [[id number]] Collected: 09/03/21 0804     Order Status: Completed Updated: 09/03/21 0808     Narrative:       EXAMINATION:   ONE XRAY VIEW OF THE CHEST     9/3/2021 6:40 am     COMPARISON:   08/31/2021     HISTORY:   ORDERING SYSTEM PROVIDED HISTORY: Shortness of breath   TECHNOLOGIST PROVIDED HISTORY:   Reason for exam:->Shortness of breath   Reason for Exam: Shortness of breath     FINDINGS:   Cardiac leads project over the chest.  Left basilar pleuroparenchymal opacity   has developed with decreased definition of the left hemidiaphragm.  Diffuse   heterogeneous right-sided opacity is seen.  No pneumothorax.  Cardiac and   mediastinal silhouettes are reflective of patient rotation.      Impression:       Bilateral airspace disease and small to moderate left pleural effusion. Pneumonia or asymmetric edema are considerations.             Other imaging: Not indicated      Micro: No growth to date    Assessment:     Principal Problem:    COPD (chronic obstructive pulmonary disease) (HCC)  Active Problems:    Acute respiratory failure with hypoxia (HCC)    Alcohol use    Tobacco abuse    Obesity    HTN (hypertension)    GERD (gastroesophageal reflux disease)    BPH (benign prostatic hyperplasia)    Hx of chronic respiratory failure  Resolved Problems:    * No resolved hospital problems. *      Discussion / Plan:       Neurology  Agitation  On home medications  He does not appear to be having alcohol withdrawal        Cardiovascular  Hypertension  Norvasc  Coreg 12.5 twice daily  Prinivil 5 mg daily      We will give Lasix twice daily  BNP was slightly elevated at 233    Pulmonary  Patient is fully vaccinated for Covid    COPD  Patient also has chronic respiratory failure with hypercapnia  Patient has been trialed on BiPAP therapy and failed  Patient was moved to AVAPS therapy and has significantly improved    Considerations for the future  Most likely has a restrictive lung disease  We will get bedside spirometry prior to discharge and nocturnal pulse ox on baseline oxygen prior to discharge  We will set up with a AVAPS therapy prior to discharge    We will get bedside PFT    ABG in the morning  Current PCO2 down to 48 from 63 from a high of 116, has greatly benefit from AVAPS therapy    Ordering volume ventilator for nocturnal uses and daytime use as needed to reduce the risk of exacerbation. Bipap insufficient due to severity of condition. Absence of respiratory support will lead to death. Patient has restrictive lung disease. Patient will need targeted tidal volume which cannot be provided via a CPAP. Bipap/Bilevel will not adequately ventilate this patient in order to complete ADLs,   Bipap/Bilevel will not lower the patient's CO2 levels, and this patient will require a set tidal volume. Therefore Astral/Trilogy NIV is being ordered due to the severe state of this patient's disease. Without this therapy, the patient runs a higher risk of expiration.       Patient was tried on a BiPAP therapy  His PCO2 went from 116 to 93 up to 96  Patient failed BiPAP therapy, and BiPAP ST therapy    COPD  Solu-Medrol 40 IV twice daily, will drop this to once daily  Should be able to transition over to oral prednisone  DuoNeb's  Symbicort   Spiriva      Smoking  Transdermal nicotine patches  Patient had a carboxyhemoglobin of 8.5 upon admission      Procalcitonin was negative  We will stop the Rocephin  Continue with azithromycin for COPD exacerbation    Wean oxygen as tolerated        Endo  TSH was normal    Blood sugars controlled    Renal  Making good urine output  We will give dose of Lasix  Creatinine is normal      Prophylaxis  Lovenox 40 subcu daily  Protonix 40 mg daily      Lines  PICC line placement      Hematology  Patient's ferritin was 21, may need iron replacement as an outpatient          Code Status: Full CODE STATUS        We will discuss with family when available      Discussed with nursing and respiratory therapy                   [facility]  [address] [address]  Dept: [phone]  Loc: [phone]     Diagnosis:  [] ADALBERTO (ICD-10: G47.33)  o CSA (ICD-10: G47.31)  [] Complex Sleep Apnea (ICD-10: G47.37)  []  (ICD-10: G47.37)  [] Hypoxemia (ICD-10: R09.02)  [x] COPD (J44.90)  [] Chronic Respiratory Failure with hypoxemia (J96.11)  [x] Chronicr Respiratory Failure with hypercapnia (J96.12)  [] Restrictive Lung Disease (J98.4)      [] New Rx (Device Preference: _________________________)     [] Change Order       [] Replace ___________    [] Discontinue Order -  [] CPAP    [] BPAP    [] PAP    [] Oxygen   /   AMA?  [] Yes   [] No              Therapy    AHI: ________ ANDREW: ________  LOW SpO2: ________%      DME:[facility]    DEVICE / SETTINGS RAMP / COMFORT INTERFACE   []  CPAP () Pressure    Ramp: _________ min's  [] Ramp to patient preference General PAP Supply Kit  Medicaid does not cover heated tubing  ([employer])  PAP Tubing:  [] Heated ()- 1/3 mos                         [] Regular ()  1/3 mos  [] Disposable Water Chamber () - 1/6 mos  [] Disposable Filter () - 2/mo  [] Non-Disposable Filter () - 1/6 mos   []  BiLevel PAP ()           IPAP        []  BiLevel PAP with   ()       Backup Rate ()      EPAP Rate  [] Adjust FLEX to patient comfort       SUPPLEMENTAL OXYGEN  [] OXYGEN:      Liter/min: _________  [] Continuous        [] Nocturnal  [] Bleed into PAP Device      []  EchoStar  Min Press                   Max Press   Mask Interface Kit    [] Mask interface () - 1/3 mos  [] Nasal Cushion ()  2/mo  [] Nasal Pillows ()  -2/mo  [] Headgear ()  1/6 mos   []  AutoBiLevel () Pressure  ()      Support           []  ResMed® IVAPS EPAP  [] Overnight Oximetry on Room Air  [] Overnight Oximetry on PAP Therapy    Target Pt Rate  Min PS      Target Va  Patient Ht  Ti Max                Ti Min        Rise time  Max PS  Trigger  Cycle  Epap  Epap max  Epap min  The patient has a history of:  [] Excessive Daytime Sleepiness  [] Insomnia  [] Impaired Cognition  [] Ischemic Heart Disease  [] Hypertension  [] Mood Disorders          [] History of Stroke  Additional Orders:______________________________________________________________________________________________________________________________________________________________________________     Full Face Mask Kit    [] Full face mask ()  1/3 mos  [] Full Face Cushion ()  1/mo  [] Headgear ()  1/6 mos                                           [] Respironics® ASV Advanced ()     EPAP Min  PS Min      EPAP Max  PS Max   Additional Supplies    [] Chin Strap ()  [] Heated Humidifier Kit ()  Mask Specifications:   [] Patient Preference      -or-            Brand:______________ Size:_______________   Type: __________________________________   [] Mask Refit:___________________________                                           Max Press  Ramp Time      Rate  Bi-flex: []1  []2  []3     [x] Respironics® AVAPS: ()     IPAP Min  IPAP Max  Pressure Max 22 Epap max  Epap min  Rise time 15      45  10      50  3     Avaps rate 2 EPAP   Additional Services    [] Annual PRN service and check of equipment  [] Routine service and check of equipment  [] Download and report compliance data   Tidal volume 650     Tigger                                     Rate                                         Inspiratory time Auto  22      0.9                                                  The following equipment is Medically Necessary for the above stated patient. It is reasonable and necessary in reference to acceptable standards of medical practice for this condition, and is not prescribed as a convenience. Frequency of Use:    Daily                 Length of Need: 13 Months              o The patient requires BiLevel PAP and the following apply: []  The patient requires a Respiratory Assist Device (RAD) and the following apply:   o CPAP was tried and failed to meet therapeutic goals. [] CPAP was tried, but failed to meet therapeutic goals   o The prescribed mask interface has been properly fit, is the most comfortable to the patient and will be used with the BPAP device. [] The prescribed mask interface has been properly fit, is the most comfortable to the patient and will be used with the RAD.   o Current CPAP setting prevents patient from tolerating the therapy and lower CPAP settings fail to adequately control the symptoms of ADALBERTO, improve sleep quality, or reduce AHI to acceptable levels. [] Current CPAP setting prevent patient from tolerating the therapy and lower PAP settings fail to  adequately control ADALBERTO symptoms, improve sleep quality, or reduce AHI to acceptable levels.          [] There is significant improvement of the respiratory events on the RAD                                                                                                                                                                                                                                  Yvonne Valderrama MD               NPI- [id number]     [place]IO- [phone]                    09/07/21       ____________________________                        _______________________           Physician Signature                                                         Date                                                                 Moses Street MD  [facility]  Pulmonary, Critical Care and Sleep Medicine  Office : [phone]    Please note that some or all of this record was generated using voice recognition software. If there are any questions about the content of this document, please contact the author as some errors in transcription may have occurred.

## 2021-09-07 LAB
ANION GAP SERPL CALCULATED.3IONS-SCNC: 8 MMOL/L (ref 3–16)
BUN BLDV-MCNC: 45 MG/DL (ref 7–20)
CALCIUM SERPL-MCNC: 9.4 MG/DL (ref 8.3–10.6)
CHLORIDE BLD-SCNC: 93 MMOL/L (ref 99–110)
CO2: 38 MMOL/L (ref 21–32)
CREAT SERPL-MCNC: 1.2 MG/DL (ref 0.8–1.3)
GFR AFRICAN AMERICAN: >60
GFR NON-AFRICAN AMERICAN: >60
GLUCOSE BLD-MCNC: 261 MG/DL (ref 70–99)
MAGNESIUM: 2 MG/DL (ref 1.8–2.4)
POTASSIUM REFLEX MAGNESIUM: 3.5 MMOL/L (ref 3.5–5.1)
SODIUM BLD-SCNC: 139 MMOL/L (ref 136–145)

## 2021-09-07 PROCEDURE — 2580000003 HC RX 258: Performed by: INTERNAL MEDICINE

## 2021-09-07 PROCEDURE — 80048 BASIC METABOLIC PNL TOTAL CA: CPT

## 2021-09-07 PROCEDURE — 2700000000 HC OXYGEN THERAPY PER DAY

## 2021-09-07 PROCEDURE — 36415 COLL VENOUS BLD VENIPUNCTURE: CPT

## 2021-09-07 PROCEDURE — 6370000000 HC RX 637 (ALT 250 FOR IP): Performed by: INTERNAL MEDICINE

## 2021-09-07 PROCEDURE — 94761 N-INVAS EAR/PLS OXIMETRY MLT: CPT

## 2021-09-07 PROCEDURE — 94640 AIRWAY INHALATION TREATMENT: CPT

## 2021-09-07 PROCEDURE — 2060000000 HC ICU INTERMEDIATE R&B

## 2021-09-07 PROCEDURE — 94660 CPAP INITIATION&MGMT: CPT

## 2021-09-07 PROCEDURE — 6360000002 HC RX W HCPCS: Performed by: INTERNAL MEDICINE

## 2021-09-07 PROCEDURE — 99232 SBSQ HOSP IP/OBS MODERATE 35: CPT | Performed by: INTERNAL MEDICINE

## 2021-09-07 PROCEDURE — 94669 MECHANICAL CHEST WALL OSCILL: CPT

## 2021-09-07 PROCEDURE — 83735 ASSAY OF MAGNESIUM: CPT

## 2021-09-07 RX ORDER — POTASSIUM CHLORIDE 20 MEQ/1
40 TABLET, EXTENDED RELEASE ORAL ONCE
Status: COMPLETED | OUTPATIENT
Start: 2021-09-07 | End: 2021-09-07

## 2021-09-07 RX ORDER — PREDNISONE 20 MG/1
20 TABLET ORAL DAILY
Status: DISCONTINUED | OUTPATIENT
Start: 2021-09-08 | End: 2021-09-08 | Stop reason: HOSPADM

## 2021-09-07 RX ADMIN — AMLODIPINE BESYLATE 10 MG: 5 TABLET ORAL at 08:25

## 2021-09-07 RX ADMIN — IPRATROPIUM BROMIDE AND ALBUTEROL SULFATE 1 AMPULE: .5; 3 SOLUTION RESPIRATORY (INHALATION) at 19:03

## 2021-09-07 RX ADMIN — AZITHROMYCIN DIHYDRATE 250 MG: 250 TABLET, FILM COATED ORAL at 08:25

## 2021-09-07 RX ADMIN — ENOXAPARIN SODIUM 40 MG: 40 INJECTION SUBCUTANEOUS at 08:24

## 2021-09-07 RX ADMIN — FOLIC ACID 1 MG: 1 TABLET ORAL at 08:25

## 2021-09-07 RX ADMIN — Medication 100 MG: at 08:25

## 2021-09-07 RX ADMIN — DIAZEPAM 5 MG: 5 TABLET ORAL at 08:25

## 2021-09-07 RX ADMIN — SODIUM CHLORIDE, PRESERVATIVE FREE 10 ML: 5 INJECTION INTRAVENOUS at 21:04

## 2021-09-07 RX ADMIN — PANTOPRAZOLE SODIUM 40 MG: 40 TABLET, DELAYED RELEASE ORAL at 06:03

## 2021-09-07 RX ADMIN — CHLORDIAZEPOXIDE HYDROCHLORIDE 25 MG: 25 CAPSULE ORAL at 08:25

## 2021-09-07 RX ADMIN — METHYLPREDNISOLONE SODIUM SUCCINATE 40 MG: 40 INJECTION, POWDER, FOR SOLUTION INTRAMUSCULAR; INTRAVENOUS at 08:26

## 2021-09-07 RX ADMIN — TAMSULOSIN HYDROCHLORIDE 0.4 MG: 0.4 CAPSULE ORAL at 08:25

## 2021-09-07 RX ADMIN — FUROSEMIDE 40 MG: 10 INJECTION, SOLUTION INTRAMUSCULAR; INTRAVENOUS at 08:26

## 2021-09-07 RX ADMIN — LISINOPRIL 5 MG: 10 TABLET ORAL at 08:25

## 2021-09-07 RX ADMIN — IPRATROPIUM BROMIDE AND ALBUTEROL SULFATE 1 AMPULE: .5; 3 SOLUTION RESPIRATORY (INHALATION) at 11:30

## 2021-09-07 RX ADMIN — POTASSIUM CHLORIDE 40 MEQ: 20 TABLET, EXTENDED RELEASE ORAL at 10:55

## 2021-09-07 RX ADMIN — IPRATROPIUM BROMIDE AND ALBUTEROL SULFATE 1 AMPULE: .5; 3 SOLUTION RESPIRATORY (INHALATION) at 07:33

## 2021-09-07 RX ADMIN — Medication 2 PUFF: at 19:08

## 2021-09-07 RX ADMIN — CARVEDILOL 12.5 MG: 3.12 TABLET, FILM COATED ORAL at 08:25

## 2021-09-07 RX ADMIN — IPRATROPIUM BROMIDE AND ALBUTEROL SULFATE 1 AMPULE: .5; 3 SOLUTION RESPIRATORY (INHALATION) at 16:41

## 2021-09-07 RX ADMIN — CARVEDILOL 12.5 MG: 3.12 TABLET, FILM COATED ORAL at 16:58

## 2021-09-07 RX ADMIN — Medication 2 PUFF: at 07:33

## 2021-09-07 RX ADMIN — TIOTROPIUM BROMIDE INHALATION SPRAY 2 PUFF: 3.12 SPRAY, METERED RESPIRATORY (INHALATION) at 07:36

## 2021-09-07 RX ADMIN — SODIUM CHLORIDE, PRESERVATIVE FREE 10 ML: 5 INJECTION INTRAVENOUS at 08:24

## 2021-09-07 RX ADMIN — CHLORDIAZEPOXIDE HYDROCHLORIDE 25 MG: 25 CAPSULE ORAL at 21:03

## 2021-09-07 RX ADMIN — CHLORDIAZEPOXIDE HYDROCHLORIDE 25 MG: 25 CAPSULE ORAL at 14:28

## 2021-09-07 NOTE — PROGRESS NOTES
09/07/21 0356   NIV Type   NIV Started/Stopped On   Equipment Type V60   Mode AVAPS   Mask Type Full face mask   Mask Size Large   Settings/Measurements   CPAP/EPAP 14 cmH2O   IPAP Min 24 cmH2O   IPAP Max   (40)   Vt Ordered 600 mL   Rate Ordered 28   Resp 28   FiO2  70 %   Vt Exhaled 608 mL   Minute Volume 16.8 Liters   Mask Leak (lpm) 0 lpm   Comfort Level Good   Using Accessory Muscles No   SpO2 96   Alarm Settings   Alarms On Y

## 2021-09-07 NOTE — CARE COORDINATION
Therapy rec's of home care noted. Discussed rec's of home care but patient is not in agreement with this plan. He states his significant other is able to assist him as needed. Encouraged patient to please notify CM should he change his mind. He verbalized understanding. Discussed history of alcohol abuse but he states he quit last April and has been sober ever since. Also following for NIV needs. Benjamin Figueroa with [employer] following too running benefits today.

## 2021-09-07 NOTE — PROGRESS NOTES
Comprehensive Nutrition Assessment    Type and Reason for Visit:  Initial, RD Nutrition Re-Screen/LOS    Nutrition Recommendations/Plan:   1. Continue general diet and encourage PO intake  2. RD to add ensure HP with lunch  3. Monitor nutrition adequacy, pertinent labs, bowel habits, wt changes, and clinical progress    Nutrition Assessment:  LOS: Admitted with acute on chronic respiratory failure, metabolic encephalopathy secondary to COPD exacerbation. On regular diet with PO intakes %, per pt and chart review. Reports UBW of 225-230 lb a few months ago, currently 249 lb. No N/V reported or C/S difficulties. Willing to trial ensure HP with lunch for protein, RD to add. Will continue to monitor. Malnutrition Assessment:  Malnutrition Status:  No malnutrition    Context:  Acute Illness       Nutrition Related Findings:  + 1 edema RLE and LLE. + BM yesterday. Last , on steroids. Wounds:  None       Current Nutrition Therapies:    ADULT DIET;  Regular    Anthropometric Measures:  · Height: 5' 10\" (177.8 cm)  · Current Body Weight: 246 lb (111.6 kg)   · Usual Body Weight: 225 lb (102.1 kg)     · Ideal Body Weight: 166 lbs; % Ideal Body Weight 148.2 %   · BMI: 35.3   · BMI Categories: Obese Class 2 (BMI 35.0 -39.9)       Nutrition Diagnosis:   · Increased nutrient needs related to increase demand for energy/nutrients as evidenced by  (Prolonged hospital stay and impaired respiratory fxn)    Nutrition Interventions:   Food and/or Nutrient Delivery:  Continue Current Diet, Start Oral Nutrition Supplement  Nutrition Education/Counseling:  Education not indicated   Coordination of Nutrition Care:  Continue to monitor while inpatient    Goals:  Consume 50% or greater of 3 meals per day and ONS       Nutrition Monitoring and Evaluation:   Behavioral-Environmental Outcomes:  None Identified   Food/Nutrient Intake Outcomes:  Food and Nutrient Intake, Supplement Intake  Physical Signs/Symptoms Outcomes: Biochemical Data     Discharge Planning:    Continue current diet     Electronically signed by Leila Leigh MS, RD, LD on 9/7/21 at 1:00 PM EDT    Contact: Office: [phone]; [address] [place] [address] Road: [phone]

## 2021-09-07 NOTE — PROGRESS NOTES
INPATIENT PULMONARY CRITICAL CARE PROGRESS NOTE      Reason for visit: Acute on chronic respiratory failure, COPD exacerbation, suspected restrictive lung disease. Smoker since age 14, up to 2 PPD, was smoking until he came into the hospital.  History of alcohol abuse. The patient lives in [facility] with his significant other. SUBJECTIVE: Patient is much improved clinically, denies much shortness of breath. He remains afebrile and hemodynamically stable. Oxygen weaned down to 4 LPM with SaO2 in the mid to low 90s. He says he is on oxygen at 3.5 LPM at home     Physical Exam:  Blood pressure 107/66, pulse 88, temperature 97.7 °F (36.5 °C), temperature source Oral, resp. rate 24, height 5' 10\" (1.778 m), weight 246 lb 11.2 oz (111.9 kg), SpO2 91 %.'   Constitutional: Pleasant, overweight. Sitting up in a chair. No acute distress. HENT:  Oropharynx is clear and moist.  Missing teeth, class III airway  Eyes:  Conjunctivae are normal. Pupils equal, round, and reactive to light. No scleral icterus. Neck: Short and large neck, no JVD. No tracheal deviation present. No obvious thyroid mass. Cardiovascular: Normal rate, regular rhythm, normal heart sounds. No right ventricular heave. Trace lower extremity edema. Pulmonary/Chest: Few scattered wheezes, reduced air entry. No accessory muscle usage or stridor. Abdominal: Deferred  Musculoskeletal: No cyanosis. No clubbing. No obvious joint deformity. Lymphadenopathy: No cervical or supraclavicular adenopathy. Skin: Skin is warm and dry. No rash or nodules on the exposed extremities. Psychiatric: Normal mood and affect. Behavior is normal.  No anxiety. Neurologic: Alert, awake and oriented. PERRL. Speech fluent.   No obvious neurologic deficit, detailed exam not performed    Results:  CBC:   Recent Labs     09/05/21  0455 09/06/21  0515   WBC 11.7* 12.6*   HGB 16.3 15.4   HCT 49.1 47.1   MCV 90.7 92.1    273     BMP:   Recent Labs 09/05/21  0455 09/06/21  0515 09/07/21  1131    143 139   K 3.8 3.2* 3.5   CL 96* 97* 93*   CO2 36* 37* 38*   PHOS 4.2 4.1  --    BUN 26* 55* 45*   CREATININE 0.7* 1.4* 1.2       Imaging:  I have reviewed radiology images personally. XR CHEST PORTABLE   Final Result   Improved aeration of the chest with moderate bibasilar atelectasis versus   pneumonia remain present although overall decreased airspace disease   bilaterally. XR CHEST PORTABLE   Final Result   Bilateral airspace disease and small to moderate left pleural effusion. Pneumonia or asymmetric edema are considerations. XR CHEST PORTABLE   Final Result   1. Cardiomegaly. 2. Patchy bilateral streaky pulmonary opacities suggestive of an atypical   pneumonitis. These findings are mildly progressed compared to August 9, 2021. Continued radiographic follow-up is recommended. XR CHEST (2 VW)    Result Date: 8/9/2021  EXAMINATION: TWO XRAY VIEWS OF THE CHEST 8/9/2021 1:51 pm COMPARISON: Chest x-ray dated 07/20/2021. HISTORY: ORDERING SYSTEM PROVIDED HISTORY: Unresolved pneumonia TECHNOLOGIST PROVIDED HISTORY: Reason for Exam: pneumonia Acuity: Chronic Type of Exam: Subsequent/Follow-up FINDINGS: HEART/MEDIASTINUM: The cardiomediastinal silhouette is within normal limits. PLEURA/LUNGS: Streaky opacity noted in the left upper lobe reflecting atelectasis versus airspace disease. There are no pleural effusions. There is no appreciable pneumothorax. BONES/SOFT TISSUE: No acute abnormality. Streaky opacity in the left upper lobe reflecting atelectasis versus improving airspace disease. Follow-up to resolution is recommended. XR CHEST PORTABLE    Result Date: 9/6/2021  EXAMINATION: ONE XRAY VIEW OF THE CHEST 9/6/2021 8:12 am COMPARISON: September 3rd HISTORY: ORDERING SYSTEM PROVIDED HISTORY: sob TECHNOLOGIST PROVIDED HISTORY: Reason for exam:->sob FINDINGS: Bibasilar airspace disease.   No evidence of pneumothorax right-sided PICC line in good position. No evidence of acute process of the cardiac or mediastinal structures. No definitive pleural effusion on limited evaluation by low lung volume portable chest x-ray. Improved aeration of the chest with moderate bibasilar atelectasis versus pneumonia remain present although overall decreased airspace disease bilaterally. XR CHEST PORTABLE    Result Date: 9/3/2021  EXAMINATION: ONE XRAY VIEW OF THE CHEST 9/3/2021 6:40 am COMPARISON: 08/31/2021 HISTORY: ORDERING SYSTEM PROVIDED HISTORY: Shortness of breath TECHNOLOGIST PROVIDED HISTORY: Reason for exam:->Shortness of breath Reason for Exam: Shortness of breath FINDINGS: Cardiac leads project over the chest.  Left basilar pleuroparenchymal opacity has developed with decreased definition of the left hemidiaphragm. Diffuse heterogeneous right-sided opacity is seen. No pneumothorax. Cardiac and mediastinal silhouettes are reflective of patient rotation. Bilateral airspace disease and small to moderate left pleural effusion. Pneumonia or asymmetric edema are considerations. XR CHEST PORTABLE    Result Date: 8/31/2021  EXAMINATION: ONE X-RAY VIEW OF THE CHEST 8/31/2021 7:32 am COMPARISON: August 9, 2021 HISTORY: ORDERING SYSTEM PROVIDED HISTORY: sob TECHNOLOGIST PROVIDED HISTORY: Reason for exam:->sob Initial evaluation for acute shortness of breath FINDINGS: The cardiomediastinal silhouette is moderately enlarged. Lung volumes are low. Patchy bilateral increased streaky opacifications are present, slightly progressed from previous exam. No evidence of lobar consolidation. No large pleural effusion. No pneumothorax is present. 1. Cardiomegaly. 2. Patchy bilateral streaky pulmonary opacities suggestive of an atypical pneumonitis. These findings are mildly progressed compared to August 9, 2021. Continued radiographic follow-up is recommended.      Assessment and plan:  Acute on chronic respiratory failure, hypoxemic and hypercarbic. Improved with AVAPS, plan discharge with Trilogy or Astral, discussed with Dr. Tr Zapata  COPD with exacerbation. On DuoNeb, prednisone  HTN, GERD, BPH. Per IM  Nicotine dependence. Should quit smoking altogether. Alcohol use. Should quit alcohol completely. Obesity. Should make attempts to lose weight. Discussed with patient, nursing. Can be discharged home from the pulmonary standpoint, will set up follow-up with our office.   Thank you for the consultation, please call with questions      Electronically signed by:  Nisha Torres MD    9/7/2021    3:22 PM.

## 2021-09-07 NOTE — PROGRESS NOTES
09/07/21 0028   NIV Type   $NIV $Daily Charge   NIV Started/Stopped On   Equipment Type V60   Mode AVAPS   Mask Type Full face mask   Mask Size Large   Settings/Measurements   CPAP/EPAP 14 cmH2O   IPAP Min 24 cmH2O   IPAP Max   (40)   Vt Ordered 600 mL   Rate Ordered 28   Resp 28   FiO2  70 %   I Time/ I Time % 0.7 s   Vt Exhaled 655 mL   Minute Volume 20.4 Liters   Mask Leak (lpm) 0 lpm   Comfort Level Good   Using Accessory Muscles No   SpO2 95   Alarm Settings   Alarms On Y

## 2021-09-07 NOTE — PLAN OF CARE
Problem: Falls - Risk of:  Goal: Will remain free from falls  Description: Will remain free from falls  Outcome: Ongoing  Goal: Absence of physical injury  Description: Absence of physical injury  Outcome: Ongoing     Problem: Discharge Planning:  Goal: Discharged to appropriate level of care  Description: Discharged to appropriate level of care  Outcome: Ongoing     Problem:  Activity Intolerance:  Goal: Ability to tolerate increased activity will improve  Description: Ability to tolerate increased activity will improve  Outcome: Ongoing     Problem: Airway Clearance - Ineffective:  Goal: Ability to maintain a clear airway will improve  Description: Ability to maintain a clear airway will improve  Outcome: Ongoing     Problem: Breathing Pattern - Ineffective:  Goal: Ability to achieve and maintain a regular respiratory rate will improve  Description: Ability to achieve and maintain a regular respiratory rate will improve  Outcome: Ongoing     Problem: Gas Exchange - Impaired:  Goal: Levels of oxygenation will improve  Description: Levels of oxygenation will improve  Outcome: Ongoing     Problem: Pain:  Goal: Pain level will decrease  Description: Pain level will decrease  Outcome: Ongoing  Goal: Control of acute pain  Description: Control of acute pain  Outcome: Ongoing  Goal: Control of chronic pain  Description: Control of chronic pain  Outcome: Ongoing     Problem: Skin Integrity:  Goal: Will show no infection signs and symptoms  Description: Will show no infection signs and symptoms  Outcome: Ongoing  Goal: Absence of new skin breakdown  Description: Absence of new skin breakdown  Outcome: Ongoing

## 2021-09-07 NOTE — FLOWSHEET NOTE
09/07/21 0815   Assessment   Charting Type Shift assessment   Neurological   Neuro (WDL) WDL   Level of Consciousness Alert (0)   Orientation Level Oriented X4   Cognition Appropriate judgement; Appropriate safety awareness; Appropriate attention/concentration; Appropriate for developmental age; Follows commands   Language Clear; Appropriate for developmental age   [de-identified] Coma Scale   Eye Opening 4   Best Verbal Response 5   Best Motor Response 6   Jessica Coma Scale Score 15   HEENT   HEENT (WDL) X   Right Ear Impaired hearing   Left Ear Impaired hearing   Throat Intact   Neck Symmetrical;Trachea midline   Tongue Pink & moist   Voice Raspy   Mucous Membrane Pink; Intact;Dry   Teeth Missing teeth   Respiratory   Respiratory (WDL) X   Respiratory Pattern Regular   Respiratory Depth Deep   Respiratory Quality/Effort Unlabored   Chest Assessment Chest expansion symmetrical   L Breath Sounds Bilateral;Diminished   R Breath Sounds Bilateral;Diminished   Breath Sounds   Right Upper Lobe Diminished   Right Middle Lobe Diminished   Right Lower Lobe Diminished   Left Upper Lobe Diminished   Left Lower Lobe Diminished   Cardiac   Cardiac (WDL) WDL   Cardiac Regularity Regular   Heart Sounds S1, S2   Cardiac Rhythm Sinus rhythm   Rhythm Interpretation   Pulse 85   Cardiac Monitor   Telemetry Monitor On Yes   Telemetry Audible Yes   Telemetry Alarms Set Yes   Telemetry Box Number 38   Gastrointestinal   Abdominal (WDL) WDL   RUQ Bowel Sounds Active   LUQ Bowel Sounds Active   RLQ Bowel Sounds Active   LLQ Bowel Sounds Active   Abdomen Inspection Rotund   Tenderness Soft;Nontender   Peripheral Vascular   Peripheral Vascular (WDL) X   Edema Generalized   Edema Generalized Non-pitting   RLE Edema +1   LLE Edema +1   Skin Color/Condition   Skin Color/Condition (WDL) WDL   Skin Color Appropriate for ethnicity   Skin Condition/Temp Warm;Dry   Skin Integrity   Skin Integrity (WDL) WDL   Skin Integrity Bruising   Location scattered Musculoskeletal   Musculoskeletal (WDL) WDL   Genitourinary   Genitourinary (WDL) X   Flank Tenderness No   Suprapubic Tenderness No   Dysuria JULES   Urine Assessment   Incontinence No   Urine Color Yellow/straw   Urine Appearance Clear   Urine Odor JULES   Anus/Rectum   Anus/Rectum (WDL) WDL

## 2021-09-08 ENCOUNTER — TELEPHONE (OUTPATIENT)
Dept: PULMONOLOGY | Age: 61
End: 2021-09-08

## 2021-09-08 VITALS
HEART RATE: 82 BPM | DIASTOLIC BLOOD PRESSURE: 74 MMHG | RESPIRATION RATE: 18 BRPM | BODY MASS INDEX: 35.32 KG/M2 | HEIGHT: 70 IN | OXYGEN SATURATION: 94 % | WEIGHT: 246.7 LBS | SYSTOLIC BLOOD PRESSURE: 132 MMHG | TEMPERATURE: 98.3 F

## 2021-09-08 LAB
ANION GAP SERPL CALCULATED.3IONS-SCNC: 8 MMOL/L (ref 3–16)
BUN BLDV-MCNC: 36 MG/DL (ref 7–20)
CALCIUM SERPL-MCNC: 9.2 MG/DL (ref 8.3–10.6)
CHLORIDE BLD-SCNC: 96 MMOL/L (ref 99–110)
CO2: 37 MMOL/L (ref 21–32)
CREAT SERPL-MCNC: 0.8 MG/DL (ref 0.8–1.3)
EKG ATRIAL RATE: 79 BPM
EKG DIAGNOSIS: NORMAL
EKG P AXIS: 48 DEGREES
EKG P-R INTERVAL: 130 MS
EKG Q-T INTERVAL: 358 MS
EKG QRS DURATION: 80 MS
EKG QTC CALCULATION (BAZETT): 410 MS
EKG R AXIS: 4 DEGREES
EKG T AXIS: 1 DEGREES
EKG VENTRICULAR RATE: 79 BPM
GFR AFRICAN AMERICAN: >60
GFR NON-AFRICAN AMERICAN: >60
GLUCOSE BLD-MCNC: 134 MG/DL (ref 70–99)
POTASSIUM REFLEX MAGNESIUM: 3.9 MMOL/L (ref 3.5–5.1)
SODIUM BLD-SCNC: 141 MMOL/L (ref 136–145)

## 2021-09-08 PROCEDURE — 97530 THERAPEUTIC ACTIVITIES: CPT

## 2021-09-08 PROCEDURE — 99231 SBSQ HOSP IP/OBS SF/LOW 25: CPT | Performed by: INTERNAL MEDICINE

## 2021-09-08 PROCEDURE — 93005 ELECTROCARDIOGRAM TRACING: CPT | Performed by: NURSE PRACTITIONER

## 2021-09-08 PROCEDURE — 94669 MECHANICAL CHEST WALL OSCILL: CPT

## 2021-09-08 PROCEDURE — 6360000002 HC RX W HCPCS: Performed by: INTERNAL MEDICINE

## 2021-09-08 PROCEDURE — 97116 GAIT TRAINING THERAPY: CPT

## 2021-09-08 PROCEDURE — 80048 BASIC METABOLIC PNL TOTAL CA: CPT

## 2021-09-08 PROCEDURE — 94761 N-INVAS EAR/PLS OXIMETRY MLT: CPT

## 2021-09-08 PROCEDURE — 6370000000 HC RX 637 (ALT 250 FOR IP): Performed by: INTERNAL MEDICINE

## 2021-09-08 PROCEDURE — 97535 SELF CARE MNGMENT TRAINING: CPT

## 2021-09-08 PROCEDURE — 94640 AIRWAY INHALATION TREATMENT: CPT

## 2021-09-08 PROCEDURE — 93010 ELECTROCARDIOGRAM REPORT: CPT | Performed by: INTERNAL MEDICINE

## 2021-09-08 PROCEDURE — 2700000000 HC OXYGEN THERAPY PER DAY

## 2021-09-08 PROCEDURE — 2580000003 HC RX 258: Performed by: INTERNAL MEDICINE

## 2021-09-08 PROCEDURE — 97110 THERAPEUTIC EXERCISES: CPT

## 2021-09-08 RX ORDER — PREDNISONE 20 MG/1
20 TABLET ORAL DAILY
Qty: 5 TABLET | Refills: 0 | Status: SHIPPED | OUTPATIENT
Start: 2021-09-09 | End: 2021-09-14

## 2021-09-08 RX ADMIN — IPRATROPIUM BROMIDE AND ALBUTEROL SULFATE 1 AMPULE: .5; 3 SOLUTION RESPIRATORY (INHALATION) at 13:08

## 2021-09-08 RX ADMIN — SODIUM CHLORIDE, PRESERVATIVE FREE 10 ML: 5 INJECTION INTRAVENOUS at 09:02

## 2021-09-08 RX ADMIN — TIOTROPIUM BROMIDE INHALATION SPRAY 2 PUFF: 3.12 SPRAY, METERED RESPIRATORY (INHALATION) at 08:32

## 2021-09-08 RX ADMIN — CHLORDIAZEPOXIDE HYDROCHLORIDE 25 MG: 25 CAPSULE ORAL at 16:15

## 2021-09-08 RX ADMIN — IPRATROPIUM BROMIDE AND ALBUTEROL SULFATE 1 AMPULE: .5; 3 SOLUTION RESPIRATORY (INHALATION) at 08:31

## 2021-09-08 RX ADMIN — FOLIC ACID 1 MG: 1 TABLET ORAL at 09:02

## 2021-09-08 RX ADMIN — Medication 2 PUFF: at 08:32

## 2021-09-08 RX ADMIN — LISINOPRIL 5 MG: 10 TABLET ORAL at 09:01

## 2021-09-08 RX ADMIN — CARVEDILOL 12.5 MG: 3.12 TABLET, FILM COATED ORAL at 09:01

## 2021-09-08 RX ADMIN — AMLODIPINE BESYLATE 10 MG: 5 TABLET ORAL at 09:02

## 2021-09-08 RX ADMIN — PREDNISONE 20 MG: 20 TABLET ORAL at 09:02

## 2021-09-08 RX ADMIN — PANTOPRAZOLE SODIUM 40 MG: 40 TABLET, DELAYED RELEASE ORAL at 16:15

## 2021-09-08 RX ADMIN — TAMSULOSIN HYDROCHLORIDE 0.4 MG: 0.4 CAPSULE ORAL at 09:01

## 2021-09-08 RX ADMIN — ENOXAPARIN SODIUM 40 MG: 40 INJECTION SUBCUTANEOUS at 09:02

## 2021-09-08 RX ADMIN — Medication 100 MG: at 09:01

## 2021-09-08 RX ADMIN — CARVEDILOL 12.5 MG: 3.12 TABLET, FILM COATED ORAL at 17:12

## 2021-09-08 RX ADMIN — CHLORDIAZEPOXIDE HYDROCHLORIDE 25 MG: 25 CAPSULE ORAL at 09:00

## 2021-09-08 ASSESSMENT — PAIN SCALES - GENERAL
PAINLEVEL_OUTOF10: 0

## 2021-09-08 NOTE — PROGRESS NOTES
INPATIENT PULMONARY CRITICAL CARE PROGRESS NOTE      Reason for visit: Acute on chronic respiratory failure, COPD exacerbation, suspected restrictive lung disease. Smoker since age 14, up to 2 PPD, was smoking until he came into the hospital.  History of alcohol abuse. The patient lives in [facility] with his significant other. SUBJECTIVE: Patient is much improved clinically, denies much shortness of breath. He remains afebrile and hemodynamically stable. Oxygen weaned down to 4 LPM with SaO2 93%. He did wear his AVAPS last night. He has a good appetite, denies GI or  complaints     Physical Exam:  Blood pressure 132/74, pulse 82, temperature 98.3 °F (36.8 °C), temperature source Oral, resp. rate 18, height 5' 10\" (1.778 m), weight 246 lb 11.2 oz (111.9 kg), SpO2 94 %.'   Constitutional: Pleasant, overweight. Sitting up in a chair. No acute distress. HENT:  Oropharynx is clear and moist.  Missing teeth, class III airway  Eyes:  Conjunctivae are normal. Pupils equal, round, and reactive to light. No scleral icterus. Neck: Short and large neck, no JVD. Cardiovascular: Normal rate, regular rhythm, normal heart sounds. Pulmonary/Chest: Few scattered wheezes, reduced air entry. No accessory muscle usage or stridor. Abdominal: Deferred  Musculoskeletal: No cyanosis. No clubbing. No obvious joint deformity. Lymphadenopathy: No cervical or supraclavicular adenopathy. Skin: Skin is warm and dry. No rash or nodules on the exposed extremities. Psychiatric: Normal mood and affect. Behavior is normal.  No anxiety. Neurologic: Alert, awake and oriented. PERRL. Speech fluent.   No obvious neurologic deficit, detailed exam not performed    Results:  CBC:   Recent Labs     09/06/21  0515   WBC 12.6*   HGB 15.4   HCT 47.1   MCV 92.1        BMP:   Recent Labs     09/06/21  0515 09/07/21  1131 09/08/21  0630    139 141   K 3.2* 3.5 3.9   CL 97* 93* 96*   CO2 37* 38* 37*   PHOS 4.1  --   -- BUN 55* 45* 36*   CREATININE 1.4* 1.2 0.8       Imaging:  I have reviewed radiology images personally. XR CHEST PORTABLE   Final Result   Improved aeration of the chest with moderate bibasilar atelectasis versus   pneumonia remain present although overall decreased airspace disease   bilaterally. XR CHEST PORTABLE   Final Result   Bilateral airspace disease and small to moderate left pleural effusion. Pneumonia or asymmetric edema are considerations. XR CHEST PORTABLE   Final Result   1. Cardiomegaly. 2. Patchy bilateral streaky pulmonary opacities suggestive of an atypical   pneumonitis. These findings are mildly progressed compared to August 9, 2021. Continued radiographic follow-up is recommended. XR CHEST (2 VW)    Result Date: 8/9/2021  EXAMINATION: TWO XRAY VIEWS OF THE CHEST 8/9/2021 1:51 pm COMPARISON: Chest x-ray dated 07/20/2021. HISTORY: ORDERING SYSTEM PROVIDED HISTORY: Unresolved pneumonia TECHNOLOGIST PROVIDED HISTORY: Reason for Exam: pneumonia Acuity: Chronic Type of Exam: Subsequent/Follow-up FINDINGS: HEART/MEDIASTINUM: The cardiomediastinal silhouette is within normal limits. PLEURA/LUNGS: Streaky opacity noted in the left upper lobe reflecting atelectasis versus airspace disease. There are no pleural effusions. There is no appreciable pneumothorax. BONES/SOFT TISSUE: No acute abnormality. Streaky opacity in the left upper lobe reflecting atelectasis versus improving airspace disease. Follow-up to resolution is recommended. XR CHEST PORTABLE    Result Date: 9/6/2021  EXAMINATION: ONE XRAY VIEW OF THE CHEST 9/6/2021 8:12 am COMPARISON: September 3rd HISTORY: ORDERING SYSTEM PROVIDED HISTORY: sob TECHNOLOGIST PROVIDED HISTORY: Reason for exam:->sob FINDINGS: Bibasilar airspace disease. No evidence of pneumothorax right-sided PICC line in good position. No evidence of acute process of the cardiac or mediastinal structures.   No definitive pleural effusion on limited evaluation by low lung volume portable chest x-ray. Improved aeration of the chest with moderate bibasilar atelectasis versus pneumonia remain present although overall decreased airspace disease bilaterally. XR CHEST PORTABLE    Result Date: 9/3/2021  EXAMINATION: ONE XRAY VIEW OF THE CHEST 9/3/2021 6:40 am COMPARISON: 08/31/2021 HISTORY: ORDERING SYSTEM PROVIDED HISTORY: Shortness of breath TECHNOLOGIST PROVIDED HISTORY: Reason for exam:->Shortness of breath Reason for Exam: Shortness of breath FINDINGS: Cardiac leads project over the chest.  Left basilar pleuroparenchymal opacity has developed with decreased definition of the left hemidiaphragm. Diffuse heterogeneous right-sided opacity is seen. No pneumothorax. Cardiac and mediastinal silhouettes are reflective of patient rotation. Bilateral airspace disease and small to moderate left pleural effusion. Pneumonia or asymmetric edema are considerations. XR CHEST PORTABLE    Result Date: 8/31/2021  EXAMINATION: ONE X-RAY VIEW OF THE CHEST 8/31/2021 7:32 am COMPARISON: August 9, 2021 HISTORY: ORDERING SYSTEM PROVIDED HISTORY: sob TECHNOLOGIST PROVIDED HISTORY: Reason for exam:->sob Initial evaluation for acute shortness of breath FINDINGS: The cardiomediastinal silhouette is moderately enlarged. Lung volumes are low. Patchy bilateral increased streaky opacifications are present, slightly progressed from previous exam. No evidence of lobar consolidation. No large pleural effusion. No pneumothorax is present. 1. Cardiomegaly. 2. Patchy bilateral streaky pulmonary opacities suggestive of an atypical pneumonitis. These findings are mildly progressed compared to August 9, 2021. Continued radiographic follow-up is recommended. Assessment and plan:  Acute on chronic respiratory failure, hypoxemic and hypercarbic. Improved with AVAPS, plan discharge with Trilogy or Astral, he will see Dr. Gabriel Caruso in follow-up.   His mother is also Dr. Roberto White patient  COPD with exacerbation. On DuoNeb, prednisone  HTN, GERD, BPH. Per IM  Nicotine dependence. Should quit smoking altogether. He said he will try  Alcohol use. Should quit alcohol completely. Obesity. Should make attempts to lose weight. Discussed with patient, nursing. Can be discharged home from the pulmonary standpoint, will set up follow-up with our office.   Thank you for the consultation, please call with questions      Electronically signed by:  Taniya Cruz MD    9/8/2021    4:35 PM.

## 2021-09-08 NOTE — CARE COORDINATION
This CM made aware that patient will need NIV at discharge. Spoke to [place] with [facility] and after running benefits, he states that [facility] is not in network with patient's [employer] plan. He states that [facility] is in network. Placed call to [facility] with [facility] and notified of need for Trilogy or Astral.  She states they carry the Astral and confirmed that they are in network with patient's insurance. Faxed all requested clinicals to [facility] at [phone] including facesheet, H&P, latest Pulmonary note, bipap order and latest respiratory note. [facility] states that they will review and call back with approval.        [id number] addendum: Received call from [place] with [facility] who states that patient still has oxygen through them, therefore, patient will need to be re-approved for oxygen through [facility]. RN aware and will be doing walk/O2 test.  MD aware and will put in order. Placed call to [facility] with [facility] to update her on the above. She states they will also work on getting oxygen for patient once walk test and orders are done. CM will need to fax to her at above number when completed. [id number] addendum:  Oxygen order and walk test done and faxed to [facility]. Placed call to [facility] with [facility] and notified her. Inquired as to status of the Astral approval and she states it is still pending. She states that once it is approved, they will be able to deliver oxygen to the hospital. MD aware. [id number] addendum: Received call from [address] [place] [address] with [facility] and he states that the Astral NIV needs to be filled out one of their order forms and he states he will fax one over. He further states that they are unable to set him up with the NIV until tomorrow morning. He states they will, however, deliver the oxygen to the hospital tonight.   Informed patient of the above and he is asking if he can go ahead and discharge tonight with new oxygen through [facility] and be set up at home in the morning for the NIV. Perfect serve sent to MD to see if that is an option.

## 2021-09-08 NOTE — DISCHARGE SUMMARY
Hospital Medicine Discharge Summary    Patient: Kam Sao Tomean     Age: 62 y.o. Gender: male  : 1960   MRN: [id number]  Code Status: Full     Admit Date: 2021   Discharge Date: 2021    Disposition:  Home     Condition at Discharge: Stable    Primary Care Provider: Behzad Grove MD    Admitting Physician: Corey Samson MD  Discharge Physician: Ramon Ortega MD       Discharge Diagnoses: Active Hospital Problems    Diagnosis     HTN (hypertension) [I10]     GERD (gastroesophageal reflux disease) [K21.9]     BPH (benign prostatic hyperplasia) [N40.0]     Hx of chronic respiratory failure [Z87.09]     Obesity [E66.9]     Alcohol use [Z72.89]     Tobacco abuse [Z72.0]     Acute respiratory failure with hypoxia ([address] [J96.01]     COPD exacerbation ([address] [J44.1]        Hospital Course:   Admitted with acute on chronic respiratory failure with hypercapnia and metabolic encephalopathy secondary to COPD exacerbation (baseline home oxygen 4 LPM) and Strep pneumonia treated with ceftriaxone and azithromycin. Complicated by alcohol withdrawal refractory to CIWA directed lorazepam requiring transfer to ICU overnight and IV dexmedetomidine. Assessment/Plan:    Acute on chronic respiratory failure with hypercapnia and metabolic encephalopathy secondary to COPD exacerbation (baseline home oxygen 4 LPM) and Strep pneumonia treated with ceftriaxone and azithromycin. Continues on methylprednisolone 40 mg IV every 12 hours. Symbicort (budesonide-formoterol) 2-puff BID and Duonebs (ipratropium-albuterol) every 4 hours. Followed by pulmonary. O2 improving and nearing baseline. He is agreeable to NIV/AVAPS at home. COPD with CO2 retention on ABG resolving from high of 116 to 49, chronic CO2 retention with metabolic alkalosis. Unable to tolerated BiPAP, has improved on AVAPS per pulmonary. Most likely has restrictive lung disease. Alcohol withdrawal.  Improved.   No further need for CIWA. Gastric reflux and stress ulcer prophylaxis with pantoprazole (Protonix) 40 mg daily. Nicotine dependence on nicotine replacement therapy with 21 mg nicotine patch. Hypertension:  Continues on amlodipine 10 mg and lisinopril 5 mg daily. Prostate hypertrophy on tamsulosin 0.4 mg daily. Obstructive sleep apnea:  AVAPS as above. Morbid Obesity -  With Body mass index is 35.4 kg/m². Complicating assessment and treatment. Placing patient at risk for multiple co-morbidities as well as early death and contributing to the patient's presentation.          Exam:   /74   Pulse 82   Temp 98.3 °F (36.8 °C) (Oral)   Resp 18   Ht 5' 10\" (1.778 m)   Wt 246 lb 11.2 oz (111.9 kg)   SpO2 94%   BMI 35.40 kg/m²   General appearance: No apparent distress, appears stated age and cooperative. HEENT: Pupils equal, round, and reactive to light. Conjunctivae/corneas clear. Neck: Supple, no jugular venous distention. Trachea midline with full range of motion. Respiratory:  Normal respiratory effort. Clear to auscultation, bilaterally without Rales/Wheezes/Rhonchi. Cardiovascular: Regular rate and rhythm with normal S1/S2 without murmurs, rubs or gallops. Abdomen: Soft, non-tender, non-distended with normal bowel sounds. Musculoskelatal: No clubbing, cyanosis or edema bilaterally. Full range of motion without deformity. Neurologic:  Neurovascularly intact without any focal sensory/motor deficits. Cranial nerves: II-XII intact, grossly non-focal.  Psychiatric: Alert and oriented, thought content appropriate, normal insight  Skin: Skin color, texture, turgor normal.  No rashes or lesions. Capillary Refill: Brisk,< 3 seconds   Peripheral Pulses: +2 palpable, equal bilaterally     Patient Discharge Instructions: Follow up:  1. Primary Care Provider Aman Dunn MD in the next 1-2 weeks.       Discharge Medications:   Discharge Medication List as of 9/8/2021  5:00 PM      START taking these medications    Details   predniSONE (DELTASONE) 20 MG tablet Take 1 tablet by mouth daily for 5 days, Disp-5 tablet, R-0Normal      tiotropium (SPIRIVA RESPIMAT) 2.5 MCG/ACT AERS inhaler Inhale 2 puffs into the lungs daily, Disp-1 each, R-0Normal           Discharge Medication List as of 9/8/2021  5:00 PM        Discharge Medication List as of 9/8/2021  5:00 PM      CONTINUE these medications which have NOT CHANGED    Details   pantoprazole (PROTONIX) 40 MG tablet Take 1 tablet by mouth every morning (before breakfast) for 10 days, Disp-10 tablet, R-0Normal      lisinopril (PRINIVIL;ZESTRIL) 5 MG tablet Take 1 tablet by mouth daily, Disp-30 tablet, R-3Normal      carvedilol (COREG) 12.5 MG tablet Take 1 tablet by mouth 2 times daily (with meals), Disp-60 tablet, R-3Normal      amLODIPine (NORVASC) 10 MG tablet Take 1 tablet by mouth daily, Disp-30 tablet, R-3Normal      tamsulosin (FLOMAX) 0.4 MG capsule Take 1 capsule by mouth daily, Disp-30 capsule, R-3Normal      nicotine (NICODERM CQ) 21 MG/24HR Place 1 patch onto the skin daily, Disp-30 patch, R-0Normal      thiamine 100 MG tablet Take 1 tablet by mouth daily, Disp-30 tablet, R-3Normal      budesonide-formoterol (SYMBICORT) 160-4.5 MCG/ACT AERO Inhale 2 puffs into the lungs 2 times daily, Disp-3 Inhaler, R-1Normal      albuterol sulfate HFA (VENTOLIN HFA) 108 (90 Base) MCG/ACT inhaler Inhale 2 puffs into the lungs every 6 hours as needed for Wheezing, Disp-1 Inhaler, R-1Print           Discharge Medication List as of 9/8/2021  5:00 PM            Significant Test Results    XR CHEST PORTABLE    Result Date: 9/6/2021  EXAMINATION: ONE XRAY VIEW OF THE CHEST 9/6/2021 8:12 am COMPARISON: September 3rd HISTORY: ORDERING SYSTEM PROVIDED HISTORY: sob TECHNOLOGIST PROVIDED HISTORY: Reason for exam:->sob FINDINGS: Bibasilar airspace disease. No evidence of pneumothorax right-sided PICC line in good position.   No evidence of acute process of the cardiac or mediastinal structures. No definitive pleural effusion on limited evaluation by low lung volume portable chest x-ray. Improved aeration of the chest with moderate bibasilar atelectasis versus pneumonia remain present although overall decreased airspace disease bilaterally. Consults:     IP CONSULT TO HOSPITALIST  IP CONSULT TO SOCIAL WORK  IP CONSULT TO PULMONOLOGY    Labs: For convenience and continuity at follow-up the following most recent labs are provided:    Lab Results   Component Value Date    WBC 12.6 09/06/2021    HGB 15.4 09/06/2021    HCT 47.1 09/06/2021    MCV 92.1 09/06/2021     09/06/2021     09/08/2021    K 3.9 09/08/2021    CL 96 09/08/2021    CO2 37 09/08/2021    BUN 36 09/08/2021    CREATININE 0.8 09/08/2021    CALCIUM 9.2 09/08/2021    PHOS 4.1 09/06/2021    TROPONINI <0.01 08/31/2021    ALKPHOS 76 09/03/2021    ALT 33 09/03/2021    AST 16 09/03/2021    BILITOT 0.3 09/03/2021    BILIDIR <0.2 09/03/2021    LABALBU 3.4 09/05/2021    TRIG 146 09/03/2021     No results found for: INR      The patient was seen and examined on day of discharge and this discharge summary is in conjunction with any daily progress note from day of discharge. Time spent on discharge is more than 30 minutes in the examination, evaluation, counseling and review of medications and discharge plan. Signed:    Alisson Reese MD   10/4/2021    Thank you Pam Stern MD for the opportunity to be involved in this patient's care. If you have any questions or concerns please feel free to contact my office [phone].

## 2021-09-08 NOTE — PROGRESS NOTES
picc line discontinued per protocol in anticipation of discharge. Site cleansed with chloraprep covered with vaseline guaze covered with 2x2 and tegaderm tolerated well.

## 2021-09-08 NOTE — PROGRESS NOTES
MD  Subjective  Subjective: pt resting in bed on approach, pleasant and agreeable to PT session  General Comment  Comments: RN cleared pt for session  Pain Screening  Patient Currently in Pain: Denies  Vital Signs  Patient Currently in Pain: Denies       Orientation  Orientation  Overall Orientation Status: Within Normal Limits  Cognition      Objective   Bed mobility  Supine to Sit: Modified independent  Transfers  Sit to Stand: Independent  Stand to sit: Independent  Ambulation  Ambulation?: Yes  Ambulation 1  Surface: level tile  Device: No Device  Other Apparatus: O2 (4 L)  Assistance: Supervision  Quality of Gait: steady  Gait Deviations: Slow Gabrielle;Decreased step length;Decreased arm swing;Decreased head and trunk rotation  Distance: 50', 75'  Stairs/Curb  Stairs?: Yes  Stairs  # Steps : 8 (4 steps  up/down twice w/o stop)  Stairs Height: 6\"  Rails: Right ascending  Device: No Device  Assistance: Modified independent          AM-PAC Score  AM-PAC Inpatient Mobility Raw Score : 22 (09/08/21 1512)  AM-PAC Inpatient T-Scale Score : 53.28 (09/08/21 1512)  Mobility Inpatient CMS 0-100% Score: 20.91 (09/08/21 1512)  Mobility Inpatient CMS G-Code Modifier : CJ (09/08/21 1512)          Goals  Short term goals  Time Frame for Short term goals: 1 week 9/12/21 (unless otherwise specified)  Short term goal 1: Pt will complete supine to/from sit with I  -9/07 mod indep  Short term goal 2: Pt will complete sit to/from stand with LRAD with MI   -9/07 met and indep  Short term goal 3: Pt will ambualte >50' with LRAD with MI without LOB   -9/07 superv 75' NAD  Short term goal 4: Pt will ascend/descend 13 steps with HR with S without LOB   -9/07 met  Short term goal 5: 9/09/21: Pt will participate in 12-15 reps BLE exercises to increase strength and increase I with functional mobility and gait   -9/07 met  Patient Goals   Patient goals :  \"Get home\"    Plan    Plan  Times per week: 3-5x/wk  Times per day: Daily  Specific instructions for Next Treatment: Progress mobility as tolerated  Current Treatment Recommendations: Strengthening, Home Exercise Program, Safety Education & Training, Balance Training, Endurance Training, Patient/Caregiver Education & Training, Functional Mobility Training, Transfer Training, Gait Training, Stair training, Positioning  Safety Devices  Type of devices: Left in chair, Call light within reach, Nurse notified, Gait belt     Therapy Time   Individual Concurrent Group Co-treatment   Time In 1405         Time Out 1430         Minutes 25         Timed Code Treatment Minutes: 1440 [facility]

## 2021-09-08 NOTE — PROGRESS NOTES
Occupational Therapy  Facility/Department: [facility] C4 PCU  Daily Treatment Note  NAME: Erica Eid  : 1960  MRN: [id number]    Date of Service: 2021    Discharge Recommendations:  Home with assist PRN  OT Equipment Recommendations  Equipment Needed: Yes  ADL Assistive Devices: Shower Chair with back    Assessment   Performance deficits / Impairments: Decreased functional mobility ; Decreased ADL status; Decreased endurance;Decreased safe awareness;Decreased high-level IADLs  Assessment: Pt demonstrated improvements in ADLs and mobility this date as evidence to complete with mod I - supervision. Anticipate safe d/c home with PRN assist. No additional acute OT needs at this time. Prognosis: Good  OT Education: OT Role;Plan of Care;Transfer Training;ADL Adaptive Strategies;Precautions; Energy Conservation  Patient Education: disease specific: POC while in acute care, general safety during hospitalization, importance of OOB mobility, safe t/fs. Pt verbalized understanding. REQUIRES OT FOLLOW UP: No  Activity Tolerance  Activity Tolerance: Patient Tolerated treatment well  Activity Tolerance: Pt on baseline O2 this date  Safety Devices  Safety Devices in place: Yes  Type of devices: Nurse notified;Gait belt;Call light within reach; Left in chair;Chair alarm in place         Patient Diagnosis(es): The primary encounter diagnosis was COPD exacerbation ([address] Diagnoses of Acute respiratory failure with hypoxia and hypercapnia (HCC) and Pneumonia of both lower lobes due to infectious organism were also pertinent to this visit. has a past medical history of COPD (chronic obstructive pulmonary disease) ([address] and Hypertension. has no past surgical history on file.     Restrictions  Restrictions/Precautions  Restrictions/Precautions: Up as Tolerated, Fall Risk, Seizure  Required Braces or Orthoses?: No  Position Activity Restriction  Other position/activity restrictions: 4L of O2,  Subjective   General  Chart Reviewed: Yes  Patient assessed for rehabilitation services?: Yes  Response to previous treatment: Patient with no complaints from previous session  Family / Caregiver Present: No  Referring Practitioner: Manda Yun MD  Diagnosis: acute on chronic respiratory failure with hypercapnia and metabolic encephalopathy secondary to COPD exacerbation (baseline home oxygen 4 LPM) and Strep pneumonia treated with ceftriaxone and azithromycin. Complicated by alcohol withdrawal  Subjective  Subjective: Pt pleasant and agreeable to OT tx session  General Comment  Comments: RN cleared for therapy. Vital Signs  Patient Currently in Pain: No   Objective    ADL  Feeding: Independent  Grooming: Independent (washing face while standing at sink)  Toileting: Independent (voiding while standing over toilet)  Additional Comments: Pt ambulated to bathroom to complete toileting and grooming. Pt safely managing O2 line during bathroom ADLs. Balance  Sitting Balance: Independent  Standing Balance: Supervision (sup to mod I)  Functional Mobility  Functional - Mobility Device: No device  Activity: To/from bathroom; Other  Assist Level: Supervision  Functional Mobility Comments: 4L of O2 (baseline o2 per chart review)  Toilet Transfers  Toilet - Technique: Ambulating  Equipment Used: Standard toilet  Toilet Transfer: Independent  Bed mobility  Supine to Sit: Modified independent  Sit to Supine: Unable to assess  Scooting: Modified independent  Comment: HoB elevated  Transfers  Sit to stand: Independent  Stand to sit:  Independent                       Cognition  Arousal/Alertness: Appropriate responses to stimuli  Insights: Decreased awareness of deficits                                         Plan   Plan  Times per week: d/c from OT  Current Treatment Recommendations: Strengthening, Functional Mobility Training, Endurance Training, Safety Education & Training, Patient/Caregiver Education & Training, Equipment Evaluation, Education, & procurement, Self-Care / ADL, Home Management Training    AM-PAC Score        AM-PAC Inpatient Daily Activity Raw Score: 22 (09/08/21 0825)  AM-PAC Inpatient ADL T-Scale Score : 47.1 (09/08/21 0825)  ADL Inpatient CMS 0-100% Score: 25.8 (09/08/21 0825)  ADL Inpatient CMS G-Code Modifier : Paras Robison (09/08/21 0825)    Goals  Short term goals  Time Frame for Short term goals: 1 week (9/11) unless stated otherwise.   Short term goal 1: Pt will LB dress with set up and AE PRN- GOAL MET  Short term goal 2: Pt will toilet with supervision and AD PRN- GOAL MET  Short term goal 3: Pt will perform BUE ther ex x20 to increase endurance for functional activities (9/8)- GOAL MET  Short term goal 4: Pt will perform 2-3 ADLS in stance with supervision and AD PRN- GOAL MET  Short term goal 5: Pt will perform functional mobility with supervision and AD PRN- GOAL MET  Patient Goals   Patient goals : \"to go home and get better\"       Therapy Time   Individual Concurrent Group Co-treatment   Time In 0800         Time Out 0824         Minutes 24         Timed Code Treatment Minutes: 24 Minutes       LOYD Rosales/BG

## 2021-09-08 NOTE — PROGRESS NOTES
Discharge order noted iv hub and picc line out reviewed instructions with patient and s.o verbalizes understanding of instruction and follow up care.

## 2021-09-09 NOTE — TELEPHONE ENCOUNTER
Please set up follow-up with Dr. Regla Camilo at his request.  The patient's mother Doyce Brochure is also Dr. Dayan Ulrich patient.

## 2021-09-09 NOTE — CARE COORDINATION
NIV form completed, signed and faxed to Medical Services. Received call from Ottoniel Dupont with [employer] and he states they received and NIV has been approved by insurance and their respiratory therapist, Milena, is getting patient set up today with NIV.

## 2021-09-28 ENCOUNTER — OFFICE VISIT (OUTPATIENT)
Dept: PULMONOLOGY | Age: 61
End: 2021-09-28
Payer: MEDICAID

## 2021-09-28 VITALS
OXYGEN SATURATION: 97 % | BODY MASS INDEX: 37.28 KG/M2 | TEMPERATURE: 97.8 F | DIASTOLIC BLOOD PRESSURE: 81 MMHG | RESPIRATION RATE: 16 BRPM | HEIGHT: 70 IN | SYSTOLIC BLOOD PRESSURE: 138 MMHG | WEIGHT: 260.4 LBS | HEART RATE: 73 BPM

## 2021-09-28 DIAGNOSIS — J96.12 CHRONIC RESPIRATORY FAILURE WITH HYPERCAPNIA (HCC): ICD-10-CM

## 2021-09-28 DIAGNOSIS — K21.9 GASTROESOPHAGEAL REFLUX DISEASE WITHOUT ESOPHAGITIS: ICD-10-CM

## 2021-09-28 DIAGNOSIS — J44.9 CHRONIC OBSTRUCTIVE PULMONARY DISEASE, UNSPECIFIED COPD TYPE (HCC): Primary | ICD-10-CM

## 2021-09-28 DIAGNOSIS — Z72.0 TOBACCO ABUSE: ICD-10-CM

## 2021-09-28 DIAGNOSIS — Z99.89 DEPENDENCE ON ENABLING MACHINE: ICD-10-CM

## 2021-09-28 DIAGNOSIS — J98.4 RESTRICTIVE LUNG DISEASE: ICD-10-CM

## 2021-09-28 DIAGNOSIS — E66.9 CLASS 1 OBESITY WITH BODY MASS INDEX (BMI) OF 33.0 TO 33.9 IN ADULT, UNSPECIFIED OBESITY TYPE, UNSPECIFIED WHETHER SERIOUS COMORBIDITY PRESENT: ICD-10-CM

## 2021-09-28 DIAGNOSIS — I50.9 CONGESTIVE HEART FAILURE, UNSPECIFIED HF CHRONICITY, UNSPECIFIED HEART FAILURE TYPE (HCC): ICD-10-CM

## 2021-09-28 PROCEDURE — G8926 SPIRO NO PERF OR DOC: HCPCS | Performed by: INTERNAL MEDICINE

## 2021-09-28 PROCEDURE — G8427 DOCREV CUR MEDS BY ELIG CLIN: HCPCS | Performed by: INTERNAL MEDICINE

## 2021-09-28 PROCEDURE — 3017F COLORECTAL CA SCREEN DOC REV: CPT | Performed by: INTERNAL MEDICINE

## 2021-09-28 PROCEDURE — 1111F DSCHRG MED/CURRENT MED MERGE: CPT | Performed by: INTERNAL MEDICINE

## 2021-09-28 PROCEDURE — 99215 OFFICE O/P EST HI 40 MIN: CPT | Performed by: INTERNAL MEDICINE

## 2021-09-28 PROCEDURE — 3023F SPIROM DOC REV: CPT | Performed by: INTERNAL MEDICINE

## 2021-09-28 PROCEDURE — D1320 PR TOBACCO CNSL CONTROL&PREVENTION ORAL DISEASE: HCPCS | Performed by: INTERNAL MEDICINE

## 2021-09-28 PROCEDURE — 4004F PT TOBACCO SCREEN RCVD TLK: CPT | Performed by: INTERNAL MEDICINE

## 2021-09-28 PROCEDURE — G8417 CALC BMI ABV UP PARAM F/U: HCPCS | Performed by: INTERNAL MEDICINE

## 2021-09-28 RX ORDER — FUROSEMIDE 20 MG/1
20 TABLET ORAL 2 TIMES DAILY
COMMUNITY

## 2021-09-28 ASSESSMENT — ENCOUNTER SYMPTOMS
HOARSE VOICE: 0
SPUTUM PRODUCTION: 0
TROUBLE SWALLOWING: 0
COUGH: 0
HEARTBURN: 0
FREQUENT THROAT CLEARING: 0
SORE THROAT: 0
RHINORRHEA: 0
HEMOPTYSIS: 0
CHEST TIGHTNESS: 0
ALLERGIC/IMMUNOLOGIC NEGATIVE: 1
GASTROINTESTINAL NEGATIVE: 1
DIFFICULTY BREATHING: 0
EYES NEGATIVE: 1
WHEEZING: 0
SHORTNESS OF BREATH: 1

## 2021-09-28 ASSESSMENT — COPD QUESTIONNAIRES: COPD: 1

## 2021-09-28 NOTE — LETTER
9/28/21        Erica Eid      I have seen this patient in the office today and wanted to communicate my findings and recommendations. Patient Instructions        ASSESSMENT/PLAN:  1. Chronic obstructive pulmonary disease, unspecified COPD type ([address]  2. Tobacco abuse  3. Chronic respiratory failure with hypercapnia (HCC)  4. Restrictive lung disease  5. Class 1 obesity with body mass index (BMI) of 33.0 to 33.9 in adult, unspecified obesity type, unspecified whether serious comorbidity present  6. Gastroesophageal reflux disease without esophagitis  7. Dependence on enabling machine      COPD  Continue with  Symbicort 160/4.52 puffs twice daily  Spiriva Respimat 2 puffs once daily  Albuterol as needed, inhalers  Albuterol nebulizer as needed    Chronic respiratory failure  Astral set in ivaps AE mode, height 68 inches, TVA 8.6 L/min, minimum PS 10, max PS 30, rise 300, rate 20, trigger medium, TI min 0.5, TI max 1.5, cycle 25%, EPAP min 10, max EPAP 15    Using 100% since getting astral  Time 8 hours/day  AHI 0.3    90% epap of 10.9  90% PS of 21.6    MV 12.8    [employer]  I have access via [address] [place] [address],[address] to be benefiting from astral therapy, NIMV      Tobacco abuse  Needs to stop smokinig      GERD  Continue with   Protonix      CHF  On   Lasix     Volume overloaded  I would suggest referral to either cardiology to nephrology for the volume issues    Last echo  Done 3/2021  Conclusions      Summary   Technically difficult examination. Normal left ventricle systolic function with an estimated ejection fraction   of 55%. No regional wall motion abnormalities are seen. Normal left   ventricular diastolic filling pressure. The right atrium is mildly dilated. Trace mitral and tricuspid regurgitation. Systolic pulmonary artery pressure (SPAP) is normal and estimated at 34 mmHg   (right atrial pressure 3 mmHg).        RTC in 3 months                        Thank you for allowing me to assist in the care of the Jane Rocha MD

## 2021-09-28 NOTE — PATIENT INSTRUCTIONS
ASSESSMENT/PLAN:  1. Chronic obstructive pulmonary disease, unspecified COPD type ([address]  2. Tobacco abuse  3. Chronic respiratory failure with hypercapnia (HCC)  4. Restrictive lung disease  5. Class 1 obesity with body mass index (BMI) of 33.0 to 33.9 in adult, unspecified obesity type, unspecified whether serious comorbidity present  6. Gastroesophageal reflux disease without esophagitis  7. Dependence on enabling machine      COPD  Continue with  Symbicort 160/4.52 puffs twice daily  Spiriva Respimat 2 puffs once daily  Albuterol as needed, inhalers  Albuterol nebulizer as needed    Chronic respiratory failure  Astral set in ivaps AE mode, height 68 inches, TVA 8.6 L/min, minimum PS 10, max PS 30, rise 300, rate 20, trigger medium, TI min 0.5, TI max 1.5, cycle 25%, EPAP min 10, max EPAP 15    Using 100% since getting astral  Time 8 hours/day  AHI 0.3    90% epap of 10.9  90% PS of 21.6    MV 12.8    [facility]  I have access via [address] [place] [address],[address] to be benefiting from astral therapy, NIMV      Tobacco abuse  Needs to stop smokinig      GERD  Continue with   Protonix      CHF  On   Lasix     Volume overloaded  I would suggest referral to either cardiology to nephrology for the volume issues    Last echo  Done 3/2021  Conclusions      Summary   Technically difficult examination. Normal left ventricle systolic function with an estimated ejection fraction   of 55%. No regional wall motion abnormalities are seen. Normal left   ventricular diastolic filling pressure. The right atrium is mildly dilated. Trace mitral and tricuspid regurgitation. Systolic pulmonary artery pressure (SPAP) is normal and estimated at 34 mmHg   (right atrial pressure 3 mmHg). RTC in 3 months     Remember to bring a list of pulmonary medications and any CPAP or BiPAP machines to your next appointment with the office.      Please keep all of your future appointments scheduled by [facility] office. Out of respect for other patients and providers, you may be asked to reschedule your appointment if you arrive later than your scheduled appointment time. Appointments cancelled less than 24hrs in advance will be considered a no show. Patients with three missed appointments within 1 year or four missed appointments within 2 years can be dismissed from the practice. Please be aware that our physicians are required to work in the Intensive Care Unit at [facility].  Your appointment may need to be rescheduled if they are designated to work during your appointment time. You may receive a survey regarding the care you received during your visit. Your input is valuable to us. We encourage you to complete and return your survey. We hope you will choose us in the future for your healthcare needs. Pt instructed of all future appointment dates & times, including radiology, labs, procedures & referrals. If procedures were scheduled preparation instructions provided. Instructions on future appointments with [facility] were given.

## 2021-09-28 NOTE — PROGRESS NOTES
Ami Loera (:  1960) is a 62 y.o. male,Established patient, here for evaluation of the following chief complaint(s):  New Patient [facility]  f/u) and Follow-Up from Hospital         ASSESSMENT/PLAN:  1. Chronic obstructive pulmonary disease, unspecified COPD type ([facility]  2. Tobacco abuse  3. Chronic respiratory failure with hypercapnia (HCC)  4. Restrictive lung disease  5. Class 1 obesity with body mass index (BMI) of 33.0 to 33.9 in adult, unspecified obesity type, unspecified whether serious comorbidity present  6. Gastroesophageal reflux disease without esophagitis  7. Dependence on enabling machine  8. Congestive heart failure, unspecified HF chronicity, unspecified heart failure type ([facility]      COPD  Continue with  Symbicort 160/4.52 puffs twice daily  Spiriva Respimat 2 puffs once daily  Albuterol as needed, inhalers  Albuterol nebulizer as needed  Oxygen at 2 lpm , continuous, will wean later    Chronic respiratory failure  Astral set in ivaps AE mode, height 68 inches, TVA 8.6 L/min, minimum PS 10, max PS 30, rise 300, rate 20, trigger medium, TI min 0.5, TI max 1.5, cycle 25%, EPAP min 10, max EPAP 15    Using 100% since getting astral  Time 8 hours/day  AHI 0.3    90% epap of 10.9  90% PS of 21.6    MV 12.8    TapnScrap  I have access via [address] [place] [address] to be benefiting from astral therapy, NIMV      Tobacco abuse  Needs to stop smokinig      GERD  Continue with   Protonix      CHF  On   Lasix     Volume overloaded  I would suggest referral to either cardiology to nephrology for the volume issues    Last echo  Done 3/2021  Conclusions      Summary   Technically difficult examination. Normal left ventricle systolic function with an estimated ejection fraction   of 55%. No regional wall motion abnormalities are seen. Normal left   ventricular diastolic filling pressure. The right atrium is mildly dilated. Trace mitral and tricuspid regurgitation.    Systolic pulmonary artery pressure (SPAP) is normal and estimated at 34 mmHg   (right atrial pressure 3 mmHg). RTC in 3 months           Return in about 3 months (around 12/28/2021). [address] [facility]  [address]  [address] [place] [address]  Dept: [phone]  Loc: [phone]     Diagnosis:  [] ADALBERTO (ICD-10: G47.33)  o CSA (ICD-10: G47.31)  [] Complex Sleep Apnea (ICD-10: G47.37)  []  (ICD-10: G47.37)  [] Hypoxemia (ICD-10: R09.02)  [] COPD (J44.90)  [] Chronic Respiratory Failure with hypoxemia (J96.11)  [] Chronicr Respiratory Failure with hypercapnia (J96.12)  [x] Restrictive Lung Disease (J98.4)      [] New Rx (Device Preference: _________________________)     [x] Change Order       [] Replace ___________    [] Discontinue Order -  [] CPAP    [] BPAP    [] PAP    [] Oxygen   /   AMA?  [] Yes   [] No              Therapy    AHI: ________ ANDREW: ________  LOW SpO2: ________%      DME:[facility] [place]    DEVICE / SETTINGS RAMP / COMFORT INTERFACE   []  CPAP () Pressure    Ramp: _________ min's  [] Ramp to patient preference General PAP Supply Kit  Medicaid does not cover heated tubing  ([employer])  PAP Tubing:  [x] Heated ()- 1/3 mos                         [x] Regular () - 1/3 mos  [x] Disposable Water Chamber () - 1/6 mos  [x] Disposable Filter () - 2/mo  [x] Non-Disposable Filter () - 1/6 mos   []  BiLevel PAP ()           IPAP        []  BiLevel PAP with   ()       Backup Rate ()      EPAP Rate  [] Adjust FLEX to patient comfort       SUPPLEMENTAL OXYGEN  [] OXYGEN:      Liter/min: _________  [] Continuous        [] Nocturnal  [] Bleed into PAP Device      []  [facility]    [] Mask interface () - 1/3 mos  [] Nasal Cushion () - 2/mo  [] Nasal Pillows ()  -2/mo  [] Headgear () - 1/6 mos   []  AutoBiLevel () Pressure  ()      Support           []  ResMed® IVAPS EPAP  [] Overnight Oximetry on Room Air  [] Overnight Oximetry on PAP Therapy    Target Pt Rate  Min PS      Target Va  Patient Ht  Ti Max                Ti Min        Rise time  Max PS  Trigger  Cycle  Epap  Epap max  Epap min  The patient has a history of:  [] Excessive Daytime Sleepiness  [] Insomnia  [] Impaired Cognition  [] Ischemic Heart Disease  [] Hypertension  [] Mood Disorders          [] History of Stroke  Additional Orders:_____________________________________if mask refit to nasal mask or pillow will need epap min to 7_________________________________________________________________________________________________________________________________________     Full Face Mask Kit    [] Full face mask () - 1/3 mos  [] Full Face Cushion () - 1/mo  [] Headgear () - 1/6 mos                                           [] Respironics® ASV Advanced ()     EPAP Min  PS Min      EPAP Max  PS Max   Additional Supplies    [] Chin Strap ()  [] Heated Humidifier Kit ()  Mask Specifications:   [] Patient Preference      -or-            Brand:______________ Size:_______________   Type: __________________________________   [x] Mask Refit:___________________________                                           Max Press  Ramp Time      Rate  Bi-flex: []1  []2  []3     [] Respironics® AVAPS: ()     IPAP Min  IPAP Max  Pressure Max  Epap max  Epap min  Rise time                      Avaps rate  EPAP   Additional Services    [] Annual PRN service and check of equipment  [] Routine service and check of equipment  [] Download and report compliance data   Tidal volume      Tigger                                     Rate                                         Inspiratory time                                                         The following equipment is Medically Necessary for the above stated patient.   It is reasonable and necessary in reference to acceptable standards of medical practice for this condition, and is not prescribed as a convenience. Frequency of Use:    Daily                 Length of Need: 13 Months              o The patient requires BiLevel PAP and the following apply: []  The patient requires a Respiratory Assist Device (RAD) and the following apply:   o CPAP was tried and failed to meet therapeutic goals. [] CPAP was tried, but failed to meet therapeutic goals   o The prescribed mask interface has been properly fit, is the most comfortable to the patient and will be used with the BPAP device. [] The prescribed mask interface has been properly fit, is the most comfortable to the patient and will be used with the RAD.   o Current CPAP setting prevents patient from tolerating the therapy and lower CPAP settings fail to adequately control the symptoms of ADALBERTO, improve sleep quality, or reduce AHI to acceptable levels. [] Current CPAP setting prevent patient from tolerating the therapy and lower PAP settings fail to  adequately control ADALBERTO symptoms, improve sleep quality, or reduce AHI to acceptable levels.          [] There is significant improvement of the respiratory events on the RAD                                                                                                                                                                                                                                  Yvonne Valderrama MD NPI- [id number]     KOKA- [phone]                    09/28/21       ____________________________                        _______________________           Physician Signature                                                         Date                                                   Subjective   SUBJECTIVE/OBJECTIVE:  Follow-up from hospitalization September 2021    History of present illness for the hospitalization  History of Present Illness  Is a 50-year-old gentleman who is known to have severe COPD along with alcohol use and tobacco use who comes into the hospital because of acute respiratory failure.  Patient was having shortness of breath about a day prior to coming into the hospital and found by EMS to be hypoxic upon arrival at the house.  The patient's significant other who is at the bedside.  Along with his mother, who is my patient, and sister. Patient is unable to give any history and was on BiPAP therapy  Patient apparently this morning had an issue of becoming confused and moving furniture around the room and trying to get out of the window. Patient was subdued and placed on BiPAP  However was not getting good tidal volumes           9/3/2021-moved to the ICU and started on Precedex drip, still on AVAPS     9/4/2021-tolerated the Precedex drip, tolerated the AVAPS, ABGs significantly improved.     9/5/2021-moved out of the ICU, doing well       The plan from the hospital  Pulmonary  Patient is fully vaccinated for Covid     COPD  Patient also has chronic respiratory failure with hypercapnia  Patient has been trialed on BiPAP therapy and failed  Patient was moved to AVAPS therapy and has significantly improved     Considerations for the future  Most likely has a restrictive lung disease  We will get bedside spirometry prior to discharge and nocturnal pulse ox on baseline oxygen prior to discharge  We will set up with a AVAPS therapy prior to discharge     We will get bedside PFT     ABG in the morning  Current PCO2 down to 48 from 63 from a high of 116, has greatly benefit from AVAPS therapy     Ordering volume ventilator for nocturnal uses and daytime use as needed to reduce the risk of exacerbation. Bipap insufficient due to severity of condition. Absence of respiratory support will lead to death.        Patient has restrictive lung disease. Patient will need targeted tidal volume which cannot be provided via a CPAP.   Bipap/Bilevel will not adequately ventilate this patient in order to complete ADLs,   Bipap/Bilevel will not lower the patient's CO2 levels, and this patient will require a set tidal volume. Therefore Astral/Trilogy NIV is being ordered due to the severe state of this patient's disease. Without this therapy, the patient runs a higher risk of expiration.        Patient was tried on a BiPAP therapy  His PCO2 went from 116 to 93 up to 96  Patient failed BiPAP therapy, and BiPAP ST therapy     COPD  Solu-Medrol 40 IV twice daily, will drop this to once daily  Should be able to transition over to oral prednisone  Leanna's  Symbicort   Spiriva        Smoking  Transdermal nicotine patches  Patient had a carboxyhemoglobin of 8.5 upon admission        Since hospitalization  Using the astral    Sleeping thru night    No headache  No chest pain  No bloating  No burping  No ear popping    Still smoking        Albuterol 1-2 twice a day b/cof    COPD  He complains of shortness of breath. There is no chest tightness, cough, difficulty breathing, frequent throat clearing, hemoptysis, hoarse voice, sputum production or wheezing. This is a chronic problem. The current episode started more than 1 year ago. The problem occurs intermittently. The problem has been waxing and waning. Pertinent negatives include no appetite change, chest pain, dyspnea on exertion, ear congestion, ear pain, fever, heartburn, malaise/fatigue, myalgias, nasal congestion, orthopnea, rhinorrhea, sneezing, sore throat, sweats or trouble swallowing. Review of Systems   Constitutional: Negative. Negative for appetite change, fever and malaise/fatigue. HENT: Negative. Negative for ear pain, hoarse voice, rhinorrhea, sneezing, sore throat and trouble swallowing. Eyes: Negative. Respiratory: Positive for shortness of breath. Negative for cough, hemoptysis, sputum production and wheezing. Cardiovascular: Negative. Negative for chest pain and dyspnea on exertion. Gastrointestinal: Negative.   Negative for heartburn. Endocrine: Negative. Genitourinary: Negative. Musculoskeletal: Negative. Negative for myalgias. Skin: Negative. Allergic/Immunologic: Negative. Neurological: Negative. Hematological: Negative. Psychiatric/Behavioral: Negative. Objective   Physical Exam  Vitals and nursing note reviewed. Constitutional:       General: He is not in acute distress. Appearance: Normal appearance. He is not ill-appearing. HENT:      Head: Normocephalic and atraumatic. Right Ear: External ear normal.      Left Ear: External ear normal.      Nose: Nose normal.      Mouth/Throat:      Mouth: Mucous membranes are moist.      Pharynx: Oropharynx is clear. Comments: Mallampati 3  Eyes:      General: No scleral icterus. Extraocular Movements: Extraocular movements intact. Conjunctiva/sclera: Conjunctivae normal.      Pupils: Pupils are equal, round, and reactive to light. Cardiovascular:      Rate and Rhythm: Normal rate and regular rhythm. Pulses: Normal pulses. Heart sounds: Normal heart sounds. No murmur heard. No friction rub. Pulmonary:      Effort: No respiratory distress. Breath sounds: No stridor. No wheezing, rhonchi or rales. Chest:      Chest wall: No tenderness. Abdominal:      General: Abdomen is flat. Bowel sounds are normal. There is no distension. Tenderness: There is no abdominal tenderness. There is no guarding. Musculoskeletal:         General: No swelling or tenderness. Normal range of motion. Cervical back: Normal range of motion and neck supple. No rigidity. Skin:     General: Skin is warm and dry. Coloration: Skin is not jaundiced. Neurological:      General: No focal deficit present. Mental Status: He is alert and oriented to person, place, and time. Mental status is at baseline. Cranial Nerves: No cranial nerve deficit. Sensory: No sensory deficit. Motor: No weakness.       Gait: Gait normal.   Psychiatric:         Mood and Affect: Mood normal.         Thought Content:  Thought content normal.         Judgment: Judgment normal.                  An electronic signature was used to authenticate this note.    --Lorenda Olszewski, MD

## 2021-09-28 NOTE — PROGRESS NOTES
MA Communication:   The following orders are received by verbal communication from Alessio Ramirez MD    Orders include: DME form      FU 3 mo

## 2022-03-07 ENCOUNTER — TELEPHONE (OUTPATIENT)
Dept: PULMONOLOGY | Age: 62
End: 2022-03-07

## 2022-03-07 ENCOUNTER — OFFICE VISIT (OUTPATIENT)
Dept: PULMONOLOGY | Age: 62
End: 2022-03-07
Payer: MEDICAID

## 2022-03-07 VITALS
TEMPERATURE: 97.7 F | RESPIRATION RATE: 18 BRPM | WEIGHT: 238 LBS | HEART RATE: 66 BPM | SYSTOLIC BLOOD PRESSURE: 152 MMHG | BODY MASS INDEX: 34.07 KG/M2 | DIASTOLIC BLOOD PRESSURE: 92 MMHG | HEIGHT: 70 IN | OXYGEN SATURATION: 95 %

## 2022-03-07 DIAGNOSIS — Z72.0 TOBACCO ABUSE: ICD-10-CM

## 2022-03-07 DIAGNOSIS — I50.9 CONGESTIVE HEART FAILURE, UNSPECIFIED HF CHRONICITY, UNSPECIFIED HEART FAILURE TYPE (HCC): ICD-10-CM

## 2022-03-07 DIAGNOSIS — Z99.89 DEPENDENCE ON ENABLING MACHINE: ICD-10-CM

## 2022-03-07 DIAGNOSIS — J44.9 CHRONIC OBSTRUCTIVE PULMONARY DISEASE, UNSPECIFIED COPD TYPE (HCC): Primary | ICD-10-CM

## 2022-03-07 DIAGNOSIS — J96.12 CHRONIC RESPIRATORY FAILURE WITH HYPERCAPNIA (HCC): ICD-10-CM

## 2022-03-07 PROCEDURE — G8417 CALC BMI ABV UP PARAM F/U: HCPCS | Performed by: INTERNAL MEDICINE

## 2022-03-07 PROCEDURE — D1320 PR TOBACCO CNSL CONTROL&PREVENTION ORAL DISEASE: HCPCS | Performed by: INTERNAL MEDICINE

## 2022-03-07 PROCEDURE — 3023F SPIROM DOC REV: CPT | Performed by: INTERNAL MEDICINE

## 2022-03-07 PROCEDURE — G8484 FLU IMMUNIZE NO ADMIN: HCPCS | Performed by: INTERNAL MEDICINE

## 2022-03-07 PROCEDURE — 3017F COLORECTAL CA SCREEN DOC REV: CPT | Performed by: INTERNAL MEDICINE

## 2022-03-07 PROCEDURE — 99215 OFFICE O/P EST HI 40 MIN: CPT | Performed by: INTERNAL MEDICINE

## 2022-03-07 PROCEDURE — 4004F PT TOBACCO SCREEN RCVD TLK: CPT | Performed by: INTERNAL MEDICINE

## 2022-03-07 PROCEDURE — G8427 DOCREV CUR MEDS BY ELIG CLIN: HCPCS | Performed by: INTERNAL MEDICINE

## 2022-03-07 ASSESSMENT — ENCOUNTER SYMPTOMS
DIFFICULTY BREATHING: 0
WHEEZING: 0
SPUTUM PRODUCTION: 0
CHEST TIGHTNESS: 0
HEMOPTYSIS: 0
EYES NEGATIVE: 1
COUGH: 0
ALLERGIC/IMMUNOLOGIC NEGATIVE: 1
FREQUENT THROAT CLEARING: 0
HEARTBURN: 0
SORE THROAT: 0
HOARSE VOICE: 0
GASTROINTESTINAL NEGATIVE: 1
SHORTNESS OF BREATH: 1
TROUBLE SWALLOWING: 0
RHINORRHEA: 0

## 2022-03-07 ASSESSMENT — COPD QUESTIONNAIRES: COPD: 1

## 2022-03-07 NOTE — PROGRESS NOTES
Marzena Fu (:  1960) is a 62 y.o. male,Established patient, here for evaluation of the following chief complaint(s):  Follow-up (3 mon), COPD, and Sleep Apnea         ASSESSMENT/PLAN:  1. Chronic obstructive pulmonary disease, unspecified COPD type ([facility]  2. Tobacco abuse  3. Chronic respiratory failure with hypercapnia (HCC)  4. Dependence on enabling machine  5. Congestive heart failure, unspecified HF chronicity, unspecified heart failure type ([facility]      COPD  Continue with  Symbicort 160/4.52 puffs twice daily  Spiriva Respimat 2 puffs once daily  Albuterol as needed, inhalers  Albuterol nebulizer as needed  Oxygen at 2 lpm , now using only at night    Chronic respiratory failure  Astral set in ivaps AE mode, height 68 inches, TVA 8.6 L/min, minimum PS 10, max PS 30, rise 300, rate 20, trigger medium, TI min 0.5, TI max 1.5, cycle 25%, EPAP min 10, max EPAP 15  With 2 lpm of oxygen    Using 93% since getting astral  Time 5.5 hours/day  AHI 1.2    90% epap of 10.7  90% PS of 21.3    MV 13.0    Will follow with renal panels, will consider abg later    I have changed the TVA to 8.0  Call me in 1-2 weeks about the change in pressure    DME medical service company  I have access via [address] [place] [address],[address] to be benefiting from astral therapy, NIMV      Tobacco abuse  Needs to stop smokinig      GERD  Continue with   Protonix      CHF  On   Lasix     Last echo  Done 3/2021  Conclusions      Summary   Technically difficult examination. Normal left ventricle systolic function with an estimated ejection fraction   of 55%. No regional wall motion abnormalities are seen. Normal left   ventricular diastolic filling pressure. The right atrium is mildly dilated. Trace mitral and tricuspid regurgitation. Systolic pulmonary artery pressure (SPAP) is normal and estimated at 34 mmHg   (right atrial pressure 3 mmHg). RTC in 6 months           No follow-ups on file.             [address] [facility] [address] [facility]  [address] [place] [address]  Dept: [phone]  Loc: [phone]     Diagnosis:  [] ADALBERTO (ICD-10: G47.33)  o CSA (ICD-10: G47.31)  [] Complex Sleep Apnea (ICD-10: G47.37)  []  (ICD-10: G47.37)  [] Hypoxemia (ICD-10: R09.02)  [] COPD (J44.90)  [] Chronic Respiratory Failure with hypoxemia (J96.11)  [] Chronicr Respiratory Failure with hypercapnia (J96.12)  [x] Restrictive Lung Disease (J98.4)      [] New Rx (Device Preference: _________________________)     [x] Change Order I have changed the following         [] Replace ___________    [] Discontinue Order -  [] CPAP    [] BPAP    [] PAP    [] Oxygen   /   AMA?  [] Yes   [] No              Therapy    AHI: ________ ANDREW: ________  LOW SpO2: ________%      DME:msc    DEVICE / SETTINGS RAMP / COMFORT INTERFACE   []  CPAP () Pressure    Ramp: _________ min's  [] Ramp to patient preference General PAP Supply Kit  Medicaid does not cover heated tubing  ([employer])  PAP Tubing:  [x] Heated ()- 1/3 mos                         [x] Regular () - 1/3 mos  [x] Disposable Water Chamber () - 1/6 mos  [x] Disposable Filter () - 2/mo  [x] Non-Disposable Filter () - 1/6 mos   []  BiLevel PAP ()           IPAP        []  BiLevel PAP with   ()       Backup Rate ()      EPAP Rate  [] Adjust FLEX to patient comfort       SUPPLEMENTAL OXYGEN  [] OXYGEN:      Liter/min: _________  [] Continuous        [] Nocturnal  [] Bleed into PAP Device      []  [address] Kit    [] Mask interface () - 1/3 mos  [] Nasal Cushion () - 2/mo  [] Nasal Pillows ()  -2/mo  [] Headgear () - 1/6 mos   []  AutoBiLevel () Pressure  ()      Support           []  ResMed® IVAPS EPAP  [] Overnight Oximetry on Room Air  [] Overnight Oximetry on PAP Therapy    Target Pt Rate  Min PS Target Va  Patient Ht  Ti Max                Ti Min        Rise time 8.0 Max PS  Trigger  Cycle  Epap  Epap max  Epap min  The patient has a history of:  [] Excessive Daytime Sleepiness  [] Insomnia  [] Impaired Cognition  [] Ischemic Heart Disease  [] Hypertension  [] Mood Disorders          [] History of Stroke  Additional Orders:_____________________________________if mask refit to nasal mask or pillow will need epap min to 7_________________________________________________________________________________________________________________________________________     Full Face Mask Kit    [x] Full face mask () - 1/3 mos  [x] Full Face Cushion () - 1/mo  [x] Headgear () - 1/6 mos                                           [] Respironics® ASV Advanced ()     EPAP Min  PS Min      EPAP Max  PS Max   Additional Supplies    [] Chin Strap ()  [] Heated Humidifier Kit ()  Mask Specifications:   [] Patient Preference      -or-            Brand:______________ Size:_______________   Type: __________________________________   [x] Mask Refit:___________________________                                           Max Press  Ramp Time      Rate  Bi-flex: []1  []2  []3     [] Respironics® AVAPS: ()     IPAP Min  IPAP Max  Pressure Max  Epap max  Epap min  Rise time                      Avaps rate  EPAP   Additional Services    [] Annual PRN service and check of equipment  [] Routine service and check of equipment  [] Download and report compliance data   Tidal volume      Tigger                                     Rate                                         Inspiratory time                                                         The following equipment is Medically Necessary for the above stated patient. It is reasonable and necessary in reference to acceptable standards of medical practice for this condition, and is not prescribed as a convenience.            Frequency of Use:    Daily Length of Need: 13 Months              o The patient requires BiLevel PAP and the following apply: []  The patient requires a Respiratory Assist Device (RAD) and the following apply:   o CPAP was tried and failed to meet therapeutic goals. [] CPAP was tried, but failed to meet therapeutic goals   o The prescribed mask interface has been properly fit, is the most comfortable to the patient and will be used with the BPAP device. [] The prescribed mask interface has been properly fit, is the most comfortable to the patient and will be used with the RAD.   o Current CPAP setting prevents patient from tolerating the therapy and lower CPAP settings fail to adequately control the symptoms of ADALBERTO, improve sleep quality, or reduce AHI to acceptable levels. [] Current CPAP setting prevent patient from tolerating the therapy and lower PAP settings fail to  adequately control ADALBERTO symptoms, improve sleep quality, or reduce AHI to acceptable levels.          [] There is significant improvement of the respiratory events on the RAD                                                                                                                                                                                                                                  Oksana Huynh MD               NPI- [id number]     KOTKA- [phone]                    03/07/22       ____________________________                        _______________________           Physician Signature                                                         Date                                                   Subjective   SUBJECTIVE/OBJECTIVE:  Follow-up from hospitalization September 2021    History of present illness for the hospitalization  History of Present Illness  Is a 73-year-old gentleman who is known to have severe COPD along with alcohol use and tobacco use who comes into the hospital because of acute respiratory failure.  Patient was having shortness of breath about a day prior to coming into the hospital and found by EMS to be hypoxic upon arrival at the house. Tye Romero patient's significant other who is at the bedside.  Along with his mother, who is my patient, and sister. Patient is unable to give any history and was on BiPAP therapy  Patient apparently this morning had an issue of becoming confused and moving furniture around the room and trying to get out of the window. Patient was subdued and placed on BiPAP  However was not getting good tidal volumes           9/3/2021-moved to the ICU and started on Precedex drip, still on AVAPS     9/4/2021-tolerated the Precedex drip, tolerated the AVAPS, ABGs significantly improved.     9/5/2021-moved out of the ICU, doing well       The plan from the hospital  Pulmonary  Patient is fully vaccinated for Covid     COPD  Patient also has chronic respiratory failure with hypercapnia  Patient has been trialed on BiPAP therapy and failed  Patient was moved to AVAPS therapy and has significantly improved     Considerations for the future  Most likely has a restrictive lung disease  We will get bedside spirometry prior to discharge and nocturnal pulse ox on baseline oxygen prior to discharge  We will set up with a AVAPS therapy prior to discharge     We will get bedside PFT     ABG in the morning  Current PCO2 down to 48 from 63 from a high of 116, has greatly benefit from AVAPS therapy     Ordering volume ventilator for nocturnal uses and daytime use as needed to reduce the risk of exacerbation. Bipap insufficient due to severity of condition. Absence of respiratory support will lead to death.        Patient has restrictive lung disease. Patient will need targeted tidal volume which cannot be provided via a CPAP. Bipap/Bilevel will not adequately ventilate this patient in order to complete ADLs,   Bipap/Bilevel will not lower the patient's CO2 levels, and this patient will require a set tidal volume.   Therefore Astral/Trilogy NIV is being ordered due to the severe state of this patient's disease. Without this therapy, the patient runs a higher risk of expiration.        Patient was tried on a BiPAP therapy  His PCO2 went from 116 to 93 up to 96  Patient failed BiPAP therapy, and BiPAP ST therapy     COPD  Solu-Medrol 40 IV twice daily, will drop this to once daily  Should be able to transition over to oral prednisone  Leanna's  Symbicort   Spiriva        Smoking  Transdermal nicotine patches  Patient had a carboxyhemoglobin of 8.5 upon admission        Since hospitalization  Using the astral    Sleeping thru night    No headache  No chest pain  No bloating  No burping  No ear popping    Still smoking      On symbicort and spriiva    Albuterol as needed    COPD  He complains of shortness of breath. There is no chest tightness, cough, difficulty breathing, frequent throat clearing, hemoptysis, hoarse voice, sputum production or wheezing. This is a chronic problem. The current episode started more than 1 year ago. The problem occurs intermittently. The problem has been waxing and waning. Pertinent negatives include no appetite change, chest pain, dyspnea on exertion, ear congestion, ear pain, fever, heartburn, malaise/fatigue, myalgias, nasal congestion, orthopnea, rhinorrhea, sneezing, sore throat, sweats or trouble swallowing. Review of Systems   Constitutional: Negative. Negative for appetite change, fever and malaise/fatigue. HENT: Negative. Negative for ear pain, hoarse voice, rhinorrhea, sneezing, sore throat and trouble swallowing. Eyes: Negative. Respiratory: Positive for shortness of breath. Negative for cough, hemoptysis, sputum production and wheezing. Cardiovascular: Negative. Negative for chest pain and dyspnea on exertion. Gastrointestinal: Negative. Negative for heartburn. Endocrine: Negative. Genitourinary: Negative. Musculoskeletal: Negative. Negative for myalgias.    Skin: Negative. Allergic/Immunologic: Negative. Neurological: Negative. Hematological: Negative. Psychiatric/Behavioral: Negative. Vitals:    03/07/22 0942   BP: (!) 152/92   Pulse: 66   Resp: 18   Temp: 97.7 °F (36.5 °C)   TempSrc: Temporal   SpO2: 95%   Weight: 238 lb (108 kg)   Height: 5' 10\" (1.778 m)         Objective   Physical Exam  Vitals and nursing note reviewed. Constitutional:       General: He is not in acute distress. Appearance: Normal appearance. He is not ill-appearing. HENT:      Head: Normocephalic and atraumatic. Right Ear: External ear normal.      Left Ear: External ear normal.      Nose: Nose normal.      Mouth/Throat:      Mouth: Mucous membranes are moist.      Pharynx: Oropharynx is clear. Comments: Mallampati 3  Eyes:      General: No scleral icterus. Extraocular Movements: Extraocular movements intact. Conjunctiva/sclera: Conjunctivae normal.      Pupils: Pupils are equal, round, and reactive to light. Cardiovascular:      Rate and Rhythm: Normal rate and regular rhythm. Pulses: Normal pulses. Heart sounds: Normal heart sounds. No murmur heard. No friction rub. Pulmonary:      Effort: No respiratory distress. Breath sounds: No stridor. No wheezing, rhonchi or rales. Comments: Dec bs b/l  Chest:      Chest wall: No tenderness. Abdominal:      General: Abdomen is flat. Bowel sounds are normal. There is no distension. Tenderness: There is no abdominal tenderness. There is no guarding. Musculoskeletal:         General: No swelling or tenderness. Normal range of motion. Cervical back: Normal range of motion and neck supple. No rigidity. Skin:     General: Skin is warm and dry. Coloration: Skin is not jaundiced. Neurological:      General: No focal deficit present. Mental Status: He is alert and oriented to person, place, and time. Mental status is at baseline.       Cranial Nerves: No cranial nerve deficit. Sensory: No sensory deficit. Motor: No weakness. Gait: Gait normal.   Psychiatric:         Mood and Affect: Mood normal.         Thought Content:  Thought content normal.         Judgment: Judgment normal.                  An electronic signature was used to authenticate this note.    --Andrew Linder MD

## 2022-03-07 NOTE — PATIENT INSTRUCTIONS
ASSESSMENT/PLAN:  1. Chronic obstructive pulmonary disease, unspecified COPD type ([facility]  2. Tobacco abuse  3. Chronic respiratory failure with hypercapnia (HCC)  4. Dependence on enabling machine  5. Congestive heart failure, unspecified HF chronicity, unspecified heart failure type ([facility]      COPD  Continue with  Symbicort 160/4.52 puffs twice daily  Spiriva Respimat 2 puffs once daily  Albuterol as needed, inhalers  Albuterol nebulizer as needed  Oxygen at 2 lpm , now using only at night    Chronic respiratory failure  Astral set in ivaps AE mode, height 68 inches, TVA 8.6 L/min, minimum PS 10, max PS 30, rise 300, rate 20, trigger medium, TI min 0.5, TI max 1.5, cycle 25%, EPAP min 10, max EPAP 15  With 2 lpm of oxygen    Using 93% since getting astral  Time 5.5 hours/day  AHI 1.2    90% epap of 10.7  90% PS of 21.3    MV 13.0    Will follow with renal panels, will consider abg later    I have changed the TVA to 8.0  Call me in 1-2 weeks about the change in pressure    DME [employer] company  I have access via [address] [place] [address],[address] to be benefiting from astral therapy, NIMV      Tobacco abuse  Needs to stop smokinig      GERD  Continue with   Protonix      CHF  On   Lasix     Last echo  Done 3/2021  Conclusions      Summary   Technically difficult examination. Normal left ventricle systolic function with an estimated ejection fraction   of 55%. No regional wall motion abnormalities are seen. Normal left   ventricular diastolic filling pressure. The right atrium is mildly dilated. Trace mitral and tricuspid regurgitation. Systolic pulmonary artery pressure (SPAP) is normal and estimated at 34 mmHg   (right atrial pressure 3 mmHg). RTC in 6 months    Remember to bring a list of pulmonary medications and any CPAP or BiPAP machines to your next appointment with the office. Please keep all of your future appointments scheduled by [facility], [address] [place] [address] Pulmonary office.  Out of respect for other patients and providers, you may be asked to reschedule your appointment if you arrive later than your scheduled appointment time. Appointments cancelled less than 24hrs in advance will be considered a no show. Patients with three missed appointments within 1 year or four missed appointments within 2 years can be dismissed from the practice. Please be aware that our physicians are required to work in the Intensive Care Unit at [facility].  Your appointment may need to be rescheduled if they are designated to work during your appointment time. You may receive a survey regarding the care you received during your visit. Your input is valuable to us. We encourage you to complete and return your survey. We hope you will choose us in the future for your healthcare needs. Pt instructed of all future appointment dates & times, including radiology, labs, procedures & referrals. If procedures were scheduled preparation instructions provided. Instructions on future appointments with [facility] were given.

## 2022-03-07 NOTE — LETTER
3/7/22        Cira Roque      I have seen this patient in the office today and wanted to communicate my findings and recommendations. Patient Instructions        ASSESSMENT/PLAN:  1. Chronic obstructive pulmonary disease, unspecified COPD type ([address]  2. Tobacco abuse  3. Chronic respiratory failure with hypercapnia (HCC)  4. Dependence on enabling machine  5. Congestive heart failure, unspecified HF chronicity, unspecified heart failure type ([address]      COPD  Continue with  Symbicort 160/4.52 puffs twice daily  Spiriva Respimat 2 puffs once daily  Albuterol as needed, inhalers  Albuterol nebulizer as needed  Oxygen at 2 lpm , now using only at night    Chronic respiratory failure  Astral set in ivaps AE mode, height 68 inches, TVA 8.6 L/min, minimum PS 10, max PS 30, rise 300, rate 20, trigger medium, TI min 0.5, TI max 1.5, cycle 25%, EPAP min 10, max EPAP 15  With 2 lpm of oxygen    Using 93% since getting astral  Time 5.5 hours/day  AHI 1.2    90% epap of 10.7  90% PS of 21.3    MV 13.0    Will follow with renal panels, will consider abg later    I have changed the TVA to 8.0  Call me in 1-2 weeks about the change in pressure    DME [employer] company  I have access via [address] [place] [address],[address] to be benefiting from astral therapy, NIMV      Tobacco abuse  Needs to stop smokinig      GERD  Continue with   Protonix      CHF  On   Lasix     Last echo  Done 3/2021  Conclusions      Summary   Technically difficult examination. Normal left ventricle systolic function with an estimated ejection fraction   of 55%. No regional wall motion abnormalities are seen. Normal left   ventricular diastolic filling pressure. The right atrium is mildly dilated. Trace mitral and tricuspid regurgitation. Systolic pulmonary artery pressure (SPAP) is normal and estimated at 34 mmHg   (right atrial pressure 3 mmHg).        RTC in 6 months                   Thank you for allowing me to assist in the care of the Buddy Wesley Rosaline Ahuja MD

## 2022-03-07 NOTE — PROGRESS NOTES
MA Communication:   The following orders are received by verbal communication from Lisa Argueta MD    Orders include:      6 month follow up

## 2022-06-14 ENCOUNTER — TELEPHONE (OUTPATIENT)
Dept: PULMONOLOGY | Age: 62
End: 2022-06-14

## 2022-06-14 NOTE — TELEPHONE ENCOUNTER
Jayesh Luke from [facility] called and said they need new O2 orders sent over. It appears patient uses 2 LPM at night bleed in to Astral.    The script they originally had stated pt was on 4 LPM continuous. I do not see this in the patient's chart. Please verify and send new order to [facility]. Thank you!

## 2022-06-14 NOTE — TELEPHONE ENCOUNTER
[address] PULMONARY CRITICAL CARE AND SLEEP  [address]  SUITE [facility] [address]  Dept: [phone]  Loc: [phone]     Diagnosis:  [] ADALBERTO (ICD-10: G47.33)  o CSA (ICD-10: G47.31)  [] Complex Sleep Apnea (ICD-10: G47.37)  []  (ICD-10: G47.37)  [] Hypoxemia (ICD-10: R09.02)  [] COPD (J44.90)  [] Chronic Respiratory Failure with hypoxemia (J96.11)  [] Chronicr Respiratory Failure with hypercapnia (J96.12)  [x] Restrictive Lung Disease (J98.4)      [] New Rx (Device Preference: _________________________)     [] Change Order       [] Replace ___________  [] Clinical assessments and may include IN-Check device, spirometry and ETCO2 PRN    [] Discontinue Order -  [] CPAP    [] BPAP    [] PAP    [] Oxygen   /   AMA?  [] Yes   [] No              Therapy    AHI: ________ ANDREW: ________  LOW SpO2: ________%      DME: Medical service company    DEVICE / SETTINGS RAMP / Comp[employer]   []  CPAP () Pressure    Ramp: _________ min's  [] Ramp to patient preference General PAP Supply Kit  Medicaid does not cover heated tubing  ([employer])  PAP Tubing:  [] Heated ()- 1/3 mos                         [] Regular () - 1/3 mos  [] Disposable Water Chamber () - 1/6 mos  [] Disposable Filter () - 2/mo  [] Non-Disposable Filter () - 1/6 mos   []  BiLevel PAP ()           IPAP        []  BiLevel PAP with   ()       Backup Rate ()      EPAP Rate  [] Adjust FLEX to patient comfort       SUPPLEMENTAL OXYGEN  [x] OXYGEN:      Liter/min: ______ 2___  [] Continuous        [x] Nocturnal  [x] Bleed into PAP Device      []  [employer]                   Max Press   Mask Interface Kit    [] Mask interface () - 1/3 mos  [] Nasal Cushion () - 2/mo  [] Nasal Pillows ()  -2/mo  [] Headgear () - 1/6 mos   []  AutoBiLevel () Pressure  ()      Support           []  ResMed® IVAPS EPAP [] Overnight Oximetry on Room Air  [] Overnight Oximetry on PAP Therapy  [] Overnight Oximetry on ___ LPM of oxygen  []  Overnight Oximetry on  PAP therapy with _____ lpm of O2    Target Pt Rate  Min PS      Target Va  Patient Ht  Ti Max                Ti Min        Rise time  Max PS  Trigger  Cycle  Epap  Epap max  Epap min  The patient has a history of:  [] Excessive Daytime Sleepiness  [] Insomnia  [] Impaired Cognition  [] Ischemic Heart Disease  [] Hypertension  [] Mood Disorders          [] History of Stroke  Additional Orders:______________________________________________________________________________________________________________    [] Titrate to comfort  [] Add cloud access via [employer] Full Face Mask Kit    [] Full face mask () - 1/3 mos  [] Full Face Cushion () - 1/mo  [] Headgear () - 1/6 mos                                           [] Respironics® ASV Advanced ()     EPAP Min  PS Min      EPAP Max  PS Max   Additional Supplies    [] Chin Strap ()  [] Heated Humidifier Kit ()  Mask Specifications:   [] Patient Preference      -or-            Brand:______________ Size:_______________   Type: __________________________________   [] Mask Refit:___________________________  [] Mask Fitting:  [] Full     [] Nasal                []  Pillows                                Max Press  Ramp Time      Rate  Bi-flex: []1  []2  []3     [] Respironics® AVAPS: ()     IPAP Min  IPAP Max  Pressure Max  Epap max  Epap min  Rise time                      Avaps rate  EPAP   Additional Services    [] Annual PRN service and check of equipment  [] Routine service and check of equipment  [] Download and report compliance data   Tidal volume      Tigger                                     Rate                                         Inspiratory time                                                         The following equipment is Medically Necessary for the above stated patient. It is reasonable and necessary in reference to acceptable standards of medical practice for this condition, and is not prescribed as a convenience. Frequency of Use:    Daily                 Length of Need: 13 Months              o The patient requires BiLevel PAP and the following apply: []  The patient requires a Respiratory Assist Device (RAD) and the following apply:   o CPAP was tried and failed to meet therapeutic goals. [] CPAP was tried, but failed to meet therapeutic goals   o The prescribed mask interface has been properly fit, is the most comfortable to the patient and will be used with the BPAP device. [] The prescribed mask interface has been properly fit, is the most comfortable to the patient and will be used with the RAD.   o Current CPAP setting prevents patient from tolerating the therapy and lower CPAP settings fail to adequately control the symptoms of ADALBERTO, improve sleep quality, or reduce AHI to acceptable levels. [] Current CPAP setting prevent patient from tolerating the therapy and lower PAP settings fail to  adequately control ADALBERTO symptoms, improve sleep quality, or reduce AHI to acceptable levels.          [] There is significant improvement of the respiratory events on the RAD                                                                                                                                                                                                                                  MD DAY Harding- [id number]     KOTKA- [phone]                    06/14/22       ____________________________                        _______________________           Physician Signature                                                         Date

## 2022-09-06 ENCOUNTER — TELEPHONE (OUTPATIENT)
Dept: PULMONOLOGY | Age: 62
End: 2022-09-06

## 2022-09-06 NOTE — TELEPHONE ENCOUNTER
Patient cancelled appointment on 09/06/2022 with Dr Nayeli Dobson  for 6 mon pulm. Reason: n/a    Patient did not reschedule appointment.     Appointment rescheduled:  n/a

## 2022-09-22 ENCOUNTER — TELEPHONE (OUTPATIENT)
Dept: PULMONOLOGY | Age: 62
End: 2022-09-22

## 2022-09-22 NOTE — TELEPHONE ENCOUNTER
Pt needs order sent to [facility] for all his CPAP supplies. Please place order so I can fax it to them. Thank you!

## 2022-09-22 NOTE — TELEPHONE ENCOUNTER
[address] PULMONARY CRITICAL CARE AND SLEEP  [address] [facility]  [address] [address]  Dept: [phone]  Loc: [phone]     Diagnosis:  [] ADALBERTO (ICD-10: G47.33)  o CSA (ICD-10: G47.31)  [] Complex Sleep Apnea (ICD-10: G47.37)  []  (ICD-10: G47.37)  [] Hypoxemia (ICD-10: R09.02)  [] COPD (J44.90)  [] Chronic Respiratory Failure with hypoxemia (J96.11)  [] Chronicr Respiratory Failure with hypercapnia (J96.12)  [] Restrictive Lung Disease (J98.4)      [] New Rx (Device Preference: _________________________)     [] Change Order       [] Replace ___________  [] Clinical assessments and may include IN-Check device, spirometry and ETCO2 PRN    [] Discontinue Order -  [] CPAP    [] BPAP    [] PAP    [] Oxygen   /   AMA?  [] Yes   [] No              Therapy    AHI: ________ ANDREW: ________  LOW SpO2: ________%      DME:    DEVICE / SETTINGS RAMP / COMFORT INTERFACE   []  CPAP () Pressure    Ramp: _________ min's  [] Ramp to patient preference General PAP Supply Kit  Medicaid does not cover heated tubing  ([employer])  PAP Tubing:  [x] Heated ()- 1/3 mos                         [x] Regular () - 1/3 mos  [x] Disposable Water Chamber () - 1/6 mos  [x] Disposable Filter () - 2/mo  [x] Non-Disposable Filter () - 1/6 mos   []  BiLevel PAP ()           IPAP        []  BiLevel PAP with   ()       Backup Rate ()      EPAP Rate  [] Adjust FLEX to patient comfort       SUPPLEMENTAL OXYGEN  [] OXYGEN:      Liter/min: _________  [] Continuous        [] Nocturnal  [] Bleed into PAP Device      []  [facility] Kit    [x] Mask interface () - 1/3 mos  [x] Nasal Cushion () - 2/mo  [x] Nasal Pillows ()  -2/mo  [x] Headgear () - 1/6 mos   []  AutoBiLevel () Pressure  ()      Support           []  ResMed® IVAPS EPAP  [] Overnight Oximetry on Room Air  [] Overnight Oximetry on PAP Therapy  [] Overnight Oximetry on ___ LPM of oxygen  []  Overnight Oximetry on  PAP therapy with _____ lpm of O2    Target Pt Rate  Min PS      Target Va  Patient Ht  Ti Max                Ti Min        Rise time  Max PS  Trigger  Cycle  Epap  Epap max  Epap min  The patient has a history of:  [] Excessive Daytime Sleepiness  [] Insomnia  [] Impaired Cognition  [] Ischemic Heart Disease  [] Hypertension  [] Mood Disorders          [] History of Stroke  Additional Orders:______________________________________________________________________________________________________________    [] Titrate to comfort  [] Add cloud access via [employer] Full Face Mask Kit    [x] Full face mask () - 1/3 mos  [x] Full Face Cushion () - 1/mo  [x] Headgear () - 1/6 mos                                           [] Respironics® ASV Advanced ()     EPAP Min  PS Min      EPAP Max  PS Max   Additional Supplies    [] Chin Strap ()  [] Heated Humidifier Kit ()  Mask Specifications:   [] Patient Preference      -or-            Brand:______________ Size:_______________   Type: __________________________________   [] Mask Refit:___________________________  [] Mask Fitting:  [] Full     [] Nasal                []  Pillows                                Max Press  Ramp Time      Rate  Bi-flex: []1  []2  []3     [] Respironics® AVAPS: ()     IPAP Min  IPAP Max  Pressure Max  Epap max  Epap min  Rise time                      Avaps rate  EPAP   Additional Services    [] Annual PRN service and check of equipment  [] Routine service and check of equipment  [] Download and report compliance data   Tidal volume      Tigger                                     Rate                                         Inspiratory time                                                         The following equipment is Medically Necessary for the above stated patient.   It is reasonable and necessary in reference to acceptable standards of medical practice for this condition, and is not prescribed as a convenience. Frequency of Use:    Daily                 Length of Need: 13 Months              o The patient requires BiLevel PAP and the following apply: []  The patient requires a Respiratory Assist Device (RAD) and the following apply:   o CPAP was tried and failed to meet therapeutic goals. [] CPAP was tried, but failed to meet therapeutic goals   o The prescribed mask interface has been properly fit, is the most comfortable to the patient and will be used with the BPAP device. [] The prescribed mask interface has been properly fit, is the most comfortable to the patient and will be used with the RAD.   o Current CPAP setting prevents patient from tolerating the therapy and lower CPAP settings fail to adequately control the symptoms of ADALBERTO, improve sleep quality, or reduce AHI to acceptable levels. [] Current CPAP setting prevent patient from tolerating the therapy and lower PAP settings fail to  adequately control ADALBERTO symptoms, improve sleep quality, or reduce AHI to acceptable levels.          [] There is significant improvement of the respiratory events on the RAD                                                                                                                                                                                                                                  MD DAY Katz- [phone]     KOTKA- [phone]                    09/22/22       ____________________________                        _______________________           Physician Signature                                                         Date

## 2022-10-10 ENCOUNTER — OFFICE VISIT (OUTPATIENT)
Dept: PULMONOLOGY | Age: 62
End: 2022-10-10
Payer: MEDICAID

## 2022-10-10 VITALS
BODY MASS INDEX: 34.07 KG/M2 | OXYGEN SATURATION: 94 % | WEIGHT: 238 LBS | SYSTOLIC BLOOD PRESSURE: 174 MMHG | HEIGHT: 70 IN | DIASTOLIC BLOOD PRESSURE: 97 MMHG | RESPIRATION RATE: 16 BRPM | HEART RATE: 52 BPM | TEMPERATURE: 98.4 F

## 2022-10-10 DIAGNOSIS — K21.9 GASTROESOPHAGEAL REFLUX DISEASE WITHOUT ESOPHAGITIS: ICD-10-CM

## 2022-10-10 DIAGNOSIS — J96.12 CHRONIC RESPIRATORY FAILURE WITH HYPERCAPNIA (HCC): ICD-10-CM

## 2022-10-10 DIAGNOSIS — Z87.891 PERSONAL HISTORY OF TOBACCO USE: ICD-10-CM

## 2022-10-10 DIAGNOSIS — E66.9 CLASS 1 OBESITY WITH BODY MASS INDEX (BMI) OF 33.0 TO 33.9 IN ADULT, UNSPECIFIED OBESITY TYPE, UNSPECIFIED WHETHER SERIOUS COMORBIDITY PRESENT: ICD-10-CM

## 2022-10-10 DIAGNOSIS — Z72.0 TOBACCO ABUSE: ICD-10-CM

## 2022-10-10 DIAGNOSIS — J44.9 CHRONIC OBSTRUCTIVE PULMONARY DISEASE, UNSPECIFIED COPD TYPE (HCC): Primary | ICD-10-CM

## 2022-10-10 DIAGNOSIS — Z99.89 DEPENDENCE ON ENABLING MACHINE: ICD-10-CM

## 2022-10-10 DIAGNOSIS — I50.9 CONGESTIVE HEART FAILURE, UNSPECIFIED HF CHRONICITY, UNSPECIFIED HEART FAILURE TYPE (HCC): ICD-10-CM

## 2022-10-10 DIAGNOSIS — J98.4 RESTRICTIVE LUNG DISEASE: ICD-10-CM

## 2022-10-10 PROCEDURE — 3023F SPIROM DOC REV: CPT | Performed by: INTERNAL MEDICINE

## 2022-10-10 PROCEDURE — 4004F PT TOBACCO SCREEN RCVD TLK: CPT | Performed by: INTERNAL MEDICINE

## 2022-10-10 PROCEDURE — 3017F COLORECTAL CA SCREEN DOC REV: CPT | Performed by: INTERNAL MEDICINE

## 2022-10-10 PROCEDURE — 99215 OFFICE O/P EST HI 40 MIN: CPT | Performed by: INTERNAL MEDICINE

## 2022-10-10 PROCEDURE — G8417 CALC BMI ABV UP PARAM F/U: HCPCS | Performed by: INTERNAL MEDICINE

## 2022-10-10 PROCEDURE — G0296 VISIT TO DETERM LDCT ELIG: HCPCS | Performed by: INTERNAL MEDICINE

## 2022-10-10 PROCEDURE — G8484 FLU IMMUNIZE NO ADMIN: HCPCS | Performed by: INTERNAL MEDICINE

## 2022-10-10 PROCEDURE — G8427 DOCREV CUR MEDS BY ELIG CLIN: HCPCS | Performed by: INTERNAL MEDICINE

## 2022-10-10 RX ORDER — ALBUTEROL SULFATE 90 UG/1
2 AEROSOL, METERED RESPIRATORY (INHALATION) 4 TIMES DAILY PRN
Qty: 54 G | Refills: 1 | Status: SHIPPED | OUTPATIENT
Start: 2022-10-10

## 2022-10-10 ASSESSMENT — ENCOUNTER SYMPTOMS
GASTROINTESTINAL NEGATIVE: 1
SHORTNESS OF BREATH: 1
EYES NEGATIVE: 1
RHINORRHEA: 0
HEARTBURN: 0
DIFFICULTY BREATHING: 0
HEMOPTYSIS: 0
SORE THROAT: 0
COUGH: 0
ALLERGIC/IMMUNOLOGIC NEGATIVE: 1
WHEEZING: 0
CHEST TIGHTNESS: 0
FREQUENT THROAT CLEARING: 0
TROUBLE SWALLOWING: 0
SPUTUM PRODUCTION: 0
HOARSE VOICE: 0

## 2022-10-10 ASSESSMENT — COPD QUESTIONNAIRES: COPD: 1

## 2022-10-10 NOTE — LETTER
[address] [facility] Pulmonary Critical Care and Sleep  [address] [facility] [address] [place] [id number]  Phone: [phone]  Fax: [phone]    Liz Anaya MD        October 10, 2022     Patient: Keily Ramírez   YOB: 1960   Date of Visit: 10/10/2022     10/10/22        Keily Ramírez      I have seen this patient in the office today and wanted to communicate my findings and recommendations. Patient Instructions     ASSESSMENT/PLAN:  1. Chronic obstructive pulmonary disease, unspecified COPD type ([address]  2. Tobacco abuse  3. Chronic respiratory failure with hypercapnia (HCC)  4. Dependence on enabling machine  5. Congestive heart failure, unspecified HF chronicity, unspecified heart failure type ([address]  6. Restrictive lung disease  7. Class 1 obesity with body mass index (BMI) of 33.0 to 33.9 in adult, unspecified obesity type, unspecified whether serious comorbidity present  8.  Gastroesophageal reflux disease without esophagitis      COPD  Continue with  Symbicort 160/4.52 puffs twice daily  Spiriva Respimat 2 puffs once daily  Albuterol as needed, inhalers  Albuterol nebulizer as needed  Oxygen at 2 lpm , now using only at night    Chronic respiratory failure  Astral set in ivaps AE mode, height 68 inches, TVA 8.0 L/min, minimum PS 10, max PS 30, rise 300, rate 20, trigger medium, TI min 0.5, TI max 1.5, cycle 25%, EPAP min 10, max EPAP 15  With 2 lpm of oxygen    Using 93% since getting astral  Time 4.5 hours/day  AHI 0.9    90% epap of 10.4  90% PS of 20    MV 11.6    Will follow with renal panels, will consider abg later  Last HCO3 was 7/6/22 was 26    DME medical service company- may need to change DME  I have access via [address] [facility] to be benefiting from astral therapy, NIMV      Tobacco abuse  Needs to stop smokinig    Will do LDCT      GERD  Continue with   Protonix      CHF  On   Lasix     Last echo  Done 3/2021  Conclusions      Summary   Technically difficult examination. Normal left ventricle systolic function with an estimated ejection fraction   of 55%. No regional wall motion abnormalities are seen. Normal left   ventricular diastolic filling pressure. The right atrium is mildly dilated. Trace mitral and tricuspid regurgitation. Systolic pulmonary artery pressure (SPAP) is normal and estimated at 34 mmHg   (right atrial pressure 3 mmHg). RTC in 3 months       What is lung cancer screening? Lung cancer screening is a way in which doctors check the lungs for early signs of cancer in people who have no symptoms of lung cancer. A low-dose CT scan uses much less radiation than a normal CT scan and shows a more detailed image of the lungs than a standard X-ray. The goal of lung cancer screening is to find cancer early, before it has a chance to grow, spread, or cause problems. One large study found that smokers who were screened with low-dose CT scans were less likely to die of lung cancer than those who were screened with standard X-ray. Below is a summary of the things you need to know regarding screening for lung cancer with low-dose computed tomography (LDCT). This is a screening program that involves routine annual screening with LDCT studies of the lung. The LDCTs are done using low-dose radiation that is not thought to increase your cancer risk. If you have other serious medical conditions (other cancers, congestive heart failure) that limit your life expectancy to less than 10 years, you should not undergo lung cancer screening with LDCT. The chance is 20%-60% that the LDCT result will show abnormalities. This would require additional testing which could include repeat imaging or even invasive procedures. Most (about 95%) of \"abnormal\" LDCT results are false in the sense that no lung cancer is ultimately found.   Additionally, some (about 10%) of the cancers found would not affect your life expectancy, even if undetected and untreated. If you are still smoking, the single most important thing that you can do to reduce your risk of dying of lung cancer is to quit. For this screening to be covered by Medicare and most other insurers, strict criteria must be met. If you do not meet these criteria, but still wish to undergo LDCT testing, you will be required to sign a waiver indicating your willingness to pay for the scan.                    Thank you for allowing me to assist in the care of the MD Vianca Schmid MD

## 2022-10-10 NOTE — PROGRESS NOTES
Suzanne Gonzalez (:  1960) is a 65 y.o. male,Established patient, here for evaluation of the following chief complaint(s):  COPD         ASSESSMENT/PLAN:  1. Chronic obstructive pulmonary disease, unspecified COPD type ([facility] [address]  2. Tobacco abuse  3. Chronic respiratory failure with hypercapnia (HCC)  4. Dependence on enabling machine  5. Congestive heart failure, unspecified HF chronicity, unspecified heart failure type ([address]  6. Restrictive lung disease  7. Class 1 obesity with body mass index (BMI) of 33.0 to 33.9 in adult, unspecified obesity type, unspecified whether serious comorbidity present  8. Gastroesophageal reflux disease without esophagitis      COPD  Continue with  Symbicort 160/4.52 puffs twice daily  Spiriva Respimat 2 puffs once daily  Albuterol as needed, inhalers  Albuterol nebulizer as needed  Oxygen at 2 lpm , now using only at night    Chronic respiratory failure  Astral set in ivaps AE mode, height 68 inches, TVA 8.0 L/min, minimum PS 10, max PS 30, rise 300, rate 20, trigger medium, TI min 0.5, TI max 1.5, cycle 25%, EPAP min 10, max EPAP 15  With 2 lpm of oxygen    Using 93% since getting astral  Time 4.5 hours/day  AHI 0.9    90% epap of 10.4  90% PS of 20    MV 11.6    Will follow with renal panels, will consider abg later  Last HCO3 was 22 was 26    DME medical service company- may need to change DME  I have access via [address] [place] [address],[address] to be benefiting from astral therapy, NIMV      Tobacco abuse  Needs to stop smokinig    Will do LDCT      GERD  Continue with   Protonix      CHF  On   Lasix     Last echo  Done 3/2021  Conclusions      Summary   Technically difficult examination. Normal left ventricle systolic function with an estimated ejection fraction   of 55%. No regional wall motion abnormalities are seen. Normal left   ventricular diastolic filling pressure. The right atrium is mildly dilated. Trace mitral and tricuspid regurgitation.    Systolic pulmonary artery pressure (SPAP) is normal and estimated at 34 mmHg   (right atrial pressure 3 mmHg). RTC in 3 months           No follow-ups on file. I have personally reviewed and summarized the old records       I have independently reviewed the images and reviewed with patient    I have reviewed the lab tests, radiology reports and medications    I have downloaded and interpreted the cpap/bipap/pap data. I have made adjustments as described    Reviewed present meds and side effects. Continue present meds. Stay compliant. Call if worsens. Reviewed proper inhaler usage    Patient understands that smoking is aggravating the respiratory disease and must be discontinued. Patient refuses to d/c smoking           [address] [facility]  [address] [facility]  [address] [facility] [address] [address]  Dept: [phone]  Loc: [phone]     Diagnosis:  [] ADALBERTO (ICD-10: G47.33)  o CSA (ICD-10: G47.31)  [] Complex Sleep Apnea (ICD-10: G47.37)  []  (ICD-10: G47.37)  [] Hypoxemia (ICD-10: R09.02)  [] COPD (J44.90)  [] Chronic Respiratory Failure with hypoxemia (J96.11)  [] Chronicr Respiratory Failure with hypercapnia (J96.12)  [x] Restrictive Lung Disease (J98.4)      [] New Rx (Device Preference: _________________________)     [x] Change Order I have changed the following         [] Replace ___________    [] Discontinue Order -  [] CPAP    [] BPAP    [] PAP    [] Oxygen   /   AMA?  [] Yes   [] No              Therapy    AHI: ________ ANDREW: ________  LOW SpO2: ________%      DME:msc    DEVICE / SETTINGS RAMP / COMFORT INTERFACE   []  CPAP () Pressure    Ramp: _________ min's  [] Ramp to patient preference General PAP Supply Kit  Medicaid does not cover heated tubing  ([employer])  PAP Tubing:  [x] Heated ()- 1/3 mos                         [x] Regular () - 1/3 mos  [x] Disposable Water Chamber () - 1/6 mos  [x] Disposable Filter () - 2/mo  [x] Non-Disposable Filter () - 1/6 mos   []  BiLevel PAP ()           IPAP        []  BiLevel PAP with   ()       Backup Rate ()      EPAP Rate  [] Adjust FLEX to patient comfort       SUPPLEMENTAL OXYGEN  [] OXYGEN:      Liter/min: _________  [] Continuous        [] Nocturnal  [] Bleed into PAP Device      []  EchoStar  Min Press                   Max Press   Mask Interface Kit    [] Mask interface () - 1/3 mos  [] Nasal Cushion () - 2/mo  [] Nasal Pillows ()  -2/mo  [] Headgear () - 1/6 mos   []  AutoBiLevel () Pressure  ()      Support           []  ResMed® IVAPS EPAP  [] Overnight Oximetry on Room Air  [] Overnight Oximetry on PAP Therapy    Target Pt Rate  Min PS      Target Va  Patient Ht  Ti Max                Ti Min        Rise time 8.0 Max PS  Trigger  Cycle  Epap  Epap max  Epap min  The patient has a history of:  [] Excessive Daytime Sleepiness  [] Insomnia  [] Impaired Cognition  [] Ischemic Heart Disease  [] Hypertension  [] Mood Disorders          [] History of Stroke  Additional Orders:_____________________________________if mask refit to nasal mask or pillow will need epap min to 7_________________________________________________________________________________________________________________________________________     Full Face Mask Kit    [x] Full face mask () - 1/3 mos  [x] Full Face Cushion () - 1/mo  [x] Headgear () - 1/6 mos                                           [] Respironics® ASV Advanced ()     EPAP Min  PS Min      EPAP Max  PS Max   Additional Supplies    [] Chin Strap ()  [] Heated Humidifier Kit ()  Mask Specifications:   [] Patient Preference      -or-            Brand:______________ Size:_______________   Type: __________________________________   [x] Mask Refit:___________________________                                           Max Press  Ramp Time      Rate  Bi-flex: []1  []2  []3     [] Respironics® AVAPS: ()     IPAP Min  IPAP Max  Pressure Max  Epap max  Epap min  Rise time                      Avaps rate  EPAP   Additional Services    [] Annual PRN service and check of equipment  [] Routine service and check of equipment  [] Download and report compliance data   Tidal volume      Tigger                                     Rate                                         Inspiratory time                                                         The following equipment is Medically Necessary for the above stated patient. It is reasonable and necessary in reference to acceptable standards of medical practice for this condition, and is not prescribed as a convenience. Frequency of Use:    Daily                 Length of Need: 13 Months              o The patient requires BiLevel PAP and the following apply: []  The patient requires a Respiratory Assist Device (RAD) and the following apply:   o CPAP was tried and failed to meet therapeutic goals. [] CPAP was tried, but failed to meet therapeutic goals   o The prescribed mask interface has been properly fit, is the most comfortable to the patient and will be used with the BPAP device. [] The prescribed mask interface has been properly fit, is the most comfortable to the patient and will be used with the RAD.   o Current CPAP setting prevents patient from tolerating the therapy and lower CPAP settings fail to adequately control the symptoms of ADALBERTO, improve sleep quality, or reduce AHI to acceptable levels. [] Current CPAP setting prevent patient from tolerating the therapy and lower PAP settings fail to  adequately control ADALBERTO symptoms, improve sleep quality, or reduce AHI to acceptable levels.          [] There is significant improvement of the respiratory events on the RAD MD DAY De La Garza- [id number]     KOTKA- [id number]                    10/10/22       ____________________________                        _______________________           Physician Signature                                                         Date                                                   Subjective   SUBJECTIVE/OBJECTIVE:  Follow-up from hospitalization September 2021    History of present illness for the hospitalization  History of Present Illness  Is a 78-year-old gentleman who is known to have severe COPD along with alcohol use and tobacco use who comes into the hospital because of acute respiratory failure. Patient was having shortness of breath about a day prior to coming into the hospital and found by EMS to be hypoxic upon arrival at the house. The patient's significant other who is at the bedside. Along with his mother, who is my patient, and sister. Patient is unable to give any history and was on BiPAP therapy  Patient apparently this morning had an issue of becoming confused and moving furniture around the room and trying to get out of the window. Patient was subdued and placed on BiPAP  However was not getting good tidal volumes           9/3/2021-moved to the ICU and started on Precedex drip, still on AVAPS     9/4/2021-tolerated the Precedex drip, tolerated the AVAPS, ABGs significantly improved.      9/5/2021-moved out of the ICU, doing well       The plan from the hospital  Pulmonary  Patient is fully vaccinated for Covid     COPD  Patient also has chronic respiratory failure with hypercapnia  Patient has been trialed on BiPAP therapy and failed  Patient was moved to AVAPS therapy and has significantly improved     Considerations for the future  Most likely has a restrictive lung disease  We will get bedside spirometry prior to discharge and nocturnal pulse ox on baseline oxygen prior to discharge  We will set up with a AVAPS therapy prior to discharge     We will get bedside PFT     ABG in the morning  Current PCO2 down to 48 from 63 from a high of 116, has greatly benefit from AVAPS therapy     Ordering volume ventilator for nocturnal uses and daytime use as needed to reduce the risk of exacerbation. Bipap insufficient due to severity of condition. Absence of respiratory support will lead to death. Patient has restrictive lung disease. Patient will need targeted tidal volume which cannot be provided via a CPAP. Bipap/Bilevel will not adequately ventilate this patient in order to complete ADLs,   Bipap/Bilevel will not lower the patient's CO2 levels, and this patient will require a set tidal volume. Therefore Astral/Trilogy NIV is being ordered due to the severe state of this patient's disease. Without this therapy, the patient runs a higher risk of expiration. Patient was tried on a BiPAP therapy  His PCO2 went from 116 to 93 up to 96  Patient failed BiPAP therapy, and BiPAP ST therapy     COPD  Solu-Medrol 40 IV twice daily, will drop this to once daily  Should be able to transition over to oral prednisone  Leanna's  Symbicort   Spiriva        Smoking  Transdermal nicotine patches  Patient had a carboxyhemoglobin of 8.5 upon admission        Since hospitalization  Using the astral    Sleeping thru night    No headache  No chest pain  No bloating  No burping  No ear popping    Still smoking      On symbicort and spriiva    Albuterol as needed    Sicne last visit  Symbicort and sprivia  Overall doing well    Will need albuterol    COPD  He complains of shortness of breath. There is no chest tightness, cough, difficulty breathing, frequent throat clearing, hemoptysis, hoarse voice, sputum production or wheezing. This is a chronic problem. The current episode started more than 1 year ago. The problem occurs intermittently. The problem has been waxing and waning.  Pertinent negatives include no appetite change, chest pain, dyspnea on exertion, ear congestion, ear pain, fever, heartburn, malaise/fatigue, myalgias, nasal congestion, orthopnea, rhinorrhea, sneezing, sore throat, sweats or trouble swallowing. Review of Systems   Constitutional: Negative. Negative for appetite change, fever and malaise/fatigue. HENT: Negative. Negative for ear pain, hoarse voice, rhinorrhea, sneezing, sore throat and trouble swallowing. Eyes: Negative. Respiratory:  Positive for shortness of breath. Negative for cough, hemoptysis, sputum production and wheezing. Cardiovascular: Negative. Negative for chest pain and dyspnea on exertion. Gastrointestinal: Negative. Negative for heartburn. Endocrine: Negative. Genitourinary: Negative. Musculoskeletal: Negative. Negative for myalgias. Skin: Negative. Allergic/Immunologic: Negative. Neurological: Negative. Hematological: Negative. Psychiatric/Behavioral: Negative. Vitals:    10/10/22 1024 10/10/22 1031   BP: (!) 173/90 (!) 174/97   Site: Left Upper Arm Right Upper Arm   Position: Sitting Sitting   Cuff Size: Medium Adult Medium Adult   Pulse: 52    Resp: 16    Temp: 98.4 °F (36.9 °C)    TempSrc: Temporal    SpO2: 94%    Weight: 238 lb (108 kg)    Height: 5' 10\" (1.778 m)          Objective   Physical Exam  Vitals and nursing note reviewed. Constitutional:       General: He is not in acute distress. Appearance: Normal appearance. He is not ill-appearing. HENT:      Head: Normocephalic and atraumatic. Right Ear: External ear normal.      Left Ear: External ear normal.      Nose: Nose normal.      Mouth/Throat:      Mouth: Mucous membranes are moist.      Pharynx: Oropharynx is clear. Comments: Mallampati 3  Eyes:      General: No scleral icterus. Extraocular Movements: Extraocular movements intact. Conjunctiva/sclera: Conjunctivae normal.      Pupils: Pupils are equal, round, and reactive to light.    Cardiovascular: Rate and Rhythm: Normal rate and regular rhythm. Pulses: Normal pulses. Heart sounds: Normal heart sounds. No murmur heard. No friction rub. Pulmonary:      Effort: No respiratory distress. Breath sounds: No stridor. No wheezing, rhonchi or rales. Comments: Dec bs b/l  Chest:      Chest wall: No tenderness. Abdominal:      General: Abdomen is flat. Bowel sounds are normal. There is no distension. Tenderness: There is no abdominal tenderness. There is no guarding. Musculoskeletal:         General: No swelling or tenderness. Normal range of motion. Cervical back: Normal range of motion and neck supple. No rigidity. Skin:     General: Skin is warm and dry. Coloration: Skin is not jaundiced. Neurological:      General: No focal deficit present. Mental Status: He is alert and oriented to person, place, and time. Mental status is at baseline. Cranial Nerves: No cranial nerve deficit. Sensory: No sensory deficit. Motor: No weakness. Gait: Gait normal.   Psychiatric:         Mood and Affect: Mood normal.         Thought Content: Thought content normal.         Judgment: Judgment normal.                An electronic signature was used to authenticate this note.    --Zoltan Rascon MD Low Dose CT (LDCT) Lung Screening criteria met:     Age 50-77(Medicare) or 50-80 (USPST)   Pack year smoking >20   Still smoking or less than 15 year since quit   No sign or symptoms of lung cancer   > 11 months since last LDCT     Risks and benefits of lung cancer screening with LDCT scans discussed:    Significance of positive screen - False-positive LDCT results often occur. 95% of all positive results do not lead to a diagnosis of cancer. Usually further imaging can resolve most false-positive results; however, some patients may require invasive procedures.     Over diagnosis risk - 10% to 12% of screen-detected lung cancer cases are over diagnosed--that is, the cancer would not have been detected in the patient's lifetime without the screening. Need for follow up screens annually to continue lung cancer screening effectiveness     Risks associated with radiation from annual LDCT- Radiation exposure is about the same as for a mammogram, which is about 1/3 of the annual background radiation exposure from everyday life. Starting screening at age 54 is not likely to increase cancer risk from radiation exposure. Patients with comorbidities resulting in life expectancy of < 10 years, or that would preclude treatment of an abnormality identified on CT, should not be screened due to lack of benefit.     To obtain maximal benefit from this screening, smoking cessation and long-term abstinence from smoking is critical

## 2022-10-10 NOTE — PATIENT INSTRUCTIONS
ASSESSMENT/PLAN:  1. Chronic obstructive pulmonary disease, unspecified COPD type ([facility] [address]  2. Tobacco abuse  3. Chronic respiratory failure with hypercapnia (HCC)  4. Dependence on enabling machine  5. Congestive heart failure, unspecified HF chronicity, unspecified heart failure type ([address]  6. Restrictive lung disease  7. Class 1 obesity with body mass index (BMI) of 33.0 to 33.9 in adult, unspecified obesity type, unspecified whether serious comorbidity present  8. Gastroesophageal reflux disease without esophagitis      COPD  Continue with  Symbicort 160/4.52 puffs twice daily  Spiriva Respimat 2 puffs once daily  Albuterol as needed, inhalers  Albuterol nebulizer as needed  Oxygen at 2 lpm , now using only at night    Chronic respiratory failure  Astral set in ivaps AE mode, height 68 inches, TVA 8.0 L/min, minimum PS 10, max PS 30, rise 300, rate 20, trigger medium, TI min 0.5, TI max 1.5, cycle 25%, EPAP min 10, max EPAP 15  With 2 lpm of oxygen    Using 93% since getting astral  Time 4.5 hours/day  AHI 0.9    90% epap of 10.4  90% PS of 20    MV 11.6    Will follow with renal panels, will consider abg later  Last HCO3 was 7/6/22 was 26    DME medical service company- may need to change DME  I have access via [address] [place] [address],[address] to be benefiting from astral therapy, NIMV      Tobacco abuse  Needs to stop smokinig    Will do LDCT      GERD  Continue with   Protonix      CHF  On   Lasix     Last echo  Done 3/2021  Conclusions      Summary   Technically difficult examination. Normal left ventricle systolic function with an estimated ejection fraction   of 55%. No regional wall motion abnormalities are seen. Normal left   ventricular diastolic filling pressure. The right atrium is mildly dilated. Trace mitral and tricuspid regurgitation. Systolic pulmonary artery pressure (SPAP) is normal and estimated at 34 mmHg   (right atrial pressure 3 mmHg). RTC in 3 months       What is lung cancer screening? Lung cancer screening is a way in which doctors check the lungs for early signs of cancer in people who have no symptoms of lung cancer. A low-dose CT scan uses much less radiation than a normal CT scan and shows a more detailed image of the lungs than a standard X-ray. The goal of lung cancer screening is to find cancer early, before it has a chance to grow, spread, or cause problems. One large study found that smokers who were screened with low-dose CT scans were less likely to die of lung cancer than those who were screened with standard X-ray. Below is a summary of the things you need to know regarding screening for lung cancer with low-dose computed tomography (LDCT). This is a screening program that involves routine annual screening with LDCT studies of the lung. The LDCTs are done using low-dose radiation that is not thought to increase your cancer risk. If you have other serious medical conditions (other cancers, congestive heart failure) that limit your life expectancy to less than 10 years, you should not undergo lung cancer screening with LDCT. The chance is 20%-60% that the LDCT result will show abnormalities. This would require additional testing which could include repeat imaging or even invasive procedures. Most (about 95%) of \"abnormal\" LDCT results are false in the sense that no lung cancer is ultimately found. Additionally, some (about 10%) of the cancers found would not affect your life expectancy, even if undetected and untreated. If you are still smoking, the single most important thing that you can do to reduce your risk of dying of lung cancer is to quit. For this screening to be covered by Medicare and most other insurers, strict criteria must be met. If you do not meet these criteria, but still wish to undergo LDCT testing, you will be required to sign a waiver indicating your willingness to pay for the scan.     Remember to bring a list of pulmonary medications and any CPAP or BiPAP machines to your next appointment with the office. Please keep all of your future appointments scheduled by [facility] office. Out of respect for other patients and providers, you may be asked to reschedule your appointment if you arrive later than your scheduled appointment time. Appointments cancelled less than 24hrs in advance will be considered a no show. Patients with three missed appointments within 1 year or four missed appointments within 2 years can be dismissed from the practice. Please be aware that our physicians are required to work in the Intensive Care Unit at [facility].  Your appointment may need to be rescheduled if they are designated to work during your appointment time. You may receive a survey regarding the care you received during your visit. Your input is valuable to us. We encourage you to complete and return your survey. We hope you will choose us in the future for your healthcare needs. Pt instructed of all future appointment dates & times, including radiology, labs, procedures & referrals. If procedures were scheduled preparation instructions provided. Instructions on future appointments with [facility] were given.

## 2022-10-10 NOTE — PROGRESS NOTES
MA Communication: The following orders are received by verbal communication from Mine Huff MD    Orders include:  Pt to contact [address] [place] [address] to see if they will service NIV.   If they do not, pt will contact office and we will have to send new order to DME       3 mo fu scheduled 1/12/23

## 2022-12-12 ENCOUNTER — HOSPITAL ENCOUNTER (EMERGENCY)
Age: 62
Discharge: HOME | End: 2022-12-12
Attending: STUDENT IN AN ORGANIZED HEALTH CARE EDUCATION/TRAINING PROGRAM | Admitting: INTERNAL MEDICINE
Payer: COMMERCIAL

## 2022-12-12 ENCOUNTER — APPOINTMENT (OUTPATIENT)
Dept: GENERAL RADIOLOGY | Age: 62
End: 2022-12-12
Payer: COMMERCIAL

## 2022-12-12 VITALS
WEIGHT: 245 LBS | BODY MASS INDEX: 35.07 KG/M2 | HEART RATE: 55 BPM | HEIGHT: 70 IN | SYSTOLIC BLOOD PRESSURE: 167 MMHG | RESPIRATION RATE: 19 BRPM | OXYGEN SATURATION: 95 % | DIASTOLIC BLOOD PRESSURE: 98 MMHG | TEMPERATURE: 98.1 F

## 2022-12-12 DIAGNOSIS — R07.9 CHEST PAIN, UNSPECIFIED TYPE: Primary | ICD-10-CM

## 2022-12-12 LAB
A/G RATIO: 2 (ref 1.1–2.2)
ALBUMIN SERPL-MCNC: 4.4 G/DL (ref 3.4–5)
ALP BLD-CCNC: 91 U/L (ref 40–129)
ALT SERPL-CCNC: 15 U/L (ref 10–40)
ANION GAP SERPL CALCULATED.3IONS-SCNC: 9 MMOL/L (ref 3–16)
AST SERPL-CCNC: 12 U/L (ref 15–37)
BASOPHILS ABSOLUTE: 0.1 K/UL (ref 0–0.2)
BASOPHILS RELATIVE PERCENT: 0.9 %
BILIRUB SERPL-MCNC: <0.2 MG/DL (ref 0–1)
BUN BLDV-MCNC: 12 MG/DL (ref 7–20)
CALCIUM SERPL-MCNC: 9.6 MG/DL (ref 8.3–10.6)
CHLORIDE BLD-SCNC: 100 MMOL/L (ref 99–110)
CO2: 30 MMOL/L (ref 21–32)
CREAT SERPL-MCNC: 0.8 MG/DL (ref 0.8–1.3)
D DIMER: 0.37 UG/ML FEU (ref 0–0.6)
EKG ATRIAL RATE: 61 BPM
EKG DIAGNOSIS: NORMAL
EKG P AXIS: 48 DEGREES
EKG P-R INTERVAL: 160 MS
EKG Q-T INTERVAL: 390 MS
EKG QRS DURATION: 76 MS
EKG QTC CALCULATION (BAZETT): 392 MS
EKG R AXIS: -1 DEGREES
EKG T AXIS: 8 DEGREES
EKG VENTRICULAR RATE: 61 BPM
EOSINOPHILS ABSOLUTE: 0.1 K/UL (ref 0–0.6)
EOSINOPHILS RELATIVE PERCENT: 1.1 %
GFR SERPL CREATININE-BSD FRML MDRD: >60 ML/MIN/{1.73_M2}
GLUCOSE BLD-MCNC: 102 MG/DL (ref 70–99)
HCT VFR BLD CALC: 44.5 % (ref 40.5–52.5)
HEMOGLOBIN: 14.7 G/DL (ref 13.5–17.5)
LYMPHOCYTES ABSOLUTE: 3.2 K/UL (ref 1–5.1)
LYMPHOCYTES RELATIVE PERCENT: 26.2 %
MCH RBC QN AUTO: 31.3 PG (ref 26–34)
MCHC RBC AUTO-ENTMCNC: 32.9 G/DL (ref 31–36)
MCV RBC AUTO: 94.9 FL (ref 80–100)
MONOCYTES ABSOLUTE: 0.8 K/UL (ref 0–1.3)
MONOCYTES RELATIVE PERCENT: 6.2 %
NEUTROPHILS ABSOLUTE: 8.1 K/UL (ref 1.7–7.7)
NEUTROPHILS RELATIVE PERCENT: 65.6 %
PDW BLD-RTO: 14 % (ref 12.4–15.4)
PLATELET # BLD: 272 K/UL (ref 135–450)
PMV BLD AUTO: 9.3 FL (ref 5–10.5)
POTASSIUM REFLEX MAGNESIUM: 4.2 MMOL/L (ref 3.5–5.1)
RBC # BLD: 4.69 M/UL (ref 4.2–5.9)
SODIUM BLD-SCNC: 139 MMOL/L (ref 136–145)
TOTAL PROTEIN: 6.6 G/DL (ref 6.4–8.2)
TROPONIN: <0.01 NG/ML
TROPONIN: <0.01 NG/ML
WBC # BLD: 12.3 K/UL (ref 4–11)

## 2022-12-12 PROCEDURE — 6360000002 HC RX W HCPCS: Performed by: STUDENT IN AN ORGANIZED HEALTH CARE EDUCATION/TRAINING PROGRAM

## 2022-12-12 PROCEDURE — 84484 ASSAY OF TROPONIN QUANT: CPT

## 2022-12-12 PROCEDURE — 6370000000 HC RX 637 (ALT 250 FOR IP)

## 2022-12-12 PROCEDURE — 99285 EMERGENCY DEPT VISIT HI MDM: CPT

## 2022-12-12 PROCEDURE — 1200000000 HC SEMI PRIVATE

## 2022-12-12 PROCEDURE — 80053 COMPREHEN METABOLIC PANEL: CPT

## 2022-12-12 PROCEDURE — 96375 TX/PRO/DX INJ NEW DRUG ADDON: CPT

## 2022-12-12 PROCEDURE — 93005 ELECTROCARDIOGRAM TRACING: CPT | Performed by: STUDENT IN AN ORGANIZED HEALTH CARE EDUCATION/TRAINING PROGRAM

## 2022-12-12 PROCEDURE — 6370000000 HC RX 637 (ALT 250 FOR IP): Performed by: STUDENT IN AN ORGANIZED HEALTH CARE EDUCATION/TRAINING PROGRAM

## 2022-12-12 PROCEDURE — 96374 THER/PROPH/DIAG INJ IV PUSH: CPT

## 2022-12-12 PROCEDURE — 85379 FIBRIN DEGRADATION QUANT: CPT

## 2022-12-12 PROCEDURE — 85025 COMPLETE CBC W/AUTO DIFF WBC: CPT

## 2022-12-12 PROCEDURE — 71045 X-RAY EXAM CHEST 1 VIEW: CPT

## 2022-12-12 RX ORDER — NITROGLYCERIN 0.4 MG/1
0.4 TABLET SUBLINGUAL EVERY 5 MIN PRN
Status: DISCONTINUED | OUTPATIENT
Start: 2022-12-12 | End: 2022-12-12 | Stop reason: HOSPADM

## 2022-12-12 RX ORDER — METHOCARBAMOL 500 MG/1
500 TABLET, FILM COATED ORAL 3 TIMES DAILY PRN
Status: DISCONTINUED | OUTPATIENT
Start: 2022-12-12 | End: 2022-12-12 | Stop reason: HOSPADM

## 2022-12-12 RX ORDER — MELOXICAM 15 MG/1
15 TABLET ORAL DAILY
Qty: 30 TABLET | Refills: 0 | Status: SHIPPED | OUTPATIENT
Start: 2022-12-12

## 2022-12-12 RX ORDER — ACETAMINOPHEN 650 MG/1
650 SUPPOSITORY RECTAL EVERY 6 HOURS PRN
Status: CANCELLED | OUTPATIENT
Start: 2022-12-12

## 2022-12-12 RX ORDER — SODIUM CHLORIDE 9 MG/ML
INJECTION, SOLUTION INTRAVENOUS PRN
Status: CANCELLED | OUTPATIENT
Start: 2022-12-12

## 2022-12-12 RX ORDER — ACETAMINOPHEN 325 MG/1
650 TABLET ORAL EVERY 6 HOURS PRN
Status: CANCELLED | OUTPATIENT
Start: 2022-12-12

## 2022-12-12 RX ORDER — ASPIRIN 81 MG/1
324 TABLET, CHEWABLE ORAL ONCE
Status: COMPLETED | OUTPATIENT
Start: 2022-12-12 | End: 2022-12-12

## 2022-12-12 RX ORDER — METHOCARBAMOL 500 MG/1
500 TABLET, FILM COATED ORAL 3 TIMES DAILY PRN
Qty: 42 TABLET | Refills: 0 | Status: SHIPPED | OUTPATIENT
Start: 2022-12-12 | End: 2022-12-26

## 2022-12-12 RX ORDER — ATORVASTATIN CALCIUM 40 MG/1
40 TABLET, FILM COATED ORAL NIGHTLY
Status: CANCELLED | OUTPATIENT
Start: 2022-12-12

## 2022-12-12 RX ORDER — ENOXAPARIN SODIUM 100 MG/ML
40 INJECTION SUBCUTANEOUS DAILY
Status: CANCELLED | OUTPATIENT
Start: 2022-12-12

## 2022-12-12 RX ORDER — POLYETHYLENE GLYCOL 3350 17 G/17G
17 POWDER, FOR SOLUTION ORAL DAILY PRN
Status: CANCELLED | OUTPATIENT
Start: 2022-12-12

## 2022-12-12 RX ORDER — ASPIRIN 81 MG/1
81 TABLET, CHEWABLE ORAL DAILY
Status: CANCELLED | OUTPATIENT
Start: 2022-12-13

## 2022-12-12 RX ORDER — ONDANSETRON 4 MG/1
4 TABLET, ORALLY DISINTEGRATING ORAL EVERY 8 HOURS PRN
Status: CANCELLED | OUTPATIENT
Start: 2022-12-12

## 2022-12-12 RX ORDER — MORPHINE SULFATE 4 MG/ML
4 INJECTION, SOLUTION INTRAMUSCULAR; INTRAVENOUS ONCE
Status: COMPLETED | OUTPATIENT
Start: 2022-12-12 | End: 2022-12-12

## 2022-12-12 RX ORDER — ONDANSETRON 2 MG/ML
4 INJECTION INTRAMUSCULAR; INTRAVENOUS EVERY 6 HOURS PRN
Status: CANCELLED | OUTPATIENT
Start: 2022-12-12

## 2022-12-12 RX ORDER — SODIUM CHLORIDE 0.9 % (FLUSH) 0.9 %
5-40 SYRINGE (ML) INJECTION EVERY 12 HOURS SCHEDULED
Status: CANCELLED | OUTPATIENT
Start: 2022-12-12

## 2022-12-12 RX ORDER — ONDANSETRON 2 MG/ML
4 INJECTION INTRAMUSCULAR; INTRAVENOUS ONCE
Status: COMPLETED | OUTPATIENT
Start: 2022-12-12 | End: 2022-12-12

## 2022-12-12 RX ORDER — SODIUM CHLORIDE 0.9 % (FLUSH) 0.9 %
5-40 SYRINGE (ML) INJECTION PRN
Status: CANCELLED | OUTPATIENT
Start: 2022-12-12

## 2022-12-12 RX ADMIN — ONDANSETRON 4 MG: 2 INJECTION INTRAMUSCULAR; INTRAVENOUS at 12:43

## 2022-12-12 RX ADMIN — METHOCARBAMOL 500 MG: 500 TABLET ORAL at 14:27

## 2022-12-12 RX ADMIN — ASPIRIN 324 MG: 81 TABLET, CHEWABLE ORAL at 14:12

## 2022-12-12 RX ADMIN — MORPHINE SULFATE 4 MG: 4 INJECTION, SOLUTION INTRAMUSCULAR; INTRAVENOUS at 12:44

## 2022-12-12 ASSESSMENT — PAIN SCALES - GENERAL
PAINLEVEL_OUTOF10: 5
PAINLEVEL_OUTOF10: 7
PAINLEVEL_OUTOF10: 2
PAINLEVEL_OUTOF10: 5
PAINLEVEL_OUTOF10: 5

## 2022-12-12 ASSESSMENT — PAIN DESCRIPTION - LOCATION
LOCATION: CHEST
LOCATION: HEAD
LOCATION: BACK
LOCATION: CHEST

## 2022-12-12 ASSESSMENT — PAIN - FUNCTIONAL ASSESSMENT
PAIN_FUNCTIONAL_ASSESSMENT: 0-10

## 2022-12-12 ASSESSMENT — PAIN DESCRIPTION - ORIENTATION: ORIENTATION: LEFT;UPPER

## 2022-12-12 NOTE — H&P
Hospital Medicine History & Physical      PCP: Tung Francois MD    Date of Admission: 12/12/2022    Date of Service: Pt seen/examined on 12/12/22 and admitted to inpatient with expected LOS greater than two midnights due to medical therapy. Chief Complaint:  shoulder pain      History Of Present Illness: 65 y.o. male with past medical history significant for COPD, sleep apnea, hypertension, tobacco abuse, obesity. Presented to [facility] with complaint of left-sided pain. He states this pain started near his left shoulder blade last Wednesday, and over the past couple days has spread to the left upper chest and into the left arm. He describes the pain as stabbing. He denies shortness of breath, nausea/vomiting, diaphoresis with this pain. The pain is worse in the morning upon waking, and just prior to going to bed. Rest, and hot water to the area provide some relief. He also mentions difficulty with certain movements. He denies chest pain, dyspnea, n/v/d/c, dysuria, fever, chills, headache. Past Medical History:          Diagnosis Date    COPD (chronic obstructive pulmonary disease) ([facility] [address]     Hypertension        Past Surgical History:      History reviewed. No pertinent surgical history. Medications Prior to Admission:      Prior to Admission medications    Medication Sig Start Date End Date Taking?  Authorizing Provider   methocarbamol (ROBAXIN) 500 MG tablet Take 1 tablet by mouth 3 times daily as needed (pain) 12/12/22 12/26/22 Yes BC Stevens CNP   meloxicam (MOBIC) 15 MG tablet Take 1 tablet by mouth daily 12/12/22  Yes BC Stevens CNP   tiotropium (SPIRIVA RESPIMAT) 2.5 MCG/ACT AERS inhaler Inhale 2 puffs into the lungs daily 9/9/21   Amira Millard MD   pantoprazole (PROTONIX) 40 MG tablet Take 1 tablet by mouth every morning (before breakfast) for 10 days 7/24/21 10/10/22  Alpa Wallis MD   lisinopril (PRINIVIL;ZESTRIL) 5 MG tablet Take 1 tablet by mouth daily 4/11/21   Norman Phan MD   carvedilol (COREG) 12.5 MG tablet Take 1 tablet by mouth 2 times daily (with meals) 4/10/21   Norman Phan MD   amLODIPine (NORVASC) 10 MG tablet Take 1 tablet by mouth daily 4/11/21   Norman Phan MD   nicotine (NICODERM CQ) 21 MG/24HR Place 1 patch onto the skin daily 4/11/21   Norman Phan MD   budesonide-formoterol (SYMBICORT) 160-4.5 MCG/ACT AERO Inhale 2 puffs into the lungs 2 times daily 4/10/21   Norman Phan MD   albuterol sulfate HFA (VENTOLIN HFA) 108 (90 Base) MCG/ACT inhaler Inhale 2 puffs into the lungs every 6 hours as needed for Wheezing 5/9/19   GABRIELA Rinaldi       Allergies:  Patient has no known allergies. Social History:      The patient currently lives alone    TOBACCO:   reports that he has been smoking cigarettes. He started smoking about 46 years ago. He has a 40.00 pack-year smoking history. He has never used smokeless tobacco.  ETOH:   reports that he does not currently use alcohol after a past usage of about 42.0 standard drinks per week. E-cigarette/Vaping       Questions Responses    E-cigarette/Vaping Use Former User    Start Date     Passive Exposure     Quit Date     Counseling Given     Comments               Family History:      Reviewed and negative in regards to presenting illness/complaint. History reviewed. No pertinent family history. REVIEW OF SYSTEMS COMPLETED:   Pertinent positives as noted in the HPI. All other systems reviewed and negative. PHYSICAL EXAM PERFORMED:    BP (!) 167/98   Pulse 55   Temp 98.1 °F (36.7 °C) (Oral)   Resp 19   Ht 5' 10\" (1.778 m)   Wt 245 lb (111.1 kg)   SpO2 95%   BMI 35.15 kg/m²     General appearance: Resting in bed, comfortable. No apparent distress, appears stated age and cooperative. HEENT: Normal cephalic, atraumatic without obvious deformity. Pupils equal, round, and reactive to light. Extra ocular muscles intact. Conjunctivae/corneas clear.   Neck: Supple, with full range of motion. No jugular venous distention. Trachea midline. Respiratory: Normal respiratory effort. Clear to auscultation, bilaterally without Rales/Wheezes/Rhonchi. Room air  Cardiovascular: Regular rate and rhythm with normal S1/S2 without murmurs, rubs or gallops. Abdomen: Soft, non-tender, non-distended with normal bowel sounds. Musculoskeletal: No clubbing, cyanosis or edema bilaterally. RUE with full range of motion. Left arm ROM is limited by pain at the top of shoulder ROM, there is also pain with raising his arm against resistance. The left upper back is swollen. The left upper back, chest, shoulder are tender to palpation. No obvious deformity. Skin: Skin color, texture, turgor normal. No rashes or lesions. Neurologic: Neurovascularly intact without any focal sensory/motor deficits. Cranial nerves: II-XII intact, grossly non-focal.  Psychiatric: Alert and oriented, thought content appropriate, normal insight  Capillary Refill: Brisk,3 seconds, normal  Peripheral Pulses: +2 palpable, equal bilaterally       Labs:     Recent Labs     12/12/22  1201   WBC 12.3*   HGB 14.7   HCT 44.5        Recent Labs     12/12/22  1201      K 4.2      CO2 30   BUN 12   CREATININE 0.8   CALCIUM 9.6     Recent Labs     12/12/22  1201   AST 12*   ALT 15   BILITOT <0.2   ALKPHOS 91     No results for input(s): INR in the last 72 hours.   Recent Labs     12/12/22  1201 12/12/22  1505   TROPONINI <0.01 <0.01       Urinalysis:      Lab Results   Component Value Date/Time    NITRU Negative 03/28/2021 01:45 PM    WBCUA None seen 03/28/2021 01:45 PM    BACTERIA Rare 03/28/2021 01:45 PM    RBCUA None seen 03/28/2021 01:45 PM    BLOODU Negative 03/28/2021 01:45 PM    SPECGRAV 1.020 03/28/2021 01:45 PM    GLUCOSEU Negative 03/28/2021 01:45 PM       Radiology:     CXR: I have reviewed the CXR with the following interpretation: clear lungs  EKG:  I have reviewed the EKG with the following interpretation: NSR    XR CHEST PORTABLE   Final Result   No pneumonia or edema             Consults:    None    ASSESSMENT & PLAN:    Active Hospital Problems    Diagnosis Date Noted    Chest pain [R07.9] 12/12/2022     Priority: Medium     Chest pain  -Possible etiologies include cardiac, GI, musculoskeletal  -Trop negative x2, EKG non-acute  -CXR with no acute process identified  -Cardiology consulted, appreciate recommendations    Left shoulder injury  -Likely the cause of patients presenting pain  -Acute injury vs arthritis  -Presenting pain reproducible on exam, and increased with certain ROM and raising arm against resistance  -Methocarbamol provided relief  -Recommend follow up with ortho as outpatient for further testing/treatment    Essential hypertension  -Continue home meds  -Pain likely contributing to elevated readings  -Monitor    COPD, stable   -Does not require oxygen at home  -Continue home regimen  -Follows with Dr. Kar Mead as outpatient  -Monitor    Sleep apnea   -Reports using CPAP for 3-4 hours each night    Tobacco abuse  -Counseled on cessation  -NRT    Obesity  -BMI 06.56  -This complicates assessment and treatment. Placing patient at risk for multiple co-morbidities as well as early death and contributing to the patient's presentation  -Counseled on weight loss      DVT Prophylaxis: lovenox  Diet: No diet orders on file  Code Status: Prior    PT/OT Eval Status: not indicated    Dispo - discharged 12/12       BC Arvizu - CNP    Thank you Mary Jean MD for the opportunity to be involved in this patient's care. If you have any questions or concerns please feel free to contact me at [phone].

## 2022-12-12 NOTE — ED PROVIDER NOTES
[facility]  ED      CHIEF COMPLAINT  Chest Pain (Pt to ED with c/o chest pain. Pt reports the pain started in his back last week and has now spread to chest and left arm. No cardiac hx. Pt reports SOB and cough, however he is a smoker so he says that is normal for him.)     HISTORY OF PRESENT ILLNESS  Mary Ellen Sandoval is a 65 y.o. male  who presents to the ED complaining of chest pain and back pain. Patient states that about a week ago, he developed pain in the left upper part of his back. States that today, it started to spread to his chest and into his left arm. He states that he has a cough and shortness of breath which are chronic and at baseline. He is a smoker, does have a history of COPD as well as hypertension. He states that the pain is sharp, with no exacerbating relieving factors. He has not taken anything at home for the pain. He denies any associated numbness or tingling. He denies other complaints or concerns. No other complaints, modifying factors or associated symptoms. I have reviewed the following from the nursing documentation. Past Medical History:   Diagnosis Date    COPD (chronic obstructive pulmonary disease) ([facility] [address]     Hypertension      History reviewed. No pertinent surgical history. History reviewed. No pertinent family history.   Social History     Socioeconomic History    Marital status: Single     Spouse name: Not on file    Number of children: Not on file    Years of education: Not on file    Highest education level: Not on file   Occupational History    Not on file   Tobacco Use    Smoking status: Every Day     Packs/day: 1.00     Years: 40.00     Pack years: 40.00     Types: Cigarettes     Start date: 7/24/1976    Smokeless tobacco: Never   Vaping Use    Vaping Use: Former    Substances: Nicotine   Substance and Sexual Activity    Alcohol use: Not Currently     Alcohol/week: 42.0 standard drinks     Types: 42 Cans of beer per week     Comment: Quit earlier this year \"around february\"    Drug use: No    Sexual activity: Not on file   Other Topics Concern    Not on file   Social History Narrative    Not on file     Social Determinants of Health     Financial Resource Strain: Not on file   Food Insecurity: Not on file   Transportation Needs: Not on file   Physical Activity: Not on file   Stress: Not on file   Social Connections: Not on file   Intimate Partner Violence: Not on file   Housing Stability: Not on file     Current Facility-Administered Medications   Medication Dose Route Frequency Provider Last Rate Last Admin    nitroGLYCERIN (NITROSTAT) SL tablet 0.4 mg  0.4 mg SubLINGual Q5 Min PRN Marquez King MD        methocarbamol (ROBAXIN) tablet 500 mg  500 mg Oral TID PRN BC Miller - CNP   500 mg at 12/12/22 2187     Current Outpatient Medications   Medication Sig Dispense Refill    albuterol sulfate HFA (VENTOLIN HFA) 108 (90 Base) MCG/ACT inhaler Inhale 2 puffs into the lungs 4 times daily as needed for Wheezing 54 g 1    furosemide (LASIX) 20 MG tablet Take 20 mg by mouth 2 times daily (Patient not taking: Reported on 10/10/2022)      tiotropium (SPIRIVA RESPIMAT) 2.5 MCG/ACT AERS inhaler Inhale 2 puffs into the lungs daily 1 each 0    pantoprazole (PROTONIX) 40 MG tablet Take 1 tablet by mouth every morning (before breakfast) for 10 days 10 tablet 0    lisinopril (PRINIVIL;ZESTRIL) 5 MG tablet Take 1 tablet by mouth daily 30 tablet 3    carvedilol (COREG) 12.5 MG tablet Take 1 tablet by mouth 2 times daily (with meals) 60 tablet 3    amLODIPine (NORVASC) 10 MG tablet Take 1 tablet by mouth daily 30 tablet 3    tamsulosin (FLOMAX) 0.4 MG capsule Take 1 capsule by mouth daily (Patient not taking: Reported on 10/10/2022) 30 capsule 3    nicotine (NICODERM CQ) 21 MG/24HR Place 1 patch onto the skin daily 30 patch 0    thiamine 100 MG tablet Take 1 tablet by mouth daily (Patient not taking: Reported on 10/10/2022) 30 tablet 3 budesonide-formoterol (SYMBICORT) 160-4.5 MCG/ACT AERO Inhale 2 puffs into the lungs 2 times daily 3 Inhaler 1    albuterol sulfate HFA (VENTOLIN HFA) 108 (90 Base) MCG/ACT inhaler Inhale 2 puffs into the lungs every 6 hours as needed for Wheezing 1 Inhaler 1     No Known Allergies    REVIEW OF SYSTEMS  10 systems reviewed, pertinent positives per HPI otherwise noted to be negative. PHYSICAL EXAM  BP (!) 157/101   Pulse 68   Temp 98.1 °F (36.7 °C) (Oral)   Resp 16   Ht 5' 10\" (1.778 m)   Wt 245 lb (111.1 kg)   SpO2 97%   BMI 35.15 kg/m²    GENERAL APPEARANCE: Awake and alert. Cooperative. No acute distress. HENT: Normocephalic. Atraumatic. Mucous membranes are moist  NECK: Supple. EYES: PERRL. EOM's grossly intact. HEART/CHEST: RRR. No murmurs. No chest wall tenderness. LUNGS: Respirations unlabored. CTAB. Good air exchange. Speaking comfortably in full sentences. ABDOMEN: No tenderness. Soft. Non-distended. No masses. No organomegaly. No guarding or rebound. MUSCULOSKELETAL: No extremity edema. Compartments soft. No deformity. No tenderness in the extremities. All extremities neurovascularly intact. BACK: Tenderness to palpation over L posterior shoulder  SKIN: Warm and dry. No acute rashes. NEUROLOGICAL: Alert and oriented. CN's 2-12 intact. No gross facial drooping. Strength 5/5, sensation intact. Gait normal.  PSYCHIATRIC: Normal mood and affect. LABS  I have reviewed all labs for this visit.    Results for orders placed or performed during the hospital encounter of 12/12/22   CBC with Auto Differential   Result Value Ref Range    WBC 12.3 (H) 4.0 - 11.0 K/uL    RBC 4.69 4.20 - 5.90 M/uL    Hemoglobin 14.7 13.5 - 17.5 g/dL    Hematocrit 44.5 40.5 - 52.5 %    MCV 94.9 80.0 - 100.0 fL    MCH 31.3 26.0 - 34.0 pg    MCHC 32.9 31.0 - 36.0 g/dL    RDW 14.0 12.4 - 15.4 %    Platelets 464 397 - 086 K/uL    MPV 9.3 5.0 - 10.5 fL    Neutrophils % 65.6 %    Lymphocytes % 26.2 %    Monocytes % 6.2 %    Eosinophils % 1.1 %    Basophils % 0.9 %    Neutrophils Absolute 8.1 (H) 1.7 - 7.7 K/uL    Lymphocytes Absolute 3.2 1.0 - 5.1 K/uL    Monocytes Absolute 0.8 0.0 - 1.3 K/uL    Eosinophils Absolute 0.1 0.0 - 0.6 K/uL    Basophils Absolute 0.1 0.0 - 0.2 K/uL   Comprehensive Metabolic Panel w/ Reflex to MG   Result Value Ref Range    Sodium 139 136 - 145 mmol/L    Potassium reflex Magnesium 4.2 3.5 - 5.1 mmol/L    Chloride 100 99 - 110 mmol/L    CO2 30 21 - 32 mmol/L    Anion Gap 9 3 - 16    Glucose 102 (H) 70 - 99 mg/dL    BUN 12 7 - 20 mg/dL    Creatinine 0.8 0.8 - 1.3 mg/dL    Est, Glom Filt Rate >60 >60    Calcium 9.6 8.3 - 10.6 mg/dL    Total Protein 6.6 6.4 - 8.2 g/dL    Albumin 4.4 3.4 - 5.0 g/dL    Albumin/Globulin Ratio 2.0 1.1 - 2.2    Total Bilirubin <0.2 0.0 - 1.0 mg/dL    Alkaline Phosphatase 91 40 - 129 U/L    ALT 15 10 - 40 U/L    AST 12 (L) 15 - 37 U/L   Troponin   Result Value Ref Range    Troponin <0.01 <0.01 ng/mL   D-Dimer, Quantitative   Result Value Ref Range    D-Dimer, Quant 0.37 0.00 - 0.60 ug/mL FEU   EKG 12 Lead   Result Value Ref Range    Ventricular Rate 61 BPM    Atrial Rate 61 BPM    P-R Interval 160 ms    QRS Duration 76 ms    Q-T Interval 390 ms    QTc Calculation (Bazett) 392 ms    P Axis 48 degrees    R Axis -1 degrees    T Axis 8 degrees    Diagnosis       Baseline artifactNormal sinus rhythmNormal ECGConfirmed by Kyung Banks MD, [facility] ([id number]) on 12/12/2022 1:42:39 PM       ECG  The Ekg interpreted by me shows  normal sinus rhythm with a rate of 61  Axis is   Left axis deviation  QTc is  normal  Intervals and Durations are unremarkable. ST Segments: nonspecific changes  No significant change from prior EKG dated 9/8/2021    RADIOLOGY  XR CHEST PORTABLE   Final Result   No pneumonia or edema           ED COURSE/MDM  Patient seen and evaluated. Old records reviewed. Labs and imaging reviewed and results discussed with patient.       Patient is a 72-year-old male, with history of hypertension and smoking, presenting with concerns for left-sided back pain for the past week which is now progressed to pain in the left chest and arm. Full HPI as detailed above. Upon arrival in the ED, vitals remarkable for hypertension, otherwise reassuring. Patient is resting comfortably and is in no acute distress. He does have reproducible tenderness to palpation over the left posterior shoulder but no reproducible chest wall tenderness. EKG with some nonspecific ST segment abnormalities but no significant change compared to previous. Troponin is negative. However, I given his risk factors, I do feel that he would benefit from hospitalization for further work-up and treatment of his chest pain. He is comfortable in agreement with plan of care and will be hospitalized. I, Dr. Eneida Auguste MD, am the primary clinician of record. Is this patient to be included in the SEP-1 Core Measure? No   Exclusion criteria - the patient is NOT to be included for SEP-1 Core Measure due to: Infection is not suspected     During the patient's ED course, the patient was given:  Medications   nitroGLYCERIN (NITROSTAT) SL tablet 0.4 mg (has no administration in time range)   methocarbamol (ROBAXIN) tablet 500 mg (500 mg Oral Given 12/12/22 1427)   morphine sulfate (PF) injection 4 mg (4 mg IntraVENous Given 12/12/22 1244)   ondansetron (ZOFRAN) injection 4 mg (4 mg IntraVENous Given 12/12/22 1243)   aspirin chewable tablet 324 mg (324 mg Oral Given 12/12/22 1412)        CLINICAL IMPRESSION  1. Chest pain, unspecified type        Blood pressure (!) 157/101, pulse 68, temperature 98.1 °F (36.7 °C), temperature source Oral, resp. rate 16, height 5' 10\" (1.778 m), weight 245 lb (111.1 kg), SpO2 97 %. DISPOSITION  Anabel Rush was admitted in stable condition. Patient was given scripts for the following medications. I counseled patient how to take these medications.    New Prescriptions    No medications on file       Follow-up with:  MD Carolyn Lynn [facility] [address]  [phone]    Schedule an appointment as soon as possible for a visit  left shoulder injury    DISCLAIMER: This chart was created using Dragon dictation software. Efforts were made by me to ensure accuracy, however some errors may be present due to limitations of this technology and occasionally words are not transcribed correctly.        Dandre Bunch MD  12/12/22 1900

## 2022-12-28 NOTE — PROGRESS NOTES
02/20/23      Ike Mcdonald  [address]  [place] [address]       Dear Ike,    Your last labs look good.  We will have you repeat your labs in 6 months.      Sincerely,       Dr. Ricardo Bates  [facility]  [facility] [place] [address]  Phone: [phone]               last 72 hours. No results for input(s): INR in the last 72 hours. No results for input(s): Cori Lemme in the last 72 hours. Urinalysis:      Lab Results   Component Value Date    NITRU Negative 03/28/2021    WBCUA None seen 03/28/2021    BACTERIA Rare 03/28/2021    RBCUA None seen 03/28/2021    BLOODU Negative 03/28/2021    SPECGRAV 1.020 03/28/2021    GLUCOSEU Negative 03/28/2021       Radiology:  XR CHEST PORTABLE   Final Result   Mildly increased bilateral pulmonary disease over the past 24 hours, likely   due to increasing edema. Bilateral pleural effusions without significant change remain present         XR CHEST PORTABLE   Final Result   Persistent bibasilar airspace disease and pleural effusions. Mild cardiac   silhouette enlargement. No change in life support. XR CHEST PORTABLE   Final Result   Supportive tubing projects in stable positions. Continued bibasilar airspace disease, with possible layered pleural effusions. XR CHEST PORTABLE   Final Result   Supportive tubing is in stable position. Stable pattern of bilateral airspace disease and possible layered effusions. Pulmonary edema or pneumonia are possible. XR ABDOMEN (KUB) (SINGLE AP VIEW)   Final Result   Satisfactory enteric tube placement. XR CHEST PORTABLE   Final Result   Support lines and tubes in satisfactory position. No pneumothorax. Worsening bibasilar predominant airspace disease. Small right and trace left   pleural effusions suspected.          XR CHEST PORTABLE   Final Result      XR CHEST PORTABLE    (Results Pending)   XR ABDOMEN (KUB) (SINGLE AP VIEW)    (Results Pending)           Assessment/Plan:    Active Hospital Problems    Diagnosis    Acute respiratory failure with hypoxia and hypercapnia (HCC) [J96.01, J96.02]    COPD exacerbation (HCC) [J44.1]    Alcohol withdrawal (HCC) [F10.239]     Acute hypoxic and hypercarbic respiratory failure 2/2 COPD

## 2023-01-03 ENCOUNTER — OFFICE VISIT (OUTPATIENT)
Dept: ORTHOPEDIC SURGERY | Age: 63
End: 2023-01-03
Payer: MEDICAID

## 2023-01-03 VITALS — WEIGHT: 245 LBS | HEIGHT: 70 IN | BODY MASS INDEX: 35.07 KG/M2

## 2023-01-03 DIAGNOSIS — M54.12 CERVICAL RADICULOPATHY: Primary | ICD-10-CM

## 2023-01-03 DIAGNOSIS — M25.512 LEFT SHOULDER PAIN, UNSPECIFIED CHRONICITY: ICD-10-CM

## 2023-01-03 DIAGNOSIS — M75.02 ADHESIVE CAPSULITIS OF LEFT SHOULDER: ICD-10-CM

## 2023-01-03 PROCEDURE — G8417 CALC BMI ABV UP PARAM F/U: HCPCS | Performed by: PHYSICIAN ASSISTANT

## 2023-01-03 PROCEDURE — 20610 DRAIN/INJ JOINT/BURSA W/O US: CPT | Performed by: PHYSICIAN ASSISTANT

## 2023-01-03 PROCEDURE — 99204 OFFICE O/P NEW MOD 45 MIN: CPT | Performed by: PHYSICIAN ASSISTANT

## 2023-01-03 PROCEDURE — G8484 FLU IMMUNIZE NO ADMIN: HCPCS | Performed by: PHYSICIAN ASSISTANT

## 2023-01-03 PROCEDURE — G8427 DOCREV CUR MEDS BY ELIG CLIN: HCPCS | Performed by: PHYSICIAN ASSISTANT

## 2023-01-03 RX ORDER — BUPIVACAINE HYDROCHLORIDE 2.5 MG/ML
30 INJECTION, SOLUTION INFILTRATION; PERINEURAL ONCE
Status: COMPLETED | OUTPATIENT
Start: 2023-01-03 | End: 2023-01-03

## 2023-01-03 RX ORDER — TRIAMCINOLONE ACETONIDE 40 MG/ML
40 INJECTION, SUSPENSION INTRA-ARTICULAR; INTRAMUSCULAR ONCE
Status: COMPLETED | OUTPATIENT
Start: 2023-01-03 | End: 2023-01-03

## 2023-01-03 RX ORDER — METHYLPREDNISOLONE 4 MG/1
TABLET ORAL
Qty: 1 KIT | Refills: 0 | Status: SHIPPED | OUTPATIENT
Start: 2023-01-03

## 2023-01-03 RX ORDER — LIDOCAINE HYDROCHLORIDE 10 MG/ML
20 INJECTION, SOLUTION INFILTRATION; PERINEURAL ONCE
Status: COMPLETED | OUTPATIENT
Start: 2023-01-03 | End: 2023-01-03

## 2023-01-03 RX ADMIN — LIDOCAINE HYDROCHLORIDE 20 ML: 10 INJECTION, SOLUTION INFILTRATION; PERINEURAL at 11:07

## 2023-01-03 RX ADMIN — BUPIVACAINE HYDROCHLORIDE 75 MG: 2.5 INJECTION, SOLUTION INFILTRATION; PERINEURAL at 11:07

## 2023-01-03 RX ADMIN — TRIAMCINOLONE ACETONIDE 40 MG: 40 INJECTION, SUSPENSION INTRA-ARTICULAR; INTRAMUSCULAR at 11:08

## 2023-01-03 NOTE — PROGRESS NOTES
Dr Amrita Rothman      Date /Time 1/3/2023             10:44 AM EST  Name Aakash Juares             1960   Location  [facility]  MRN [id number]                Chief Complaint   Patient presents with    Shoulder Pain     Left Shoulder         History of Present Illness    Aakash Juares is a 65 y.o. male who presents with  left Shoulder pain. Sent in consultation by Jaqueline Layne MD, . Athletic/exercise activity: no sports. Injury Mechanism:  none. Worker's Comp. & legal issues:   none. Previous Treatments: Ice, Heat, and NSAIDs    Patient presents to the office today for new problem. Patient here with a chief complaint of left shoulder pain. Patient developed scapular pain on 12/6/2022 without any injury or trauma. His pain then radiated anteriorly into his chest.  He was seen in the emergency room and had a complete cardiac work-up. He was cleared from a cardiac standpoint. His pain then took a radicular pattern with numbness and tingling and pain down his arm and into his hand. He does have increased pain with activities and improvement with rest.  No direct injury or trauma. Past Medical History  Past Medical History:   Diagnosis Date    COPD (chronic obstructive pulmonary disease) ([facility] [address]     Hypertension      History reviewed. No pertinent surgical history.   Social History     Tobacco Use    Smoking status: Every Day     Packs/day: 1.00     Years: 40.00     Pack years: 40.00     Types: Cigarettes     Start date: 7/24/1976    Smokeless tobacco: Never   Substance Use Topics    Alcohol use: Not Currently     Alcohol/week: 42.0 standard drinks     Types: 42 Cans of beer per week     Comment: Quit earlier this year \"around february\"      Current Outpatient Medications on File Prior to Visit   Medication Sig Dispense Refill    meloxicam (MOBIC) 15 MG tablet Take 1 tablet by mouth daily 30 tablet 0    tiotropium (SPIRIVA RESPIMAT) 2.5 MCG/ACT AERS inhaler Inhale 2 puffs into the lungs daily 1 each 0    pantoprazole (PROTONIX) 40 MG tablet Take 1 tablet by mouth every morning (before breakfast) for 10 days 10 tablet 0    lisinopril (PRINIVIL;ZESTRIL) 5 MG tablet Take 1 tablet by mouth daily 30 tablet 3    carvedilol (COREG) 12.5 MG tablet Take 1 tablet by mouth 2 times daily (with meals) 60 tablet 3    amLODIPine (NORVASC) 10 MG tablet Take 1 tablet by mouth daily 30 tablet 3    nicotine (NICODERM CQ) 21 MG/24HR Place 1 patch onto the skin daily 30 patch 0    budesonide-formoterol (SYMBICORT) 160-4.5 MCG/ACT AERO Inhale 2 puffs into the lungs 2 times daily 3 Inhaler 1    albuterol sulfate HFA (VENTOLIN HFA) 108 (90 Base) MCG/ACT inhaler Inhale 2 puffs into the lungs every 6 hours as needed for Wheezing 1 Inhaler 1     No current facility-administered medications on file prior to visit. ASCVD 10-YEAR RISK SCORE  The ASCVD Risk score ([place] DK, et al., 2019) failed to calculate for the following reasons:    Cannot find a previous HDL lab    Cannot find a previous total cholesterol lab     Review of Systems  10-point ROS is negative other than HPI. Physical Exam  Based off 1997 Exam Criteria  Ht 5' 10\" (1.778 m)   Wt 245 lb (111.1 kg)   BMI 35.15 kg/m²      Constitutional:       General: He is not in acute distress. Appearance: Normal appearance. Cardiovascular:      Rate and Rhythm: Normal rate and regular rhythm. Pulses: Normal pulses. Pulmonary:      Effort: Pulmonary effort is normal. No respiratory distress. Neurological:      Mental Status: He is alert and oriented to person, place, and time. Mental status is at baseline. Skin: Clean dry and intact.   No open wounds or sores  Lymphatics: No palpable lymph node    Musculoskeletal:  Gait:  normal    Cervical Spine / Shoulder:      RIGHT  LEFT    Cervical Spine Exam  [x] All Neg    [] All Neg     Spurling's  []  []Not tested   [x]  []Not tested    Pederson's  []  []Not tested   []  []Not tested    Pain with rotation  []  []Not tested   [x]  []Not tested    Pain with lateral bending  []  []Not tested   [x]  []Not tested    Paraspinal muscle tenderness  [] Paraspinal  []Midline   [x] Paraspinal  []Not tested    Sensation RIGHT  LEFT    Axillary  [x] Normal []Decreased    [x] Normal []Decreased   Musculocutaneous  [x] Normal  []Decreased   [x] Normal []Decreased   Median  [x] Normal []Decreased   [x] Normal []Decreased   Radial  [x] Normal  []Decreased   [x] Normal []Decreased   Ulnar  [x] Normal  []Decreased   [x] Normal []Decreased   Scapula       Position  [x]Nml  []low  [] lateral  [x]Nml  []low  [] lateral   Dyskinesia  []+ []Abn.Shrug   []+ []Abn.Shrug                     Winging     [x]None   []Med  []Lat   []Worse w/FE  []Med  []Lat  []Worse w/FE   Scapulothoracic Compress.   []Impr Pain  []Impr Motion  []Impr Pain []Impr Motion    Range of Motion Active Passive Active Passive   Forward Elevation 170  150 150   Abduction 100  80 80   External Rotation @ side 60  60    External Rotation @ 90 abd 90  70 70   Internal Rotation @ 90 abd 40  40    Internal Rotation Normal  Sacrum    End range of motion  [] Pain  [] Pain  [x] Pain  [] Pain   Strength RIGHT /5 LEFT /5   Abduction 5  5    External Rotation 5  5    Internal Rotation 5  5    Provocative Signs/Tests  [] All Neg   [x] +      [] -  [] All Neg   [x] +      [] -   Rotator Cuff Signs  [x] All Neg  [] Not tested   [x] All Neg  [] Not tested    Neer  []  []Not tested   []  []Not tested    Merritt  []  []Not tested   []  []Not tested    Painful arc  []  []Not tested   []  []Not tested    Greater tuberosity tenderness  []  []Not tested   []  []Not tested    Drop arm  []  []Not tested  []  []Not tested   Superior Escape  []  []Not tested   []  []Not tested    ER Lag  []  []Not tested   []  []Not tested    Belly press  []  []Not tested   []  []Not tested    Lift-off  []  []Not tested   []  []Not tested    Bear hug  []  []Not tested   []  []Not tested   Biceps/Labral Signs  [x] All Neg  [] Not tested   [x] All Neg  [] Not tested    Lobo's  []  []Not tested   []  []Not tested    Speed's  []  []Not tested   []  []Not tested    Dynamic Load Shift/Shear  []  []Not tested   []  []Not tested    Clicking/Popping  []  []Not tested  []  []Not tested   Bicipital groove tenderness  []  []Not tested   []  []Not tested    Florentino  []  []Not tested   []  []Not tested    [facility] Joint Signs  [x] All Neg  [] Not tested   [x] All Neg  [] Not tested    [facility] joint tenderness  []  []Not tested   []  []Not tested    Cross-arm adduction pain  []  []Not tested   []  []Not tested    Instability Signs  [] All Neg  [] Not tested  [] All Neg  [] Not tested   General laxity (thumb/elbow)  []  []Not tested   []  []Not tested    Hyperabduction  []  []Not tested   []  []Not tested    Sulcus []Side   []ER    []Side   []ER       Anterior apprehension  []  []Not tested   []  []Not tested    Relocation  []   []Not tested  []  []Not tested     Imaging  Left Shoulder: [facility]  Radiographs: X-rays were ordered today reviewed the left shoulder. 3 views. AP, scapular Y, and axillary views. They demonstrate no evidence of fractures or dislocations with well-maintained joint space    Procedure:  Orders Placed This Encounter   Procedures    XR SHOULDER LEFT (MIN 2 VIEWS)     Standing Status:   Future     Number of Occurrences:   1     Standing Expiration Date:   12/30/2023    Ambulatory referral to Physical Therapy     Referral Priority:   Routine     Referral Type:   Eval and Treat     Referral Reason:   Specialty Services Required     Requested Specialty:   Physical Therapist     Number of Visits Requested:   1    20610 - [place] DRAIN/INJECT LARGE JOINT/BURSA       Left Shoulder Cortisone Injection: Glenohumeral CPT 20610  Consent was obtained after discussion of the risks, benefits, alternatives, including, but not limited to bleeding, pain, infection, skin disruption or discoloration. Laterality was confirmed (timeout). The shoulder was prepped with alcohol. A formulation of 2cc of 40mg/ml Kenalog, 4cc of 1% lidocaine, 4cc of 0.25% marcaine was injected into the glenohumeral joint space with a 25 gauge needle without difficulty. The site was cleaned and dressed with a band aid. He tolerated this well and there were no complications. Assessment and Plan  Nataly Ricci was seen today for shoulder pain. Diagnoses and all orders for this visit:    Cervical radiculopathy  -     methylPREDNISolone (MEDROL, DELILAH,) 4 MG tablet; Take by mouth.  -     20610 - [place] DRAIN/INJECT LARGE JOINT/BURSA    Left shoulder pain, unspecified chronicity  -     XR SHOULDER LEFT (MIN 2 VIEWS); Future    Adhesive capsulitis of left shoulder  -     20610 - [place] DRAIN/INJECT LARGE JOINT/BURSA    Other orders  -     lidocaine 1 % injection 20 mL  -     bupivacaine (MARCAINE) 0.25 % injection 75 mg  -     triamcinolone acetonide (KENALOG-40) injection 40 mg      Patient does have a frozen shoulder but this is not his major issue at this time. His bigger issue is cervical radiculopathy. We will give him an intra-articular cortisone injection left shoulder for the frozen shoulder and placed him on a Medrol Dosepak. He is placed into physical therapy. He will see myself back in 3 months but should see spine before hand. I discussed with Zaynab Ayers that his history, symptoms, signs, and imaging are most consistent with frozen shoulder and cervical radiculopathy. We reviewed the natural history of these conditions and treatment options ranging from conservative measures (rest, icing, activity modification, physical therapy, pain meds, cortisone injection) to surgical options. In terms of treatment, I recommended continuing with rest, icing, avoidance of painful activities, NSAIDs or pain meds as tolerated, and physical therapy. We discussed surgical options as well, should conservative measures fail. Electronically signed by Fred Wilde PA-C on 1/3/2023 at 10:44 AM  This dictation was generated by voice recognition computer software. Although all attempts are made to edit the dictation for accuracy, there may be errors in the transcription that are not intended.

## 2023-01-10 ENCOUNTER — HOSPITAL ENCOUNTER (OUTPATIENT)
Dept: CT IMAGING | Age: 63
Discharge: HOME | End: 2023-01-10
Payer: MEDICAID

## 2023-01-10 DIAGNOSIS — Z87.891 PERSONAL HISTORY OF TOBACCO USE: ICD-10-CM

## 2023-01-10 PROCEDURE — 71271 CT THORAX LUNG CANCER SCR C-: CPT

## 2023-01-12 ENCOUNTER — OFFICE VISIT (OUTPATIENT)
Dept: PULMONOLOGY | Age: 63
End: 2023-01-12

## 2023-01-12 VITALS
OXYGEN SATURATION: 97 % | SYSTOLIC BLOOD PRESSURE: 156 MMHG | HEART RATE: 77 BPM | TEMPERATURE: 98 F | DIASTOLIC BLOOD PRESSURE: 96 MMHG | RESPIRATION RATE: 16 BRPM | WEIGHT: 244 LBS | BODY MASS INDEX: 35.01 KG/M2

## 2023-01-12 DIAGNOSIS — J96.11 CHRONIC RESPIRATORY FAILURE WITH HYPOXIA AND HYPERCAPNIA (HCC): ICD-10-CM

## 2023-01-12 DIAGNOSIS — Z72.0 TOBACCO ABUSE: ICD-10-CM

## 2023-01-12 DIAGNOSIS — J44.1 COPD EXACERBATION (HCC): ICD-10-CM

## 2023-01-12 DIAGNOSIS — E66.01 CLASS 2 SEVERE OBESITY DUE TO EXCESS CALORIES WITH SERIOUS COMORBIDITY AND BODY MASS INDEX (BMI) OF 35.0 TO 35.9 IN ADULT (HCC): ICD-10-CM

## 2023-01-12 DIAGNOSIS — B37.0 ORAL THRUSH: ICD-10-CM

## 2023-01-12 DIAGNOSIS — J43.9 PULMONARY EMPHYSEMA, UNSPECIFIED EMPHYSEMA TYPE (HCC): Primary | ICD-10-CM

## 2023-01-12 DIAGNOSIS — J96.12 CHRONIC RESPIRATORY FAILURE WITH HYPOXIA AND HYPERCAPNIA (HCC): ICD-10-CM

## 2023-01-12 DIAGNOSIS — Z99.89 DEPENDENCE ON ENABLING MACHINE: ICD-10-CM

## 2023-01-12 PROBLEM — R91.1 LUNG NODULE, SOLITARY: Status: ACTIVE | Noted: 2017-04-05

## 2023-01-12 PROBLEM — I65.21 CAROTID ARTERY PLAQUE, RIGHT: Status: ACTIVE | Noted: 2019-05-30

## 2023-01-12 PROBLEM — M47.812 OSTEOARTHRITIS OF CERVICAL SPINE: Status: ACTIVE | Noted: 2022-07-06

## 2023-01-12 RX ORDER — OMEPRAZOLE 40 MG/1
CAPSULE, DELAYED RELEASE ORAL
COMMUNITY
Start: 2023-01-03

## 2023-01-12 RX ORDER — BUPROPION HYDROCHLORIDE 150 MG/1
TABLET ORAL
COMMUNITY
Start: 2022-10-21

## 2023-01-12 RX ORDER — AMOXICILLIN AND CLAVULANATE POTASSIUM 875; 125 MG/1; MG/1
TABLET, FILM COATED ORAL
COMMUNITY
Start: 2023-01-05

## 2023-01-12 RX ORDER — EZETIMIBE 10 MG/1
TABLET ORAL
COMMUNITY
Start: 2023-01-09

## 2023-01-12 RX ORDER — LISINOPRIL 20 MG/1
TABLET ORAL
COMMUNITY
Start: 2023-01-03

## 2023-01-12 RX ORDER — PREDNISONE 10 MG/1
TABLET ORAL
Qty: 20 TABLET | Refills: 0 | Status: SHIPPED | OUTPATIENT
Start: 2023-01-12

## 2023-01-12 ASSESSMENT — ENCOUNTER SYMPTOMS
EYE PAIN: 0
ABDOMINAL PAIN: 0
WHEEZING: 0
RHINORRHEA: 0
SORE THROAT: 0
HEARTBURN: 0
VOMITING: 0
CHEST TIGHTNESS: 1
NAUSEA: 0
DIARRHEA: 0
SHORTNESS OF BREATH: 1
COUGH: 1

## 2023-01-12 NOTE — PROGRESS NOTES
Juany Buckley is a 65 y.o. who comes in today for Follow-up (COPD /Coughing ongoing 1 week)   He recently completed abx and steroids for bronchitis with no real improvement. He continues Symbicort, Spiriva, and using albuterol  sporadically. He is on NIVM at night for his chronic resp failure with oxygen however he is not wearing consistently. He continues to smoke. Cough  This is a recurrent problem. The current episode started 1 to 4 weeks ago. The problem has been waxing and waning. Associated symptoms include shortness of breath. Pertinent negatives include no chest pain, chills, fever, headaches, heartburn, nasal congestion, postnasal drip, rhinorrhea, sore throat or wheezing. He has tried rest, steroid inhaler, oral steroids and a beta-agonist inhaler for the symptoms. The treatment provided mild relief. His past medical history is significant for bronchitis and COPD. Past Medical History:   Diagnosis Date    COPD (chronic obstructive pulmonary disease) ([facility] [address]     Hypertension         History reviewed. No pertinent surgical history. History reviewed. No pertinent family history. No Known Allergies    Current Outpatient Medications   Medication Sig Dispense Refill    predniSONE (DELTASONE) 10 MG tablet Prednisone taper 4 pills daily for 2 days then 3 pills daily for 2 days then 2 pills daily for 2 days then 1 pill daily for 2 days then off. 20 tablet 0    nystatin (MYCOSTATIN) 238599 UNIT/ML suspension Take 5 mLs by mouth 4 times daily Take for 2 days after symptoms resolve. Do not eat or drink for 15-20 min after use.  180 mL 0    amoxicillin-clavulanate (AUGMENTIN) 875-125 MG per tablet TAKE 1 TABLET BY MOUTH TWICE DAILY      buPROPion (WELLBUTRIN XL) 150 MG extended release tablet       ezetimibe (ZETIA) 10 MG tablet       lisinopril (PRINIVIL;ZESTRIL) 20 MG tablet TAKE 1/2 TABLET BY MOUTH TWICE DAILY      omeprazole (PRILOSEC) 40 MG delayed release capsule       meloxicam (MOBIC) 15 MG tablet Take 1 tablet by mouth daily 30 tablet 0    tiotropium (SPIRIVA RESPIMAT) 2.5 MCG/ACT AERS inhaler Inhale 2 puffs into the lungs daily 1 each 0    pantoprazole (PROTONIX) 40 MG tablet Take 1 tablet by mouth every morning (before breakfast) for 10 days 10 tablet 0    carvedilol (COREG) 12.5 MG tablet Take 1 tablet by mouth 2 times daily (with meals) 60 tablet 3    amLODIPine (NORVASC) 10 MG tablet Take 1 tablet by mouth daily 30 tablet 3    nicotine (NICODERM CQ) 21 MG/24HR Place 1 patch onto the skin daily 30 patch 0    budesonide-formoterol (SYMBICORT) 160-4.5 MCG/ACT AERO Inhale 2 puffs into the lungs 2 times daily 3 Inhaler 1    albuterol sulfate HFA (VENTOLIN HFA) 108 (90 Base) MCG/ACT inhaler Inhale 2 puffs into the lungs every 6 hours as needed for Wheezing 1 Inhaler 1     No current facility-administered medications for this visit. Review of Systems   Constitutional:  Negative for chills, fatigue and fever. HENT:  Negative for congestion, postnasal drip, rhinorrhea and sore throat. Eyes:  Negative for pain and visual disturbance. Respiratory:  Positive for cough, chest tightness and shortness of breath. Negative for wheezing. Cardiovascular:  Negative for chest pain, palpitations and leg swelling. Gastrointestinal:  Negative for abdominal pain, diarrhea, heartburn, nausea and vomiting. Genitourinary:  Negative for difficulty urinating. Musculoskeletal: Negative. Skin: Negative. Neurological:  Negative for dizziness, numbness and headaches. Psychiatric/Behavioral:  The patient is not nervous/anxious.        BP (!) 156/96 (Site: Left Upper Arm, Position: Sitting, Cuff Size: Small Adult)   Pulse 77   Temp 98 °F (36.7 °C) (Temporal)   Resp 16   Wt 244 lb (110.7 kg)   SpO2 97%   BMI 35.01 kg/m²         Imaging /Test:   EXAMINATION:   LOW DOSE SCREENING CT OF THE CHEST WITHOUT CONTRAST       1/10/2023 9:52 am       TECHNIQUE:   Low dose lung cancer screening CT of the chest was performed without the   administration of intravenous contrast.  Multiplanar reformatted images    are   provided for review. Automated exposure control, iterative    reconstruction,   and/or weight based adjustment of the mA/kV was utilized to reduce the   radiation dose to as low as reasonably achievable. COMPARISON:   April 2021       HISTORY:   ORDERING SYSTEM PROVIDED HISTORY: Personal history of tobacco use   TECHNOLOGIST PROVIDED HISTORY:   Age: Patient is 65 y.o. Smoking History: Social History   Tobacco Use   Smoking status: Every Day   Packs/day: 2.00   Years: 40.00   Pack years: [de-identified]   Types: Cigarettes   Start date: 7/24/1976   Smokeless tobacco: Never   Vaping Use   Vaping Use: Former   Substances: Nicotine   Alcohol use: Not Currently   Alcohol/week: 42.0 standard drinks   Types: 42 Cans of beer per week   Comment: Quit earlier this year \"around february\"   Drug use: No   Pack years: [de-identified]       Date of last lung cancer screening: No previous lung cancer screening exam   Is there documentation of shared decision making? ->Yes   Is this a low dose CT or a routine CT?->Low Dose CT   Is this the first (baseline) CT or an annual exam?->Annual   Does the patient show any signs or symptoms of lung cancer? ->No   Smoking Status? ->Every Day   Smoking packs per day?->2   Years smoking?->40   CTDlvol (mGy)->2.3       FINDINGS:   Mediastinum:  Thyroid gland is unremarkable. Small mediastinal and hilar   nodes are noted. Index lymph node in the mediastinum which previously   measured 1.8 x 1.2 cm is smaller in size, now measuring 1.2 cm by 0.0 cm. Mild-to-moderate coronary artery calcification is seen. Aortic annulus   calcification is seen. Small hiatal hernia seen. There is nonspecific   thickening at the GE junction       Lungs/Pleura:  Mild underlying emphysema is seen. No obstructing   endobronchial lesions are seen. No pneumonia. No edema. No pleural   effusion noted.   No large blebs or bulla. a few tiny punctate 1-2 mm   noncalcified pulmonary nodules are seen in the right upper lobe. Scarring    is   seen in the right upper lobe. Scattered calcified granuloma are seen. .     These   tiny nodules may have been present on prior study, however, this cannot be   confirmed given the significant motion artifact on prior study       Upper Abdomen:  Right adrenal gland is normal.  Left adrenal gland is    normal.       Soft Tissues/Bones: Spurring is seen in the spine. Spurring is seen in    the   shoulder joints. Impression   Emphysema with a few tiny punctate noncalcified pulmonary nodules       Mild-to-moderate coronary artery calcification       LUNG RADS:   Per ACR Lung-RADS Version 1.1       Lung-RADS 2 - Benign ()       Management:  12 month screening LDCT           Physical Exam  Vitals reviewed. Constitutional:       General: He is not in acute distress. Appearance: Normal appearance. He is obese. He is not ill-appearing, toxic-appearing or diaphoretic. HENT:      Head: Normocephalic and atraumatic. Nose: Nose normal. No congestion or rhinorrhea. Mouth/Throat:      Mouth: Mucous membranes are moist.      Pharynx: Oropharyngeal exudate (thrush) present. No posterior oropharyngeal erythema. Eyes:      Pupils: Pupils are equal, round, and reactive to light. Cardiovascular:      Rate and Rhythm: Normal rate. Pulmonary:      Effort: Pulmonary effort is normal. No respiratory distress. Breath sounds: No stridor. Wheezing present. No rhonchi or rales. Chest:      Chest wall: No tenderness. Abdominal:      Palpations: Abdomen is soft. Tenderness: There is no abdominal tenderness. There is no guarding or rebound. Musculoskeletal:         General: Normal range of motion. Cervical back: Neck supple. Right lower leg: No edema. Left lower leg: No edema. Lymphadenopathy:      Cervical: No cervical adenopathy.    Skin:     General: Skin is warm and dry. Capillary Refill: Capillary refill takes less than 2 seconds. Neurological:      Mental Status: He is alert and oriented to person, place, and time. Psychiatric:         Mood and Affect: Mood normal.         Behavior: Behavior normal.         Thought Content: Thought content normal.         Judgment: Judgment normal.         Assessment/Plan:     1. Pulmonary emphysema, unspecified emphysema type ([facility] [address]  2. COPD exacerbation (HCC)  -     predniSONE (DELTASONE) 10 MG tablet; Prednisone taper 4 pills daily for 2 days then 3 pills daily for 2 days then 2 pills daily for 2 days then 1 pill daily for 2 days then off., Disp-20 tablet, R-0Normal  3. Chronic respiratory failure with hypoxia and hypercapnia (HCC)  4. Tobacco abuse  5. Dependence on enabling machine  6. Oral thrush  -     nystatin (MYCOSTATIN) 554422 UNIT/ML suspension; Take 5 mLs by mouth 4 times daily Take for 2 days after symptoms resolve. Do not eat or drink for 15-20 min after use., Oral, 4 TIMES DAILY Starting u 1/12/2023, Disp-180 mL, R-0, Normal  7. Class 2 severe obesity due to excess calories with serious comorbidity and body mass index (BMI) of 35.0 to 35.9 in adult [facility])     Prior pulmonary OV note, PFT report and recent chest imaging report reviewed. Reviewed CT with Donato Murcia, stable nodules, no pna. He will have repeat low dose CT in Jan 2024. Will treat with repeat steroids and mucinex for current exacerbation. Hold on further antibiotics for now. I have personally reviewed and summarized the old records and/or obtained further history from someone other than the patient. Reviewed present meds and side effects. Reviewed proper inhaler usage. He is to continue Symbicort, Spiriva, and using albuterol as needed for SOB, wheezing. Discussed when to call with worsening symptoms such as increased shortness of breath, productive cough, wheezing or symptoms not responding to treatment plan.         Reviewed compliance report, wearing 93% of time however only 40% > than 4 hours. AHI controlled 1.1 with current settings. Discussed importance of wearing NIVM consistently. Smoking cessation and weight loss encouraged.      Kristina Arnold, APRN - CNP

## 2023-01-12 NOTE — PATIENT INSTRUCTIONS
Call with worsening symptoms such as increased shortness of breath, productive cough, wheezing or symptoms not responding to treatment plan. Patient understands that smoking is aggravating the respiratory disease and must be discontinued. Use albuterol 2 puffs four times a day x 2 days then 2 puffs four times a day as needed. Prednisone taper 4 pills daily for 2 days then 3 pills daily for 2 days then 2 pills daily for 2 days then 1 pill daily for 2 days then off. May use Mucinex or guaifenesin 600-1200 mg twice a day to help thin secretions. Drink with plenty of water. Nystatin swish and swallow 4 times a day for thrush. Take for 2 days after symptoms resolve. Do not eat or drink for 15-20 min after use. Return to clinic in 4 months with Dr. Payton Gamez     Need to consistently wear ventilator nightly for at least 4 hours. Remember to bring a list of pulmonary medications and any CPAP or BiPAP machines to your next appointment with the office. Please keep all of your future appointments scheduled by [address] office. Out of respect for other patients and providers, you may be asked to reschedule your appointment if you arrive later than your scheduled appointment time. Appointments cancelled less than 24hrs in advance will be considered a no show. Patients with three missed appointments within 1 year or four missed appointments within 2 years can be dismissed from the practice. Please be aware that our physicians are required to work in the Intensive Care Unit at [facility].  Your appointment may need to be rescheduled if they are designated to work during your appointment time. You may receive a survey regarding the care you received during your visit. Your input is valuable to us. We encourage you to complete and return your survey. We hope you will choose us in the future for your healthcare needs.      Pt instructed of all future appointment dates & times, including radiology, labs, procedures & referrals. If procedures were scheduled preparation instructions provided. Instructions on future appointments with [facility] were given. MA Communication: The following orders are received by verbal communication from [facility]    Orders include:   Follow with Dr. Harman Fore 4 months

## 2023-01-23 ENCOUNTER — HOSPITAL ENCOUNTER (OUTPATIENT)
Dept: GENERAL RADIOLOGY | Age: 63
Discharge: HOME | End: 2023-01-23
Payer: MEDICAID

## 2023-01-23 ENCOUNTER — TELEPHONE (OUTPATIENT)
Dept: PULMONOLOGY | Age: 63
End: 2023-01-23

## 2023-01-23 ENCOUNTER — HOSPITAL ENCOUNTER (OUTPATIENT)
Age: 63
Discharge: HOME | End: 2023-01-23
Payer: MEDICAID

## 2023-01-23 DIAGNOSIS — J44.1 COPD EXACERBATION (HCC): Primary | ICD-10-CM

## 2023-01-23 DIAGNOSIS — J44.1 COPD EXACERBATION (HCC): ICD-10-CM

## 2023-01-23 PROCEDURE — 71046 X-RAY EXAM CHEST 2 VIEWS: CPT

## 2023-01-23 RX ORDER — PREDNISONE 10 MG/1
TABLET ORAL
Qty: 20 TABLET | Refills: 0 | Status: SHIPPED | OUTPATIENT
Start: 2023-01-23

## 2023-01-23 NOTE — TELEPHONE ENCOUNTER
Patient calling c/o still coughing and having SOB. Patient states he was diagnosed with bronchitis. Has had antibiotics prescribed by primary doctor, the steroids from pulmonary doctor. He is on the last steroid and states he is not any better. He is coughing up a lot of yellow junk. Patient is afraid this is going into pneumonia. Please call patient and advise.

## 2023-01-23 NOTE — TELEPHONE ENCOUNTER
Please let Codi Gaston know his CXR does not show any pneumonia. He is to continue with plan of care, if symptoms worsen or fail to improve, he is to go to the ED for further evaluation for failed outpatient treatment.

## 2023-01-23 NOTE — TELEPHONE ENCOUNTER
Please have him get a CXR and I have sent repeat steroids to his pharmacy.  May use Mucinex or guaifenesin 600-1200 mg twice a day to help thin secretions. Drink with plenty of water.  Use albuterol 2 puffs four times a day x 2 days then 2 puffs four times a day as needed.   Call with worsening symptoms such as increased shortness of breath, productive cough, wheezing or symptoms not responding to treatment plan.

## 2023-01-27 ENCOUNTER — OFFICE VISIT (OUTPATIENT)
Dept: ORTHOPEDIC SURGERY | Age: 63
End: 2023-01-27

## 2023-01-27 VITALS — HEIGHT: 70 IN | BODY MASS INDEX: 34.93 KG/M2 | WEIGHT: 244 LBS

## 2023-01-27 DIAGNOSIS — M50.30 DDD (DEGENERATIVE DISC DISEASE), CERVICAL: ICD-10-CM

## 2023-01-27 DIAGNOSIS — M54.12 CERVICAL RADICULOPATHY: ICD-10-CM

## 2023-01-27 DIAGNOSIS — M54.2 CERVICAL PAIN: Primary | ICD-10-CM

## 2023-01-27 RX ORDER — GABAPENTIN 300 MG/1
300 CAPSULE ORAL 3 TIMES DAILY
Qty: 90 CAPSULE | Refills: 0 | Status: SHIPPED | OUTPATIENT
Start: 2023-01-27 | End: 2023-02-26

## 2023-01-27 NOTE — PROGRESS NOTES
New Patient: CERVICAL SPINE    Referring Provider:  No ref. provider found    CHIEF COMPLAINT:    Chief Complaint   Patient presents with    Neck Pain     cervical       HISTORY OF PRESENT ILLNESS:   Mr. Chuy Lara is a pleasant 65 y.o. old male here for consultation regarding his neck and left arm pain. He states the pain began while upholstering the trunk of an older car approximately 6 weeks ago. His pain has steadily continued and somewhat increased since then. Patient was initially seen by Emilia Arevalo PA-C, for left shoulder pain and and did receive a left shoulder cortisone injection for adhesive capsulitis. He states that he has had some benefit from an injection but continues to have pain within the left side of his neck which radiates into his left arm and hand. He states that his current neck pain is 5/10, left shoulder pain 5/10 and left hand pain numbness and tingling 7/10. He states that the pain is worse with prolonged sitting and looking up and improved with positional changes and lying down. He does have a sense of difficulty with fine motor movements but denies any progressive weakness. Patient is right-hand dominant. He finds that the numbness and tingling and pain is in the C5-6 and C6-7 distribution. He denies any lower extremity symptoms, bowel or bladder dysfunction, or saddle anesthesia. .  Pain Assessment  Location of Pain: Neck  Severity of Pain: 3  Quality of Pain: Other (Comment)  Duration of Pain: Other (Comment)  Frequency of Pain: Other (Comment)  Aggravating Factors: Other (Comment)  Relieving Factors: Other (Comment)]  Current/Past Treatment:   Physical Therapy: None  Chiropractic: None  Injection: None  Medications: Present prednisone taper        Past Medical History:   Past Medical History:   Diagnosis Date    COPD (chronic obstructive pulmonary disease) ([facility] [address]     Hypertension         Past Surgical History:     History reviewed.  No pertinent surgical history. Current Medications:     Current Outpatient Medications:     gabapentin (NEURONTIN) 300 MG capsule, Take 1 capsule by mouth 3 times daily for 30 days. Intended supply: 30 days, Disp: 90 capsule, Rfl: 0    predniSONE (DELTASONE) 10 MG tablet, Prednisone taper 4 pills daily for 2 days then 3 pills daily for 2 days then 2 pills daily for 2 days then 1 pill daily for 2 days then off., Disp: 20 tablet, Rfl: 0    amoxicillin-clavulanate (AUGMENTIN) 875-125 MG per tablet, TAKE 1 TABLET BY MOUTH TWICE DAILY, Disp: , Rfl:     buPROPion (WELLBUTRIN XL) 150 MG extended release tablet, , Disp: , Rfl:     ezetimibe (ZETIA) 10 MG tablet, , Disp: , Rfl:     lisinopril (PRINIVIL;ZESTRIL) 20 MG tablet, TAKE 1/2 TABLET BY MOUTH TWICE DAILY, Disp: , Rfl:     omeprazole (PRILOSEC) 40 MG delayed release capsule, , Disp: , Rfl:     nystatin (MYCOSTATIN) 952634 UNIT/ML suspension, Take 5 mLs by mouth 4 times daily Take for 2 days after symptoms resolve.  Do not eat or drink for 15-20 min after use., Disp: 180 mL, Rfl: 0    meloxicam (MOBIC) 15 MG tablet, Take 1 tablet by mouth daily, Disp: 30 tablet, Rfl: 0    tiotropium (SPIRIVA RESPIMAT) 2.5 MCG/ACT AERS inhaler, Inhale 2 puffs into the lungs daily, Disp: 1 each, Rfl: 0    pantoprazole (PROTONIX) 40 MG tablet, Take 1 tablet by mouth every morning (before breakfast) for 10 days, Disp: 10 tablet, Rfl: 0    carvedilol (COREG) 12.5 MG tablet, Take 1 tablet by mouth 2 times daily (with meals), Disp: 60 tablet, Rfl: 3    amLODIPine (NORVASC) 10 MG tablet, Take 1 tablet by mouth daily, Disp: 30 tablet, Rfl: 3    nicotine (NICODERM CQ) 21 MG/24HR, Place 1 patch onto the skin daily, Disp: 30 patch, Rfl: 0    budesonide-formoterol (SYMBICORT) 160-4.5 MCG/ACT AERO, Inhale 2 puffs into the lungs 2 times daily, Disp: 3 Inhaler, Rfl: 1    albuterol sulfate HFA (VENTOLIN HFA) 108 (90 Base) MCG/ACT inhaler, Inhale 2 puffs into the lungs every 6 hours as needed for Wheezing, Disp: 1 Inhaler, Rfl: 1    Allergies:  Patient has no known allergies. Social History:    reports that he has been smoking cigarettes. He started smoking about 46 years ago. He has a 40.00 pack-year smoking history. He has never used smokeless tobacco. He reports that he does not currently use alcohol after a past usage of about 42.0 standard drinks per week. He reports that he does not use drugs. Family History:   History reviewed. No pertinent family history. REVIEW OF SYSTEMS: Full ROS noted & scanned   CONSTITUTIONAL: Denies unexplained weight loss, fevers, chills or fatigue  NEUROLOGICAL: Denies unsteady gait or progressive weakness  MUSCULOSKELETAL: Denies joint swelling or redness  PSYCHOLOGICAL: Denies anxiety, depression   SKIN: Denies skin changes, delayed healing, rash, itching   HEMATOLOGIC: Denies easy bleeding or bruising  ENDOCRINE: Denies excessive thirst, urination, heat/cold  RESPIRATORY: Denies current dyspnea, cough  GI: Denies nausea, vomiting, diarrhea   : Denies bowel or bladder issues       PHYSICAL EXAM:    Vitals: Height 5' 10\" (1.778 m), weight 244 lb (110.7 kg). GENERAL EXAM:  General Apparence: Patient is adequately groomed with no evidence of malnutrition. Orientation: The patient is oriented to time, place and person. Mood & Affect:The patient's mood and affect are appropriate   Vascular: Examination reveals no swelling tenderness in upper or lower extremities. Good capillary refill  Lymphatic: The lymphatic examination bilaterally reveals all areas to be without enlargement or induration  Sensation: Sensation is intact without deficit  Coordination/Balance: Good coordination. CERVICAL EXAMINATION:  Inspection: Local inspection shows no step-off or bruising. Cervical alignment is normal.     Palpation: No evidence of tenderness at the midline, and trapezius. Paraspinal tenderness is present. There is no step-off or paraspinal spasm.    Range of Motion: Cervical flexion, extension, and rotation are mildly reduced with pain. Strength: 5/5 bilateral upper extremities   Special Tests:     Spurling's negative & Pederson's negative bilaterally. Cubital and Carpal tunnel Tinel's negative bilaterally. Skin:There are no rashes, ulcerations or lesions in right & left upper extremities. Reflexes: Bilaterally triceps, biceps and brachioradialis are 2+. Clonus absent bilaterally at the feet. Gait & station: normal, patient ambulates without assistance       Additional Examinations:       RIGHT UPPER EXTREMITY:  Inspection/examination of the right upper extremity does not show any tenderness, deformity or injury. Range of motion is unremarkable. There is no gross instability. There are no rashes, ulcerations or lesions. Strength and tone are normal.  LEFT UPPER EXTREMITY: Inspection/examination of the left upper extremity does not show any tenderness, deformity or injury. Range of motion is unremarkable. There is no gross instability. There are no rashes, ulcerations or lesions. Strength and tone are normal.    Diagnostic Testing:  X-rays: 2 views of the cervical spine include AP and lateral were obtained in the office today and independently reviewed with the patient which shows flattening of the cervical lordosis with disc space narrowing mainly at C5-6 and C6-7. There is anterior osteophyte noted over C5 and C6. Impression:   DDD cervical spine  Cervical spondylosis  Cervical radiculopathy    1. Cervical pain        Plan:      We discussed the diagnosis and treatment options including observation, physical therapy, epidural injections or additional imaging. He wishes to proceed with gabapentin with educational material on dosage, cervical MRI to further evaluate the cervical radiculopathy and pain, and EMG of the left upper extremity to further evaluate the radiculopathy. Follow Up:  After the MRI to review the results and to discuss further treatment options    Old records were reviewed.     Total evaluation time 34 minutes    Varinder Cherry PA-C  Board certified by the [address]

## 2023-03-03 RX ORDER — GABAPENTIN 300 MG/1
CAPSULE ORAL
Qty: 90 CAPSULE | Refills: 0 | Status: SHIPPED | OUTPATIENT
Start: 2023-03-03 | End: 2023-04-02

## 2023-04-17 RX ORDER — BUDESONIDE AND FORMOTEROL FUMARATE DIHYDRATE 160; 4.5 UG/1; UG/1
AEROSOL RESPIRATORY (INHALATION)
Qty: 1 EACH | Refills: 5 | Status: SHIPPED | OUTPATIENT
Start: 2023-04-17

## 2023-04-17 NOTE — TELEPHONE ENCOUNTER
Last office visit 1/12/2023     Last written N/A     Next office visit scheduled 5/12/2023    Requested Prescriptions     Pending Prescriptions Disp Refills    budesonide-formoterol (SYMBICORT) 160-4.5 MCG/ACT AERO [Pharmacy Med Name: BUDESONIDE/FORM 160/4.5MCG(120 INH)] 10.2 g      Sig: INHALE 2 PUFFS BY MOUTH TWICE DAILY          Pt's past few office notes state that the Pt is taking Symbicort & to continue taking it however I do not see that this medication has ever been given by our office.

## 2023-04-24 ENCOUNTER — TELEPHONE (OUTPATIENT)
Dept: PULMONOLOGY | Age: 63
End: 2023-04-24

## 2023-04-26 NOTE — TELEPHONE ENCOUNTER
Last office visit 1/12/2023     Last written 10/10/2022     Next office visit scheduled 5/12/2023    Requested Prescriptions     Pending Prescriptions Disp Refills    albuterol sulfate HFA (PROVENTIL;VENTOLIN;PROAIR) 108 (90 Base) MCG/ACT inhaler [Pharmacy Med Name: ALBUTEROL HFA INH(200 PUFFS) 18GM] 54 g      Sig: INHALE 2 PUFFS INTO THE LUNGS FOUR TIMES DAILY AS NEEDED FOR WHEEZING

## 2023-04-27 RX ORDER — ALBUTEROL SULFATE 90 UG/1
AEROSOL, METERED RESPIRATORY (INHALATION)
Qty: 54 G | Refills: 5 | Status: SHIPPED | OUTPATIENT
Start: 2023-04-27

## 2023-05-12 ENCOUNTER — OFFICE VISIT (OUTPATIENT)
Dept: PULMONOLOGY | Age: 63
End: 2023-05-12
Payer: MEDICAID

## 2023-05-12 VITALS
SYSTOLIC BLOOD PRESSURE: 140 MMHG | HEIGHT: 70 IN | RESPIRATION RATE: 18 BRPM | WEIGHT: 251 LBS | DIASTOLIC BLOOD PRESSURE: 83 MMHG | OXYGEN SATURATION: 94 % | HEART RATE: 65 BPM | TEMPERATURE: 97.9 F | BODY MASS INDEX: 35.93 KG/M2

## 2023-05-12 DIAGNOSIS — J96.11 CHRONIC RESPIRATORY FAILURE WITH HYPOXIA AND HYPERCAPNIA (HCC): ICD-10-CM

## 2023-05-12 DIAGNOSIS — Z72.0 TOBACCO ABUSE: ICD-10-CM

## 2023-05-12 DIAGNOSIS — E66.01 CLASS 2 SEVERE OBESITY DUE TO EXCESS CALORIES WITH SERIOUS COMORBIDITY AND BODY MASS INDEX (BMI) OF 35.0 TO 35.9 IN ADULT (HCC): ICD-10-CM

## 2023-05-12 DIAGNOSIS — J96.12 CHRONIC RESPIRATORY FAILURE WITH HYPOXIA AND HYPERCAPNIA (HCC): ICD-10-CM

## 2023-05-12 DIAGNOSIS — J43.9 PULMONARY EMPHYSEMA, UNSPECIFIED EMPHYSEMA TYPE (HCC): Primary | ICD-10-CM

## 2023-05-12 DIAGNOSIS — Z99.89 DEPENDENCE ON ENABLING MACHINE: ICD-10-CM

## 2023-05-12 PROCEDURE — 3077F SYST BP >= 140 MM HG: CPT | Performed by: INTERNAL MEDICINE

## 2023-05-12 PROCEDURE — 3079F DIAST BP 80-89 MM HG: CPT | Performed by: INTERNAL MEDICINE

## 2023-05-12 PROCEDURE — 99214 OFFICE O/P EST MOD 30 MIN: CPT | Performed by: INTERNAL MEDICINE

## 2023-05-12 PROCEDURE — 3017F COLORECTAL CA SCREEN DOC REV: CPT | Performed by: INTERNAL MEDICINE

## 2023-05-12 PROCEDURE — G8417 CALC BMI ABV UP PARAM F/U: HCPCS | Performed by: INTERNAL MEDICINE

## 2023-05-12 PROCEDURE — 4004F PT TOBACCO SCREEN RCVD TLK: CPT | Performed by: INTERNAL MEDICINE

## 2023-05-12 PROCEDURE — G8427 DOCREV CUR MEDS BY ELIG CLIN: HCPCS | Performed by: INTERNAL MEDICINE

## 2023-05-12 PROCEDURE — 3023F SPIROM DOC REV: CPT | Performed by: INTERNAL MEDICINE

## 2023-05-12 ASSESSMENT — ENCOUNTER SYMPTOMS
SHORTNESS OF BREATH: 1
COUGH: 0
GASTROINTESTINAL NEGATIVE: 1
CHEST TIGHTNESS: 0
HOARSE VOICE: 0
HEARTBURN: 0
DIFFICULTY BREATHING: 0
HEMOPTYSIS: 0
EYES NEGATIVE: 1
SORE THROAT: 0
SPUTUM PRODUCTION: 0
RHINORRHEA: 0
WHEEZING: 0
TROUBLE SWALLOWING: 0
FREQUENT THROAT CLEARING: 0
ALLERGIC/IMMUNOLOGIC NEGATIVE: 1

## 2023-05-12 ASSESSMENT — COPD QUESTIONNAIRES: COPD: 1

## 2023-05-12 NOTE — PATIENT INSTRUCTIONS
ASSESSMENT/PLAN:  1. Pulmonary emphysema, unspecified emphysema type ([address]  2. Chronic respiratory failure with hypoxia and hypercapnia (HCC)  3. Tobacco abuse  4. Dependence on enabling machine  5. Class 2 severe obesity due to excess calories with serious comorbidity and body mass index (BMI) of 35.0 to 35.9 in adult [facility])      COPD  Continue with  Symbicort 160/4.52 puffs twice daily  Spiriva Respimat 2 puffs once daily  Albuterol as needed, inhalers  Albuterol nebulizer as needed  Oxygen at 2 lpm , now using only at night    Chronic respiratory failure  Astral set in ivaps AE mode, height 68 inches, TVA 8.0 L/min, minimum PS 10, max PS 30, rise 300, rate 20, trigger medium, TI min 0.5, TI max 1.5, cycle 25%, EPAP min 10, max EPAP 15  With 2 lpm of oxygen    Using 93% since getting astral  Time 7 hours/day  AHI 0.3  Leak at 4.4    90% epap of 10.4  90% PS of 20.9    MV 11.1    Will follow with renal panels, will consider abg later  Last HCO3 was 7/6/22 was 26  HCO3 on 12/12/22 was 30    DME medical service company- may need to change DME  I have access via [address] [place] [address],[address] to be benefiting from astral therapy, [facility]      Tobacco abuse  Needs to stop smokinig    LDCT done 1/10/23    IMPRESSION:  Emphysema with a few tiny punctate noncalcified pulmonary nodules     Mild-to-moderate coronary artery calcification     LUNG RADS:  Per ACR Lung-RADS Version 1.1     Lung-RADS 2 - Benign ()     Management:  12 month screening LDCT      Will plan on ct scan in Jan 2023    Obesity  Weight today was 251      GERD  Continue with   Protonix      CHF  On   Lasix     Last echo  Done 3/2021  Conclusions      Summary   Technically difficult examination. Normal left ventricle systolic function with an estimated ejection fraction   of 55%. No regional wall motion abnormalities are seen. Normal left   ventricular diastolic filling pressure. The right atrium is mildly dilated.    Trace mitral and tricuspid

## 2023-05-12 NOTE — PROGRESS NOTES
Tasha Sanderson (:  1960) is a 65 y.o. male,Established patient, here for evaluation of the following chief complaint(s):  Follow-up (4 month) and COPD         ASSESSMENT/PLAN:  1. Pulmonary emphysema, unspecified emphysema type ([address]  2. Chronic respiratory failure with hypoxia and hypercapnia (HCC)  3. Tobacco abuse  4. Dependence on enabling machine  5. Class 2 severe obesity due to excess calories with serious comorbidity and body mass index (BMI) of 35.0 to 35.9 in [facility])      COPD  Continue with  Symbicort 160/4.52 puffs twice daily  Spiriva Respimat 2 puffs once daily  Albuterol as needed, inhalers  Albuterol nebulizer as needed  Oxygen at 2 lpm , now using only at night    Chronic respiratory failure  Astral set in ivaps AE mode, height 68 inches, TVA 8.0 L/min, minimum PS 10, max PS 30, rise 300, rate 20, trigger medium, TI min 0.5, TI max 1.5, cycle 25%, EPAP min 10, max EPAP 15  With 2 lpm of oxygen    Using 93% since getting astral  Time 7 hours/day  AHI 0.3  Leak at 4.4    90% epap of 10.4  90% PS of 20.9    MV 11.1    Will follow with renal panels, will consider abg later  Last HCO3 was 22 was 26  HCO3 on 22 was 30    DME medical service company- may need to change DME  I have access via [address] [place] [address],[address] to be benefiting from astral therapy, [facility]      Tobacco abuse  Needs to stop smokinig    LDCT done 1/10/23    IMPRESSION:  Emphysema with a few tiny punctate noncalcified pulmonary nodules     Mild-to-moderate coronary artery calcification     LUNG RADS:  Per ACR Lung-RADS Version 1.1     Lung-RADS 2 - Benign ()     Management:  12 month screening LDCT      Will plan on ct scan in 2023    Obesity  Weight today was 251      GERD  Continue with   Protonix      CHF  On   Lasix     Last echo  Done 3/2021  Conclusions      Summary   Technically difficult examination. Normal left ventricle systolic function with an estimated ejection fraction   of 55%.  No regional

## 2023-05-12 NOTE — PROGRESS NOTES
MA Communication:   The following orders are received by verbal communication from Alayna Bowden MD    Orders include:  3 Month f/u

## 2023-08-11 RX ORDER — BUDESONIDE AND FORMOTEROL FUMARATE DIHYDRATE 160; 4.5 UG/1; UG/1
AEROSOL RESPIRATORY (INHALATION)
Qty: 10.2 G | Refills: 0 | Status: SHIPPED | OUTPATIENT
Start: 2023-08-11

## 2023-08-11 NOTE — TELEPHONE ENCOUNTER
Last office visit 5/12/2023     Last written 4/17/2023     Next office visit scheduled 8/30/2023    Requested Prescriptions     Pending Prescriptions Disp Refills    budesonide-formoterol (SYMBICORT) 160-4.5 MCG/ACT AERO [Pharmacy Med Name: BUDESONIDE/FORM 160/4.5MCG(120 INH)] 10.2 g      Sig: INHALE 2 PUFFS BY MOUTH TWICE DAILY

## 2023-08-29 ENCOUNTER — OFFICE VISIT (OUTPATIENT)
Dept: PULMONOLOGY | Age: 63
End: 2023-08-29
Payer: MEDICAID

## 2023-08-29 VITALS
WEIGHT: 246 LBS | HEIGHT: 70 IN | RESPIRATION RATE: 16 BRPM | DIASTOLIC BLOOD PRESSURE: 89 MMHG | TEMPERATURE: 98.2 F | OXYGEN SATURATION: 98 % | HEART RATE: 72 BPM | BODY MASS INDEX: 35.22 KG/M2 | SYSTOLIC BLOOD PRESSURE: 139 MMHG

## 2023-08-29 DIAGNOSIS — Z99.89 DEPENDENCE ON ENABLING MACHINE: ICD-10-CM

## 2023-08-29 DIAGNOSIS — K21.9 GASTROESOPHAGEAL REFLUX DISEASE WITHOUT ESOPHAGITIS: ICD-10-CM

## 2023-08-29 DIAGNOSIS — Z72.0 TOBACCO ABUSE: ICD-10-CM

## 2023-08-29 DIAGNOSIS — I50.9 CONGESTIVE HEART FAILURE, UNSPECIFIED HF CHRONICITY, UNSPECIFIED HEART FAILURE TYPE (HCC): ICD-10-CM

## 2023-08-29 DIAGNOSIS — J96.12 CHRONIC RESPIRATORY FAILURE WITH HYPOXIA AND HYPERCAPNIA (HCC): Primary | ICD-10-CM

## 2023-08-29 DIAGNOSIS — Z87.891 PERSONAL HISTORY OF TOBACCO USE: ICD-10-CM

## 2023-08-29 DIAGNOSIS — E66.01 CLASS 2 SEVERE OBESITY DUE TO EXCESS CALORIES WITH SERIOUS COMORBIDITY AND BODY MASS INDEX (BMI) OF 35.0 TO 35.9 IN ADULT (HCC): ICD-10-CM

## 2023-08-29 DIAGNOSIS — J43.9 PULMONARY EMPHYSEMA, UNSPECIFIED EMPHYSEMA TYPE (HCC): ICD-10-CM

## 2023-08-29 DIAGNOSIS — J96.11 CHRONIC RESPIRATORY FAILURE WITH HYPOXIA AND HYPERCAPNIA (HCC): Primary | ICD-10-CM

## 2023-08-29 PROCEDURE — 3017F COLORECTAL CA SCREEN DOC REV: CPT | Performed by: INTERNAL MEDICINE

## 2023-08-29 PROCEDURE — 3023F SPIROM DOC REV: CPT | Performed by: INTERNAL MEDICINE

## 2023-08-29 PROCEDURE — 3075F SYST BP GE 130 - 139MM HG: CPT | Performed by: INTERNAL MEDICINE

## 2023-08-29 PROCEDURE — 99214 OFFICE O/P EST MOD 30 MIN: CPT | Performed by: INTERNAL MEDICINE

## 2023-08-29 PROCEDURE — 4004F PT TOBACCO SCREEN RCVD TLK: CPT | Performed by: INTERNAL MEDICINE

## 2023-08-29 PROCEDURE — G8417 CALC BMI ABV UP PARAM F/U: HCPCS | Performed by: INTERNAL MEDICINE

## 2023-08-29 PROCEDURE — G0296 VISIT TO DETERM LDCT ELIG: HCPCS | Performed by: INTERNAL MEDICINE

## 2023-08-29 PROCEDURE — G8427 DOCREV CUR MEDS BY ELIG CLIN: HCPCS | Performed by: INTERNAL MEDICINE

## 2023-08-29 PROCEDURE — 3079F DIAST BP 80-89 MM HG: CPT | Performed by: INTERNAL MEDICINE

## 2023-08-29 RX ORDER — RESPIRATORY SYNCYTIAL VISUS VACCINE RECOMBINANT, ADJUVANTED 120MCG/0.5
0.5 KIT INTRAMUSCULAR ONCE
Qty: 1 EACH | Refills: 0 | Status: SHIPPED | OUTPATIENT
Start: 2023-08-29 | End: 2023-08-29

## 2023-08-29 RX ORDER — OMEPRAZOLE 40 MG/1
40 CAPSULE, DELAYED RELEASE ORAL
Qty: 90 CAPSULE | Refills: 1 | Status: SHIPPED | OUTPATIENT
Start: 2023-08-29

## 2023-08-29 RX ORDER — ALBUTEROL SULFATE 90 UG/1
2 AEROSOL, METERED RESPIRATORY (INHALATION) 4 TIMES DAILY PRN
Qty: 6 EACH | Refills: 4 | Status: SHIPPED | OUTPATIENT
Start: 2023-08-29

## 2023-08-29 RX ORDER — BUDESONIDE AND FORMOTEROL FUMARATE DIHYDRATE 160; 4.5 UG/1; UG/1
2 AEROSOL RESPIRATORY (INHALATION) 2 TIMES DAILY
Qty: 3 EACH | Refills: 3 | Status: SHIPPED | OUTPATIENT
Start: 2023-08-29

## 2023-08-29 ASSESSMENT — ENCOUNTER SYMPTOMS
HOARSE VOICE: 0
ALLERGIC/IMMUNOLOGIC NEGATIVE: 1
GASTROINTESTINAL NEGATIVE: 1
COUGH: 0
SPUTUM PRODUCTION: 0
DIFFICULTY BREATHING: 0
FREQUENT THROAT CLEARING: 0
SHORTNESS OF BREATH: 1
TROUBLE SWALLOWING: 0
SORE THROAT: 0
HEARTBURN: 0
CHEST TIGHTNESS: 0
HEMOPTYSIS: 0
RHINORRHEA: 0
WHEEZING: 0
EYES NEGATIVE: 1

## 2023-08-29 ASSESSMENT — COPD QUESTIONNAIRES: COPD: 1

## 2023-08-29 NOTE — PATIENT INSTRUCTIONS
smokers. That means people with a smoking history of at least 20 pack years. A pack year is a way to measure how heavy a smoker you are or were. To figure out your pack years, multiply how many packs a day on average (assuming 20 cigarettes per pack) you have smoked by how many years you have smoked. For example: If you smoked 1 pack a day for 20 years, that's 1 times 20. So you have a smoking history of 20 pack years. If you smoked 2 packs a day for 10 years, that's 2 times 10. So you have a smoking history of 20 pack years. Experts agree that screening is for people who have a high risk of lung cancer. But experts don't agree on what high risk means. Some say people age 48 or older with at least a 20-pack-year smoking history are high risk. Others say it's people age 54 or older with a 30-pack-year history. To see if you could benefit from screening, first find out if you are at high risk for lung cancer. Your doctor can help you decide your lung cancer risk. What are the risks of screening? CT screening for lung cancer isn't perfect. It can show an abnormal result when it turns out there wasn't any cancer. This is called a false-positive result. This means you may need more tests to make sure you don't have cancer. These tests can be harmful and cause a lot of worry. These tests may include more CT scans and invasive testing like a lung biopsy. In a biopsy, the doctor takes a sample of tissue from inside your lung so it can be looked at under a microscope. A biopsy is the only way to tell if you have lung cancer. If the biopsy finds cancer, you and your doctor will have to decide how or whether to treat it. Some lung cancers found on CT scans are harmless and would not have caused a problem if they had not been found through screening. But because doctors can't tell which ones will turn out to be harmless, most will be treated.  This means that you may get treatment--including surgery, radiation, or

## 2023-09-18 RX ORDER — BUDESONIDE AND FORMOTEROL FUMARATE DIHYDRATE 160; 4.5 UG/1; UG/1
2 AEROSOL RESPIRATORY (INHALATION) 2 TIMES DAILY
Qty: 30.6 G | OUTPATIENT
Start: 2023-09-18

## 2023-10-28 ENCOUNTER — HOSPITAL ENCOUNTER (EMERGENCY)
Age: 63
Discharge: HOME | End: 2023-10-28
Attending: EMERGENCY MEDICINE
Payer: MEDICAID

## 2023-10-28 ENCOUNTER — APPOINTMENT (OUTPATIENT)
Dept: GENERAL RADIOLOGY | Age: 63
End: 2023-10-28
Payer: MEDICAID

## 2023-10-28 VITALS
HEIGHT: 70 IN | WEIGHT: 232.5 LBS | DIASTOLIC BLOOD PRESSURE: 94 MMHG | SYSTOLIC BLOOD PRESSURE: 149 MMHG | HEART RATE: 80 BPM | OXYGEN SATURATION: 93 % | RESPIRATION RATE: 18 BRPM | TEMPERATURE: 98.1 F | BODY MASS INDEX: 33.28 KG/M2

## 2023-10-28 DIAGNOSIS — J96.21 ACUTE ON CHRONIC RESPIRATORY FAILURE WITH HYPOXIA (HCC): ICD-10-CM

## 2023-10-28 DIAGNOSIS — J18.9 PNEUMONIA DUE TO INFECTIOUS ORGANISM, UNSPECIFIED LATERALITY, UNSPECIFIED PART OF LUNG: Primary | ICD-10-CM

## 2023-10-28 DIAGNOSIS — J44.1 COPD EXACERBATION (HCC): ICD-10-CM

## 2023-10-28 LAB
ALBUMIN SERPL-MCNC: 3.8 G/DL (ref 3.4–5)
ALBUMIN/GLOB SERPL: 1.1 {RATIO} (ref 1.1–2.2)
ALP SERPL-CCNC: 108 U/L (ref 40–129)
ALT SERPL-CCNC: 28 U/L (ref 10–40)
ANION GAP SERPL CALCULATED.3IONS-SCNC: 11 MMOL/L (ref 3–16)
AST SERPL-CCNC: 18 U/L (ref 15–37)
BASE EXCESS BLDV CALC-SCNC: -0.3 MMOL/L (ref -3–3)
BASOPHILS # BLD: 0.1 K/UL (ref 0–0.2)
BASOPHILS NFR BLD: 0.3 %
BILIRUB SERPL-MCNC: 0.8 MG/DL (ref 0–1)
BUN SERPL-MCNC: 16 MG/DL (ref 7–20)
CALCIUM SERPL-MCNC: 9.3 MG/DL (ref 8.3–10.6)
CHLORIDE SERPL-SCNC: 100 MMOL/L (ref 99–110)
CO2 BLDV-SCNC: 27 MMOL/L
CO2 SERPL-SCNC: 27 MMOL/L (ref 21–32)
COHGB MFR BLDV: 3.4 % (ref 0–1.5)
CREAT SERPL-MCNC: 1 MG/DL (ref 0.8–1.3)
DEPRECATED RDW RBC AUTO: 13.9 % (ref 12.4–15.4)
EKG ATRIAL RATE: 80 BPM
EKG DIAGNOSIS: NORMAL
EKG P AXIS: 65 DEGREES
EKG P-R INTERVAL: 144 MS
EKG Q-T INTERVAL: 358 MS
EKG QRS DURATION: 78 MS
EKG QTC CALCULATION (BAZETT): 412 MS
EKG R AXIS: 24 DEGREES
EKG T AXIS: 28 DEGREES
EKG VENTRICULAR RATE: 80 BPM
EOSINOPHIL # BLD: 0.1 K/UL (ref 0–0.6)
EOSINOPHIL NFR BLD: 0.5 %
FLUAV RNA RESP QL NAA+PROBE: NOT DETECTED
FLUBV RNA RESP QL NAA+PROBE: NOT DETECTED
GFR SERPLBLD CREATININE-BSD FMLA CKD-EPI: >60 ML/MIN/{1.73_M2}
GLUCOSE SERPL-MCNC: 109 MG/DL (ref 70–99)
HCO3 BLDV-SCNC: 25.6 MMOL/L (ref 23–29)
HCT VFR BLD AUTO: 40.8 % (ref 40.5–52.5)
HGB BLD-MCNC: 13.7 G/DL (ref 13.5–17.5)
LACTATE BLDV-SCNC: 0.7 MMOL/L (ref 0.4–1.9)
LYMPHOCYTES # BLD: 1.8 K/UL (ref 1–5.1)
LYMPHOCYTES NFR BLD: 12.1 %
MCH RBC QN AUTO: 32.3 PG (ref 26–34)
MCHC RBC AUTO-ENTMCNC: 33.5 G/DL (ref 31–36)
MCV RBC AUTO: 96.5 FL (ref 80–100)
METHGB MFR BLDV: 0.2 %
MONOCYTES # BLD: 1.5 K/UL (ref 0–1.3)
MONOCYTES NFR BLD: 9.7 %
NEUTROPHILS # BLD: 11.7 K/UL (ref 1.7–7.7)
NEUTROPHILS NFR BLD: 77.4 %
O2 THERAPY: ABNORMAL
PCO2 BLDV: 46.1 MMHG (ref 40–50)
PH BLDV: 7.36 [PH] (ref 7.35–7.45)
PLATELET # BLD AUTO: 286 K/UL (ref 135–450)
PMV BLD AUTO: 9.1 FL (ref 5–10.5)
PO2 BLDV: 42.1 MMHG (ref 25–40)
POTASSIUM SERPL-SCNC: 3.7 MMOL/L (ref 3.5–5.1)
PROT SERPL-MCNC: 7.4 G/DL (ref 6.4–8.2)
RBC # BLD AUTO: 4.23 M/UL (ref 4.2–5.9)
SAO2 % BLDV: 78 %
SARS-COV-2 RNA RESP QL NAA+PROBE: NOT DETECTED
SODIUM SERPL-SCNC: 138 MMOL/L (ref 136–145)
WBC # BLD AUTO: 15.1 K/UL (ref 4–11)

## 2023-10-28 PROCEDURE — 94640 AIRWAY INHALATION TREATMENT: CPT

## 2023-10-28 PROCEDURE — 82803 BLOOD GASES ANY COMBINATION: CPT

## 2023-10-28 PROCEDURE — 80053 COMPREHEN METABOLIC PANEL: CPT

## 2023-10-28 PROCEDURE — 93005 ELECTROCARDIOGRAM TRACING: CPT | Performed by: PHYSICIAN ASSISTANT

## 2023-10-28 PROCEDURE — 93010 ELECTROCARDIOGRAM REPORT: CPT | Performed by: INTERNAL MEDICINE

## 2023-10-28 PROCEDURE — 87636 SARSCOV2 & INF A&B AMP PRB: CPT

## 2023-10-28 PROCEDURE — 85025 COMPLETE CBC W/AUTO DIFF WBC: CPT

## 2023-10-28 PROCEDURE — 2580000003 HC RX 258: Performed by: PHYSICIAN ASSISTANT

## 2023-10-28 PROCEDURE — 99285 EMERGENCY DEPT VISIT HI MDM: CPT

## 2023-10-28 PROCEDURE — 96375 TX/PRO/DX INJ NEW DRUG ADDON: CPT

## 2023-10-28 PROCEDURE — 96366 THER/PROPH/DIAG IV INF ADDON: CPT

## 2023-10-28 PROCEDURE — 71046 X-RAY EXAM CHEST 2 VIEWS: CPT

## 2023-10-28 PROCEDURE — 87040 BLOOD CULTURE FOR BACTERIA: CPT

## 2023-10-28 PROCEDURE — 6370000000 HC RX 637 (ALT 250 FOR IP): Performed by: PHYSICIAN ASSISTANT

## 2023-10-28 PROCEDURE — 96368 THER/DIAG CONCURRENT INF: CPT

## 2023-10-28 PROCEDURE — 6360000002 HC RX W HCPCS: Performed by: PHYSICIAN ASSISTANT

## 2023-10-28 PROCEDURE — 96365 THER/PROPH/DIAG IV INF INIT: CPT

## 2023-10-28 PROCEDURE — 83605 ASSAY OF LACTIC ACID: CPT

## 2023-10-28 RX ORDER — AMOXICILLIN AND CLAVULANATE POTASSIUM 875; 125 MG/1; MG/1
1 TABLET, FILM COATED ORAL 2 TIMES DAILY
Qty: 14 TABLET | Refills: 0 | Status: SHIPPED | OUTPATIENT
Start: 2023-10-28 | End: 2023-11-04

## 2023-10-28 RX ORDER — METHYLPREDNISOLONE SODIUM SUCCINATE 125 MG/2ML
125 INJECTION, POWDER, LYOPHILIZED, FOR SOLUTION INTRAMUSCULAR; INTRAVENOUS ONCE
Status: COMPLETED | OUTPATIENT
Start: 2023-10-28 | End: 2023-10-28

## 2023-10-28 RX ORDER — IPRATROPIUM BROMIDE AND ALBUTEROL SULFATE 2.5; .5 MG/3ML; MG/3ML
2 SOLUTION RESPIRATORY (INHALATION) ONCE
Status: COMPLETED | OUTPATIENT
Start: 2023-10-28 | End: 2023-10-28

## 2023-10-28 RX ORDER — 0.9 % SODIUM CHLORIDE 0.9 %
1000 INTRAVENOUS SOLUTION INTRAVENOUS ONCE
Status: COMPLETED | OUTPATIENT
Start: 2023-10-28 | End: 2023-10-28

## 2023-10-28 RX ORDER — AZITHROMYCIN 250 MG/1
250 TABLET, FILM COATED ORAL SEE ADMIN INSTRUCTIONS
Qty: 6 TABLET | Refills: 0 | Status: SHIPPED | OUTPATIENT
Start: 2023-10-28 | End: 2023-11-02

## 2023-10-28 RX ORDER — METHYLPREDNISOLONE 4 MG/1
TABLET ORAL
Qty: 1 KIT | Refills: 0 | Status: SHIPPED | OUTPATIENT
Start: 2023-10-28 | End: 2023-11-03

## 2023-10-28 RX ADMIN — AZITHROMYCIN MONOHYDRATE 500 MG: 500 INJECTION, POWDER, LYOPHILIZED, FOR SOLUTION INTRAVENOUS at 16:03

## 2023-10-28 RX ADMIN — IPRATROPIUM BROMIDE AND ALBUTEROL SULFATE 2 DOSE: 2.5; .5 SOLUTION RESPIRATORY (INHALATION) at 12:25

## 2023-10-28 RX ADMIN — CEFTRIAXONE SODIUM 1000 MG: 1 INJECTION, POWDER, FOR SOLUTION INTRAMUSCULAR; INTRAVENOUS at 14:34

## 2023-10-28 RX ADMIN — SODIUM CHLORIDE 1000 ML: 9 INJECTION, SOLUTION INTRAVENOUS at 14:33

## 2023-10-28 RX ADMIN — METHYLPREDNISOLONE SODIUM SUCCINATE 125 MG: 125 INJECTION INTRAMUSCULAR; INTRAVENOUS at 13:06

## 2023-10-28 ASSESSMENT — PAIN - FUNCTIONAL ASSESSMENT
PAIN_FUNCTIONAL_ASSESSMENT: NONE - DENIES PAIN

## 2023-10-28 NOTE — ED PROVIDER NOTES
Emergency Department Attending Provider Note  Location: [address] [place] [address]  ED  10/28/2023     Patient Identification  Hoda Branch is a 59 y.o. male      Hoda Branch was evaluated in the Emergency Department for shortness of breath and acute on chronic hypoxic respiratory failure. History of COPD. Eleonora Bang Although initial history and physical exam information was obtained by ANASTASIYA/NPP/MD/ (who also dictated a record of this visit), I personally saw the patient and performed a substantive portion of the visit including all aspects of the medical decision making. EKG Interpretation  Rhythm is normal sinus  Rate is 80  Axis is normal  No STEMI criteria  Qtc is 412      Patient seen and evaluated. Relevant records reviewed. Patient here with symptoms noted above. Currently requiring 4 L by nasal cannula to maintain O2 saturations. His work appears consistent with pneumonia as streaky opacity in the retrocardial space, as well as a secondary COPD exacerbation. .  He has a leukocytosis as well. Will be treated for community-acquired pneumonia. EKG is without acute process identified. Does not sound consistent with ACS dissection or PE.      I, Dr. Lizzette Ya, am the primary clinician of record. I personally saw the patient and independently provided 0 minutes of non-concurrent critical care out of the total shared critical care time provided. This chart was generated in part by using Dragon Dictation system and may contain errors related to that system including errors in grammar, punctuation, and spelling, as well as words and phrases that may be inappropriate. If there are any questions or concerns please feel free to contact the dictating provider for clarification.      Kassi Persaud MD   [employer]        Kassi Persaud MD  10/28/23 0569

## 2023-10-28 NOTE — DISCHARGE INSTRUCTIONS
You were seen in the emergency department today for shortness of breath and cough. You are found to have pneumonia. I have prescribed antibiotics and steroid. If you develop any new or worsening symptoms please return to the emergency department. Wear oxygen to maintain oxygen saturation above 90%. You may wean down as you feel better, but please follow up with your primary care physician on Monday. Return to the emergency department for any new or worsening symptoms.

## 2023-10-28 NOTE — ED PROVIDER NOTES
[facility]  ED  EMERGENCY DEPARTMENT ENCOUNTER        Pt Name: Brennan Maier  MRN: [id number]  [facility] [facility] 1960  Date of evaluation: 10/28/2023  Provider: GABRIELA Sharpe  PCP: Jacklyn Villegas MD  Note Started: 3:09 PM EDT 10/28/23       I have seen and evaluated this patient with my supervising physician Kristina Ramírez, [address] [place] [address] [address]       Chief Complaint   Patient presents with    Shortness of Breath     Has been sick for over a week, flu like symptoms. States O2 was in 80s at home. Coughing up phlegm. O2 84% in triage. Placed on 4L. Hx COPD>        HISTORY OF PRESENT ILLNESS: 1 or more Elements     History from : Patient    Limitations to history : None    Brennan Maier is a 59 y.o. male who presents to the emergency department today for shortness of breath. Patient has history of COPD he does wear oxygen at night while sleeping. He states over the past week he has had flulike symptoms. Has been taking over-the-counter Mucinex and cold and flu medication. Today he checked his oxygen and found it to be in the 80s. He states he is having a productive cough. Upon arrival he was 84% on room air and placed on 4 L of oxygen. He is using a nebulizer at home and his normal inherent layers including Spiriva. Denies any fevers. He states several years ago he was hospitalized for pneumonia. Denies any chest pain. Denies any abdominal pain, nausea or vomiting. Nursing Notes were all reviewed and agreed with or any disagreements were addressed in the HPI. REVIEW OF SYSTEMS :      Review of Systems    Positives and Pertinent negatives as per HPI. SURGICAL HISTORY   No past surgical history on file.     CURRENTMEDICATIONS       Previous Medications    ALBUTEROL SULFATE HFA (PROVENTIL;VENTOLIN;PROAIR) 108 (90 BASE) MCG/ACT INHALER    INHALE 2 PUFFS INTO THE LUNGS FOUR TIMES DAILY AS NEEDED FOR WHEEZING    ALBUTEROL SULFATE HFA (VENTOLIN HFA) 108 (90 BASE) to infectious organism, unspecified laterality, unspecified part of lung    2. COPD exacerbation ([address])    3. Acute on chronic respiratory failure with hypoxia [facility])          DISPOSITION/PLAN     DISPOSITION Decision To Admit 10/28/2023 03:09:37 PM      PATIENT REFERRED TO:  No follow-up provider specified.     DISCHARGE MEDICATIONS:  New Prescriptions    No medications on file       DISCONTINUED MEDICATIONS:  Discontinued Medications    No medications on file              (Please note that portions of this note were completed with a voice recognition program.  Efforts were made to edit the dictations but occasionally words are mis-transcribed.)    GABRIELA Bearden (electronically signed)           Deysi Fleming, [address] [place] [address]  10/28/23 2287

## 2023-10-29 LAB
BACTERIA BLD CULT ORG #2: NORMAL
BACTERIA BLD CULT: NORMAL

## 2023-10-31 ENCOUNTER — TELEPHONE (OUTPATIENT)
Dept: PULMONOLOGY | Age: 63
End: 2023-10-31

## 2023-10-31 NOTE — TELEPHONE ENCOUNTER
Aliyah Go was in the ED with a COPD exacerbation. Please call him to schedule fu in the next 2 weeks with myself or Dr. Luis Ferguson.

## 2023-11-01 LAB
BACTERIA BLD CULT ORG #2: NORMAL
BACTERIA BLD CULT: NORMAL

## 2024-02-07 ENCOUNTER — OFFICE VISIT (OUTPATIENT)
Dept: PULMONOLOGY | Age: 64
End: 2024-02-07
Payer: MEDICAID

## 2024-02-07 VITALS
HEART RATE: 68 BPM | DIASTOLIC BLOOD PRESSURE: 98 MMHG | HEIGHT: 70 IN | BODY MASS INDEX: 35.36 KG/M2 | RESPIRATION RATE: 16 BRPM | OXYGEN SATURATION: 93 % | SYSTOLIC BLOOD PRESSURE: 173 MMHG | WEIGHT: 247 LBS | TEMPERATURE: 97.8 F

## 2024-02-07 DIAGNOSIS — Z72.0 TOBACCO ABUSE: ICD-10-CM

## 2024-02-07 DIAGNOSIS — J96.12 CHRONIC RESPIRATORY FAILURE WITH HYPOXIA AND HYPERCAPNIA (HCC): Primary | ICD-10-CM

## 2024-02-07 DIAGNOSIS — J96.11 CHRONIC RESPIRATORY FAILURE WITH HYPOXIA AND HYPERCAPNIA (HCC): Primary | ICD-10-CM

## 2024-02-07 DIAGNOSIS — E66.01 CLASS 2 SEVERE OBESITY DUE TO EXCESS CALORIES WITH SERIOUS COMORBIDITY AND BODY MASS INDEX (BMI) OF 35.0 TO 35.9 IN ADULT (HCC): ICD-10-CM

## 2024-02-07 DIAGNOSIS — Z87.891 PERSONAL HISTORY OF TOBACCO USE: ICD-10-CM

## 2024-02-07 DIAGNOSIS — J43.9 PULMONARY EMPHYSEMA, UNSPECIFIED EMPHYSEMA TYPE (HCC): ICD-10-CM

## 2024-02-07 DIAGNOSIS — Z99.89 DEPENDENCE ON ENABLING MACHINE: ICD-10-CM

## 2024-02-07 DIAGNOSIS — K21.9 GASTROESOPHAGEAL REFLUX DISEASE WITHOUT ESOPHAGITIS: ICD-10-CM

## 2024-02-07 PROCEDURE — 3080F DIAST BP >= 90 MM HG: CPT | Performed by: INTERNAL MEDICINE

## 2024-02-07 PROCEDURE — D1320 PR TOBACCO CNSL CONTROL&PREVENTION ORAL DISEASE: HCPCS | Performed by: INTERNAL MEDICINE

## 2024-02-07 PROCEDURE — 99214 OFFICE O/P EST MOD 30 MIN: CPT | Performed by: INTERNAL MEDICINE

## 2024-02-07 PROCEDURE — 3023F SPIROM DOC REV: CPT | Performed by: INTERNAL MEDICINE

## 2024-02-07 PROCEDURE — G8427 DOCREV CUR MEDS BY ELIG CLIN: HCPCS | Performed by: INTERNAL MEDICINE

## 2024-02-07 PROCEDURE — 3017F COLORECTAL CA SCREEN DOC REV: CPT | Performed by: INTERNAL MEDICINE

## 2024-02-07 PROCEDURE — G8484 FLU IMMUNIZE NO ADMIN: HCPCS | Performed by: INTERNAL MEDICINE

## 2024-02-07 PROCEDURE — 4004F PT TOBACCO SCREEN RCVD TLK: CPT | Performed by: INTERNAL MEDICINE

## 2024-02-07 PROCEDURE — G0296 VISIT TO DETERM LDCT ELIG: HCPCS | Performed by: INTERNAL MEDICINE

## 2024-02-07 PROCEDURE — 3077F SYST BP >= 140 MM HG: CPT | Performed by: INTERNAL MEDICINE

## 2024-02-07 PROCEDURE — G8417 CALC BMI ABV UP PARAM F/U: HCPCS | Performed by: INTERNAL MEDICINE

## 2024-02-07 RX ORDER — RESPIRATORY SYNCYTIAL VISUS VACCINE RECOMBINANT, ADJUVANTED 120MCG/0.5
0.5 KIT INTRAMUSCULAR ONCE
Qty: 1 EACH | Refills: 0 | Status: SHIPPED | OUTPATIENT
Start: 2024-02-07 | End: 2024-02-07

## 2024-02-07 RX ORDER — GUAIFENESIN 600 MG/1
600 TABLET, EXTENDED RELEASE ORAL 2 TIMES DAILY
Qty: 60 TABLET | Refills: 4 | Status: SHIPPED | OUTPATIENT
Start: 2024-02-07 | End: 2024-03-08

## 2024-02-07 ASSESSMENT — ENCOUNTER SYMPTOMS
EYES NEGATIVE: 1
WHEEZING: 0
TROUBLE SWALLOWING: 0
HEARTBURN: 0
RHINORRHEA: 0
GASTROINTESTINAL NEGATIVE: 1
CHEST TIGHTNESS: 0
HOARSE VOICE: 0
SPUTUM PRODUCTION: 0
FREQUENT THROAT CLEARING: 0
SORE THROAT: 0
COUGH: 0
HEMOPTYSIS: 0
ALLERGIC/IMMUNOLOGIC NEGATIVE: 1
DIFFICULTY BREATHING: 0
SHORTNESS OF BREATH: 1

## 2024-02-07 ASSESSMENT — COPD QUESTIONNAIRES: COPD: 1

## 2024-02-07 NOTE — PATIENT INSTRUCTIONS
ASSESSMENT/PLAN:  1. Chronic respiratory failure with hypoxia and hypercapnia (HCC)  2. Dependence on enabling machine  3. Tobacco abuse  4. Pulmonary emphysema, unspecified emphysema type (HCC)  5. Gastroesophageal reflux disease without esophagitis  6. Class 2 severe obesity due to excess calories with serious comorbidity and body mass index (BMI) of 35.0 to 35.9 in adult (HCC)      COPD  Continue with  Symbicort 160/4.5  2 puffs twice daily  Spiriva Respimat 2 puffs once daily  Albuterol as needed, inhalers  Albuterol nebulizer as needed  Oxygen at 2 lpm , now using only at night      Ok to use this if needed  Mucinex/guaifenesin    Right now the cost of sprivia and symbicort are covered well  Will continue with them  Alternatively could consider  Trelegy or Breztri      6 MWT  Done in office on 8/29/23  Showed desat to 87% at 2 minutes with recovery with oxygen at 2lpm to 92%  Will need to continue with oxygen at night and with exertion        Chronic respiratory failure  Astral set in ivaps AE mode, height 68 inches, TVA 8.0 L/min, minimum PS 10, max PS 30, rise 300, rate 20, trigger medium, TI min 0.5, TI max 1.5, cycle 25%, EPAP min 10, max EPAP 15  With 2 lpm of oxygen- will need to continue with this    Using 97% since getting astral  Time 9.5 hours/day  AHI 1.0  Leak at 0.1    90% epap of 10.3  90% PS of 20.6    MV 13.6    Will follow with renal panels, will consider abg later  Last HCO3 was 7/6/22 was 26  HCO3 on 12/12/22 was 30    DME medical service company  I have access via [employer]  Seems to be benefiting from astral therapy, [facility]      Tobacco abuse  Needs to stop smokinig    LDCT done 1/10/23    IMPRESSION:  Emphysema with a few tiny punctate noncalcified pulmonary nodules     Mild-to-moderate coronary artery calcification     LUNG RADS:  Per ACR Lung-RADS Version 1.1     Lung-RADS 2 - Benign (v2022)     Management:  12 month screening LDCT        Low dose ct scan in Jan

## 2024-02-07 NOTE — PROGRESS NOTES
MA Communication:  The following orders are received by verbal communication from Korey Alexander MD    Orders include:  LDCT now, f/u with another provider here in 6 months    
ear pain, hoarse voice, rhinorrhea, sneezing, sore throat and trouble swallowing.    Eyes: Negative.    Respiratory:  Positive for shortness of breath. Negative for cough, hemoptysis, sputum production and wheezing.    Cardiovascular: Negative.  Negative for chest pain and dyspnea on exertion.   Gastrointestinal: Negative.  Negative for heartburn.   Endocrine: Negative.    Genitourinary: Negative.    Musculoskeletal: Negative.  Negative for myalgias.   Skin: Negative.    Allergic/Immunologic: Negative.    Neurological: Negative.    Hematological: Negative.    Psychiatric/Behavioral: Negative.           Vitals:    02/07/24 1056   BP: (!) 173/98   Site: Left Lower Arm   Position: Sitting   Cuff Size: Medium Adult   Pulse: 68   Resp: 16   Temp: 97.8 °F (36.6 °C)   TempSrc: Temporal   SpO2: 93%   Weight: 112 kg (247 lb)   Height: 1.778 m (5' 10\")           Objective   Physical Exam  Vitals and nursing note reviewed.   Constitutional:       General: He is not in acute distress.     Appearance: Normal appearance. He is not ill-appearing.   HENT:      Head: Normocephalic and atraumatic.      Right Ear: External ear normal.      Left Ear: External ear normal.      Nose: Nose normal.      Mouth/Throat:      Mouth: Mucous membranes are moist.      Pharynx: Oropharynx is clear.      Comments: Mallampati 3  Eyes:      General: No scleral icterus.     Extraocular Movements: Extraocular movements intact.      Conjunctiva/sclera: Conjunctivae normal.      Pupils: Pupils are equal, round, and reactive to light.   Cardiovascular:      Rate and Rhythm: Normal rate and regular rhythm.      Pulses: Normal pulses.      Heart sounds: Normal heart sounds. No murmur heard.     No friction rub.   Pulmonary:      Effort: No respiratory distress.      Breath sounds: No stridor. Rhonchi present. No wheezing or rales.      Comments: Dec bs b/l  Chest:      Chest wall: No tenderness.   Abdominal:      General: Abdomen is flat. Bowel sounds are

## 2024-02-21 ENCOUNTER — HOSPITAL ENCOUNTER (OUTPATIENT)
Dept: CT IMAGING | Age: 64
Discharge: HOME | End: 2024-02-21
Payer: MEDICAID

## 2024-02-21 DIAGNOSIS — Z87.891 PERSONAL HISTORY OF TOBACCO USE: ICD-10-CM

## 2024-02-21 PROCEDURE — 71271 CT THORAX LUNG CANCER SCR C-: CPT

## 2024-02-22 ENCOUNTER — TELEPHONE (OUTPATIENT)
Dept: PULMONOLOGY | Age: 64
End: 2024-02-22

## 2024-02-22 NOTE — TELEPHONE ENCOUNTER
Low-dose CT scan done  IMPRESSION:  Stable emphysema with unchanged 1-2 mm punctate pulmonary nodules.  No new or  suspicious nodule     Coronary artery disease.     LUNG RADS:  Lung-RADS 2 - Benign (v2022)     Management:  12 month screening LDCT         Please inform the patient that the lung nodules are stable and unchanged, would need to have a repeat low-dose CT scan in 1 year

## 2024-03-11 NOTE — ED NOTES
Bed: 02  Expected date:   Expected time:   Means of arrival:   Comments:  Medic Chris [facility], [facility]  08/31/21 9849 Statement Selected

## 2024-05-02 ENCOUNTER — TELEPHONE (OUTPATIENT)
Dept: PULMONOLOGY | Age: 64
End: 2024-05-02

## 2024-05-02 NOTE — TELEPHONE ENCOUNTER
Called Pt to reschedule his appointment in August with Dr. Berrios, after rescheduling him he informed me that he needed refills on his medication that Dr. Alexander had prescribed.       budesonide-formoterol (SYMBICORT) 160-4.5 MCG/ACT AERO     tiotropium (SPIRIVA RESPIMAT) 2.5 MCG/ACT AERS inhaler     albuterol sulfate HFA (VENTOLIN HFA) 108 (90 Base) MCG/ACT inhaler     omeprazole (PRILOSEC) 40 MG delayed release capsule

## 2024-05-03 RX ORDER — OMEPRAZOLE 40 MG/1
40 CAPSULE, DELAYED RELEASE ORAL
Qty: 90 CAPSULE | Refills: 1 | Status: SHIPPED | OUTPATIENT
Start: 2024-05-03

## 2024-05-03 RX ORDER — BUDESONIDE AND FORMOTEROL FUMARATE DIHYDRATE 160; 4.5 UG/1; UG/1
2 AEROSOL RESPIRATORY (INHALATION) 2 TIMES DAILY
Qty: 3 EACH | Refills: 1 | Status: SHIPPED | OUTPATIENT
Start: 2024-05-03

## 2024-05-03 RX ORDER — ALBUTEROL SULFATE 90 UG/1
2 AEROSOL, METERED RESPIRATORY (INHALATION) 4 TIMES DAILY PRN
Qty: 6 EACH | Refills: 1 | Status: SHIPPED | OUTPATIENT
Start: 2024-05-03

## 2024-08-01 ENCOUNTER — APPOINTMENT (OUTPATIENT)
Dept: GENERAL RADIOLOGY | Age: 64
End: 2024-08-01
Payer: MEDICAID

## 2024-08-01 ENCOUNTER — HOSPITAL ENCOUNTER (EMERGENCY)
Age: 64
Discharge: HOME | End: 2024-08-01
Payer: MEDICAID

## 2024-08-01 VITALS
RESPIRATION RATE: 18 BRPM | HEIGHT: 71 IN | HEART RATE: 71 BPM | DIASTOLIC BLOOD PRESSURE: 66 MMHG | SYSTOLIC BLOOD PRESSURE: 106 MMHG | BODY MASS INDEX: 32.2 KG/M2 | OXYGEN SATURATION: 91 % | WEIGHT: 230 LBS | TEMPERATURE: 98.6 F

## 2024-08-01 DIAGNOSIS — S81.012A LACERATION OF LEFT KNEE, INITIAL ENCOUNTER: Primary | ICD-10-CM

## 2024-08-01 PROCEDURE — 12004 RPR S/N/AX/GEN/TRK7.6-12.5CM: CPT

## 2024-08-01 PROCEDURE — 73560 X-RAY EXAM OF KNEE 1 OR 2: CPT

## 2024-08-01 PROCEDURE — 99283 EMERGENCY DEPT VISIT LOW MDM: CPT

## 2024-08-01 ASSESSMENT — PAIN SCALES - GENERAL: PAINLEVEL_OUTOF10: 8

## 2024-08-01 ASSESSMENT — PAIN DESCRIPTION - LOCATION: LOCATION: KNEE

## 2024-08-01 ASSESSMENT — PAIN - FUNCTIONAL ASSESSMENT: PAIN_FUNCTIONAL_ASSESSMENT: 0-10

## 2024-08-01 ASSESSMENT — PAIN DESCRIPTION - ORIENTATION: ORIENTATION: LEFT

## 2024-08-02 NOTE — ED NOTES
Pt ambulated out of ER NAD resp e/u on RA site to left knee appears clean/dry/wrapped, d/c with visitor, discharged by other RN

## 2024-08-03 NOTE — ED PROVIDER NOTES
Wheezing (2 inhalers/month), Disp-6 each, R-1Normal      omeprazole (PRILOSEC) 40 MG delayed release capsule Take 1 capsule by mouth every morning (before breakfast), Disp-90 capsule, R-1Normal      !! albuterol sulfate HFA (PROVENTIL;VENTOLIN;PROAIR) 108 (90 Base) MCG/ACT inhaler INHALE 2 PUFFS INTO THE LUNGS FOUR TIMES DAILY AS NEEDED FOR WHEEZING, Disp-54 g, R-5Normal      buPROPion (WELLBUTRIN XL) 150 MG extended release tablet Historical Med      ezetimibe (ZETIA) 10 MG tablet Historical Med      lisinopril (PRINIVIL;ZESTRIL) 20 MG tablet TAKE 1/2 TABLET BY MOUTH TWICE DAILYHistorical Med      nystatin (MYCOSTATIN) 403205 UNIT/ML suspension Take 5 mLs by mouth 4 times daily Take for 2 days after symptoms resolve. Do not eat or drink for 15-20 min after use., Oral, 4 TIMES DAILY Starting Thu 1/12/2023, Disp-180 mL, R-0, Normal      meloxicam (MOBIC) 15 MG tablet Take 1 tablet by mouth daily, Disp-30 tablet, R-0Normal      carvedilol (COREG) 12.5 MG tablet Take 1 tablet by mouth 2 times daily (with meals), Disp-60 tablet, R-3Normal      amLODIPine (NORVASC) 10 MG tablet Take 1 tablet by mouth daily, Disp-30 tablet, R-3Normal      nicotine (NICODERM CQ) 21 MG/24HR Place 1 patch onto the skin daily, Disp-30 patch, R-0Normal       !! - Potential duplicate medications found. Please discuss with provider.          ALLERGIES     Patient has no known allergies.    FAMILYHISTORY     No family history on file.     SOCIAL HISTORY       Social History     Tobacco Use    Smoking status: Every Day     Current packs/day: 1.00     Average packs/day: 1 pack/day for 48.0 years (48.0 ttl pk-yrs)     Types: Cigarettes     Start date: 7/24/1976    Smokeless tobacco: Never   Vaping Use    Vaping Use: Former    Substances: Nicotine   Substance Use Topics    Alcohol use: Not Currently     Alcohol/week: 42.0 standard drinks of alcohol     Types: 42 Cans of beer per week     Comment: Quit earlier this year \"around february\"    Drug use:  FINDINGS: Two views were obtained the left knee.  Soft tissue laceration is seen in the suprapatellar region best appreciated on the lateral view.  Several punctate 1 mm densities are seen within the soft tissues of the anterior knee.  No gross fracture or dislocation.  Calcification of the interosseous ligament.     No gross fracture. Soft tissue laceration in the suprapatellar region.  Several small punctate densities are seen within the soft tissues adjacent, likely calcifications though debris cannot be excluded.  Correlate with physical exam.       No results found.    PROCEDURES   Unless otherwise noted below, none     Procedures  PROCEDURE:  LACERATION REPAIR  Donato Alvarenga or their surrogate had an opportunity to ask questions, and the risks, benefits, and alternatives were discussed. The laceration is 8cm in length.The wound was prepped and draped to maintain a sterile field. A local anesthetic was used to completely anesthetize the wound. It was copiously irrigated. It was explored to its depth in a bloodless field with no sign of tendon, nerve, or vascular injury. No foreign bodies were identified. It was closed with 4-0 ethilon. There were no complications during the procedure.   CRITICAL CARE TIME (.cctime)       PAST MEDICAL HISTORY      has a past medical history of COPD (chronic obstructive pulmonary disease) (HCC) and Hypertension.     EMERGENCY DEPARTMENT COURSE and DIFFERENTIAL DIAGNOSIS/MDM:   Vitals:    Vitals:    08/01/24 1814   BP: 106/66   Pulse: 71   Resp: 18   Temp: 98.6 °F (37 °C)   TempSrc: Oral   SpO2: 91%   Weight: 104.3 kg (230 lb)   Height: 1.803 m (5' 11\")       Patient was given the following medications:  Medications - No data to display          Is this patient to be included in the SEP-1 Core Measure due to severe sepsis or septic shock?   No   Exclusion criteria - the patient is NOT to be included for SEP-1 Core Measure due to:  Infection is not suspected    Chronic

## 2024-08-04 DIAGNOSIS — J44.1 COPD EXACERBATION (HCC): Primary | ICD-10-CM

## 2024-08-05 RX ORDER — TIOTROPIUM BROMIDE INHALATION SPRAY 3.12 UG/1
2 SPRAY, METERED RESPIRATORY (INHALATION) DAILY
Qty: 12 G | Refills: 0 | Status: SHIPPED | OUTPATIENT
Start: 2024-08-05

## 2024-08-05 NOTE — TELEPHONE ENCOUNTER
Last office visit 2/7/2024     Last written 5/3/24    Next office visit scheduled 8/20/2024    Requested Prescriptions     Pending Prescriptions Disp Refills    SPIRIVA RESPIMAT 2.5 MCG/ACT AERS inhaler [Pharmacy Med Name: SPIRIVA RESPIMAT 2.5MCG INH 4GM 60D] 12 g 2     Sig: INHALE 2 PUFFS INTO THE LUNGS DAILY           Med Pending

## 2024-08-20 ENCOUNTER — OFFICE VISIT (OUTPATIENT)
Dept: PULMONOLOGY | Age: 64
End: 2024-08-20

## 2024-08-20 VITALS
HEIGHT: 71 IN | HEART RATE: 69 BPM | WEIGHT: 244.6 LBS | SYSTOLIC BLOOD PRESSURE: 139 MMHG | RESPIRATION RATE: 16 BRPM | DIASTOLIC BLOOD PRESSURE: 85 MMHG | TEMPERATURE: 98 F | BODY MASS INDEX: 34.24 KG/M2 | OXYGEN SATURATION: 96 %

## 2024-08-20 DIAGNOSIS — Z99.89 DEPENDENCE ON ENABLING MACHINE: ICD-10-CM

## 2024-08-20 DIAGNOSIS — F17.218 CIGARETTE NICOTINE DEPENDENCE WITH OTHER NICOTINE-INDUCED DISORDER: Primary | ICD-10-CM

## 2024-08-20 DIAGNOSIS — J44.1 COPD EXACERBATION (HCC): ICD-10-CM

## 2024-08-20 DIAGNOSIS — Z87.891 PERSONAL HISTORY OF TOBACCO USE: ICD-10-CM

## 2024-08-20 RX ORDER — GUAIFENESIN 600 MG/1
600 TABLET, EXTENDED RELEASE ORAL 2 TIMES DAILY PRN
Qty: 30 TABLET | Refills: 3 | Status: SHIPPED | OUTPATIENT
Start: 2024-08-20 | End: 2024-10-19

## 2024-08-20 RX ORDER — FUROSEMIDE 20 MG/1
20 TABLET ORAL DAILY
COMMUNITY
Start: 2024-06-11

## 2024-08-20 RX ORDER — AMLODIPINE BESYLATE 5 MG/1
TABLET ORAL
COMMUNITY
Start: 2024-08-05

## 2024-08-20 RX ORDER — LISINOPRIL 10 MG/1
10 TABLET ORAL 2 TIMES DAILY
COMMUNITY
Start: 2024-08-04

## 2024-08-20 RX ORDER — VARENICLINE TARTRATE 0.5 MG/1
.5-1 TABLET, FILM COATED ORAL SEE ADMIN INSTRUCTIONS
Qty: 160 TABLET | Refills: 0 | Status: SHIPPED | OUTPATIENT
Start: 2024-08-20

## 2024-08-20 RX ORDER — ALBUTEROL SULFATE 2.5 MG/3ML
2.5 SOLUTION RESPIRATORY (INHALATION) EVERY 6 HOURS PRN
Qty: 120 EACH | Refills: 3 | Status: SHIPPED | OUTPATIENT
Start: 2024-08-20

## 2024-08-20 ASSESSMENT — SLEEP AND FATIGUE QUESTIONNAIRES

## 2024-08-20 NOTE — PROGRESS NOTES
MA Communication:  The following orders are received by verbal communication from Theodore Berrios MD    Orders include:    CT lung screen end of Feb  Follow up 1 week after    
and talking to someone 0   Sitting quietly after a lunch without alcohol 0   In a car, while stopped for a few minutes in traffic 0   [place] Sleepiness Score 0       Physical Exam:  General appearance: In no apparent distress.  HEENT: Moist mucus membranes.  Mallampati score: III  Cardiac: Normal S1 and S2.  Lungs: Good air entry bilaterally.  Abdomen: Soft.  Back & Extremities: Symmetric pulses with good perfusion.  Neurological: No focal deficit.      ________________________________________________________  Orders Placed This Encounter   • CT LUNG SCREENING     Standing Status:   Future     Standing Expiration Date:   2/20/2026     Order Specific Question:   Is there documentation of shared decision making?     Answer:   Yes     Order Specific Question:   Does the patient show any signs or symptoms of lung cancer?     Answer:   No     Order Specific Question:   Is this the first (baseline) CT or an annual exam?     Answer:   Annual [2]     Order Specific Question:   Is this a low dose CT or a routine CT?     Answer:   Low Dose CT [1]     Order Specific Question:   Smoking Status?     Answer:   Every Day [1]     Order Specific Question:   Smoking packs per day?     Answer:   1     Order Specific Question:   Years smoking?     Answer:   48     Order Specific Question:   Pack Years     Answer:   48   • furosemide (LASIX) 20 MG tablet     Sig: Take 1 tablet by mouth daily   • amLODIPine (NORVASC) 5 MG tablet   • lisinopril (PRINIVIL;ZESTRIL) 10 MG tablet     Sig: Take 1 tablet by mouth 2 times daily   • albuterol (PROVENTIL) (2.5 MG/3ML) 0.083% nebulizer solution     Sig: Take 3 mLs by nebulization every 6 hours as needed for Wheezing     Dispense:  120 each     Refill:  3   • varenicline (CHANTIX) 0.5 MG tablet     Sig: Take 1-2 tablets by mouth See Admin Instructions 0.5mg DAILY for 3 days followed by 0.5mg TWICE DAILY for 4 days followed by 1mg TWICE DAILY for 11 weeks     Dispense:  160 tablet     Refill:  0   •

## 2024-08-20 NOTE — PATIENT INSTRUCTIONS
it.  Some lung cancers found on CT scans are harmless and would not have caused a problem if they had not been found through screening. But because doctors can't tell which ones will turn out to be harmless, most will be treated. This means that you may get treatment--including surgery, radiation, or chemotherapy--that you don't need.  There is a risk of damage to cells or tissue from being exposed to radiation, including the small amounts used in CTs, X-rays, and other medical tests. Over time, exposure to radiation may cause cancer and other health problems. But in most cases, the risk of getting cancer from being exposed to small amounts of radiation is low. It's not a reason to avoid these tests for most people.  What are the benefits of screening?  Your scan may be normal (negative).  For some people who are at higher risk, screening lowers the chance of dying of lung cancer. How much and how long you smoked helps to determine your risk level. Screening can find some cancers early, when treatment may be more likely to work.  What happens after screening?  The results of your CT scan will be sent to your doctor. Someone from your care team will explain the results of your scan and answer any questions you may have. If you need any follow-up, he or she will help you understand what to do next.  After a lung cancer screening, you can go back to your usual activities right away.  A lung cancer screening test can't tell if you have lung cancer. If your results are positive, your doctor can't tell whether an abnormal finding is a harmless nodule, cancer, or something else without doing more tests.  What can you do to help prevent lung cancer?  Some lung cancers can't be prevented. But if you smoke, quitting smoking is the best step you can take to prevent lung cancer. If you want to quit, your doctor can recommend medicines or other ways to help.  Follow-up care is a key part of your treatment and safety. Be sure to

## 2024-08-29 ENCOUNTER — TELEPHONE (OUTPATIENT)
Dept: PULMONOLOGY | Age: 64
End: 2024-08-29

## 2024-08-29 DIAGNOSIS — J96.11 CHRONIC RESPIRATORY FAILURE WITH HYPOXIA AND HYPERCAPNIA (HCC): Primary | ICD-10-CM

## 2024-08-29 DIAGNOSIS — J96.12 CHRONIC RESPIRATORY FAILURE WITH HYPOXIA AND HYPERCAPNIA (HCC): Primary | ICD-10-CM

## 2024-08-29 DIAGNOSIS — J44.9 COPD, SEVERE (HCC): ICD-10-CM

## 2024-08-29 NOTE — TELEPHONE ENCOUNTER
Steven concepcion/[employer] calling asking for testing done 6 months ago for oxygen and office notes from past 6 months to be faxed to them.  F#[phone]

## 2024-08-29 NOTE — TELEPHONE ENCOUNTER
Spoke with Dalton from [facility] and he informed me that we don't need new testing, but we do need a new script for this patient's O2 and we will send over the OV notes from 8/20. Please place order.

## 2024-09-25 NOTE — DISCHARGE INSTRUCTIONS
Wound Care Center Physician Orders and Discharge Instructions  [facility]  [address], [address]  [place] [id number]  Telephone: [phone]      Fax: [phone]      Your home care company:  *** .    Your wound-care supplies will be provided by:  *** .    NAME:  Donato Alvarenga   YOB: 1960  PRIMARY DIAGNOSIS FOR WOUND CARE CENTER:  Non healing wound- Trauma .    Wound cleansing:   Do not scrub or use excessive force.  Wash hands with soap and water before and after dressing changes.  Prior to applying a clean dressing, cleanse wound with normal saline, wound cleanser, or mild soap and water. Ask your physician or nurse before getting the wound(s) wet in the shower.     Wound care for home:    KNEE    Please note, all wounds (unless stated otherwise here) were mechanically debrided at the time of cleansing here in the wound-care center today, so a small amount of pain, drainage or bleeding from that process might be expected, and is normal.     All products for home use, including multiple products for a single wound if applicable, are medically necessary in order to achieve the best chance at timely wound healing. See provider documentation for details if needed.    Substituted dressings applied in the [facility] today, if applicable:    ***    New orders for this week (labs, imaging, medications, etc.):    ***    Additional instructions for specific diagnoses:    ***    F/U Appointment is with Dr. Raygoza in 1 week, on                                   at                       .     Your nurse  is ALEN Chen.     If we applied slip-resistant hospital socks today, be sure to remove them at least once a day to inspect your toes or feet, even if you're not changing the wraps or dressings underneath. If you see anything concerning (redness, excess moisture, etc), please call and let us know right away.    Should you experience any significant changes in your

## 2024-10-01 ENCOUNTER — HOSPITAL ENCOUNTER (OUTPATIENT)
Dept: WOUND CARE | Age: 64
Discharge: HOME | End: 2024-10-01

## 2024-10-31 DIAGNOSIS — J44.1 COPD EXACERBATION (HCC): ICD-10-CM

## 2024-10-31 RX ORDER — TIOTROPIUM BROMIDE INHALATION SPRAY 3.12 UG/1
2 SPRAY, METERED RESPIRATORY (INHALATION) DAILY
Qty: 12 G | Refills: 5 | Status: SHIPPED | OUTPATIENT
Start: 2024-10-31

## 2024-10-31 NOTE — TELEPHONE ENCOUNTER
Pharmacy requesting refill of Spiriva.   Last seen 8/20/24. Next OV 3/11/25.  Last ordered 8/5/24 w/ 0 refills.   Order pended if appropriate.

## 2025-02-26 ENCOUNTER — HOSPITAL ENCOUNTER (OUTPATIENT)
Dept: CT IMAGING | Age: 65
Discharge: HOME | End: 2025-02-26
Attending: INTERNAL MEDICINE
Payer: MEDICAID

## 2025-02-26 DIAGNOSIS — F17.218 CIGARETTE NICOTINE DEPENDENCE WITH OTHER NICOTINE-INDUCED DISORDER: ICD-10-CM

## 2025-02-26 PROCEDURE — 71271 CT THORAX LUNG CANCER SCR C-: CPT

## 2025-03-12 ENCOUNTER — RESULTS FOLLOW-UP (OUTPATIENT)
Dept: CT IMAGING | Age: 65
End: 2025-03-12

## 2025-03-14 DIAGNOSIS — J44.9 COPD WITHOUT EXACERBATION (HCC): Primary | ICD-10-CM

## 2025-03-17 RX ORDER — BUDESONIDE AND FORMOTEROL FUMARATE DIHYDRATE 160; 4.5 UG/1; UG/1
2 AEROSOL RESPIRATORY (INHALATION) 2 TIMES DAILY
Qty: 10.2 G | Refills: 0 | Status: SHIPPED | OUTPATIENT
Start: 2025-03-17

## 2025-04-07 NOTE — PROGRESS NOTES
PULMONARY CLINIC NOTE      Donato Alvarenga   : 1960  MRN: [id number]     Date of Service: 2025    PCP: Jhoan Chamberlain MD    Referring provider: No ref. provider found      Chief Complaint   Patient presents with    Results     CT           ASSESSMENT & PLAN       64 y.o. pleasant  male patient with:    Assessment:  Severe COPD with emphysema  Chronic hypoxic and hypercapnic respiratory failure. Dependent on enabling machine \"astral AVAPS AE with 2 L of oxygen bled in.\"  Small punctate pulmonary nodules  Nicotine dependence.  48 PY. Quit date: Pending.  LDCT: Indicated.  Alcohol use.  4-5 beers a day during the week  Metabolic syndrome: HTN, HLD, body habitus.     Plan:              Continue Symbicort and Spiriva.  Wash mouth after use  Albuterol nebulizer ordered to use as needed and before anticipated exertion  Continue AVAPS machine with 2 L of oxygen at bedtime  Will request AVAPS report review.  Patient reports no issues.  Smoking cessation counseling provided.  Failed Chantix, Wellbutrin and nicotine replacement.  Mucinex refill ordered to use as needed for congestion  Patient cannot join pulmonary rehab with his commitment to his current job and no help   Reviewed CAT scan findings from February and reassured patient.  Low-dose CT scan for lung cancer screening in 2026  Follow-up in 6 months      Return in about 6 months (around 10/11/2025) for COPD.     I spent total of 25 minutes on this encounter on physical exam, chart review, interpreting labs and images, coordinating care, medical documentation and counseling the patient on diagnosis listed above.     Subjective/Objective     Interval History: Encounter 2025  []Since labs that the patient completed low-dose CT scan for lung cancer screening on 2025 that showed no suspicious nodules with aortic and coronary calcification.  Follow-up CT scan in 12 months recommended.  Patient continues to smoke about a

## 2025-04-11 ENCOUNTER — TELEMEDICINE (OUTPATIENT)
Dept: PULMONOLOGY | Age: 65
End: 2025-04-11
Payer: MEDICAID

## 2025-04-11 DIAGNOSIS — J96.12 CHRONIC RESPIRATORY FAILURE WITH HYPOXIA AND HYPERCAPNIA: ICD-10-CM

## 2025-04-11 DIAGNOSIS — Z99.89 DEPENDENCE ON ENABLING MACHINE: ICD-10-CM

## 2025-04-11 DIAGNOSIS — J44.9 COPD WITHOUT EXACERBATION (HCC): Primary | ICD-10-CM

## 2025-04-11 DIAGNOSIS — J96.11 CHRONIC RESPIRATORY FAILURE WITH HYPOXIA AND HYPERCAPNIA: ICD-10-CM

## 2025-04-11 PROCEDURE — 3023F SPIROM DOC REV: CPT | Performed by: INTERNAL MEDICINE

## 2025-04-11 PROCEDURE — G8427 DOCREV CUR MEDS BY ELIG CLIN: HCPCS | Performed by: INTERNAL MEDICINE

## 2025-04-11 PROCEDURE — 3017F COLORECTAL CA SCREEN DOC REV: CPT | Performed by: INTERNAL MEDICINE

## 2025-04-11 PROCEDURE — G8417 CALC BMI ABV UP PARAM F/U: HCPCS | Performed by: INTERNAL MEDICINE

## 2025-04-11 PROCEDURE — 4004F PT TOBACCO SCREEN RCVD TLK: CPT | Performed by: INTERNAL MEDICINE

## 2025-04-11 PROCEDURE — G2211 COMPLEX E/M VISIT ADD ON: HCPCS | Performed by: INTERNAL MEDICINE

## 2025-04-11 PROCEDURE — 99215 OFFICE O/P EST HI 40 MIN: CPT | Performed by: INTERNAL MEDICINE

## 2025-04-11 NOTE — PATIENT INSTRUCTIONS
[]  []      Health Maintenance/Preventive measures:        >>  Avoid smoking, vaping or secondhand exposure.  Avoid exposure to irritants, allergens as possible as well as contact with patients with infectious respiratory illness.        >>  Stay up-to-date with influenza & pneumonia vaccines, RSV, & COVID-19 vaccine as recommended by the Advisory Committee on Immunization Practices (ACIP)        >>  Healthy diet and activity as able.        >>  Acid reflux precautions: Head of bed elevation, avoiding tight clothes, avoiding big meals or snacking 3 hours before bedtime, targeting healthy weight.        >>  Practice sleep hygiene measures. Avoid driving or operating heavy machines if tired or sleepy.

## 2025-04-11 NOTE — PROGRESS NOTES
MA Communication:  The following orders are received by verbal communication from Theodore Berrios MD    Orders include:    6 month

## 2025-05-02 DIAGNOSIS — J44.9 COPD WITHOUT EXACERBATION (HCC): ICD-10-CM

## 2025-05-02 RX ORDER — BUDESONIDE AND FORMOTEROL FUMARATE DIHYDRATE 160; 4.5 UG/1; UG/1
2 AEROSOL RESPIRATORY (INHALATION) 2 TIMES DAILY
Qty: 10.2 G | Refills: 4 | Status: SHIPPED | OUTPATIENT
Start: 2025-05-02

## 2025-06-19 NOTE — PROGRESS NOTES
Hospitalist Progress Note    Date of Admission: 8/31/2021    Chief Complaint: SOB    Hospital Course: Admitted with acute on chronic respiratory failure with hypercapnia and metabolic encephalopathy secondary to COPD exacerbation (baseline home oxygen 4 LPM) and Strep pneumonia treated with ceftriaxone and azithromycin. Complicated by alcohol withdrawal refractory to CIWA directed lorazepam requiring transfer to ICU overnight and IV dexmedetomidine. Subjective: SOB resolving. O2 requirement nearing baseline (3.5L PRN). Now more agreeable to NIV at home. Labs:   Recent Labs     09/05/21 0455 09/06/21 0515   WBC 11.7* 12.6*   HGB 16.3 15.4   HCT 49.1 47.1    273     Recent Labs     09/05/21 0455 09/06/21 0515    143   K 3.8 3.2*   CL 96* 97*   CO2 36* 37*   BUN 26* 55*   CREATININE 0.7* 1.4*   CALCIUM 9.6 9.6   PHOS 4.2 4.1     No results for input(s): AST, ALT, BILIDIR, BILITOT, ALKPHOS in the last 72 hours. No results for input(s): INR in the last 72 hours. Physical Exam Performed:    /74   Pulse 85   Temp 97.9 °F (36.6 °C) (Oral)   Resp 24   Ht 5' 10\" (1.778 m)   Wt 246 lb 11.2 oz (111.9 kg)   SpO2 92%   BMI 35.40 kg/m²     General appearance: No apparent distress, appears stated age and cooperative. HEENT: Pupils equal, round, and reactive to light. Conjunctivae/corneas clear. Neck: Supple, no jugular venous distention. Trachea midline with full range of motion. Respiratory:  Normal respiratory effort. Clear to auscultation, bilaterally without Rales/Wheezes/Rhonchi. Cardiovascular: Regular rate and rhythm with normal S1/S2 without murmurs, rubs or gallops. Abdomen: Soft, non-tender, non-distended with normal bowel sounds. Musculoskelatal: No clubbing, cyanosis or edema bilaterally. Full range of motion without deformity. Neurologic:  Neurovascularly intact without any focal sensory/motor deficits.  Cranial nerves: II-XII intact, grossly non-focal.  Psychiatric: Alert and oriented, thought content appropriate, normal insight  Skin: Skin color, texture, turgor normal.  No rashes or lesions. Capillary Refill: Brisk,< 3 seconds   Peripheral Pulses: +2 palpable, equal bilaterally       Assessment/Plan:    Active Hospital Problems    Diagnosis     HTN (hypertension) [I10]     GERD (gastroesophageal reflux disease) [K21.9]     BPH (benign prostatic hyperplasia) [N40.0]     Hx of chronic respiratory failure [Z87.09]     Obesity [E66.9]     Alcohol use [Z72.89]     Tobacco abuse [Z72.0]     Acute respiratory failure with hypoxia ([facility] [address] [J96.01]     COPD (chronic obstructive pulmonary disease) ([facility]) [J44.9]      Acute on chronic respiratory failure with hypercapnia and metabolic encephalopathy secondary to COPD exacerbation (baseline home oxygen 4 LPM) and Strep pneumonia treated with ceftriaxone and azithromycin. Continues on methylprednisolone 40 mg IV every 12 hours. Symbicort (budesonide-formoterol) 2-puff BID and Duonebs (ipratropium-albuterol) every 4 hours. Followed by pulmonary. O2 improving and nearing baseline. He is agreeable to NIV/AVAPS at home. COPD with CO2 retention on ABG resolving from high of 116 to 49, chronic CO2 retention with metabolic alkalosis. Unable to tolerated BiPAP, has improved on AVAPS per pulmonary. Most likely has restrictive lung disease. Alcohol withdrawal.  Improved. Can stop valium and taper off the Librium. No further need for CIWA. Gastric reflux and stress ulcer prophylaxis with pantoprazole (Protonix) 40 mg daily. Nicotine dependence on nicotine replacement therapy with 21 mg nicotine patch. Hypertension:  Continues on amlodipine 10 mg and lisinopril 5 mg daily. Prostate hypertrophy on tamsulosin 0.4 mg daily. Obstructive sleep apnea:  Has CPAP at home but not using. Morbid Obesity -  With Body mass index is 35.4 kg/m². Complicating assessment and treatment.  Placing patient stated

## 2025-06-26 DIAGNOSIS — J44.1 COPD EXACERBATION (HCC): ICD-10-CM

## 2025-06-26 NOTE — TELEPHONE ENCOUNTER
Patient called requesting a refill on spiriva.  Script pended for approval.    Last visit  04/11/2025  Next visit 10/14/2025